# Patient Record
Sex: FEMALE | Race: WHITE | Employment: OTHER | URBAN - METROPOLITAN AREA
[De-identification: names, ages, dates, MRNs, and addresses within clinical notes are randomized per-mention and may not be internally consistent; named-entity substitution may affect disease eponyms.]

---

## 2018-12-21 ENCOUNTER — HOSPITAL ENCOUNTER (OUTPATIENT)
Dept: MAMMOGRAPHY | Age: 73
Discharge: HOME | End: 2018-12-21
Attending: FAMILY MEDICINE
Payer: MEDICARE

## 2018-12-21 ENCOUNTER — HOSPITAL ENCOUNTER (OUTPATIENT)
Dept: CT IMAGING | Age: 73
Discharge: HOME | End: 2018-12-21
Attending: FAMILY MEDICINE
Payer: MEDICARE

## 2018-12-21 DIAGNOSIS — Z12.39 SCREENING BREAST EXAMINATION: ICD-10-CM

## 2018-12-21 DIAGNOSIS — F17.200 NICOTINE DEPENDENCE: ICD-10-CM

## 2018-12-21 DIAGNOSIS — Z87.891 PERSONAL HISTORY OF NICOTINE DEPENDENCE: ICD-10-CM

## 2018-12-21 DIAGNOSIS — Z12.2 ENCOUNTER FOR SCREENING FOR MALIGNANT NEOPLASM OF LUNG: ICD-10-CM

## 2018-12-21 PROCEDURE — G0297 LDCT FOR LUNG CA SCREEN: HCPCS

## 2018-12-21 PROCEDURE — 77063 BREAST TOMOSYNTHESIS BI: CPT

## 2019-07-11 ENCOUNTER — HOSPITAL ENCOUNTER (OUTPATIENT)
Dept: MRI IMAGING | Age: 74
Discharge: HOME | End: 2019-07-11
Attending: ORTHOPAEDIC SURGERY
Payer: MEDICARE

## 2019-07-11 DIAGNOSIS — M54.50 LOW BACK PAIN: ICD-10-CM

## 2019-07-11 PROCEDURE — 72148 MRI LUMBAR SPINE W/O DYE: CPT

## 2021-11-15 ENCOUNTER — HOSPITAL ENCOUNTER (INPATIENT)
Age: 76
LOS: 5 days | Discharge: HOME | DRG: 286 | End: 2021-11-20
Attending: EMERGENCY MEDICINE | Admitting: INTERNAL MEDICINE
Payer: MEDICARE

## 2021-11-15 ENCOUNTER — APPOINTMENT (OUTPATIENT)
Dept: GENERAL RADIOLOGY | Age: 76
DRG: 286 | End: 2021-11-15
Attending: EMERGENCY MEDICINE
Payer: MEDICARE

## 2021-11-15 ENCOUNTER — APPOINTMENT (OUTPATIENT)
Dept: NON INVASIVE DIAGNOSTICS | Age: 76
DRG: 286 | End: 2021-11-15
Attending: INTERNAL MEDICINE
Payer: MEDICARE

## 2021-11-15 DIAGNOSIS — I48.91 ATRIAL FIBRILLATION, UNSPECIFIED TYPE (HCC): Primary | ICD-10-CM

## 2021-11-15 DIAGNOSIS — R07.9 CHEST PAIN: ICD-10-CM

## 2021-11-15 DIAGNOSIS — I50.9 ACUTE ON CHRONIC CONGESTIVE HEART FAILURE, UNSPECIFIED HEART FAILURE TYPE (HCC): ICD-10-CM

## 2021-11-15 LAB
ALBUMIN SERPL-MCNC: 3.7 G/DL (ref 3.5–5)
ALBUMIN/GLOB SERPL: 0.9 {RATIO} (ref 1.1–2.2)
ALP SERPL-CCNC: 77 U/L (ref 45–117)
ALT SERPL-CCNC: 18 U/L (ref 12–78)
ANION GAP SERPL CALC-SCNC: 9 MMOL/L (ref 5–15)
AST SERPL-CCNC: 10 U/L (ref 15–37)
ATRIAL RATE: 68 BPM
BASOPHILS # BLD: 0.1 K/UL (ref 0–0.1)
BASOPHILS NFR BLD: 1 % (ref 0–1)
BILIRUB SERPL-MCNC: 0.8 MG/DL (ref 0.2–1)
BNP SERPL-MCNC: 2178 PG/ML
BUN SERPL-MCNC: 9 MG/DL (ref 6–20)
BUN/CREAT SERPL: 14 (ref 12–20)
CALCIUM SERPL-MCNC: 9.6 MG/DL (ref 8.5–10.1)
CALCULATED R AXIS, ECG10: 54 DEGREES
CALCULATED T AXIS, ECG11: 9 DEGREES
CHLORIDE SERPL-SCNC: 108 MMOL/L (ref 97–108)
CK SERPL-CCNC: 28 U/L (ref 26–192)
CO2 SERPL-SCNC: 24 MMOL/L (ref 21–32)
CREAT SERPL-MCNC: 0.65 MG/DL (ref 0.55–1.02)
DIAGNOSIS, 93000: NORMAL
DIFFERENTIAL METHOD BLD: ABNORMAL
EOSINOPHIL # BLD: 0.2 K/UL (ref 0–0.4)
EOSINOPHIL NFR BLD: 2 % (ref 0–7)
ERYTHROCYTE [DISTWIDTH] IN BLOOD BY AUTOMATED COUNT: 15.8 % (ref 11.5–14.5)
GLOBULIN SER CALC-MCNC: 4 G/DL (ref 2–4)
GLUCOSE SERPL-MCNC: 109 MG/DL (ref 65–100)
HCT VFR BLD AUTO: 38.8 % (ref 35–47)
HGB BLD-MCNC: 11.8 G/DL (ref 11.5–16)
IMM GRANULOCYTES # BLD AUTO: 0.1 K/UL (ref 0–0.04)
IMM GRANULOCYTES NFR BLD AUTO: 1 % (ref 0–0.5)
LYMPHOCYTES # BLD: 1.3 K/UL (ref 0.8–3.5)
LYMPHOCYTES NFR BLD: 14 % (ref 12–49)
MCH RBC QN AUTO: 25.8 PG (ref 26–34)
MCHC RBC AUTO-ENTMCNC: 30.4 G/DL (ref 30–36.5)
MCV RBC AUTO: 84.7 FL (ref 80–99)
MONOCYTES # BLD: 0.8 K/UL (ref 0–1)
MONOCYTES NFR BLD: 9 % (ref 5–13)
NEUTS SEG # BLD: 6.7 K/UL (ref 1.8–8)
NEUTS SEG NFR BLD: 73 % (ref 32–75)
NRBC # BLD: 0 K/UL (ref 0–0.01)
NRBC BLD-RTO: 0 PER 100 WBC
PLATELET # BLD AUTO: 372 K/UL (ref 150–400)
PMV BLD AUTO: 11.2 FL (ref 8.9–12.9)
POTASSIUM SERPL-SCNC: 3.6 MMOL/L (ref 3.5–5.1)
PROT SERPL-MCNC: 7.7 G/DL (ref 6.4–8.2)
Q-T INTERVAL, ECG07: 322 MS
QRS DURATION, ECG06: 86 MS
QTC CALCULATION (BEZET), ECG08: 455 MS
RBC # BLD AUTO: 4.58 M/UL (ref 3.8–5.2)
SODIUM SERPL-SCNC: 141 MMOL/L (ref 136–145)
TROPONIN-HIGH SENSITIVITY: 8 NG/L (ref 0–51)
TSH SERPL DL<=0.05 MIU/L-ACNC: 1.26 UIU/ML (ref 0.36–3.74)
VENTRICULAR RATE, ECG03: 120 BPM
WBC # BLD AUTO: 9.1 K/UL (ref 3.6–11)

## 2021-11-15 PROCEDURE — 94761 N-INVAS EAR/PLS OXIMETRY MLT: CPT

## 2021-11-15 PROCEDURE — 84484 ASSAY OF TROPONIN QUANT: CPT

## 2021-11-15 PROCEDURE — 74011000250 HC RX REV CODE- 250: Performed by: EMERGENCY MEDICINE

## 2021-11-15 PROCEDURE — 83880 ASSAY OF NATRIURETIC PEPTIDE: CPT

## 2021-11-15 PROCEDURE — 82550 ASSAY OF CK (CPK): CPT

## 2021-11-15 PROCEDURE — 36415 COLL VENOUS BLD VENIPUNCTURE: CPT

## 2021-11-15 PROCEDURE — 74011250637 HC RX REV CODE- 250/637: Performed by: INTERNAL MEDICINE

## 2021-11-15 PROCEDURE — 65660000000 HC RM CCU STEPDOWN

## 2021-11-15 PROCEDURE — 93306 TTE W/DOPPLER COMPLETE: CPT

## 2021-11-15 PROCEDURE — 96374 THER/PROPH/DIAG INJ IV PUSH: CPT

## 2021-11-15 PROCEDURE — 74011250636 HC RX REV CODE- 250/636: Performed by: EMERGENCY MEDICINE

## 2021-11-15 PROCEDURE — 96375 TX/PRO/DX INJ NEW DRUG ADDON: CPT

## 2021-11-15 PROCEDURE — 99284 EMERGENCY DEPT VISIT MOD MDM: CPT

## 2021-11-15 PROCEDURE — 93005 ELECTROCARDIOGRAM TRACING: CPT

## 2021-11-15 PROCEDURE — 85025 COMPLETE CBC W/AUTO DIFF WBC: CPT

## 2021-11-15 PROCEDURE — 71046 X-RAY EXAM CHEST 2 VIEWS: CPT

## 2021-11-15 PROCEDURE — 84443 ASSAY THYROID STIM HORMONE: CPT

## 2021-11-15 PROCEDURE — 80053 COMPREHEN METABOLIC PANEL: CPT

## 2021-11-15 RX ORDER — APIXABAN 5 MG/1
5 TABLET, FILM COATED ORAL 2 TIMES DAILY
COMMUNITY
Start: 2021-10-29 | End: 2022-04-15

## 2021-11-15 RX ORDER — SODIUM CHLORIDE 0.9 % (FLUSH) 0.9 %
5-40 SYRINGE (ML) INJECTION AS NEEDED
Status: DISCONTINUED | OUTPATIENT
Start: 2021-11-15 | End: 2021-11-20 | Stop reason: HOSPADM

## 2021-11-15 RX ORDER — SODIUM CHLORIDE 0.9 % (FLUSH) 0.9 %
5-40 SYRINGE (ML) INJECTION EVERY 8 HOURS
Status: DISCONTINUED | OUTPATIENT
Start: 2021-11-15 | End: 2021-11-20 | Stop reason: HOSPADM

## 2021-11-15 RX ORDER — FUROSEMIDE 10 MG/ML
40 INJECTION INTRAMUSCULAR; INTRAVENOUS DAILY
Status: DISPENSED | OUTPATIENT
Start: 2021-11-16 | End: 2021-11-19

## 2021-11-15 RX ORDER — METOPROLOL TARTRATE 5 MG/5ML
2.5 INJECTION INTRAVENOUS
Status: DISCONTINUED | OUTPATIENT
Start: 2021-11-15 | End: 2021-11-20 | Stop reason: HOSPADM

## 2021-11-15 RX ORDER — OMEPRAZOLE 20 MG/1
20 CAPSULE, DELAYED RELEASE ORAL DAILY
COMMUNITY
Start: 2021-09-29

## 2021-11-15 RX ORDER — CELECOXIB 200 MG/1
200 CAPSULE ORAL DAILY
COMMUNITY
Start: 2021-09-29

## 2021-11-15 RX ORDER — DILTIAZEM HYDROCHLORIDE 300 MG/1
300 CAPSULE, EXTENDED RELEASE ORAL DAILY
COMMUNITY
Start: 2021-11-12 | End: 2021-11-20

## 2021-11-15 RX ORDER — PANTOPRAZOLE SODIUM 40 MG/1
40 TABLET, DELAYED RELEASE ORAL
Status: DISCONTINUED | OUTPATIENT
Start: 2021-11-16 | End: 2021-11-20 | Stop reason: HOSPADM

## 2021-11-15 RX ORDER — DILTIAZEM HYDROCHLORIDE 5 MG/ML
10 INJECTION INTRAVENOUS
Status: COMPLETED | OUTPATIENT
Start: 2021-11-15 | End: 2021-11-15

## 2021-11-15 RX ORDER — TRAMADOL HYDROCHLORIDE 50 MG/1
100 TABLET ORAL 2 TIMES DAILY
Status: DISCONTINUED | OUTPATIENT
Start: 2021-11-15 | End: 2021-11-20 | Stop reason: HOSPADM

## 2021-11-15 RX ORDER — FUROSEMIDE 10 MG/ML
40 INJECTION INTRAMUSCULAR; INTRAVENOUS
Status: COMPLETED | OUTPATIENT
Start: 2021-11-15 | End: 2021-11-15

## 2021-11-15 RX ADMIN — APIXABAN 5 MG: 5 TABLET, FILM COATED ORAL at 17:21

## 2021-11-15 RX ADMIN — TRAMADOL HYDROCHLORIDE 100 MG: 50 TABLET, COATED ORAL at 17:20

## 2021-11-15 RX ADMIN — Medication 10 ML: at 22:12

## 2021-11-15 RX ADMIN — DILTIAZEM HYDROCHLORIDE 10 MG: 5 INJECTION INTRAVENOUS at 11:25

## 2021-11-15 RX ADMIN — FUROSEMIDE 40 MG: 10 INJECTION, SOLUTION INTRAMUSCULAR; INTRAVENOUS at 11:28

## 2021-11-15 NOTE — Clinical Note
Right brachial vein. Accessed successfully. Radial access needle used. Using ultrasound guidance.  Number of attempts =  1.

## 2021-11-15 NOTE — Clinical Note
Diagnostic catheter removed over exchange length wire. This document is complete and the patient is ready for discharge.

## 2021-11-15 NOTE — Clinical Note
Sheath #1: Sheath: inserted. Sheath inserted/placed in the right brachialcephalic  vein.  4/5 slender

## 2021-11-15 NOTE — Clinical Note
Right radial artery. Accessed successfully. Micropuncture needle used. Using ultrasound guidance. Number of attempts =  2.

## 2021-11-15 NOTE — Clinical Note
TRANSFER - OUT REPORT:     Verbal report given to: Chaparro Lantigua. Report consisted of patient's Situation, Background, Assessment and   Recommendations(SBAR). Opportunity for questions and clarification was provided. Patient transported with a Registered Nurse. Patient transported to: [facility].

## 2021-11-15 NOTE — Clinical Note
Aspirin Registry Question:   Aspirin was discussed with physician prior to the procedure. Aspirin was not given prior to the procedure.  Eliquis 11/17/21

## 2021-11-15 NOTE — ACP (ADVANCE CARE PLANNING)
Advance Care Planning Note      NAME: Velasquez Crane   :  1945   MRN:  [id number]     Date/Time:  11/15/2021 4:26 PM    Active Diagnoses:  Hospital Problems  Date Reviewed: 2013          Codes Class Noted POA    CHF (congestive heart failure) ([facility] ICD-10-CM: I50.9  ICD-9-CM: 428.0  11/15/2021 Unknown        Atrial fibrillation with rapid ventricular response [facility]) ICD-10-CM: I48.91  ICD-9-CM: 427.31  11/15/2021 Unknown              These active diagnoses are of sufficient risk that focused discussion on advance care planning is indicated in order to allow the patient to thoughtfully consider personal goals of care, and if situations arise that prevent the ability to personally give input, to ensure appropriate representation of their personal desires for different levels and aggressiveness of care. Discussion:   Code status addressed and wants to be a Full Code. Patient wants central line and vasopressors if needed. Patient would also want a feeding tube, if needed, for nutritional support. Patient  would like to assign her daughter  as the surrogate decision maker. Persons present and participating in discussion: Gina Nobles MD,  [address] [place] [id number] Child [phone]           Time Spent:   Total time spent face-to-face in education and discussion: 16  minutes.          Rula Antonio MD   Hospitalist

## 2021-11-15 NOTE — PROGRESS NOTES
Pharmacy Medication Reconciliation     The patient was interviewed regarding current PTA medication list, use and drug allergies;  patient present in room and obtained permission from patient to discuss drug regimen with visitor(s) present. The patient was questioned regarding use of any other inhalers, topical products, over the counter medications, herbal medications, vitamin products or ophthalmic/nasal/otic medication use. Allergy Update: Penicillin g and Amoxicillin    Recommendations/Findings: The following amendments were made to the patient's active medication list on file at [address]:   1) Additions:   +apixaban  +diltiazem  +omeprazole  2) Deletions:   -arm brace, vitamin D, folic acid, norco, methotrexate po, percocet, forteo, zolpidem  3) Changes:   (Old regimen: tramadol 50mg bid /New regimen: tramadol 100mg bid)  Pertinent Findings: none    Clarified PTA med list with patient interview, rx query. PTA medication list was corrected to the following:     Prior to Admission Medications   Prescriptions Last Dose Informant Taking? CALCIUM PO 11/14/2021 at Unknown time Self Yes   Sig: Take 1,000 mg by mouth daily. Eliquis 5 mg tablet 11/15/2021 at 0900 Self Yes   Sig: Take 5 mg by mouth two (2) times a day. celecoxib (CELEBREX) 200 mg capsule 11/15/2021 at Unknown time Self Yes   Sig: Take 200 mg by mouth daily. dilTIAZem ER (TIAZAC) 300 mg capsule 11/15/2021 at Unknown time Self Yes   Sig: Take 300 mg by mouth daily. multivitamin, tx-iron-ca-min (THERA-M w/ IRON) 9 mg iron-400 mcg tab tablet 11/14/2021 at Unknown time Self Yes   Sig: Take 1 Tablet by mouth daily. omeprazole (PRILOSEC) 20 mg capsule 11/15/2021 at 0730 Self Yes   Sig: Take 20 mg by mouth daily. traMADol (ULTRAM) 50 mg tablet 11/15/2021 at 0800 Self Yes   Sig: Take 100 mg by mouth two (2) times a day.       Facility-Administered Medications: None        Thank you,  LADI Tony

## 2021-11-15 NOTE — H&P
Hospitalist Admission Note    NAME: Adah Blizzard   :  1945   MRN:  [id number]     Date/Time:  11/15/2021 2:52 PM    Patient PCP: Kalvin Leventhal, MD  ______________________________________________________________________  Given the patient's current clinical presentation, I have a high level of concern for decompensation if discharged from the emergency department. Complex decision making was performed, which includes reviewing the patient's available past medical records, laboratory results, and x-ray films. My assessment of this patient's clinical condition and my plan of care is as follows. Assessment / Plan:  A. fib with RVR  Elevated proBNP and shortness of breath concerning for CHF  Hypertension  We will admit to stepdown unit, status post Cardizem 10 mg push with improvement of the heart rate. Patient did not need any Cardizem drip  Status post IV Lasix 40 mg. We will continue with Lasix 40 mg daily  Check 2D echo  We will check 3 sets of troponin, first set was negative  Continue with Eliquis, Cardizem  Code Status: Full code  Surrogate Decision Maker: Her daughter Stefanie Stallings    DVT Prophylaxis: Eliquis  GI Prophylaxis: not indicated    Baseline: Ambulatory      Subjective:   CHIEF COMPLAINT: Shortness of breath and chest pain    HISTORY OF PRESENT ILLNESS:     Deyvi Zurita is a 77 y.o.  female past medical history of A. fib, who presents with complaint of shortness of breath and chest pain. She denies any nausea vomiting. Pain was between her shoulder blade, but by the time she got to the ED the pain had resolved. In the ED, she was found to be in A. fib with RVR, no acute ST changes on the EKG  Blood pressure was within normal limit ,she was saturating well on room air. Her labs revealed normal CBC and BMP, elevated proBNP. Chest x-ray showed cardiomegaly  We were asked to admit for work up and evaluation of the above problems.      Past Medical History:   Diagnosis Date    Anemia 7/12/2010    Arrhythmia     afib r/t low potassium     Arthritis 7/12/2010    Chronic pain     Eczema 7/12/2010    Edema 7/12/2010    GERD (gastroesophageal reflux disease) 7/12/2010    Hiatal hernia 7/12/2010    Smoker 7/12/2010        Past Surgical History:   Procedure Laterality Date    HX GYN      hysterectomy    HX ORTHOPAEDIC      left wrist open reduction internal fixation.  HX OTHER SURGICAL      facelift    HI ABDOMEN SURGERY PROC UNLISTED      Lap band    HI COLONOSCOPY FLX DX W/COLLJ SPEC WHEN PFRMD  2/9/2012            Social History     Tobacco Use    Smoking status: Current Every Day Smoker     Packs/day: 0.25     Years: 40.00     Pack years: 10.00    Smokeless tobacco: Not on file   Substance Use Topics    Alcohol use: Yes     Comment: occassionally        Family History   Problem Relation Age of Onset    Cancer Mother     Cancer Father     Breast Cancer Maternal Aunt         not sure     Allergies   Allergen Reactions    Penicillin G Rash    Amoxicillin Rash        Prior to Admission medications    Medication Sig Start Date End Date Taking? Authorizing Provider   multivitamin, tx-iron-ca-min (THERA-M w/ IRON) 9 mg iron-400 mcg tab tablet Take 1 Tablet by mouth daily. Yes Provider, Historical   Eliquis 5 mg tablet Take 5 mg by mouth two (2) times a day. 10/29/21  Yes Provider, Historical   dilTIAZem ER (TIAZAC) 300 mg capsule Take 300 mg by mouth daily. 11/12/21  Yes Provider, Historical   omeprazole (PRILOSEC) 20 mg capsule Take 20 mg by mouth daily. 9/29/21  Yes Provider, Historical   celecoxib (CELEBREX) 200 mg capsule Take 200 mg by mouth daily. 9/29/21  Yes Provider, Historical   CALCIUM PO Take 1,000 mg by mouth daily. Yes Provider, Historical   traMADol (ULTRAM) 50 mg tablet Take 100 mg by mouth two (2) times a day.    Yes Provider, Historical       REVIEW OF SYSTEMS:     I am not able to complete the review of systems because: The patient is intubated and sedated    The patient has altered mental status due to his acute medical problems    The patient has baseline aphasia from prior stroke(s)    The patient has baseline dementia and is not reliable historian    The patient is in acute medical distress and unable to provide information           Total of 12 systems reviewed as follows:       POSITIVE= underlined text  Negative = text not underlined  General:  fever, chills, sweats, generalized weakness, weight loss/gain,      loss of appetite   Eyes:    blurred vision, eye pain, loss of vision, double vision  ENT:    rhinorrhea, pharyngitis   Respiratory:   cough, sputum production, SOB, OSORIO, wheezing, pleuritic pain   Cardiology:   chest pain, palpitations, orthopnea, PND, edema, syncope   Gastrointestinal:  abdominal pain , N/V, diarrhea, dysphagia, constipation, bleeding   Genitourinary:  frequency, urgency, dysuria, hematuria, incontinence   Muskuloskeletal :  arthralgia, myalgia, back pain  Hematology:  easy bruising, nose or gum bleeding, lymphadenopathy   Dermatological: rash, ulceration, pruritis, color change / jaundice  Endocrine:   hot flashes or polydipsia   Neurological:  headache, dizziness, confusion, focal weakness, paresthesia,     Speech difficulties, memory loss, gait difficulty  Psychological: Feelings of anxiety, depression, agitation    Objective:   VITALS:    Visit Vitals  /83   Pulse 79   Temp 98.2 °F (36.8 °C)   Resp 19   Ht 5' 6\" (1.676 m)   Wt 86 kg (189 lb 9.5 oz)   SpO2 92%   BMI 30.60 kg/m²       PHYSICAL EXAM:    General:    Alert, cooperative, no distress, appears stated age. HEENT: Atraumatic, anicteric sclerae, pink conjunctivae     No oral ulcers, mucosa moist, throat clear, dentition fair  Neck:  Supple, symmetrical,  thyroid: non tender  Lungs:   Clear to auscultation bilaterally. No Wheezing or Rhonchi. No rales. Chest wall:  No tenderness  No Accessory muscle use.   Heart:   Regular rhythm,  No  murmur   No edema  Abdomen:   Soft, non-tender. Not distended. Bowel sounds normal  Extremities: No cyanosis. No clubbing,      Skin turgor normal, Capillary refill normal, Radial dial pulse 2+  Skin:     Not pale. Not Jaundiced  No rashes   Psych:  Good insight. Not depressed. Not anxious or agitated. Neurologic: EOMs intact. No facial asymmetry. No aphasia or slurred speech. Symmetrical strength, Sensation grossly intact. Alert and oriented X 4.     _______________________________________________________________________  Care Plan discussed with:    Comments   Patient x    Family      RN x    Care Manager                    Consultant:      _______________________________________________________________________  Expected  Disposition:   Home with Family    HH/PT/OT/RN    SNF/LTC    JESSNEIA    ________________________________________________________________________  TOTAL TIME: 65Minutes    Critical Care Provided     Minutes non procedure based      Comments    x Reviewed previous records   >50% of visit spent in counseling and coordination of care x Discussion with patient and/or family and questions answered       ________________________________________________________________________  Signed: Nadege Mcdaniel MD    Procedures: see electronic medical records for all procedures/Xrays and details which were not copied into this note but were reviewed prior to creation of Plan.     LAB DATA REVIEWED:    Recent Results (from the past 24 hour(s))   EKG, 12 LEAD, INITIAL    Collection Time: 11/15/21 10:19 AM   Result Value Ref Range    Ventricular Rate 120 BPM    Atrial Rate 68 BPM    QRS Duration 86 ms    Q-T Interval 322 ms    QTC Calculation (Bezet) 455 ms    Calculated R Axis 54 degrees    Calculated T Axis 9 degrees    Diagnosis       Atrial fibrillation with rapid ventricular response  Low voltage QRS  Nonspecific ST and T wave abnormality  When compared with ECG of 25-APR-2013 13:31,  Atrial fibrillation has replaced Ectopic atrial rhythm  Confirmed by Lilian Bowden ([id number]) on 11/15/2021 2:25:29 PM     CBC WITH AUTOMATED DIFF    Collection Time: 11/15/21 10:23 AM   Result Value Ref Range    WBC 9.1 3.6 - 11.0 K/uL    RBC 4.58 3.80 - 5.20 M/uL    HGB 11.8 11.5 - 16.0 g/dL    HCT 38.8 35.0 - 47.0 %    MCV 84.7 80.0 - 99.0 FL    MCH 25.8 (L) 26.0 - 34.0 PG    MCHC 30.4 30.0 - 36.5 g/dL    RDW 15.8 (H) 11.5 - 14.5 %    PLATELET 639 403 - 689 K/uL    MPV 11.2 8.9 - 12.9 FL    NRBC 0.0 0  WBC    ABSOLUTE NRBC 0.00 0.00 - 0.01 K/uL    NEUTROPHILS 73 32 - 75 %    LYMPHOCYTES 14 12 - 49 %    MONOCYTES 9 5 - 13 %    EOSINOPHILS 2 0 - 7 %    BASOPHILS 1 0 - 1 %    IMMATURE GRANULOCYTES 1 (H) 0.0 - 0.5 %    ABS. NEUTROPHILS 6.7 1.8 - 8.0 K/UL    ABS. LYMPHOCYTES 1.3 0.8 - 3.5 K/UL    ABS. MONOCYTES 0.8 0.0 - 1.0 K/UL    ABS. EOSINOPHILS 0.2 0.0 - 0.4 K/UL    ABS. BASOPHILS 0.1 0.0 - 0.1 K/UL    ABS. IMM. GRANS. 0.1 (H) 0.00 - 0.04 K/UL    DF AUTOMATED     METABOLIC PANEL, COMPREHENSIVE    Collection Time: 11/15/21 10:23 AM   Result Value Ref Range    Sodium 141 136 - 145 mmol/L    Potassium 3.6 3.5 - 5.1 mmol/L    Chloride 108 97 - 108 mmol/L    CO2 24 21 - 32 mmol/L    Anion gap 9 5 - 15 mmol/L    Glucose 109 (H) 65 - 100 mg/dL    BUN 9 6 - 20 MG/DL    Creatinine 0.65 0.55 - 1.02 MG/DL    BUN/Creatinine ratio 14 12 - 20      GFR est AA >60 >60 ml/min/1.73m2    GFR est non-AA >60 >60 ml/min/1.73m2    Calcium 9.6 8.5 - 10.1 MG/DL    Bilirubin, total 0.8 0.2 - 1.0 MG/DL    ALT (SGPT) 18 12 - 78 U/L    AST (SGOT) 10 (L) 15 - 37 U/L    Alk.  phosphatase 77 45 - 117 U/L    Protein, total 7.7 6.4 - 8.2 g/dL    Albumin 3.7 3.5 - 5.0 g/dL    Globulin 4.0 2.0 - 4.0 g/dL    A-G Ratio 0.9 (L) 1.1 - 2.2     CK W/ REFLX CKMB    Collection Time: 11/15/21 10:23 AM   Result Value Ref Range    CK 28 26 - 192 U/L   TROPONIN-HIGH SENSITIVITY    Collection Time: 11/15/21 10:23 AM   Result Value Ref Range    Troponin-High Sensitivity 8 0 - 51 ng/L   NT-PRO BNP    Collection Time: 11/15/21 10:23 AM   Result Value Ref Range    NT pro-BNP 2,178 (H) <125 PG/ML

## 2021-11-15 NOTE — ED PROVIDER NOTES
EMERGENCY DEPARTMENT HISTORY AND PHYSICAL EXAM      Date: 11/15/2021  Patient Name: Ron Mejia    History of Presenting Illness     Chief Complaint   Patient presents with    Back Pain     lower back pain acute flare for two weeks    Chest Pain     chest pain onset this week from \"back in afib\" saw cardiolgist yesterday; but chest pain started last night under left shoulder blade       History Provided By: Patient    HPI: Ron Mejia, 77 y.o. female presents to the ED with cc of chest pain and shortness of breath. 79-year-old female with a history of atrial fibrillation on Eliquis, anemia, tobacco use and chronic pain presents emergency department with a chief complaint of chest pain or shortness of breath. Patient reports that she saw her cardiologist as an outpatient. Was found to be in atrial fibrillation. History of one episode of atrial fibrillation in the past which resolved with cardioversion. Patient reports cardiology increase her diltiazem, however she has not picked up the medication yet. She reports last night was \"rough\" with intermittent chest pressure. Patient reports a left sided chest pain radiating to the shoulder that is worse with exertion. The last 10 to 15 minutes and then resolves. She reports is associated with shortness of breath. Shortness of breath is worse with exertion, she is using her inhaler without relief. Patient denies abdominal pain, nausea, vomiting or diarrhea. Denies leg swelling. She is not on Lasix. There are no other complaints, changes, or physical findings at this time. PCP: Brendon Gupta MD    No current facility-administered medications on file prior to encounter. Current Outpatient Medications on File Prior to Encounter   Medication Sig Dispense Refill    multivitamin, tx-iron-ca-min (THERA-M w/ IRON) 9 mg iron-400 mcg tab tablet Take 1 Tablet by mouth daily.  Eliquis 5 mg tablet Take 5 mg by mouth two (2) times a day.       dilTIAZem ER (TIAZAC) 300 mg capsule Take 300 mg by mouth daily.  omeprazole (PRILOSEC) 20 mg capsule Take 20 mg by mouth daily.  celecoxib (CELEBREX) 200 mg capsule Take 200 mg by mouth daily.  CALCIUM PO Take 1,000 mg by mouth daily.  traMADol (ULTRAM) 50 mg tablet Take 100 mg by mouth two (2) times a day.  [DISCONTINUED] HYDROcodone-acetaminophen (NORCO) 5-325 mg per tablet Take 2 Tabs by mouth every four (4) hours as needed for Pain (pain). 40 Tab 0    [DISCONTINUED] oxyCODONE-acetaminophen (PERCOCET) 5-325 mg per tablet Take 2 Tabs by mouth every four (4) hours as needed for Pain (severe pain). 30 Tab 0    [DISCONTINUED] Cholecalciferol, Vitamin D3, 50,000 unit cap Take 1 Tab by mouth every Tuesday.  [DISCONTINUED] zolpidem (AMBIEN) 5 mg tablet Take 5 mg by mouth nightly as needed.  [DISCONTINUED] oxyCODONE-acetaminophen (PERCOCET) 5-325 mg per tablet Take 1 Tab by mouth every six (6) hours as needed for Pain. 10 Tab 0    [DISCONTINUED] Arm Brace (ARM SLING) Misc Apply to left arm   1 Device 0    [DISCONTINUED] METHOTREXATE 6 Tabs by Does Not Apply route every seven (7) days.  [DISCONTINUED] FOLIC ACID PO Take 1 mg by mouth daily.  [DISCONTINUED] TERIPARATIDE (FORTEO SC) by SubCUTAneous route daily.  [DISCONTINUED] CELECOXIB (CELEBREX PO) Take 200 mg by mouth daily.  [DISCONTINUED] MULTIVITAMINS (MULTIVITAMIN PO) Take  by mouth. Past History     Past Medical History:  Past Medical History:   Diagnosis Date    Anemia 7/12/2010    Arrhythmia     afib r/t low potassium     Arthritis 7/12/2010    Chronic pain     Eczema 7/12/2010    Edema 7/12/2010    GERD (gastroesophageal reflux disease) 7/12/2010    Hiatal hernia 7/12/2010    Smoker 7/12/2010       Past Surgical History:  Past Surgical History:   Procedure Laterality Date    HX GYN      hysterectomy    HX ORTHOPAEDIC      left wrist open reduction internal fixation.     HX OTHER SURGICAL      facelift    OH ABDOMEN SURGERY PROC UNLISTED      Lap band    OH COLONOSCOPY FLX DX W/COLLJ SPEC WHEN PFRMD  2/9/2012            Family History:  Family History   Problem Relation Age of Onset    Cancer Mother     Cancer Father     Breast Cancer Maternal Aunt         not sure       Social History:  Social History     Tobacco Use    Smoking status: Current Every Day Smoker     Packs/day: 0.25     Years: 40.00     Pack years: 10.00    Smokeless tobacco: Not on file   Substance Use Topics    Alcohol use: Yes     Comment: occassionally    Drug use: No       Allergies: Allergies   Allergen Reactions    Penicillin G Rash    Amoxicillin Rash         Review of Systems   Review of Systems   Constitutional: Negative for activity change, chills and fever. HENT: Negative for facial swelling and voice change. Eyes: Negative for redness. Respiratory: Positive for shortness of breath. Negative for cough and wheezing. Cardiovascular: Positive for chest pain. Negative for leg swelling. Gastrointestinal: Negative for abdominal pain, diarrhea, nausea and vomiting. Genitourinary: Negative for decreased urine volume. Musculoskeletal: Negative for gait problem. Skin: Negative for pallor and rash. Neurological: Negative for tremors and facial asymmetry. Psychiatric/Behavioral: Negative for agitation. All other systems reviewed and are negative. Physical Exam   Physical Exam  Vitals and nursing note reviewed. Constitutional:       Comments: 31-year-old female, resting in bed, no acute distress   HENT:      Head: Normocephalic and atraumatic. Cardiovascular:      Rate and Rhythm: Tachycardia present. Rhythm irregular. Pulses:           Radial pulses are 2+ on the right side and 2+ on the left side. Heart sounds: No murmur heard. No friction rub. No gallop.     Pulmonary:      Effort: Pulmonary effort is normal.      Breath sounds: Examination of the right-lower field reveals decreased breath sounds. Examination of the left-lower field reveals decreased breath sounds. Decreased breath sounds present. Abdominal:      Palpations: Abdomen is soft. Tenderness: There is no abdominal tenderness. Musculoskeletal:         General: Normal range of motion. Cervical back: Normal range of motion. Right lower leg: Edema present. Left lower leg: Edema present. Comments: 1+ edema bilaterally   Skin:     General: Skin is warm. Capillary Refill: Capillary refill takes less than 2 seconds. Neurological:      General: No focal deficit present. Mental Status: She is alert.    Psychiatric:         Mood and Affect: Mood normal.         Diagnostic Study Results     Labs -     Recent Results (from the past 12 hour(s))   EKG, 12 LEAD, INITIAL    Collection Time: 11/15/21 10:19 AM   Result Value Ref Range    Ventricular Rate 120 BPM    Atrial Rate 68 BPM    QRS Duration 86 ms    Q-T Interval 322 ms    QTC Calculation (Bezet) 455 ms    Calculated R Axis 54 degrees    Calculated T Axis 9 degrees    Diagnosis       Atrial fibrillation with rapid ventricular response  Low voltage QRS  Nonspecific ST and T wave abnormality  When compared with ECG of 25-APR-2013 13:31,  Atrial fibrillation has replaced Ectopic atrial rhythm  Confirmed by Susan Austin ([id number]) on 11/15/2021 2:25:29 PM     CBC WITH AUTOMATED DIFF    Collection Time: 11/15/21 10:23 AM   Result Value Ref Range    WBC 9.1 3.6 - 11.0 K/uL    RBC 4.58 3.80 - 5.20 M/uL    HGB 11.8 11.5 - 16.0 g/dL    HCT 38.8 35.0 - 47.0 %    MCV 84.7 80.0 - 99.0 FL    MCH 25.8 (L) 26.0 - 34.0 PG    MCHC 30.4 30.0 - 36.5 g/dL    RDW 15.8 (H) 11.5 - 14.5 %    PLATELET 186 879 - 452 K/uL    MPV 11.2 8.9 - 12.9 FL    NRBC 0.0 0  WBC    ABSOLUTE NRBC 0.00 0.00 - 0.01 K/uL    NEUTROPHILS 73 32 - 75 %    LYMPHOCYTES 14 12 - 49 %    MONOCYTES 9 5 - 13 %    EOSINOPHILS 2 0 - 7 %    BASOPHILS 1 0 - 1 %    IMMATURE GRANULOCYTES 1 (H) 0.0 - 0.5 %    ABS. NEUTROPHILS 6.7 1.8 - 8.0 K/UL    ABS. LYMPHOCYTES 1.3 0.8 - 3.5 K/UL    ABS. MONOCYTES 0.8 0.0 - 1.0 K/UL    ABS. EOSINOPHILS 0.2 0.0 - 0.4 K/UL    ABS. BASOPHILS 0.1 0.0 - 0.1 K/UL    ABS. IMM. GRANS. 0.1 (H) 0.00 - 0.04 K/UL    DF AUTOMATED     METABOLIC PANEL, COMPREHENSIVE    Collection Time: 11/15/21 10:23 AM   Result Value Ref Range    Sodium 141 136 - 145 mmol/L    Potassium 3.6 3.5 - 5.1 mmol/L    Chloride 108 97 - 108 mmol/L    CO2 24 21 - 32 mmol/L    Anion gap 9 5 - 15 mmol/L    Glucose 109 (H) 65 - 100 mg/dL    BUN 9 6 - 20 MG/DL    Creatinine 0.65 0.55 - 1.02 MG/DL    BUN/Creatinine ratio 14 12 - 20      GFR est AA >60 >60 ml/min/1.73m2    GFR est non-AA >60 >60 ml/min/1.73m2    Calcium 9.6 8.5 - 10.1 MG/DL    Bilirubin, total 0.8 0.2 - 1.0 MG/DL    ALT (SGPT) 18 12 - 78 U/L    AST (SGOT) 10 (L) 15 - 37 U/L    Alk. phosphatase 77 45 - 117 U/L    Protein, total 7.7 6.4 - 8.2 g/dL    Albumin 3.7 3.5 - 5.0 g/dL    Globulin 4.0 2.0 - 4.0 g/dL    A-G Ratio 0.9 (L) 1.1 - 2.2     CK W/ REFLX CKMB    Collection Time: 11/15/21 10:23 AM   Result Value Ref Range    CK 28 26 - 192 U/L   TROPONIN-HIGH SENSITIVITY    Collection Time: 11/15/21 10:23 AM   Result Value Ref Range    Troponin-High Sensitivity 8 0 - 51 ng/L   NT-PRO BNP    Collection Time: 11/15/21 10:23 AM   Result Value Ref Range    NT pro-BNP 2,178 (H) <125 PG/ML   TSH 3RD GENERATION    Collection Time: 11/15/21  3:32 PM   Result Value Ref Range    TSH 1.26 0.36 - 3.74 uIU/mL       Radiologic Studies -   XR CHEST PA LAT   Final Result   1. Enlarged cardiac silhouette, otherwise no acute disease           CT Results  (Last 48 hours)    None        CXR Results  (Last 48 hours)               11/15/21 1048  XR CHEST PA LAT Final result    Impression:  1. Enlarged cardiac silhouette, otherwise no acute disease           Narrative:  INDICATION:  Shortness of Breath        EXAM: Chest 2 views.         Comparison:None       Findings: Cardiac silhouette is enlarged. Pulmonary vasculature is not engorged. There are no focal parenchymal opacities, effusions, or pneumothorax. Lungs are   hyperinflated. Patient is post gastric banding. Medical Decision Making   I am the first provider for this patient. I reviewed the vital signs, available nursing notes, past medical history, past surgical history, family history and social history. Vital Signs-Reviewed the patient's vital signs. Patient Vitals for the past 12 hrs:   Temp Pulse Resp BP SpO2   11/15/21 1603 97.9 °F (36.6 °C) 87 20 125/68 93 %   11/15/21 1226 -- 79 19 -- 92 %   11/15/21 1125 -- 95 -- 124/83 --   11/15/21 1013 98.2 °F (36.8 °C) (!) 126 22 (!) 141/72 96 %     Records Reviewed: Nursing Notes and Old Medical Records    Provider Notes (Medical Decision Making):     36-year-old female with a history of atrial fibrillation presents with chest pain or shortness of breath. Suspect this is secondary to A. fib, initial EKG shows A. fib with RVR. No ischemic changes. Troponin is nonspecifically elevated, however below 99th percentile. Patient is tachypneic, appears mildly fluid overloaded. Oxygen rates between the 90s and 92%. Chest x-ray shows cardiomegaly, suspect some element of pulmonary edema in setting of A. Fib. We will attempt rate control and diuresis. ED Course:   Initial assessment performed. The patients presenting problems have been discussed, and they are in agreement with the care plan formulated and outlined with them. I have encouraged them to ask questions as they arise throughout their visit. ED Course as of 11/15/21 1637   Mon Nov 15, 2021   1113 EKG interpreted by me. Shows atrial fibrillation with a HR of 120. No ST elevations or depressions concerning for ischemia. Normal intervals. [MB]   1252 O2 Sat (%): 92 % [MB]   1252 CBC is unremarkable, CMP is unremarkable. BNP is elevated, troponin nonspecifically elevated to 8.  [MB] 1308 On reassessment, patient remained saturating 90 to 92%, she is diuresing, will admit. HR improved with diltiazem, cardiac monitor still notable for atrial fibrillation [MB]      ED Course User Index  [MB] Faye Scherer MD       Patient discussed with hospitalist for admission given borderline hypoxia, chest pain and fluid overload. Ministerio Reyna MD      Disposition:    Admitted      Diagnosis     Clinical Impression:   1. Atrial fibrillation, unspecified type ([facility] [address]    2. Acute on chronic congestive heart failure, unspecified heart failure type [facility])        Attestations:    Ministerio Reyna MD    Please note that this dictation was completed with [employer], the computer voice recognition software. Quite often unanticipated grammatical, syntax, homophones, and other interpretive errors are inadvertently transcribed by the computer software. Please disregard these errors. Please excuse any errors that have escaped final proofreading. Thank you.

## 2021-11-15 NOTE — ED NOTES
Patient is being transferred to [address], Room # 0342 [id number]. Report given to Sofia Boucher RN on Ana Zapata for routine progression of care. Report consisted of the following information SBAR, Kardex, ED Summary, MAR and Recent Results. Patient transferred to receiving unit by: Emre Greco. Outstanding consults needed: No     Next labs due: Yes    The following personal items will be sent with the patient during transfer to the floor: All valuables:    Cardiac monitoring ordered: Yes    The following CURRENT information was reported to the receiving RN:    Code status: Full Code at time of transfer    Last set of vital signs:  Vital Signs  Level of Consciousness: Alert (0) (11/15/21 1013)  Temp: 98.2 °F (36.8 °C) (11/15/21 1013)  Temp Source: Oral (11/15/21 1013)  Pulse (Heart Rate): 79 (11/15/21 1226)  Cardiac Rhythm: Atrial Fib (11/15/21 1135)  Resp Rate: 19 (11/15/21 1226)  BP: 124/83 (11/15/21 1125)  MAP (Calculated): 97 (11/15/21 1125)  BP 1 Method: Automatic (11/15/21 1013)  MEWS Score: 4 (11/15/21 1013)         Oxygen Therapy  O2 Sat (%): 92 % (11/15/21 1226)  Pulse via Oximetry: 77 beats per minute (11/15/21 1226)  O2 Device: None (Room air) (11/15/21 1134)      Last pain assessment:  Pain 1  Pain Scale 1: Numeric (0 - 10)  Pain Intensity 1: 0  Pain Location 1: Chest      Wounds: No     Urinary catheter: voiding  Is there a marquez order: No     LDAs:       Peripheral IV 11/15/21 Left Antecubital (Active)   Site Assessment Clean, dry, & intact 11/15/21 1039   Phlebitis Assessment 0 11/15/21 1039   Infiltration Assessment 0 11/15/21 1039   Dressing Status Clean, dry, & intact 11/15/21 1039   Dressing Type Transparent 11/15/21 1039   Hub Color/Line Status Pink; Flushed 11/15/21 1039         Opportunity for questions and clarification was provided.     Myke Chin RN

## 2021-11-15 NOTE — PROGRESS NOTES
TRANSFER - IN REPORT:    Verbal report received from LAURITA Gunter(name) on Adam Button  being received from ED(unit) for routine progression of care      Report consisted of patients Situation, Background, Assessment and   Recommendations(SBAR). Information from the following report(s) Cardiac Rhythm afib was reviewed with the receiving nurse. Opportunity for questions and clarification was provided. Assessment completed upon patients arrival to unit and care assumed. Primary Nurse Talon Corbin RN and Sheryl Ricci RN performed a dual skin assessment on this patient No impairment noted  Maynor score is 23    End of Shift Note    Bedside shift change report given to  (oncoming nurse) by Talon Corbin RN (offgoing nurse). Report included the following information SBAR, Kardex, Intake/Output, MAR and Cardiac Rhythm afib    Shift worked:  7a-7p     Shift summary and any significant changes:     Cardiology consult called, patient has been rate controlled afib since arrival on unit      Concerns for physician to address:  plan? Zone phone for oncoming shift:          Activity:  Activity Level: Up ad eric  Number times ambulated in hallways past shift: 0  Number of times OOB to chair past shift: 0    Cardiac:   Cardiac Monitoring: Yes      Cardiac Rhythm: Atrial Fib    Access:   Current line(s): PIV     Genitourinary:   Urinary status: voiding    Respiratory:   O2 Device: None (Room air)  Chronic home O2 use?: NO  Incentive spirometer at bedside: NO     GI:  Last Bowel Movement Date:  (not since admission)  Current diet:  ADULT DIET Regular; Low Fat/Low Chol/High Fiber/2 gm Na  Passing flatus: YES  Tolerating current diet: YES       Pain Management:   Patient states pain is manageable on current regimen: YES    Skin:  Maynor Score: 23  Interventions: limit briefs    Patient Safety:  Fall Score:  Total Score: 1  Interventions: bed/chair alarm       Length of Stay:  Expected LOS: - - -  Actual LOS: 0      Mary Do RN

## 2021-11-16 LAB
ANION GAP SERPL CALC-SCNC: 7 MMOL/L (ref 5–15)
AV R PG: 69.92 MMHG
BUN SERPL-MCNC: 9 MG/DL (ref 6–20)
BUN/CREAT SERPL: 16 (ref 12–20)
CALCIUM SERPL-MCNC: 9.2 MG/DL (ref 8.5–10.1)
CHLORIDE SERPL-SCNC: 107 MMOL/L (ref 97–108)
CO2 SERPL-SCNC: 26 MMOL/L (ref 21–32)
CREAT SERPL-MCNC: 0.57 MG/DL (ref 0.55–1.02)
ECHO AO ASC DIAM: 3.73 CM
ECHO AR MAX VEL PISA: 416.24 CM/S
ECHO AV AREA PEAK VELOCITY: 1.84 CM2
ECHO AV AREA PEAK VELOCITY: 1.99 CM2
ECHO AV AREA VTI: 2.12 CM2
ECHO AV AREA/BSA VTI: 1.1 CM2/M2
ECHO AV MEAN GRADIENT: 9.47 MMHG
ECHO AV PEAK GRADIENT: 12.41 MMHG
ECHO AV PEAK GRADIENT: 14.56 MMHG
ECHO AV PEAK VELOCITY: 176.12 CM/S
ECHO AV PEAK VELOCITY: 190.62 CM/S
ECHO AV REGURGITANT PHT: 371.89 MS
ECHO AV VTI: 33.1 CM
ECHO LA AREA 4C: 33.48 CM2
ECHO LA MAJOR AXIS: 5.08 CM
ECHO LA MINOR AXIS: 2.61 CM
ECHO LA VOL 4C: 125.86 ML (ref 22–52)
ECHO LA VOLUME INDEX A4C: 64.54 ML/M2 (ref 16–28)
ECHO LV EDV A4C: 71.56 ML
ECHO LV EDV INDEX A4C: 36.7 ML/M2
ECHO LV EJECTION FRACTION A4C: 58 PERCENT
ECHO LV ESV A4C: 29.91 ML
ECHO LV ESV INDEX A4C: 15.3 ML/M2
ECHO LV INTERNAL DIMENSION DIASTOLIC: 4.32 CM (ref 3.9–5.3)
ECHO LV INTERNAL DIMENSION SYSTOLIC: 3.24 CM
ECHO LV IVSD: 1.15 CM (ref 0.6–0.9)
ECHO LV MASS 2D: 170.6 G (ref 67–162)
ECHO LV MASS INDEX 2D: 87.5 G/M2 (ref 43–95)
ECHO LV POSTERIOR WALL DIASTOLIC: 1.11 CM (ref 0.6–0.9)
ECHO LVOT DIAM: 2.23 CM
ECHO LVOT PEAK GRADIENT: 3.24 MMHG
ECHO LVOT PEAK VELOCITY: 90.04 CM/S
ECHO LVOT SV: 70 ML
ECHO LVOT VTI: 17.97 CM
ECHO MV A VELOCITY: 33.84 CM/S
ECHO MV AREA PHT: 8.5 CM2
ECHO MV AREA VTI: 1.98 CM2
ECHO MV E DECELERATION TIME (DT): 135.32 MS
ECHO MV E VELOCITY: 141.72 CM/S
ECHO MV E/A RATIO: 4.19
ECHO MV MAX VELOCITY: 214.61 CM/S
ECHO MV MEAN GRADIENT: 4.56 MMHG
ECHO MV PEAK GRADIENT: 18.42 MMHG
ECHO MV PRESSURE HALF TIME (PHT): 25.88 MS
ECHO MV REGURGITANT RADIUS PISA: 0.76 CM
ECHO MV VTI: 35.3 CM
ECHO PV MAX VELOCITY: 129.21 CM/S
ECHO PV PEAK INSTANTANEOUS GRADIENT SYSTOLIC: 6.68 MMHG
ECHO TV REGURGITANT MAX VELOCITY: 372.26 CM/S
ECHO TV REGURGITANT MAX VELOCITY: 425.8 CM/S
ECHO TV REGURGITANT PEAK GRADIENT: 72.72 MMHG
ERYTHROCYTE [DISTWIDTH] IN BLOOD BY AUTOMATED COUNT: 15.7 % (ref 11.5–14.5)
GLUCOSE SERPL-MCNC: 88 MG/DL (ref 65–100)
HCT VFR BLD AUTO: 38 % (ref 35–47)
HGB BLD-MCNC: 11.1 G/DL (ref 11.5–16)
LVOT MG: 1.74 MMHG
MCH RBC QN AUTO: 25.3 PG (ref 26–34)
MCHC RBC AUTO-ENTMCNC: 29.2 G/DL (ref 30–36.5)
MCV RBC AUTO: 86.6 FL (ref 80–99)
NRBC # BLD: 0 K/UL (ref 0–0.01)
NRBC BLD-RTO: 0 PER 100 WBC
PLATELET # BLD AUTO: 358 K/UL (ref 150–400)
PMV BLD AUTO: 11.2 FL (ref 8.9–12.9)
POTASSIUM SERPL-SCNC: 3.5 MMOL/L (ref 3.5–5.1)
RBC # BLD AUTO: 4.39 M/UL (ref 3.8–5.2)
SODIUM SERPL-SCNC: 140 MMOL/L (ref 136–145)
TROPONIN-HIGH SENSITIVITY: 9 NG/L (ref 0–51)
WBC # BLD AUTO: 8 K/UL (ref 3.6–11)

## 2021-11-16 PROCEDURE — 84484 ASSAY OF TROPONIN QUANT: CPT

## 2021-11-16 PROCEDURE — 74011250637 HC RX REV CODE- 250/637: Performed by: INTERNAL MEDICINE

## 2021-11-16 PROCEDURE — 74011250636 HC RX REV CODE- 250/636: Performed by: INTERNAL MEDICINE

## 2021-11-16 PROCEDURE — 85027 COMPLETE CBC AUTOMATED: CPT

## 2021-11-16 PROCEDURE — 74011000250 HC RX REV CODE- 250: Performed by: INTERNAL MEDICINE

## 2021-11-16 PROCEDURE — 36415 COLL VENOUS BLD VENIPUNCTURE: CPT

## 2021-11-16 PROCEDURE — 65660000000 HC RM CCU STEPDOWN

## 2021-11-16 PROCEDURE — 80048 BASIC METABOLIC PNL TOTAL CA: CPT

## 2021-11-16 RX ADMIN — TRAMADOL HYDROCHLORIDE 100 MG: 50 TABLET, COATED ORAL at 17:05

## 2021-11-16 RX ADMIN — METOPROLOL TARTRATE 2.5 MG: 5 INJECTION INTRAVENOUS at 11:10

## 2021-11-16 RX ADMIN — APIXABAN 5 MG: 5 TABLET, FILM COATED ORAL at 17:05

## 2021-11-16 RX ADMIN — TRAMADOL HYDROCHLORIDE 100 MG: 50 TABLET, COATED ORAL at 08:30

## 2021-11-16 RX ADMIN — DILTIAZEM HYDROCHLORIDE 300 MG: 180 CAPSULE, COATED, EXTENDED RELEASE ORAL at 09:09

## 2021-11-16 RX ADMIN — FUROSEMIDE 40 MG: 10 INJECTION, SOLUTION INTRAMUSCULAR; INTRAVENOUS at 08:30

## 2021-11-16 RX ADMIN — Medication 10 ML: at 17:06

## 2021-11-16 RX ADMIN — Medication 10 ML: at 22:00

## 2021-11-16 RX ADMIN — Medication 10 ML: at 06:40

## 2021-11-16 RX ADMIN — PANTOPRAZOLE SODIUM 40 MG: 40 TABLET, DELAYED RELEASE ORAL at 08:30

## 2021-11-16 RX ADMIN — APIXABAN 5 MG: 5 TABLET, FILM COATED ORAL at 08:30

## 2021-11-16 NOTE — PROGRESS NOTES
Hospitalist Progress Note    NAME: Gisselle Haines   :  1945   MRN:  [id number]       Assessment / Plan:  Paroxysmal atrial fibrillation with rapid ventricular rate  -Continue Cardizem 300 mg daily. Continue Eliquis.  -Potassium is 3.5. Will check magnesium level. Will replace potassium  -Cardiology consulted and waiting on evaluation  -On as needed metoprolol for uncontrolled rate    Acute diastolic congestive heart failure exacerbation  Moderate to severe pulmonary hypertension  -Echo shows a contraction of 50 to 55% with moderate to severe pulmonary hypertension  -Start Lasix 40 mg IV daily.  -Strict I's and O's, daily weights and low-salt diet    Hypertension  -Continue Cardizem     She is a practicing dentist    Code Status: Full code  Surrogate Decision Maker: Her daughter Eden Frazier     DVT Prophylaxis: Eliquis  GI Prophylaxis: not indicated     Baseline: Ambulatory        Body mass index is 30.51 kg/m². Subjective:     Chief Complaint / Reason for Physician Visit  She reports that shortness of breath is better. Mild cough. No phlegm. No chest pain      Review of Systems:  Symptom Y/N Comments  Symptom Y/N Comments   Fever/Chills    Chest Pain     Poor Appetite    Edema     Cough    Abdominal Pain     Sputum    Joint Pain     SOB/OSORIO    Pruritis/Rash     Nausea/vomit    Tolerating PT/OT     Diarrhea    Tolerating Diet     Constipation    Other       Could NOT obtain due to:      Objective:     VITALS:   Last 24hrs VS reviewed since prior progress note.  Most recent are:  Patient Vitals for the past 24 hrs:   Temp Pulse Resp BP SpO2   21 1136 98.1 °F (36.7 °C) (!) 101 20 128/74 92 %   21 1056 -- (!) 128 -- -- 94 %   21 0731 98 °F (36.7 °C) (!) 101 25 (!) 149/82 95 %   21 0342 97.9 °F (36.6 °C) 100 22 (!) 146/80 92 %   21 0027 97.8 °F (36.6 °C) 88 21 130/82 95 %   11/15/21 2004 97.9 °F (36.6 °C) 95 19 125/79 94 %   11/15/21 1737 -- -- -- 125/68 -- 11/15/21 1603 97.9 °F (36.6 °C) 87 20 125/68 93 %     No intake or output data in the 24 hours ending 11/16/21 1328     PHYSICAL EXAM:  General: Alert, cooperative, no acute distress    EENT:  EOMI. Anicteric sclerae. MMM  Resp:  Bilateral air entry present, crackles present, no wheezing  CV:  Regular  rhythm,  No edema  GI:  Soft, Non distended, Non tender.  +Bowel sounds  Neurologic:  Alert and oriented X 3, normal speech,   Psych:   Not anxious nor agitated  Skin:  No rashes.   No jaundice    Reviewed most current lab test results and cultures  YES  Reviewed most current radiology test results   YES  Review and summation of old records today    NO  Reviewed patient's current orders and MAR    YES  PMH/SH reviewed - no change compared to H&P          Current Facility-Administered Medications:     apixaban (ELIQUIS) tablet 5 mg, 5 mg, Oral, BID, Tracey SOLIS MD, 5 mg at 11/16/21 0830    pantoprazole (PROTONIX) tablet 40 mg, 40 mg, Oral, ACB, Iesha Farmer MD, 40 mg at 11/16/21 0830    traMADoL (ULTRAM) tablet 100 mg, 100 mg, Oral, BID, Tracey SOLIS MD, 100 mg at 11/16/21 0830    sodium chloride (NS) flush 5-40 mL, 5-40 mL, IntraVENous, Q8H, Martinez Monroe Santos MD, 10 mL at 11/16/21 0640    sodium chloride (NS) flush 5-40 mL, 5-40 mL, IntraVENous, PRN, Iesha Farmer MD    dilTIAZem ER (CARDIZEM CD) 300 mg, 300 mg, Oral, DAILY, Iesha Farmer MD, 300 mg at 11/16/21 0909    metoprolol (LOPRESSOR) injection 2.5 mg, 2.5 mg, IntraVENous, Q6H PRN, Iesha Farmer MD, 2.5 mg at 11/16/21 1110    furosemide (LASIX) injection 40 mg, 40 mg, IntraVENous, DAILY, Iesha Farmer MD, 40 mg at 11/16/21 0830  ________________________________________________________________________  Care Plan discussed with:    Comments   Patient y    Family      RN y    Care Manager     Consultant                        Multidiciplinary team rounds were held today with , nursing, pharmacist and clinical coordinator. Patient's plan of care was discussed; medications were reviewed and discharge planning was addressed. ________________________________________________________________________  Total NON critical care TIME: 35   Minutes    Total CRITICAL CARE TIME Spent:   Minutes non procedure based      Comments   >50% of visit spent in counseling and coordination of care y    ________________________________________________________________________  Gm Jameson MD     Procedures: see electronic medical records for all procedures/Xrays and details which were not copied into this note but were reviewed prior to creation of Plan. LABS:  I reviewed today's most current labs and imaging studies.   Pertinent labs include:  Recent Labs     11/16/21  0338 11/15/21  1023   WBC 8.0 9.1   HGB 11.1* 11.8   HCT 38.0 38.8    372     Recent Labs     11/16/21  0338 11/15/21  1023    141   K 3.5 3.6    108   CO2 26 24   GLU 88 109*   BUN 9 9   CREA 0.57 0.65   CA 9.2 9.6   ALB  --  3.7   TBILI  --  0.8   ALT  --  18       Signed: Gm Jameson MD

## 2021-11-16 NOTE — PROGRESS NOTES
Problem: Patient Education: Go to Patient Education Activity  Goal: Patient/Family Education  Outcome: Progressing Towards Goal     Problem: Afib Pathway: Day 1  Goal: Off Pathway (Use only if patient is Off Pathway)  Outcome: Progressing Towards Goal  Goal: Activity/Safety  Outcome: Progressing Towards Goal  Goal: Consults, if ordered  Outcome: Progressing Towards Goal  Goal: Diagnostic Test/Procedures  Outcome: Progressing Towards Goal  Goal: Nutrition/Diet  Outcome: Progressing Towards Goal  Goal: Discharge Planning  Outcome: Progressing Towards Goal  Goal: Medications  Outcome: Progressing Towards Goal  Goal: Respiratory  Outcome: Progressing Towards Goal  Goal: Treatments/Interventions/Procedures  Outcome: Progressing Towards Goal  Goal: Psychosocial  Outcome: Progressing Towards Goal  Goal: *Optimal pain control at patient's stated goal  Outcome: Progressing Towards Goal  Goal: *Hemodynamically stable  Outcome: Progressing Towards Goal  Goal: *Stable cardiac rhythm  Outcome: Progressing Towards Goal  Goal: *Lungs clear or at baseline  Outcome: Progressing Towards Goal  Goal: *Labs within defined limits  Outcome: Progressing Towards Goal  Goal: *Describes available resources and support systems  Outcome: Progressing Towards Goal

## 2021-11-16 NOTE — PROGRESS NOTES
Bedside shift change report given to Salo Mehta RN (oncoming nurse) by LAURITA Melton (offgoing nurse). Report included the following information SBAR, Kardex, Intake/Output, MAR and Cardiac Rhythm NSR. End of Shift Note    Bedside shift change report given to LAURITA Alberto (oncoming nurse) by Sandhya Sanchez RN (offgoing nurse). Report included the following information SBAR, Kardex, Intake/Output, MAR and Cardiac Rhythm afib    Shift worked:  7a-7p     Shift summary and any significant changes:     Patient still afib. Dr. Stan Canales saw patient and will make plan     Concerns for physician to address:  none     Zone phone for oncoming shift:          Activity:  Activity Level: Up ad eric  Number times ambulated in hallways past shift: 0  Number of times OOB to chair past shift: 0    Cardiac:   Cardiac Monitoring: Yes      Cardiac Rhythm: Atrial Fib    Access:   Current line(s): PIV     Genitourinary:   Urinary status: voiding    Respiratory:   O2 Device: None (Room air)  Chronic home O2 use?: NO  Incentive spirometer at bedside: NO     GI:  Last Bowel Movement Date:  (not since admission)  Current diet:  ADULT DIET Regular; Low Fat/Low Chol/High Fiber/2 gm Na  Passing flatus: YES  Tolerating current diet: YES       Pain Management:   Patient states pain is manageable on current regimen: YES    Skin:  Maynor Score: 23  Interventions: limit briefs    Patient Safety:  Fall Score:  Total Score: 1  Interventions: gripper socks       Length of Stay:  Expected LOS: 1d 21h  Actual LOS: 809 LAURITA Landa

## 2021-11-16 NOTE — PROGRESS NOTES
11/16/21 0731   Vitals   Temp 98 °F (36.7 °C)   Temp Source Oral   Pulse (Heart Rate) (!) 101   Resp Rate 25   O2 Sat (%) 95 %   Level of Consciousness Alert (0)   BP (!) 149/82   MAP (Monitor) 98   MAP (Calculated) 104   MEWS Score 3   Pain 1   Pain Scale 1 Numeric (0 - 10)   Pain Intensity 1 0   Patient Stated Pain Goal 0   Oxygen Therapy   Pulse via Oximetry 102 beats per minute       HR elevated- in afib. BP elevated.  Will give scheduled meds

## 2021-11-16 NOTE — PROGRESS NOTES
End of Shift Note    Bedside shift change report given to Hipolito Andersen RN (oncoming nurse) by Mello Nixon RN (offgoing nurse).   Report included the following information SBAR    Shift worked:  7p-7a     Shift summary and any significant changes:     no significant changes     Concerns for physician to address:       Zone phone for oncoming shift:             Mello Nixon RN

## 2021-11-16 NOTE — PROGRESS NOTES
Transition of Care Plan:    RUR: 8%    Disposition: Home with family support    Follow up appointments: To be made prior to discharge. DME needed: To be determined    Transportation at Discharge: Patient drove herself here. Keys or means to access home:  Patient has keys        IM Medicare Letter: To be given prior to discharge. Is patient a BCPI-A Bundle: No            If yes, was Bundle Letter given?:N/A     Caregiver Contact: Daughter    Discharge Caregiver contacted prior to discharge? Caregiver to be contacted prior to discharge. Reason for Admission:  Patient came to ed for complaint of sob and chest pain. PMHX significant for atrial fib which she is on Eliquis, anemia, and  tobacco use. RUR Score:    8%                 Plan for utilizing home health:  Patient has used op rehab in the past.       PCP: First and Last name:  Darwin Velazquez MD     Name of Practice: [facility]     Are you a current patient: Yes/No: Yes     Approximate date of last visit: Approximately one year ago. Can you participate in a virtual visit with your PCP: Yes                    Current Advanced Directive/Advance Care Plan: Full Code  Advance Care Planning     General Advance Care Planning (ACP) Conversation      Date of Conversation: 11/16/21Conducted with: Patient with Decision Making [address]:   No healthcare decision makers have been documented. Click here to complete Iagnosis including selection of the Healthcare Decision Maker Relationship (ie \"Primary\")        Content/Action Overview:    Has ACP document(s) NOT on file - requested patient to provide  Reviewed DNR/DNI and patient elects Full Code (Attempt Resuscitation)         Length of Voluntary ACP Conversation in minutes:  <16 minutes (Non-Billable)    Becca Lee RN                 Healthcare Decision Maker:   Click here to Arias Makers including selection of the Healthcare Decision Maker Relationship (ie \"Primary\")                             Spoke with patient and she verified demographics and pcp information. She states she lives in two story home alone. She has children who live close by. Patient states she was independent prior to coming to the hospital. She drove herself here. She denies using home oxygen or cpap. She uses a cane at times. Will continue to monitor. Sally Moore RN BSN CRM        [phone]

## 2021-11-16 NOTE — PROGRESS NOTES
Patient seen and examined. Full consult note to follow. Reviewed the echo with her which showed normal EF 50-55% but severe pulmonary hypertension. May need an antiarrhythmic to hold sinus rhythm.

## 2021-11-17 LAB
ANION GAP SERPL CALC-SCNC: 7 MMOL/L (ref 5–15)
BUN SERPL-MCNC: 17 MG/DL (ref 6–20)
BUN/CREAT SERPL: 22 (ref 12–20)
CALCIUM SERPL-MCNC: 9.3 MG/DL (ref 8.5–10.1)
CHLORIDE SERPL-SCNC: 100 MMOL/L (ref 97–108)
CO2 SERPL-SCNC: 27 MMOL/L (ref 21–32)
CREAT SERPL-MCNC: 0.79 MG/DL (ref 0.55–1.02)
ERYTHROCYTE [DISTWIDTH] IN BLOOD BY AUTOMATED COUNT: 15.5 % (ref 11.5–14.5)
GLUCOSE SERPL-MCNC: 97 MG/DL (ref 65–100)
HCT VFR BLD AUTO: 40.2 % (ref 35–47)
HGB BLD-MCNC: 12.2 G/DL (ref 11.5–16)
MCH RBC QN AUTO: 25.6 PG (ref 26–34)
MCHC RBC AUTO-ENTMCNC: 30.3 G/DL (ref 30–36.5)
MCV RBC AUTO: 84.5 FL (ref 80–99)
NRBC # BLD: 0 K/UL (ref 0–0.01)
NRBC BLD-RTO: 0 PER 100 WBC
PLATELET # BLD AUTO: 355 K/UL (ref 150–400)
PMV BLD AUTO: 11.1 FL (ref 8.9–12.9)
POTASSIUM SERPL-SCNC: 3.7 MMOL/L (ref 3.5–5.1)
RBC # BLD AUTO: 4.76 M/UL (ref 3.8–5.2)
SODIUM SERPL-SCNC: 134 MMOL/L (ref 136–145)
WBC # BLD AUTO: 9.1 K/UL (ref 3.6–11)

## 2021-11-17 PROCEDURE — 74011000250 HC RX REV CODE- 250: Performed by: INTERNAL MEDICINE

## 2021-11-17 PROCEDURE — 74011250637 HC RX REV CODE- 250/637: Performed by: INTERNAL MEDICINE

## 2021-11-17 PROCEDURE — 65660000000 HC RM CCU STEPDOWN

## 2021-11-17 PROCEDURE — 85027 COMPLETE CBC AUTOMATED: CPT

## 2021-11-17 PROCEDURE — 74011250636 HC RX REV CODE- 250/636: Performed by: STUDENT IN AN ORGANIZED HEALTH CARE EDUCATION/TRAINING PROGRAM

## 2021-11-17 PROCEDURE — 80048 BASIC METABOLIC PNL TOTAL CA: CPT

## 2021-11-17 PROCEDURE — 74011250636 HC RX REV CODE- 250/636: Performed by: INTERNAL MEDICINE

## 2021-11-17 PROCEDURE — 36415 COLL VENOUS BLD VENIPUNCTURE: CPT

## 2021-11-17 RX ORDER — ENOXAPARIN SODIUM 100 MG/ML
1 INJECTION SUBCUTANEOUS EVERY 12 HOURS
Status: DISCONTINUED | OUTPATIENT
Start: 2021-11-17 | End: 2021-11-19

## 2021-11-17 RX ORDER — ONDANSETRON 2 MG/ML
4 INJECTION INTRAMUSCULAR; INTRAVENOUS
Status: DISCONTINUED | OUTPATIENT
Start: 2021-11-17 | End: 2021-11-20 | Stop reason: HOSPADM

## 2021-11-17 RX ORDER — ACETAMINOPHEN 325 MG/1
650 TABLET ORAL
Status: DISCONTINUED | OUTPATIENT
Start: 2021-11-17 | End: 2021-11-20 | Stop reason: HOSPADM

## 2021-11-17 RX ADMIN — TRAMADOL HYDROCHLORIDE 100 MG: 50 TABLET, COATED ORAL at 17:26

## 2021-11-17 RX ADMIN — ENOXAPARIN SODIUM 90 MG: 100 INJECTION SUBCUTANEOUS at 20:48

## 2021-11-17 RX ADMIN — METOPROLOL TARTRATE 2.5 MG: 5 INJECTION INTRAVENOUS at 18:24

## 2021-11-17 RX ADMIN — Medication 10 ML: at 05:33

## 2021-11-17 RX ADMIN — PANTOPRAZOLE SODIUM 40 MG: 40 TABLET, DELAYED RELEASE ORAL at 08:42

## 2021-11-17 RX ADMIN — TRAMADOL HYDROCHLORIDE 100 MG: 50 TABLET, COATED ORAL at 08:42

## 2021-11-17 RX ADMIN — DILTIAZEM HYDROCHLORIDE 300 MG: 180 CAPSULE, COATED, EXTENDED RELEASE ORAL at 08:42

## 2021-11-17 RX ADMIN — Medication 10 ML: at 14:00

## 2021-11-17 RX ADMIN — FUROSEMIDE 40 MG: 10 INJECTION, SOLUTION INTRAMUSCULAR; INTRAVENOUS at 08:42

## 2021-11-17 RX ADMIN — APIXABAN 5 MG: 5 TABLET, FILM COATED ORAL at 08:42

## 2021-11-17 RX ADMIN — Medication 10 ML: at 22:00

## 2021-11-17 NOTE — PROGRESS NOTES
Hospitalist Progress Note    NAME: Alba Whitlock   :  1945   MRN:  [id number]       Assessment / Plan:  Paroxysmal atrial fibrillation with rapid ventricular rate  -Continue Cardizem 300 mg daily. Continue Eliquis.  -keep NPO in case she gets FELTON/DCC today  -On as needed metoprolol for uncontrolled rate    Acute diastolic congestive heart failure exacerbation  Moderate to severe pulmonary hypertension  -Echo shows a contraction of 50 to 55% with moderate to severe pulmonary hypertension  -cont' Lasix 40 mg IV daily.   -Strict I's and O's, daily weights and low-salt diet  -cardiology following, ? cath    Hypertension  -Continue Cardizem           Code Status: Full code  Surrogate Decision Maker: Her daughter Edgardo Crane  DVT Prophylaxis: Eliquis  GI Prophylaxis: not indicated   Baseline: Ambulatory        Body mass index is 30.51 kg/m². Subjective:     Chief Complaint / Reason for Physician Visit  Pt is NAD. No complaint of cp or sob. Review of Systems:  Symptom Y/N Comments  Symptom Y/N Comments   Fever/Chills    Chest Pain     Poor Appetite    Edema     Cough    Abdominal Pain     Sputum    Joint Pain     SOB/OSORIO    Pruritis/Rash     Nausea/vomit    Tolerating PT/OT     Diarrhea    Tolerating Diet     Constipation    Other       Could NOT obtain due to:      Objective:     VITALS:   Last 24hrs VS reviewed since prior progress note.  Most recent are:  Patient Vitals for the past 24 hrs:   Temp Pulse Resp BP SpO2   21 0711 98 °F (36.7 °C) 96 18 136/64 93 %   21 0400 -- 81 -- -- --   21 0316 98.2 °F (36.8 °C) 78 20 137/78 91 %   21 2348 98.1 °F (36.7 °C) 92 22 112/72 90 %   21 1959 98.3 °F (36.8 °C) (!) 103 20 112/72 94 %   21 1459 98 °F (36.7 °C) 84 -- (!) 114/55 94 %   21 1136 98.1 °F (36.7 °C) (!) 101 20 128/74 92 %   21 1056 -- (!) 128 -- -- 94 %     No intake or output data in the 24 hours ending 21 0937     PHYSICAL EXAM:  General: Alert, cooperative, no acute distress    EENT:  EOMI. Anicteric sclerae. MMM  Resp:  Bilateral air entry present, crackles present, no wheezing  CV:  irregular  rhythm,  No edema  GI:  Soft, Non distended, Non tender.  +Bowel sounds  Neurologic:  Alert and oriented X 3, normal speech,   Psych:   Not anxious nor agitated  Skin:  No rashes.   No jaundice    Reviewed most current lab test results and cultures  YES  Reviewed most current radiology test results   YES  Review and summation of old records today    NO  Reviewed patient's current orders and MAR    YES  PMH/SH reviewed - no change compared to H&P          Current Facility-Administered Medications:     ondansetron (ZOFRAN) injection 4 mg, 4 mg, IntraVENous, Q6H PRN, Debbie Lee MD    acetaminophen (TYLENOL) tablet 650 mg, 650 mg, Oral, Q6H PRN, Debbie Lee MD    apixaban (ELIQUIS) tablet 5 mg, 5 mg, Oral, BID, Kiah Aguillon MD, 5 mg at 11/17/21 0842    pantoprazole (PROTONIX) tablet 40 mg, 40 mg, Oral, ACB, Kiah Aguillon MD, 40 mg at 11/17/21 0842    traMADoL (ULTRAM) tablet 100 mg, 100 mg, Oral, BID, Kiah Aguillon MD, 100 mg at 11/17/21 0842    sodium chloride (NS) flush 5-40 mL, 5-40 mL, IntraVENous, Q8H, Rosalia SOLIS MD, 10 mL at 11/17/21 0533    sodium chloride (NS) flush 5-40 mL, 5-40 mL, IntraVENous, PRN, Kiah Aguillon MD    dilTIAZem ER (CARDIZEM CD) 300 mg, 300 mg, Oral, DAILY, Kiah Aguillon MD, 300 mg at 11/17/21 0842    metoprolol (LOPRESSOR) injection 2.5 mg, 2.5 mg, IntraVENous, Q6H PRN, Kiah Aguillon MD, 2.5 mg at 11/16/21 1110    furosemide (LASIX) injection 40 mg, 40 mg, IntraVENous, DAILY, Kiah Aguillon MD, 40 mg at 11/17/21 6410  ________________________________________________________________________  Care Plan discussed with:    Comments   Patient y    Family      RN y    Care Manager     Consultant  y cardiology                     Multidiciplinary team rounds were held today with case manager, nursing, pharmacist and clinical coordinator. Patient's plan of care was discussed; medications were reviewed and discharge planning was addressed. ________________________________________________________________________  Total NON critical care TIME: 35   Minutes    Total CRITICAL CARE TIME Spent:   Minutes non procedure based      Comments   >50% of visit spent in counseling and coordination of care y    ________________________________________________________________________  Rocío Moran MD     Procedures: see electronic medical records for all procedures/Xrays and details which were not copied into this note but were reviewed prior to creation of Plan. LABS:  I reviewed today's most current labs and imaging studies.   Pertinent labs include:  Recent Labs     11/17/21  0357 11/16/21  0338 11/15/21  1023   WBC 9.1 8.0 9.1   HGB 12.2 11.1* 11.8   HCT 40.2 38.0 38.8    358 372     Recent Labs     11/17/21  0357 11/16/21  0338 11/15/21  1023   * 140 141   K 3.7 3.5 3.6    107 108   CO2 27 26 24   GLU 97 88 109*   BUN 17 9 9   CREA 0.79 0.57 0.65   CA 9.3 9.2 9.6   ALB  --   --  3.7   TBILI  --   --  0.8   ALT  --   --  18       Signed: Rocío Moran MD

## 2021-11-17 NOTE — CONSULTS
EP/ ARRHYTHMIA CONSULT    Patient ID:  Patient: Lauren Bai  MRN: [id number]  Age: 77 y.o.  : 1945    Date of  Admission: 11/15/2021 10:30 AM   PCP:  Rhonda Menjivar MD   Usual cardiologist:  Magi Fernando MD    Assessment: 1. Atrial fibrillation with rapid ventricular response, may be a persistent episode. 2. Echo here showing normal LV systolic function EF 22-23%, normal RV systolic function but severe pulmonary hypertension. No valvular disease or reported shunt to explain this. 3. Dyspnea, due to #1 and #2.  4. COPD with tobacco smoking. 5. Obesity with gastric banding procedure. 6. Chronic anticoagulation with Eliquis. 7. Full code. Plan:     1. Rate control. Will continue with calcium channel blocker diltiazem though I don't know if this agent is effective also for pulmonary hypertension (usually nifedipine is the CCB used for this). 2. There may have been an evaluation for pulmonary hypertension prior (a new diagnosis?). I'll have Dr. Casandra Martinez see her tomorrow to follow-up on this. 3. Continue anticoagulation. 4. Continue diuretic, may need this as an OP. 5. Oxygen. 6. 2g Na diet restriction. 7. Tobacco smoking abstinence. At some point, she'll need cardioversion if she remains in atrial fibrillation. I'll talk with Dr. Casandra Martinez as to whether an antiarrhythmic drug is appropriate for her to maintain sinus rhythm. [x]       High complexity decision making was performed in this patient. 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 Found to be in atrial fibrillation with RVR. She sought medical care because of this, but also she experienced chest tightness on exertion as well. She has intermittent pedal edema, no orthopnea, PND. No syncope, dizziness, or palpitations. No TIA or stroke symptoms.   No bleeding on anticoagulation. Past Medical History:   Diagnosis Date    Anemia 7/12/2010    Arrhythmia     afib r/t low potassium     Arthritis 7/12/2010    Chronic pain     Eczema 7/12/2010    Edema 7/12/2010    GERD (gastroesophageal reflux disease) 7/12/2010    Hiatal hernia 7/12/2010    Smoker 7/12/2010        Past Surgical History:   Procedure Laterality Date    HX GYN      hysterectomy    HX ORTHOPAEDIC      left wrist open reduction internal fixation.  HX OTHER SURGICAL      facelift    [place] ABDOMEN SURGERY PROC UNLISTED      Lap band    [place] COLONOSCOPY FLX DX W/COLLJ SPEC WHEN PFRMD  2/9/2012            Social History     Tobacco Use    Smoking status: Current Every Day Smoker     Packs/day: 0.25     Years: 40.00     Pack years: 10.00    Smokeless tobacco: Not on file   Substance Use Topics    Alcohol use: Yes     Comment: occassionally        Family History   Problem Relation Age of Onset    Cancer Mother     Cancer Father     Breast Cancer Maternal Aunt         not sure        Allergies   Allergen Reactions    Penicillin G Rash    Amoxicillin Rash          Current Facility-Administered Medications   Medication Dose Route Frequency    apixaban (ELIQUIS) tablet 5 mg  5 mg Oral BID    pantoprazole (PROTONIX) tablet 40 mg  40 mg Oral ACB    traMADoL (ULTRAM) tablet 100 mg  100 mg Oral BID    sodium chloride (NS) flush 5-40 mL  5-40 mL IntraVENous Q8H    sodium chloride (NS) flush 5-40 mL  5-40 mL IntraVENous PRN    dilTIAZem ER (CARDIZEM CD) 300 mg  300 mg Oral DAILY    metoprolol (LOPRESSOR) injection 2.5 mg  2.5 mg IntraVENous Q6H PRN    furosemide (LASIX) injection 40 mg  40 mg IntraVENous DAILY       Review of Symptoms:  See HPI as well.   General: negative for fever, chills, sweats, weakness, weight loss   Eyes: negative for blurred vision, eye pain, loss of vision, diplopia   Ear Nose and Throat: negative for rhinorrhea, pharyngitis, otalgia, tinnitus, speech or swallowing difficulties   Respiratory: negative for cough, sputum production, wheezing, hemoptysis, pleuritic pain   Cardiology: negative for chest pain, palpitations, orthopnea, PND, syncope   Gastrointestinal: negative for abdominal pain, N/V, dysphagia, change in bowel habits, bleeding   Genitourinary: negative for frequency, urgency, dysuria, hematuria, incontinence   Muskuloskeletal : negative for arthralgia, myalgia   Hematology: negative for easy bruising, bleeding, lymphadenopathy   Dermatological: negative for rash, ulceration, mole change, new lesion   Endocrine: negative for hot flashes or polydipsia   Neurological: negative for headache, dizziness, confusion, focal weakness, paresthesia, memory loss, gait disturbance   Psychological: negative for anxiety, depression, agitation       Objective:      Physical Exam:  Temp (24hrs), Av °F (36.7 °C), Min:97.8 °F (36.6 °C), Max:98.3 °F (36.8 °C)    Patient Vitals for the past 8 hrs:   Pulse   21 (!) 103   21 1459 84    Patient Vitals for the past 8 hrs:   Resp   21 20    Patient Vitals for the past 8 hrs:   BP   21 112/72   21 1459 (!) 114/55      No intake or output data in the 24 hours ending 21    Nondiaphoretic, not in acute distress. Supple, no palpable thyromegaly. No scleral icterus, mucous membranes moist, conjuctivae pink, no xanthelasma. Unlabored, clear to auscultation bilaterally, symmetric air movement. Irregular rate and rhythm, no murmur, pericardial rub, knock, or gallop. +JVD, some peripheral edema. No carotid bruit. Palpable radial pulses bilaterally. Abdomen, soft, nontender, nondistended. No abdominal bruit or pulstatile masses. Extremities without cyanosis or clubbing. Muscle tone and bulk normal.  Skin warm and dry. No rashes or ulcers. Neuro grossly nonfocal.  No tremor. Awake and appropriate. CARDIOGRAPHICS and STUDIES, I reviewed:    Telemetry:  Afib.     ECG: Reviewed. Echo:  As above. Labs:  No results for input(s): CPK, CKMB, CKNDX, TROIQ in the last 72 hours. No lab exists for component: CPKMB  No results found for: CHOL, CHOLX, CHLST, CHOLV, HDL, HDLP, LDL, LDLC, DLDLP, TGLX, TRIGL, TRIGP, CHHD, CHHDX  No results for input(s): INR, PTP, APTT, INREXT in the last 72 hours. Recent Labs     11/16/21  0338 11/15/21  1023    141   K 3.5 3.6    108   CO2 26 24   BUN 9 9   CREA 0.57 0.65   GLU 88 109*   CA 9.2 9.6   ALB  --  3.7   WBC 8.0 9.1   HGB 11.1* 11.8   HCT 38.0 38.8    372     Recent Labs     11/15/21  1023   AP 77   TP 7.7   ALB 3.7   GLOB 4.0     No components found for: GLPOC  No results for input(s): PH, PCO2, PO2 in the last 72 hours.         Karson Flor MD  11/16/2021

## 2021-11-17 NOTE — PROGRESS NOTES
Progress Note      11/17/2021 4:50 PM  NAME: Wilfrido Wiggins   MRN:  [id number]   Admit Diagnosis: Atrial fibrillation with rapid ventricular response (HCC) [I48.91]  CHF (congestive heart failure) [facility]) [I50.9]      Primary Cardiologist: Dr Miki Correa   Physician Requesting consult: Dr Gisselle Singleton     Assessment:    1. Atrial fibrillation with rapid ventricular response, may be a persistent episode. 2. Echo here showing normal LV systolic function EF 84-02%, normal RV systolic function but severe pulmonary hypertension. No valvular disease or reported shunt to explain this. 3. Dyspnea, due to #1 and #2. Also reported to have CP  4. COPD with tobacco smoking. 5. Obesity with gastric banding procedure. 6. Chronic anticoagulation with Eliquis. 7. Full code.           Recommendations:    1. Will plan for DCCV tomorrow, will need antiarrythmic drug as well to maintain NSR, may be ablation in future   2. Will schedule her for RHC to eval for PA pressures   3. She has chest pain - will also do LHC and simultaneous pressure measurements   4. ? Restrictive heart function   5. Cont lasix   6. Cont Dilt 300 mg daily   7. Will change apixaban to Lovenox for procedure tomorrow     Discussed with patient about DCCV, R and LHC tomorrow, she agreed to proceed. Thank you for this consult and allowing me to take part in this patients care. Please call with questions. [x]        High complexity decision making was performed    Subjective:     HPI:   Feels doing okay. No CP or sob at present. Had CP yesterday on admission. ROS: No CP, SOB, Abd pain, nausea, vomiting, syncope, palpitations, new focal neurological symptoms     Objective:      Physical Exam:    Last 24hrs VS reviewed since prior progress note.  Most recent are:    Visit Vitals  /69   Pulse 97   Temp 98.3 °F (36.8 °C)   Resp 17   Ht 5' 6\" (1.676 m)   Wt 85.7 kg (189 lb)   SpO2 90%   BMI 30.51 kg/m²     No intake or output data in the 24 hours ending 11/17/21 1650    General: Alert and oriented x3, no acute distress   Neck: Supple   Respiratory: No respiratory distress, clear lung sound   Cardiovascular: Irregular rate rhythm, S1S2, no murmur   Abdomen: soft, non tender, non distended   Neuro: moves all extremities, oriented x3   Skin: warm and dry   Extremity:Trace edema, warm to touch        Data Review    Telemetry: Afib      Lab Data Personally Reviewed:    Recent Labs     11/17/21 0357 11/16/21 0338   WBC 9.1 8.0   HGB 12.2 11.1*   HCT 40.2 38.0    358     No results for input(s): INR, PTP, APTT, INREXT in the last 72 hours. Recent Labs     11/17/21  0357 11/16/21  0338 11/15/21  1023   * 140 141   K 3.7 3.5 3.6    107 108   CO2 27 26 24   BUN 17 9 9   CREA 0.79 0.57 0.65   GLU 97 88 109*   CA 9.3 9.2 9.6     No results for input(s): CPK, CKNDX, TROIQ in the last 72 hours. No lab exists for component: CPKMB  No results found for: CHOL, CHOLX, CHLST, CHOLV, HDL, HDLP, LDL, LDLC, DLDLP, TGLX, TRIGL, TRIGP, CHHD, CHHDX    Recent Labs     11/15/21  1023   AP 77   TP 7.7   ALB 3.7   GLOB 4.0     No results for input(s): PH, PCO2, PO2 in the last 72 hours.     Medications Personally Reviewed:    Current Facility-Administered Medications   Medication Dose Route Frequency    ondansetron (ZOFRAN) injection 4 mg  4 mg IntraVENous Q6H PRN    acetaminophen (TYLENOL) tablet 650 mg  650 mg Oral Q6H PRN    enoxaparin (LOVENOX) injection 90 mg  1 mg/kg SubCUTAneous Q12H    pantoprazole (PROTONIX) tablet 40 mg  40 mg Oral ACB    traMADoL (ULTRAM) tablet 100 mg  100 mg Oral BID    sodium chloride (NS) flush 5-40 mL  5-40 mL IntraVENous Q8H    sodium chloride (NS) flush 5-40 mL  5-40 mL IntraVENous PRN    dilTIAZem ER (CARDIZEM CD) 300 mg  300 mg Oral DAILY    metoprolol (LOPRESSOR) injection 2.5 mg  2.5 mg IntraVENous Q6H PRN    furosemide (LASIX) injection 40 mg  40 mg IntraVENous DAILY              Susu Padgett MD

## 2021-11-17 NOTE — PROGRESS NOTES
Bedside shift change report given to Rhoda Johnson RN (oncoming nurse) by Italia Espinoza RN (offgoing nurse). Report included the following information SBAR, Kardex, Intake/Output, MAR and Cardiac Rhythm afib. End of Shift Note    Bedside shift change report given to Italia Espinoza RN (oncoming nurse) by Katelyn Rodriguez RN (offgoing nurse). Report included the following information SBAR, Kardex, Intake/Output, MAR and Cardiac Rhythm afib    Shift worked:  7a-7p     Shift summary and any significant changes:     gave PRN metoprolol once, Dr. Shira Freeman saw patient, NPO at midnight for procedure tomorrow     Concerns for physician to address:  none     Zone phone for oncoming shift:          Activity:  Activity Level: Up ad eric  Number times ambulated in hallways past shift: 0  Number of times OOB to chair past shift: 3    Cardiac:   Cardiac Monitoring: Yes      Cardiac Rhythm: Atrial Fib    Access:   Current line(s): PIV     Genitourinary:   Urinary status: voiding    Respiratory:   O2 Device: None (Room air)  Chronic home O2 use?: NO  Incentive spirometer at bedside: YES     GI:  Last Bowel Movement Date:  (not since admission)  Current diet:  DIET NPO  DIET ONE TIME MESSAGE  DIET ONE TIME MESSAGE  Passing flatus: YES  Tolerating current diet: YES       Pain Management:   Patient states pain is manageable on current regimen: YES    Skin:  Maynor Score: 23  Interventions: limit briefs    Patient Safety:  Fall Score:  Total Score: 1  Interventions: bed/chair alarm       Length of Stay:  Expected LOS: 3d 19h  Actual LOS: 2      Katelyn Rodriguez RN

## 2021-11-18 LAB
ANION GAP SERPL CALC-SCNC: 9 MMOL/L (ref 5–15)
BUN SERPL-MCNC: 19 MG/DL (ref 6–20)
BUN/CREAT SERPL: 26 (ref 12–20)
CALCIUM SERPL-MCNC: 9.5 MG/DL (ref 8.5–10.1)
CHLORIDE SERPL-SCNC: 101 MMOL/L (ref 97–108)
CO2 SERPL-SCNC: 27 MMOL/L (ref 21–32)
CREAT SERPL-MCNC: 0.72 MG/DL (ref 0.55–1.02)
GLUCOSE SERPL-MCNC: 92 MG/DL (ref 65–100)
MAGNESIUM SERPL-MCNC: 1.8 MG/DL (ref 1.6–2.4)
PHOSPHATE SERPL-MCNC: 3.6 MG/DL (ref 2.6–4.7)
POTASSIUM SERPL-SCNC: 3.6 MMOL/L (ref 3.5–5.1)
SODIUM SERPL-SCNC: 137 MMOL/L (ref 136–145)

## 2021-11-18 PROCEDURE — 77030008543 HC TBNG MON PRSS MRTM -A: Performed by: STUDENT IN AN ORGANIZED HEALTH CARE EDUCATION/TRAINING PROGRAM

## 2021-11-18 PROCEDURE — 74011250636 HC RX REV CODE- 250/636: Performed by: STUDENT IN AN ORGANIZED HEALTH CARE EDUCATION/TRAINING PROGRAM

## 2021-11-18 PROCEDURE — 74011000636 HC RX REV CODE- 636: Performed by: STUDENT IN AN ORGANIZED HEALTH CARE EDUCATION/TRAINING PROGRAM

## 2021-11-18 PROCEDURE — B2111ZZ FLUOROSCOPY OF MULTIPLE CORONARY ARTERIES USING LOW OSMOLAR CONTRAST: ICD-10-PCS | Performed by: STUDENT IN AN ORGANIZED HEALTH CARE EDUCATION/TRAINING PROGRAM

## 2021-11-18 PROCEDURE — 65660000000 HC RM CCU STEPDOWN

## 2021-11-18 PROCEDURE — 77030019698 HC SYR ANGI MDLON MRTM -A: Performed by: STUDENT IN AN ORGANIZED HEALTH CARE EDUCATION/TRAINING PROGRAM

## 2021-11-18 PROCEDURE — C1894 INTRO/SHEATH, NON-LASER: HCPCS | Performed by: STUDENT IN AN ORGANIZED HEALTH CARE EDUCATION/TRAINING PROGRAM

## 2021-11-18 PROCEDURE — 93005 ELECTROCARDIOGRAM TRACING: CPT

## 2021-11-18 PROCEDURE — C1769 GUIDE WIRE: HCPCS | Performed by: STUDENT IN AN ORGANIZED HEALTH CARE EDUCATION/TRAINING PROGRAM

## 2021-11-18 PROCEDURE — C1751 CATH, INF, PER/CENT/MIDLINE: HCPCS | Performed by: STUDENT IN AN ORGANIZED HEALTH CARE EDUCATION/TRAINING PROGRAM

## 2021-11-18 PROCEDURE — 74011000250 HC RX REV CODE- 250: Performed by: STUDENT IN AN ORGANIZED HEALTH CARE EDUCATION/TRAINING PROGRAM

## 2021-11-18 PROCEDURE — 99152 MOD SED SAME PHYS/QHP 5/>YRS: CPT | Performed by: STUDENT IN AN ORGANIZED HEALTH CARE EDUCATION/TRAINING PROGRAM

## 2021-11-18 PROCEDURE — 5A2204Z RESTORATION OF CARDIAC RHYTHM, SINGLE: ICD-10-PCS | Performed by: STUDENT IN AN ORGANIZED HEALTH CARE EDUCATION/TRAINING PROGRAM

## 2021-11-18 PROCEDURE — 83735 ASSAY OF MAGNESIUM: CPT

## 2021-11-18 PROCEDURE — 80048 BASIC METABOLIC PNL TOTAL CA: CPT

## 2021-11-18 PROCEDURE — 74011250636 HC RX REV CODE- 250/636: Performed by: INTERNAL MEDICINE

## 2021-11-18 PROCEDURE — C1887 CATHETER, GUIDING: HCPCS | Performed by: STUDENT IN AN ORGANIZED HEALTH CARE EDUCATION/TRAINING PROGRAM

## 2021-11-18 PROCEDURE — 77030015766: Performed by: STUDENT IN AN ORGANIZED HEALTH CARE EDUCATION/TRAINING PROGRAM

## 2021-11-18 PROCEDURE — 4A023N8 MEASUREMENT OF CARDIAC SAMPLING AND PRESSURE, BILATERAL, PERCUTANEOUS APPROACH: ICD-10-PCS | Performed by: STUDENT IN AN ORGANIZED HEALTH CARE EDUCATION/TRAINING PROGRAM

## 2021-11-18 PROCEDURE — 77030010221 HC SPLNT WR POS TELE -B: Performed by: STUDENT IN AN ORGANIZED HEALTH CARE EDUCATION/TRAINING PROGRAM

## 2021-11-18 PROCEDURE — 77010033678 HC OXYGEN DAILY

## 2021-11-18 PROCEDURE — 99153 MOD SED SAME PHYS/QHP EA: CPT | Performed by: STUDENT IN AN ORGANIZED HEALTH CARE EDUCATION/TRAINING PROGRAM

## 2021-11-18 PROCEDURE — 84100 ASSAY OF PHOSPHORUS: CPT

## 2021-11-18 PROCEDURE — 74011250637 HC RX REV CODE- 250/637: Performed by: INTERNAL MEDICINE

## 2021-11-18 PROCEDURE — 77030042317 HC BND COMPR HEMSTAT -B: Performed by: STUDENT IN AN ORGANIZED HEALTH CARE EDUCATION/TRAINING PROGRAM

## 2021-11-18 PROCEDURE — 92960 CARDIOVERSION ELECTRIC EXT: CPT | Performed by: STUDENT IN AN ORGANIZED HEALTH CARE EDUCATION/TRAINING PROGRAM

## 2021-11-18 PROCEDURE — 74011250637 HC RX REV CODE- 250/637: Performed by: STUDENT IN AN ORGANIZED HEALTH CARE EDUCATION/TRAINING PROGRAM

## 2021-11-18 PROCEDURE — 36415 COLL VENOUS BLD VENIPUNCTURE: CPT

## 2021-11-18 PROCEDURE — 93460 R&L HRT ART/VENTRICLE ANGIO: CPT | Performed by: STUDENT IN AN ORGANIZED HEALTH CARE EDUCATION/TRAINING PROGRAM

## 2021-11-18 RX ORDER — HEPARIN SODIUM 200 [USP'U]/100ML
INJECTION, SOLUTION INTRAVENOUS
Status: COMPLETED | OUTPATIENT
Start: 2021-11-18 | End: 2021-11-18

## 2021-11-18 RX ORDER — LIDOCAINE HYDROCHLORIDE 10 MG/ML
INJECTION, SOLUTION EPIDURAL; INFILTRATION; INTRACAUDAL; PERINEURAL AS NEEDED
Status: DISCONTINUED | OUTPATIENT
Start: 2021-11-18 | End: 2021-11-18 | Stop reason: HOSPADM

## 2021-11-18 RX ORDER — VERAPAMIL HYDROCHLORIDE 2.5 MG/ML
INJECTION, SOLUTION INTRAVENOUS AS NEEDED
Status: DISCONTINUED | OUTPATIENT
Start: 2021-11-18 | End: 2021-11-18 | Stop reason: HOSPADM

## 2021-11-18 RX ORDER — FENTANYL CITRATE 50 UG/ML
INJECTION, SOLUTION INTRAMUSCULAR; INTRAVENOUS AS NEEDED
Status: DISCONTINUED | OUTPATIENT
Start: 2021-11-18 | End: 2021-11-18 | Stop reason: HOSPADM

## 2021-11-18 RX ORDER — HEPARIN SODIUM 1000 [USP'U]/ML
INJECTION, SOLUTION INTRAVENOUS; SUBCUTANEOUS AS NEEDED
Status: DISCONTINUED | OUTPATIENT
Start: 2021-11-18 | End: 2021-11-18 | Stop reason: HOSPADM

## 2021-11-18 RX ORDER — MAGNESIUM SULFATE 1 G/100ML
1 INJECTION INTRAVENOUS ONCE
Status: COMPLETED | OUTPATIENT
Start: 2021-11-18 | End: 2021-11-18

## 2021-11-18 RX ORDER — MIDAZOLAM HYDROCHLORIDE 1 MG/ML
INJECTION, SOLUTION INTRAMUSCULAR; INTRAVENOUS AS NEEDED
Status: DISCONTINUED | OUTPATIENT
Start: 2021-11-18 | End: 2021-11-18 | Stop reason: HOSPADM

## 2021-11-18 RX ORDER — DOFETILIDE 0.25 MG/1
250 CAPSULE ORAL EVERY 12 HOURS
Status: DISCONTINUED | OUTPATIENT
Start: 2021-11-18 | End: 2021-11-20 | Stop reason: HOSPADM

## 2021-11-18 RX ADMIN — POTASSIUM BICARBONATE 40 MEQ: 782 TABLET, EFFERVESCENT ORAL at 17:16

## 2021-11-18 RX ADMIN — Medication 10 ML: at 05:07

## 2021-11-18 RX ADMIN — DOFETILIDE 250 MCG: 0.25 CAPSULE ORAL at 10:19

## 2021-11-18 RX ADMIN — POTASSIUM BICARBONATE 40 MEQ: 782 TABLET, EFFERVESCENT ORAL at 17:59

## 2021-11-18 RX ADMIN — Medication 10 ML: at 22:34

## 2021-11-18 RX ADMIN — MAGNESIUM SULFATE HEPTAHYDRATE 1 G: 1 INJECTION, SOLUTION INTRAVENOUS at 17:20

## 2021-11-18 RX ADMIN — ENOXAPARIN SODIUM 90 MG: 100 INJECTION SUBCUTANEOUS at 08:48

## 2021-11-18 RX ADMIN — DILTIAZEM HYDROCHLORIDE 300 MG: 180 CAPSULE, COATED, EXTENDED RELEASE ORAL at 10:19

## 2021-11-18 RX ADMIN — DOFETILIDE 250 MCG: 0.25 CAPSULE ORAL at 22:34

## 2021-11-18 RX ADMIN — PANTOPRAZOLE SODIUM 40 MG: 40 TABLET, DELAYED RELEASE ORAL at 08:48

## 2021-11-18 RX ADMIN — TRAMADOL HYDROCHLORIDE 100 MG: 50 TABLET, COATED ORAL at 17:16

## 2021-11-18 RX ADMIN — TRAMADOL HYDROCHLORIDE 100 MG: 50 TABLET, COATED ORAL at 08:48

## 2021-11-18 NOTE — PROGRESS NOTES
Progress Note      11/18/2021 4:50 PM  NAME: Lisa Maldonado   MRN:  [id number]   Admit Diagnosis: Atrial fibrillation with rapid ventricular response (HCC) [I48.91]  CHF (congestive heart failure) [facility]) [I50.9]      Primary Cardiologist: Dr Windy Galeas ,    Physician Requesting consult: Dr Laurie Terry     Assessment:    1. Atrial fibrillation with rapid ventricular response, may be a persistent episode. 2. Echo here showing normal LV systolic function EF 02-29%, normal RV systolic function but severe pulmonary hypertension. No valvular disease or reported shunt to explain this. 3. Dyspnea, due to #1 and #2. Also reported to have CP  4. COPD with tobacco smoking. 5. Obesity with gastric banding procedure. 6. Chronic anticoagulation with Eliquis. 7. Full code.           Recommendations:    1. Discussed with Dr Ava Gipson today, would likely need anti arrhythmic, will start tikosyn 250 mcg BID, monitor QTc interval, DCCV later today    2. Pulmonary hypertension -on echo - Will schedule her for RHC to eval for PA pressures   3. She also had chest pain - LHC with RHC and simultaneous pressure measurements   4. ? Restrictive heart function    5. Cont lasix   6. Cont Dilt 300 mg daily    7. Changed apixaban to Lovenox for procedures    Discussed with patient about DCCV, R and LHC tomorrow, she agreed to proceed. Thank you for this consult and allowing me to take part in this patients care. Please call with questions. [x]        High complexity decision making was performed    Subjective:     HPI:   Feels doing okay. Had CP yesterday on admission. ROS: No CP, SOB, Abd pain, nausea, vomiting, syncope, palpitations, new focal neurological symptoms     Objective:      Physical Exam:    Last 24hrs VS reviewed since prior progress note.  Most recent are:    Visit Vitals  /81 (BP 1 Location: Right upper arm)   Pulse (!) 104   Temp 98.1 °F (36.7 °C)   Resp 16   Ht 5' 6\" (1.676 m)   Wt 81.5 kg (179 lb 10.8 oz)   SpO2 95%   BMI 29.00 kg/m²       Intake/Output Summary (Last 24 hours) at 11/18/2021 5865  Last data filed at 11/18/2021 4176  Gross per 24 hour   Intake 0 ml   Output --   Net 0 ml       General: Alert and oriented x3, no acute distress   Neck: Supple   Respiratory: No respiratory distress, clear lung sound   Cardiovascular: Irregular rate rhythm, S1S2, no murmur   Abdomen: soft, non tender, non distended   Neuro: moves all extremities, oriented x3   Skin: warm and dry   Extremity:Trace edema, warm to touch        Data Review    Telemetry: Afib      Lab Data Personally Reviewed:    Recent Labs     11/17/21 0357 11/16/21 0338   WBC 9.1 8.0   HGB 12.2 11.1*   HCT 40.2 38.0    358     No results for input(s): INR, PTP, APTT, INREXT, INREXT in the last 72 hours. Recent Labs     11/17/21  0357 11/16/21  0338 11/15/21  1023   * 140 141   K 3.7 3.5 3.6    107 108   CO2 27 26 24   BUN 17 9 9   CREA 0.79 0.57 0.65   GLU 97 88 109*   CA 9.3 9.2 9.6     No results for input(s): CPK, CKNDX, TROIQ in the last 72 hours. No lab exists for component: CPKMB  No results found for: CHOL, CHOLX, CHLST, CHOLV, HDL, HDLP, LDL, LDLC, DLDLP, TGLX, TRIGL, TRIGP, CHHD, CHHDX    Recent Labs     11/15/21  1023   AP 77   TP 7.7   ALB 3.7   GLOB 4.0     No results for input(s): PH, PCO2, PO2 in the last 72 hours.     Medications Personally Reviewed:    Current Facility-Administered Medications   Medication Dose Route Frequency    dofetilide (TIKOSYN) capsule 250 mcg  250 mcg Oral Q12H    ondansetron (ZOFRAN) injection 4 mg  4 mg IntraVENous Q6H PRN    acetaminophen (TYLENOL) tablet 650 mg  650 mg Oral Q6H PRN    enoxaparin (LOVENOX) ++ partial dose ++ injection 90 mg  1 mg/kg SubCUTAneous Q12H    pantoprazole (PROTONIX) tablet 40 mg  40 mg Oral ACB    traMADoL (ULTRAM) tablet 100 mg  100 mg Oral BID    sodium chloride (NS) flush 5-40 mL  5-40 mL IntraVENous Q8H    sodium chloride (NS) flush 5-40 mL  5-40 mL IntraVENous PRN    dilTIAZem ER (CARDIZEM CD) 300 mg  300 mg Oral DAILY    metoprolol (LOPRESSOR) injection 2.5 mg  2.5 mg IntraVENous Q6H PRN    furosemide (LASIX) injection 40 mg  40 mg IntraVENous DAILY              Shama Dickerson MD

## 2021-11-18 NOTE — PROGRESS NOTES
0000: Patient NPO   0300: Patient desating while sleeping to 80-85%. Placed on 2L NC periodically, but then patient was placed back on room air shortly after. O2 sats 95%. 36: TRANSFER - OUT REPORT:    Verbal report given to Sarah Gray RN (name) on Gisselle Madrid  being transferred to [facility] (unit) for ordered procedure       Report consisted of patients Situation, Background, Assessment and   Recommendations(SBAR). Information from the following report(s) SBAR, Kardex, Intake/Output, MAR and Recent Results was reviewed with the receiving nurse. Lines:   Peripheral IV 11/15/21 Left Antecubital (Active)   Site Assessment Clean, dry, & intact 11/18/21 0340   Phlebitis Assessment 0 11/18/21 0340   Infiltration Assessment 0 11/18/21 0340   Dressing Status Clean, dry, & intact 11/18/21 0340   Dressing Type Transparent 11/18/21 0340   Hub Color/Line Status Pink; Capped 11/18/21 0340   Action Taken Open ports on tubing capped 11/18/21 0340   Alcohol Cap Used Yes 11/18/21 0340        Opportunity for questions and clarification was provided.       Patient transported with:   Registered Nurse

## 2021-11-18 NOTE — PROGRESS NOTES
Pharmacy Monitoring of Dofetilide [facility]) for Atrial Arrhythmias    Indication: A. Fibrillation     Initial dofetilide dose ordered: 250 mcg BID  Baseline QTc interval (on admission prior to dose) for new starts only: 455 ms on 11/15 EKG  Creatinine clearance (using actual body weight)^ (ml/min): 88.2    Labs:  Recent Labs     11/18/21  1022 11/17/21  0357 11/16/21  0338   K 3.6 3.7 3.5   MG 1.8  --   --    CREA 0.72 0.79 0.57       Date and Time Dose (mcg) QTc* (2 hours after each dose)   1/18 1019 250 310                            *Contact the prescriber, if the QTc increases by >15%    Significant drug interactions:  Ondansetron PRN but she is not using it. For contraindication details, please see package insert    Electrolyte replacement:   Potassium supplementation ordered: YES  Magnesium supplementation ordered:YES      Impression/Plan:   Mg is 1.8; K is 3.6  40 mEq of K already given; ordered 1 g IV mag  MAR note to check QTc 2 hrs after each dose entered.      Thank you,  LADI Scott the clinical trial, the actual body weight was used to calculate the CrCl    Dofetilide Protocol    Document located on pharmacy Teams site: Clinical Practice -> Anticoagulation & Cardiology

## 2021-11-18 NOTE — PROGRESS NOTES
Bedside shift change report given to [facility], RN (oncoming nurse) by Nadege Corea RN (offgoing nurse). Report included the following information SBAR, Kardex, Procedure Summary, Intake/Output, MAR, Accordion, Recent Results, Med Rec Status, Cardiac Rhythm A-Fib, Alarm Parameters , Pre Procedure Checklist, Procedure Verification, Quality Measures and Dual Neuro Assessment.

## 2021-11-18 NOTE — PROGRESS NOTES
Hospitalist Progress Note    NAME: Chloe Dose   :  1945   MRN:  [id number]       Assessment / Plan:  Paroxysmal atrial fibrillation with rapid ventricular rate  HTN  -Continue Cardizem 300 mg daily. Continue Eliquis.  -keep NPO for cardiac cath and dcc today  -pt was also started on Tikoysyn, so will need to be loaded x3 days. Monitor qtc  -hold OAc for procedure, currently on therapeutic loveno  -on as needed metoprolol for uncontrolled rate    Acute diastolic congestive heart failure exacerbation  Moderate to severe pulmonary hypertension  -Echo shows a contraction of 50 to 55% with moderate to severe pulmonary hypertension  -LHC with RHC cath to eval PA pressures  -cont' Lasix 40 mg IV daily.   -Strict I's and O's, daily weights and low-salt diet  -cardiology following, ? cath    Hypokalemia  Replete with Kcl      Code Status: Full code  Surrogate Decision Maker: Her daughter Maxx Garza  DVT Prophylaxis: Eliquis  GI Prophylaxis: not indicated   Baseline: Ambulatory        Body mass index is 29 kg/m². Subjective:     Chief Complaint / Reason for Physician Visit  Pt without any cp or sob. Review of Systems:  Symptom Y/N Comments  Symptom Y/N Comments   Fever/Chills    Chest Pain     Poor Appetite    Edema     Cough    Abdominal Pain     Sputum    Joint Pain     SOB/OSORIO    Pruritis/Rash     Nausea/vomit    Tolerating PT/OT     Diarrhea    Tolerating Diet     Constipation    Other       Could NOT obtain due to:      Objective:     VITALS:   Last 24hrs VS reviewed since prior progress note.  Most recent are:  Patient Vitals for the past 24 hrs:   Temp Pulse Resp BP SpO2   21 0820 98.1 °F (36.7 °C) (!) 104 16 135/81 95 %   21 0605 97.7 °F (36.5 °C) 98 18 131/73 93 %   21 0340 98.3 °F (36.8 °C) 84 20 111/75 91 %   21 2307 98.2 °F (36.8 °C) 95 18 124/65 96 %   21 1928 98.4 °F (36.9 °C) 83 17 (!) 142/67 96 %   21 1820 -- (!) 135 -- -- 92 %   21 1815 -- (!) 127 -- -- (!) 89 %   11/17/21 1804 -- 99 -- -- 90 %   11/17/21 1459 98.3 °F (36.8 °C) 97 -- 121/69 90 %   11/17/21 1111 98.4 °F (36.9 °C) 98 17 119/76 94 %       Intake/Output Summary (Last 24 hours) at 11/18/2021 0849  Last data filed at 11/18/2021 0653  Gross per 24 hour   Intake 0 ml   Output --   Net 0 ml        PHYSICAL EXAM:  General: Alert, cooperative, no acute distress    EENT:  EOMI. Anicteric sclerae. MMM  Resp:  Bilateral air entry present, crackles present, no wheezing  CV:  irregular  rhythm,  No edema  GI:  Soft, Non distended, Non tender.  +Bowel sounds  Neurologic:  Alert and oriented X 3, normal speech,   Psych:   Not anxious nor agitated  Skin:  No rashes.   No jaundice    Reviewed most current lab test results and cultures  YES  Reviewed most current radiology test results   YES  Review and summation of old records today    NO  Reviewed patient's current orders and MAR    YES  PMH/SH reviewed - no change compared to H&P          Current Facility-Administered Medications:     dofetilide (TIKOSYN) capsule 250 mcg, 250 mcg, Oral, Q12H, Jose Gardiner MD    potassium bicarb-citric acid (EFFER-K) tablet 40 mEq, 40 mEq, Oral, ONCE, Estuardo Rudolph MD    ondansetron (ZOFRAN) injection 4 mg, 4 mg, IntraVENous, Q6H PRN, Tomás Diaz MD    acetaminophen (TYLENOL) tablet 650 mg, 650 mg, Oral, Q6H PRN, Tomás Diaz MD    enoxaparin (LOVENOX) ++ partial dose ++ injection 90 mg, 1 mg/kg, SubCUTAneous, Q12H, Estuardo Villafuerte MD, 90 mg at 11/17/21 2048    pantoprazole (PROTONIX) tablet 40 mg, 40 mg, Oral, ACB, Kristel Borrego MD, 40 mg at 11/18/21 0848    traMADoL (ULTRAM) tablet 100 mg, 100 mg, Oral, BID, Doris SOLIS MD, 100 mg at 11/18/21 0848    sodium chloride (NS) flush 5-40 mL, 5-40 mL, IntraVENous, Q8H, Monroe Harkins MD, 10 mL at 11/18/21 0507    sodium chloride (NS) flush 5-40 mL, 5-40 mL, IntraVENous, PRN, Kristel Borrego MD    dilTIAZem ER (CARDIZEM CD) 300 mg, 300 mg, Oral, DAILY, Delmar Alan MD, 300 mg at 11/17/21 1164    metoprolol (LOPRESSOR) injection 2.5 mg, 2.5 mg, IntraVENous, Q6H PRN, Delmar Alan MD, 2.5 mg at 11/17/21 1824    furosemide (LASIX) injection 40 mg, 40 mg, IntraVENous, DAILY, Delmar Alan MD, 40 mg at 11/17/21 1443  ________________________________________________________________________  Care Plan discussed with:    Comments   Patient y    Family      RN y    Care Manager     Consultant  y cardiology                     Multidiciplinary team rounds were held today with , nursing, pharmacist and clinical coordinator. Patient's plan of care was discussed; medications were reviewed and discharge planning was addressed. ________________________________________________________________________  Total NON critical care TIME: 35   Minutes    Total CRITICAL CARE TIME Spent:   Minutes non procedure based      Comments   >50% of visit spent in counseling and coordination of care y    ________________________________________________________________________  Manuel Santillan MD     Procedures: see electronic medical records for all procedures/Xrays and details which were not copied into this note but were reviewed prior to creation of Plan. LABS:  I reviewed today's most current labs and imaging studies.   Pertinent labs include:  Recent Labs     11/17/21  0357 11/16/21  0338 11/15/21  1023   WBC 9.1 8.0 9.1   HGB 12.2 11.1* 11.8   HCT 40.2 38.0 38.8    358 372     Recent Labs     11/17/21  0357 11/16/21  0338 11/15/21  1023   * 140 141   K 3.7 3.5 3.6    107 108   CO2 27 26 24   GLU 97 88 109*   BUN 17 9 9   CREA 0.79 0.57 0.65   CA 9.3 9.2 9.6   ALB  --   --  3.7   TBILI  --   --  0.8   ALT  --   --  18       Signed: Manuel Santillan MD

## 2021-11-18 NOTE — PROGRESS NOTES
TRANSFER - IN REPORT:    Verbal report received from [employer], RN (name) on Kori Liang  being received from PCU (unit) for routine progression of care      Report consisted of patients Situation, Background, Assessment and   Recommendations(SBAR). Information from the following report(s) SBAR, Kardex, Intake/Output, MAR, Accordion, Recent Results, Med Rec Status, Cardiac Rhythm A-Fib, Alarm Parameters , Pre Procedure Checklist, Procedure Verification, Quality Measures and Dual Neuro Assessment was reviewed with the receiving nurse. Opportunity for questions and clarification was provided. Assessment completed upon patients arrival to unit and care assumed.

## 2021-11-18 NOTE — PROGRESS NOTES
Brief Procedure Note:         Indication:   Afib  CP   CMP  Pulmonary hypertension     Procedure:   LHC  RHC  DCCV    Complications: None   Blood loss: Minimal   Condition: Stable     Brief procedure Result:   Mildly elevated filling pressure   Mild pulmonary hypertension (40/17/ 27)  Borderline  and CI   No significant CAD  Successful conversion of Afib to NSR    Recommendations:   Cont Tikosyn for Afib   Fu with EP for consideration of ablation   Cont lasix   Monitor renal function and electrolytes       Full note and recommendations to follow. '

## 2021-11-18 NOTE — PROGRESS NOTES
0600 - Pt. Arrived to [facility]. Pt. Denied SOB, CP and dizziness. Pt. Is asymptomatic and stable vital signs.

## 2021-11-19 ENCOUNTER — APPOINTMENT (OUTPATIENT)
Dept: GENERAL RADIOLOGY | Age: 76
DRG: 286 | End: 2021-11-19
Attending: NURSE PRACTITIONER
Payer: MEDICARE

## 2021-11-19 LAB
ANION GAP SERPL CALC-SCNC: 7 MMOL/L (ref 5–15)
ATRIAL RATE: 80 BPM
BUN SERPL-MCNC: 20 MG/DL (ref 6–20)
BUN/CREAT SERPL: 27 (ref 12–20)
CALCIUM SERPL-MCNC: 9.6 MG/DL (ref 8.5–10.1)
CALCULATED P AXIS, ECG09: 81 DEGREES
CALCULATED R AXIS, ECG10: 13 DEGREES
CALCULATED T AXIS, ECG11: 47 DEGREES
CHLORIDE SERPL-SCNC: 100 MMOL/L (ref 97–108)
CO2 SERPL-SCNC: 29 MMOL/L (ref 21–32)
CREAT SERPL-MCNC: 0.73 MG/DL (ref 0.55–1.02)
DIAGNOSIS, 93000: NORMAL
GLUCOSE SERPL-MCNC: 97 MG/DL (ref 65–100)
MAGNESIUM SERPL-MCNC: 2 MG/DL (ref 1.6–2.4)
P-R INTERVAL, ECG05: 190 MS
POTASSIUM SERPL-SCNC: 4.1 MMOL/L (ref 3.5–5.1)
Q-T INTERVAL, ECG07: 354 MS
QRS DURATION, ECG06: 88 MS
QTC CALCULATION (BEZET), ECG08: 408 MS
SODIUM SERPL-SCNC: 136 MMOL/L (ref 136–145)
VENTRICULAR RATE, ECG03: 80 BPM

## 2021-11-19 PROCEDURE — 74011250637 HC RX REV CODE- 250/637: Performed by: INTERNAL MEDICINE

## 2021-11-19 PROCEDURE — 74011250637 HC RX REV CODE- 250/637: Performed by: STUDENT IN AN ORGANIZED HEALTH CARE EDUCATION/TRAINING PROGRAM

## 2021-11-19 PROCEDURE — 83735 ASSAY OF MAGNESIUM: CPT

## 2021-11-19 PROCEDURE — 65660000000 HC RM CCU STEPDOWN

## 2021-11-19 PROCEDURE — 80048 BASIC METABOLIC PNL TOTAL CA: CPT

## 2021-11-19 PROCEDURE — 74011250636 HC RX REV CODE- 250/636: Performed by: NURSE PRACTITIONER

## 2021-11-19 PROCEDURE — 71045 X-RAY EXAM CHEST 1 VIEW: CPT

## 2021-11-19 PROCEDURE — 36415 COLL VENOUS BLD VENIPUNCTURE: CPT

## 2021-11-19 PROCEDURE — 74011250636 HC RX REV CODE- 250/636: Performed by: STUDENT IN AN ORGANIZED HEALTH CARE EDUCATION/TRAINING PROGRAM

## 2021-11-19 RX ORDER — FUROSEMIDE 10 MG/ML
40 INJECTION INTRAMUSCULAR; INTRAVENOUS ONCE
Status: COMPLETED | OUTPATIENT
Start: 2021-11-19 | End: 2021-11-19

## 2021-11-19 RX ORDER — FUROSEMIDE 20 MG/1
20 TABLET ORAL DAILY
Status: DISCONTINUED | OUTPATIENT
Start: 2021-11-20 | End: 2021-11-20 | Stop reason: HOSPADM

## 2021-11-19 RX ORDER — DILTIAZEM HYDROCHLORIDE 300 MG/1
300 CAPSULE, COATED, EXTENDED RELEASE ORAL DAILY
Status: DISCONTINUED | OUTPATIENT
Start: 2021-11-20 | End: 2021-11-20

## 2021-11-19 RX ORDER — LANOLIN ALCOHOL/MO/W.PET/CERES
400 CREAM (GRAM) TOPICAL DAILY
Status: DISCONTINUED | OUTPATIENT
Start: 2021-11-19 | End: 2021-11-20 | Stop reason: HOSPADM

## 2021-11-19 RX ORDER — POTASSIUM CHLORIDE 750 MG/1
10 TABLET, FILM COATED, EXTENDED RELEASE ORAL DAILY
Status: DISCONTINUED | OUTPATIENT
Start: 2021-11-19 | End: 2021-11-20 | Stop reason: HOSPADM

## 2021-11-19 RX ADMIN — TRAMADOL HYDROCHLORIDE 100 MG: 50 TABLET, COATED ORAL at 17:13

## 2021-11-19 RX ADMIN — DOFETILIDE 250 MCG: 0.25 CAPSULE ORAL at 20:56

## 2021-11-19 RX ADMIN — TRAMADOL HYDROCHLORIDE 100 MG: 50 TABLET, COATED ORAL at 08:56

## 2021-11-19 RX ADMIN — DILTIAZEM HYDROCHLORIDE 300 MG: 180 CAPSULE, COATED, EXTENDED RELEASE ORAL at 08:57

## 2021-11-19 RX ADMIN — FUROSEMIDE 40 MG: 10 INJECTION, SOLUTION INTRAMUSCULAR; INTRAVENOUS at 01:29

## 2021-11-19 RX ADMIN — PANTOPRAZOLE SODIUM 40 MG: 40 TABLET, DELAYED RELEASE ORAL at 08:56

## 2021-11-19 RX ADMIN — Medication 400 MG: at 08:56

## 2021-11-19 RX ADMIN — APIXABAN 5 MG: 5 TABLET, FILM COATED ORAL at 20:56

## 2021-11-19 RX ADMIN — Medication 10 ML: at 20:52

## 2021-11-19 RX ADMIN — FUROSEMIDE 40 MG: 10 INJECTION, SOLUTION INTRAMUSCULAR; INTRAVENOUS at 08:58

## 2021-11-19 RX ADMIN — APIXABAN 5 MG: 5 TABLET, FILM COATED ORAL at 08:57

## 2021-11-19 RX ADMIN — POTASSIUM CHLORIDE 10 MEQ: 750 TABLET, FILM COATED, EXTENDED RELEASE ORAL at 08:56

## 2021-11-19 RX ADMIN — DOFETILIDE 250 MCG: 0.25 CAPSULE ORAL at 08:57

## 2021-11-19 NOTE — PROGRESS NOTES
Progress Note      11/19/2021 4:50 PM  NAME: Chloe Saab   MRN:  [id number]   Admit Diagnosis: Atrial fibrillation with rapid ventricular response (HCC) [I48.91]  CHF (congestive heart failure) [facility]) [I50.9]      Primary Cardiologist: Dr Fiorella Phillip ,    Physician Requesting consult: Dr Bernal Shows     Assessment:    1. Atrial fibrillation with rapid ventricular response, may be a persistent episode. 2. Acute on chronic Diastolic HF, weight 15 Lb down with diuresis   3. Pulmonary hypertension, post capillary, improved with diuresis, on Echo RVSP 80 but after diuresis RHC showed PASP 40s. 4. Dyspnea, due to #1 and #2. Also reported to have CP  5. COPD with tobacco smoking. 6. Obesity with gastric banding procedure. 7. Chronic anticoagulation with Eliquis. 8. Full code.           Recommendations:    1. Discussed with Dr Digna Nam today,  Stated on tikosyn 250 mcg BID, monitor QTc interval - acceptable so far   2. S/p DCCV to NSR   3. L and RHC - No significant CAD, mild post capillary pulmonary HTN    4. Cont lasix > change to PO tomorrow   5. Cont Dilt 300 mg daily    6. Change lovenox to apixaban     Likely DC tomorrow after 5 doses of Tikosyn     Thank you for this consult and allowing me to take part in this patients care. Please call with questions. [x]        High complexity decision making was performed    Subjective:     HPI:  Doing well. No CP. Needed IV lasix yesterday, had some crackles. ROS: No CP, SOB, Abd pain, nausea, vomiting, syncope, palpitations, new focal neurological symptoms     Objective:      Physical Exam:    Last 24hrs VS reviewed since prior progress note.  Most recent are:    Visit Vitals  /72 (BP 1 Location: Left arm, BP Patient Position: At rest)   Pulse 81   Temp 97.8 °F (36.6 °C)   Resp 20   Ht 5' 6\" (1.676 m)   Wt 81.5 kg (179 lb 10.8 oz)   SpO2 96%   BMI 29.00 kg/m²     No intake or output data in the 24 hours ending 11/19/21 0755    General: Alert and oriented x3, no acute distress   Neck: Supple   Respiratory: No respiratory distress, clear lung sound   Cardiovascular: Irregular rate rhythm, S1S2, no murmur   Abdomen: soft, non tender, non distended   Neuro: moves all extremities, oriented x3   Skin: warm and dry   Extremity:Trace edema, warm to touch        Data Review    Telemetry: Afib      Lab Data Personally Reviewed:    Recent Labs     11/17/21  0357   WBC 9.1   HGB 12.2   HCT 40.2        No results for input(s): INR, PTP, APTT, INREXT, INREXT in the last 72 hours. Recent Labs     11/19/21  0211 11/18/21  1022 11/17/21  0357    137 134*   K 4.1 3.6 3.7    101 100   CO2 29 27 27   BUN 20 19 17   CREA 0.73 0.72 0.79   GLU 97 92 97   CA 9.6 9.5 9.3   MG 2.0 1.8  --      No results for input(s): CPK, CKNDX, TROIQ in the last 72 hours. No lab exists for component: CPKMB  No results found for: CHOL, CHOLX, CHLST, CHOLV, HDL, HDLP, LDL, LDLC, DLDLP, TGLX, TRIGL, TRIGP, CHHD, CHHDX    No results for input(s): AP, TBIL, TP, ALB, GLOB, GGT, AML, LPSE in the last 72 hours. No lab exists for component: SGOT, GPT, AMYP, HLPSE  No results for input(s): PH, PCO2, PO2 in the last 72 hours.     Medications Personally Reviewed:    Current Facility-Administered Medications   Medication Dose Route Frequency    magnesium oxide (MAG-OX) tablet 400 mg  400 mg Oral DAILY    dofetilide (TIKOSYN) capsule 250 mcg  250 mcg Oral Q12H    dofetilide(Tikosyn)  QTc documentation reminder  1 Each Other Q12H    ondansetron (ZOFRAN) injection 4 mg  4 mg IntraVENous Q6H PRN    acetaminophen (TYLENOL) tablet 650 mg  650 mg Oral Q6H PRN    enoxaparin (LOVENOX) ++ partial dose ++ injection 90 mg  1 mg/kg SubCUTAneous Q12H    pantoprazole (PROTONIX) tablet 40 mg  40 mg Oral ACB    traMADoL (ULTRAM) tablet 100 mg  100 mg Oral BID    sodium chloride (NS) flush 5-40 mL  5-40 mL IntraVENous Q8H    sodium chloride (NS) flush 5-40 mL  5-40 mL IntraVENous PRN    dilTIAZem ER (CARDIZEM CD) 300 mg  300 mg Oral DAILY    metoprolol (LOPRESSOR) injection 2.5 mg  2.5 mg IntraVENous Q6H PRN    furosemide (LASIX) injection 40 mg  40 mg IntraVENous DAILY              Tio Egan MD

## 2021-11-19 NOTE — CARDIO/PULMONARY
Cardiac Rehab Note: chart review/referral     Cardiac cath 11/18/21 without intervention    EF 50-55%  on 11/15/21 per echo    Smoking history assessed. Patient is a current smoker. Smoking Cessation Program link has been added to the AVS.     Patient not seen at this time due to current condition as indicated in chart.

## 2021-11-19 NOTE — PROGRESS NOTES
2230: Monitoring Tikosin administration. According to notes, QT was measured at 310 2hrs post first administration of Tikosin. Looking back at the cardiac monitoring strip pt ranged with -350 and QTc 420-450. Pt had been in Afib after first dose of tikosin. Pt has had a DCCV since then, now in NSR. QTc at 2230 is 408; QT: 354. Spoke with Azul Tesfaye in Pharmacy about administration of Tikosin. Confirmed with Pharmacy, pt is able to take PM dose of tikosin. 0030: 2hrs post tikosin administration: QT: 324 QTc: 365.

## 2021-11-19 NOTE — PROGRESS NOTES
Received message from patient's nurse stating:    Pulse ox is dropping into the 70s while asleep, then recover and be in the upper 90s. RN observed sleep apnea, undiagnosed. Pt said she's never been told she has DEMETRIUS. Placed on 2L. Pt continues to drop, but recover into the 90s. Pt was admitted for CHF and NSTEMI. Pt had LHC today and AM dose of Lasix was held prior to the cath. Pt has wet crackles in the bases. Pt may benefit from getting some fluid off with Lasix, what do you recommend? Discussion / orders:    Patient Vitals for the past 4 hrs:   Temp Pulse Resp BP SpO2   11/18/21 2230 97.7 °F (36.5 °C) 77 20 127/68 92 %   11/18/21 2215 -- 80 -- (!) 119/98 92 %   11/18/21 2200 -- 84 -- (!) 133/47 91 %   11/18/21 2145 -- 79 -- 136/66 91 %   11/18/21 2130 -- 76 -- 136/60 93 %   11/18/21 2115 -- 77 -- 131/66 91 %              · Chest x-ray  · Lasix 40 mg iv x 1  · Respiratory care consult to evaluate for CPAP           Please note that this note was dictated using Dragon computer voice recognition software. Quite often unanticipated grammatical, syntax, homophones, and other interpretive errors are inadvertently transcribed by the computer software. Please disregard these errors. Please excuse any errors that have escaped final proofreading.

## 2021-11-19 NOTE — PROGRESS NOTES
Hospitalist Progress Note    NAME: Gisselle Haines   :  1945   MRN:  [id number]       Assessment / Plan:  Paroxysmal atrial fibrillation with rapid ventricular rate  HTN  -Continue Cardizem 300 mg daily. Continue Eliquis. Status post cardiac cath and successful cardioversion with return to normal sinus rhythm. Cardiac cath showed no significant CAD  -pt was also started on Tikoysyn, so will need to be loaded x3 days. Last loading dose will be tomorrow monitor qtc  Therapeutic Lovenox changed to p.o. Eliquis  -on as needed metoprolol for uncontrolled rate     Acute diastolic congestive heart failure exacerbation  Moderate to severe pulmonary hypertension  -Echo shows a contraction of 50 to 55% with moderate to severe pulmonary hypertension  -IV Lasix switched to p.o. Lasix  Hypokalemia  Replete with Kcl  Resolved      Code Status: Full code  Surrogate Decision Maker: Her daughter Lalo Urrutia  DVT Prophylaxis: Eliquis  GI Prophylaxis: not indicated   Baseline: Ambulatory        Body mass index is 29 kg/m². Subjective:     Chief Complaint / Reason for Physician Visit  Follow-up paroxysmal A. Fib  Patient reported feeling great      Review of Systems:  Symptom Y/N Comments  Symptom Y/N Comments   Fever/Chills n   Chest Pain n    Poor Appetite    Edema     Cough n   Abdominal Pain     Sputum    Joint Pain     SOB/OSORIO n   Pruritis/Rash     Nausea/vomit    Tolerating PT/OT     Diarrhea    Tolerating Diet y    Constipation    Other       Could NOT obtain due to:      Objective:     VITALS:   Last 24hrs VS reviewed since prior progress note.  Most recent are:  Patient Vitals for the past 24 hrs:   Temp Pulse Resp BP SpO2   21 0755 98.1 °F (36.7 °C) 88 19 (!) 122/52 98 %   21 0205 97.8 °F (36.6 °C) 81 20 104/72 96 %   21 2230 97.7 °F (36.5 °C) 77 20 127/68 92 %   21 2215 -- 80 -- (!) 119/98 92 %   21 2200 -- 84 -- (!) 133/47 91 %   21 2145 -- 79 -- 136/66 91 % 11/18/21 2130 -- 76 -- 136/60 93 %   11/18/21 2115 -- 77 -- 131/66 91 %   11/18/21 2100 -- 79 -- (!) 138/55 93 %   11/18/21 2045 -- 89 -- (!) 152/66 95 %   11/18/21 2030 -- 85 -- 135/62 95 %   11/18/21 2015 -- 90 -- (!) 156/74 92 %   11/18/21 2000 -- 81 -- 129/64 94 %   11/18/21 1945 -- 74 -- (!) 127/52 91 %   11/18/21 1930 -- 73 -- (!) 132/57 (!) 88 %   11/18/21 1914 97.4 °F (36.3 °C) 77 16 (!) 128/57 94 %   11/18/21 1809 -- 86 -- (!) 149/57 93 %   11/18/21 1755 -- 76 -- (!) 105/56 90 %   11/18/21 1740 -- 74 -- (!) 111/46 91 %   11/18/21 1717 -- 81 -- (!) 104/52 92 %   11/18/21 1700 97.9 °F (36.6 °C) 81 16 (!) 133/50 91 %   11/18/21 1657 -- -- -- 124/61 --   11/18/21 1516 98.1 °F (36.7 °C) (!) 117 18 139/83 95 %   11/18/21 1115 98.1 °F (36.7 °C) (!) 119 20 130/69 95 %     No intake or output data in the 24 hours ending 11/19/21 0856     PHYSICAL EXAM:  General: Alert, cooperative, no acute distress    EENT:  EOMI. Anicteric sclerae. MMM  Resp:  ctab  no wheezing  CV:  irregular  rhythm,  No edema  GI:  Soft, Non distended, Non tender.  +Bowel sounds  Neurologic:  Alert and oriented X 3, normal speech,   Psych:   Not anxious nor agitated  Skin:  No rashes.   No jaundice    Reviewed most current lab test results and cultures  YES  Reviewed most current radiology test results   YES  Review and summation of old records today    NO  Reviewed patient's current orders and MAR    YES  PMH/SH reviewed - no change compared to H&P          Current Facility-Administered Medications:     magnesium oxide (MAG-OX) tablet 400 mg, 400 mg, Oral, DAILY, Estuardo Sparrow MD    apixaban (ELIQUIS) tablet 5 mg, 5 mg, Oral, Q12H, Alyce Ferrer MD    [START ON 11/20/2021] furosemide (LASIX) tablet 20 mg, 20 mg, Oral, DAILY, Alyce Ferrer MD    potassium chloride SR (KLOR-CON 10) tablet 10 mEq, 10 mEq, Oral, DAILY, Alyce Ferrer MD    dofetilide (TIKOSYN) capsule 250 mcg, 250 mcg, Oral, Q12H, Estuardo Sparrow, MD, 250 mcg at 11/18/21 2234    dofetilide(Tikosyn)  QTc documentation reminder, 1 Each, Other, Q12H, Jadyn Treadwell MD, 1 Each at 11/18/21 2300    ondansetron (ZOFRAN) injection 4 mg, 4 mg, IntraVENous, Q6H PRN, Reynold Silva MD    acetaminophen (TYLENOL) tablet 650 mg, 650 mg, Oral, Q6H PRN, Reynold Silva MD    pantoprazole (PROTONIX) tablet 40 mg, 40 mg, Oral, ACB, Alicia Farfan MD, 40 mg at 11/18/21 0848    traMADoL (ULTRAM) tablet 100 mg, 100 mg, Oral, BID, Godfrey SOLIS MD, 100 mg at 11/18/21 1716    sodium chloride (NS) flush 5-40 mL, 5-40 mL, IntraVENous, Q8H, Godfrey SOLIS MD, 10 mL at 11/18/21 2234    sodium chloride (NS) flush 5-40 mL, 5-40 mL, IntraVENous, PRN, Alicia Farfan MD    dilTIAZem ER (CARDIZEM CD) 300 mg, 300 mg, Oral, DAILY, Alicia Farfan MD, 300 mg at 11/18/21 1019    metoprolol (LOPRESSOR) injection 2.5 mg, 2.5 mg, IntraVENous, Q6H PRN, Alicia Farfan MD, 2.5 mg at 11/17/21 1824    furosemide (LASIX) injection 40 mg, 40 mg, IntraVENous, DAILY, Jadyn Treadwell MD, 40 mg at 11/17/21 0463  ________________________________________________________________________  Care Plan discussed with:    Comments   Patient y    Family      RN y    Care Manager     Consultant                        Multidiciplinary team rounds were held today with , nursing, pharmacist and clinical coordinator. Patient's plan of care was discussed; medications were reviewed and discharge planning was addressed.      ________________________________________________________________________  Total NON critical care TIME: 35   Minutes    Total CRITICAL CARE TIME Spent:   Minutes non procedure based      Comments   >50% of visit spent in counseling and coordination of care y    ________________________________________________________________________  Tobias Wu MD     Procedures: see electronic medical records for all procedures/Xrays and details which were not copied into this note but were reviewed prior to creation of Plan. LABS:  I reviewed today's most current labs and imaging studies.   Pertinent labs include:  Recent Labs     11/17/21  0357   WBC 9.1   HGB 12.2   HCT 40.2        Recent Labs     11/19/21  0211 11/18/21  1022 11/17/21  0357    137 134*   K 4.1 3.6 3.7    101 100   CO2 29 27 27   GLU 97 92 97   BUN 20 19 17   CREA 0.73 0.72 0.79   CA 9.6 9.5 9.3   MG 2.0 1.8  --    PHOS  --  3.6  --        Signed: Tabatha Langford MD

## 2021-11-19 NOTE — PROGRESS NOTES
Pharmacy Monitoring of Dofetilide [facility]) for Atrial Arrhythmias    Indication: A. Fibrillation     Initial dofetilide dose ordered: 250 mcg BID  Baseline QTc interval (on admission prior to dose) for new starts only: 455 ms on 11/15 EKG  Creatinine clearance (using actual body weight)^ (ml/min): 87.0    Labs:  Recent Labs     11/19/21  0211 11/18/21  1022 11/18/21  1022 11/17/21  0357   K 4.1  --  3.6 3.7   MG 2.0   < > 1.8  --    CREA 0.73  --  0.72 0.79    < > = values in this interval not displayed. Date and Time Dose (mcg) QTc* (2 hours after each dose)   1/18 1019 250 310   1/18 2234 250 443   1/19 0857 250 440                  *Contact the prescriber, if the QTc increases by >15%    Significant drug interactions:  Ondansetron PRN but she is not using it. For contraindication details, please see package insert    Electrolyte replacement:   Potassium supplementation ordered: NO  Magnesium supplementation ordered:NO      Impression/Plan:   Mg is 2; K is 4.1  No electrolyte supplementation needed  MAR note to check QTc 2 hrs after each dose entered.      Thank you,  LADI Cherry the clinical trial, the actual body weight was used to calculate the CrCl    Dofetilide Protocol    Document located on pharmacy Teams site: Clinical Practice -> Anticoagulation & Cardiology

## 2021-11-20 VITALS
HEART RATE: 80 BPM | HEIGHT: 66 IN | WEIGHT: 178.79 LBS | BODY MASS INDEX: 28.73 KG/M2 | DIASTOLIC BLOOD PRESSURE: 65 MMHG | OXYGEN SATURATION: 96 % | RESPIRATION RATE: 17 BRPM | TEMPERATURE: 98.2 F | SYSTOLIC BLOOD PRESSURE: 141 MMHG

## 2021-11-20 LAB
ANION GAP SERPL CALC-SCNC: 5 MMOL/L (ref 5–15)
BUN SERPL-MCNC: 22 MG/DL (ref 6–20)
BUN/CREAT SERPL: 27 (ref 12–20)
CALCIUM SERPL-MCNC: 9.1 MG/DL (ref 8.5–10.1)
CHLORIDE SERPL-SCNC: 100 MMOL/L (ref 97–108)
CO2 SERPL-SCNC: 31 MMOL/L (ref 21–32)
CREAT SERPL-MCNC: 0.81 MG/DL (ref 0.55–1.02)
GLUCOSE SERPL-MCNC: 103 MG/DL (ref 65–100)
MAGNESIUM SERPL-MCNC: 1.9 MG/DL (ref 1.6–2.4)
POTASSIUM SERPL-SCNC: 3.8 MMOL/L (ref 3.5–5.1)
SODIUM SERPL-SCNC: 136 MMOL/L (ref 136–145)

## 2021-11-20 PROCEDURE — 80048 BASIC METABOLIC PNL TOTAL CA: CPT

## 2021-11-20 PROCEDURE — 83735 ASSAY OF MAGNESIUM: CPT

## 2021-11-20 PROCEDURE — 74011250637 HC RX REV CODE- 250/637: Performed by: STUDENT IN AN ORGANIZED HEALTH CARE EDUCATION/TRAINING PROGRAM

## 2021-11-20 PROCEDURE — 93005 ELECTROCARDIOGRAM TRACING: CPT

## 2021-11-20 PROCEDURE — 74011250637 HC RX REV CODE- 250/637: Performed by: INTERNAL MEDICINE

## 2021-11-20 PROCEDURE — 36415 COLL VENOUS BLD VENIPUNCTURE: CPT

## 2021-11-20 RX ORDER — DILTIAZEM HYDROCHLORIDE 120 MG/1
240 CAPSULE, COATED, EXTENDED RELEASE ORAL DAILY
Status: DISCONTINUED | OUTPATIENT
Start: 2021-11-21 | End: 2021-11-20 | Stop reason: HOSPADM

## 2021-11-20 RX ORDER — LANOLIN ALCOHOL/MO/W.PET/CERES
400 CREAM (GRAM) TOPICAL DAILY
Qty: 30 TABLET | Refills: 5 | Status: SHIPPED | OUTPATIENT
Start: 2021-11-20

## 2021-11-20 RX ORDER — DOFETILIDE 0.25 MG/1
250 CAPSULE ORAL EVERY 12 HOURS
Qty: 60 CAPSULE | Refills: 1 | Status: SHIPPED | OUTPATIENT
Start: 2021-11-20 | End: 2022-04-29

## 2021-11-20 RX ORDER — DILTIAZEM HYDROCHLORIDE 240 MG/1
240 CAPSULE, COATED, EXTENDED RELEASE ORAL DAILY
Qty: 30 CAPSULE | Refills: 5 | Status: SHIPPED | OUTPATIENT
Start: 2021-11-21 | End: 2022-04-29

## 2021-11-20 RX ORDER — POTASSIUM CHLORIDE 750 MG/1
40 TABLET, FILM COATED, EXTENDED RELEASE ORAL DAILY
Status: DISCONTINUED | OUTPATIENT
Start: 2021-11-20 | End: 2021-11-20 | Stop reason: HOSPADM

## 2021-11-20 RX ORDER — FUROSEMIDE 20 MG/1
20 TABLET ORAL DAILY
Qty: 30 TABLET | Refills: 5 | Status: SHIPPED | OUTPATIENT
Start: 2021-11-20 | End: 2022-04-29

## 2021-11-20 RX ORDER — POTASSIUM CHLORIDE 750 MG/1
10 TABLET, FILM COATED, EXTENDED RELEASE ORAL DAILY
Qty: 30 TABLET | Refills: 5 | Status: SHIPPED | OUTPATIENT
Start: 2021-11-20 | End: 2022-04-29

## 2021-11-20 RX ADMIN — POTASSIUM CHLORIDE 10 MEQ: 750 TABLET, FILM COATED, EXTENDED RELEASE ORAL at 09:18

## 2021-11-20 RX ADMIN — TRAMADOL HYDROCHLORIDE 100 MG: 50 TABLET, COATED ORAL at 09:17

## 2021-11-20 RX ADMIN — DOFETILIDE 250 MCG: 0.25 CAPSULE ORAL at 09:19

## 2021-11-20 RX ADMIN — PANTOPRAZOLE SODIUM 40 MG: 40 TABLET, DELAYED RELEASE ORAL at 04:44

## 2021-11-20 RX ADMIN — APIXABAN 5 MG: 5 TABLET, FILM COATED ORAL at 09:19

## 2021-11-20 RX ADMIN — POTASSIUM CHLORIDE 40 MEQ: 750 TABLET, FILM COATED, EXTENDED RELEASE ORAL at 09:18

## 2021-11-20 RX ADMIN — FUROSEMIDE 20 MG: 20 TABLET ORAL at 09:17

## 2021-11-20 RX ADMIN — Medication 10 ML: at 04:33

## 2021-11-20 RX ADMIN — Medication 400 MG: at 09:18

## 2021-11-20 NOTE — PROGRESS NOTES
1900 - Bedside report from [facility].  SR.  No complaints. Up ad eric, self care. 2330- QTc .390. No complaints.      - Bedside report to RN.  SR.

## 2021-11-20 NOTE — DISCHARGE INSTRUCTIONS
HOSPITALIST DISCHARGE INSTRUCTIONS    NAME: Wilfrido Wiggins   :  1945   MRN:  [id number]     Date/Time:  2021 11:13 AM    ADMIT DATE: 11/15/2021     DISCHARGE DATE: 2021     DISCHARGE DIAGNOSIS:  Paroxysmal atrial fibrillation with rapid ventricular rate- resolved,  started on Tikosyn, cont Eliquis, Cardizem (lowered), possible ablation in future per Dr Edwar Pino  HTN  Acute diastolic congestive heart failure exacerbation POA- cont Lasix 20 daily, K+ 10 daily, Po mag daily  Moderate to severe pulmonary hypertension  Hypokalemia  Full code    Active Problems:    CHF (congestive heart failure) ([facility] [address] [facility] (11/15/2021)      Atrial fibrillation with rapid ventricular response ([facility] (11/15/2021)         MEDICATIONS:  As per medication reconciliation  list  · It is important that you take the medication exactly as they are prescribed. · Keep your medication in the bottles provided by the pharmacist and keep a list of the medication names, dosages, and times to be taken in your wallet. · Do not take other medications without consulting your doctor. Pain Management: per above medications    What to do at Home    Recommended diet:  Cardiac Diet    Recommended activity: Activity as tolerated    If you have questions regarding the hospital related prescriptions or hospital related issues [phone]. If you experience any of the following symptoms then please call your primary care physician or return to the emergency room if you cannot get hold of your doctor:  Fever, chills, nausea, vomiting, diarrhea, change in mentation, falling, bleeding, shortness of breath,     Follow Up:  Dr. Edward Madsen MD  you are to call and set up an appointment to see them in 7-10 days. Dr Edwar Pino in 4 weeks  Dr Miki Correa in 2 weeks    Information obtained by :  I understand that if any problems occur once I am at home I am to contact my physician.     I understand and acknowledge receipt of the instructions indicated above. Physician's or R.N.'s Signature                                                                  Date/Time                                                                                                                                              Patient or Representative Signature                                                          Date/Time    Smoking Cessation Program:   [employer] is now offering a proven, interactive, text-based smoking cessation program for FREE! To register, please text Yo Cortez [phone] or visit [email or web address]  For more information, please call: [phone]

## 2021-11-20 NOTE — DISCHARGE SUMMARY
Hospitalist Discharge Summary     Patient ID:  Jazmine Douglas  [id number]  77 y.o.  1945  11/15/2021    PCP on record: Radha Dejesus MD    Admit date: 11/15/2021  Discharge date and time: 11/20/2021    DISCHARGE DIAGNOSIS:    Paroxysmal atrial fibrillation with rapid ventricular rate- resolved,  started on Tikosyn, cont Eliquis, Cardizem (lowered), possible ablation in future per Dr Malissa Snow  HTN  Acute diastolic congestive heart failure exacerbation POA- cont Lasix 20 daily, K+ 10 daily, Po mag daily  Moderate to severe pulmonary hypertension  Hypokalemia  Full code    CONSULTATIONS:  IP CONSULT TO HOSPITALIST  IP CONSULT TO CARDIOLOGY    Excerpted HPI from H&P of Jose Ervin MD:  [age] y.o.  female past medical history of A. fib, who presents with complaint of shortness of breath and chest pain. She denies any nausea vomiting. Pain was between her shoulder blade, but by the time she got to the ED the pain had resolved. In the ED, she was found to be in A. fib with RVR, no acute ST changes on the EKG  Blood pressure was within normal limit ,she was saturating well on room air. Her labs revealed normal CBC and BMP, elevated proBNP. Chest x-ray showed cardiomegaly  We were asked to admit for work up and evaluation of the above problems. \"    ______________________________________________________________________  DISCHARGE SUMMARY/HOSPITAL COURSE:  for full details see H&P, daily progress notes, labs, consult notes. Paroxysmal atrial fibrillation -  HTN  -Continue Cardizem 300 mg daily. Continue Eliquis. Status post cardiac cath and successful cardioversion with return to normal sinus rhythm. Cardiac cath showed no significant CAD  -pt was also started on Tikoysyn, so will need to be loaded x3 days.   Last loading dose will be tomorrow monitor qtc  Therapeutic Lovenox changed to p.o. Eliquis  -on as needed metoprolol for uncontrolled rate     Acute diastolic congestive heart failure exacerbation  Moderate to severe pulmonary hypertension  -Echo shows a contraction of 50 to 55% with moderate to severe pulmonary hypertension  -IV Lasix switched to p.o. Lasix  Hypokalemia  Replete with Kcl  Resolved        Code Status: Full code        _______________________________________________________________________  Patient seen and examined by me on discharge day. Pertinent Findings:  Gen:    Not in distress  Chest: Clear lungs  CVS:   Regular rhythm. No edema  Abd:  Soft, not distended, not tender  Neuro:  Alert, oriented x 3  _______________________________________________________________________  DISCHARGE MEDICATIONS:   Current Discharge Medication List      START taking these medications    Details   dilTIAZem ER (CARDIZEM CD) 240 mg capsule Take 1 Capsule by mouth daily. Qty: 30 Capsule, Refills: 5  Start date: 11/21/2021      dofetilide (TIKOSYN) 250 mcg capsule Take 1 Capsule by mouth every twelve (12) hours every twelve (12) hours. Qty: 60 Capsule, Refills: 1  Start date: 11/20/2021      furosemide (LASIX) 20 mg tablet Take 1 Tablet by mouth daily. Qty: 30 Tablet, Refills: 5  Start date: 11/20/2021      magnesium oxide (MAG-OX) 400 mg tablet Take 1 Tablet by mouth daily. Qty: 30 Tablet, Refills: 5  Start date: 11/20/2021      potassium chloride SR (KLOR-CON 10) 10 mEq tablet Take 1 Tablet by mouth daily. Qty: 30 Tablet, Refills: 5  Start date: 11/20/2021         CONTINUE these medications which have NOT CHANGED    Details   multivitamin, tx-iron-ca-min (THERA-M w/ IRON) 9 mg iron-400 mcg tab tablet Take 1 Tablet by mouth daily. Eliquis 5 mg tablet Take 5 mg by mouth two (2) times a day. omeprazole (PRILOSEC) 20 mg capsule Take 20 mg by mouth daily. celecoxib (CELEBREX) 200 mg capsule Take 200 mg by mouth daily. CALCIUM PO Take 1,000 mg by mouth daily. traMADol (ULTRAM) 50 mg tablet Take 100 mg by mouth two (2) times a day.          STOP taking these medications       dilTIAZem ER (TIAZAC) 300 mg capsule Comments:   Reason for Stopping:                 Patient Follow Up Instructions: Activity: Activity as tolerated  Diet: Cardiac Diet      Follow-up with Dr Veronica Mahajan (S cardiology) in 4 weeks.       Follow-up Information    None       ________________________________________________________________    Risk of deterioration: Low    Condition at Discharge:  Stable  __________________________________________________________________    Disposition  Home with family, no needs    ____________________________________________________________________    Code Status: Full Code  ___________________________________________________________________      Total time in minutes spent coordinating this discharge (includes going over instructions, follow-up, prescriptions, and preparing report for sign off to her PCP) :  >30 minutes    Signed:  Rodrigue Parr MD

## 2021-11-20 NOTE — PROGRESS NOTES
Medicare pt has received, reviewed, and signed 2nd IM letter informing them of their right to appeal the discharge. Signed copied has been placed on pt bedside chart.     Lloyd Dela Cruz [address], [address] [facility] [employer]

## 2021-11-20 NOTE — PROGRESS NOTES
CM was alerted that the patient is being discharged today, 11/20/21. CM met with the patient and she has no concerns for discharge. She plans to drive herself home. The patient was given and signed her 2nd IM letter. From CM perspective, the patient can be discharged.      Lolyd Dela Cruz [address], [address]

## 2021-11-20 NOTE — PROGRESS NOTES
Progress Note      11/20/2021 4:50 PM  NAME: Lisa Maldonado   MRN:  [id number]   Admit Diagnosis: Atrial fibrillation with rapid ventricular response (HCC) [I48.91]  CHF (congestive heart failure) [facility]) [I50.9]      Primary Cardiologist: Dr Windy Galeas ,    Physician Requesting consult: Dr Laurie Terry     Assessment:    1. Atrial fibrillation with rapid ventricular response, may be a persistent episode. 2. Acute on chronic Diastolic HF, weight 15 Lb down with diuresis   3. Pulmonary hypertension, post capillary, improved with diuresis, on Echo RVSP 80 but after diuresis RHC showed PASP 40s. 4. Dyspnea, due to #1 and #2. Also reported to have CP  5. COPD with tobacco smoking. 6. Obesity with gastric banding procedure. 7. Chronic anticoagulation with Eliquis. 8. Full code.           Recommendations:    1. Discussed with Dr Ava Gipson,  Stated on tikosyn 250 mcg BID, monitor QTc interval - acceptable so far - if EKG remains okay after dose today then discharge - possible ablation in future   2. S/p DCCV to NSR   3. L and RHC - No significant CAD, mild post capillary pulmonary HTN    4. Continue apixaban   5. Continue Diltiazem - dose reduced to 240   6. Changed lasix to 20 mg po Daily   7. Cont KCL 10 meq and MgO 400 daily   8. Check BMP and Mg in 1 week      Likely DC today if EKG acceptable after dose of Tikosyn today    F/u with Dr Windy Galeas in 2 wks   F/u with Dr Ava Gipson in 4 wks for ablation consideration     Thank you for this consult and allowing me to take part in this patients care. Please call with questions. [x]        High complexity decision making was performed    Subjective:     HPI:  Doing well. No CP or SOB     ROS: No CP, SOB, Abd pain, nausea, vomiting, syncope, palpitations, new focal neurological symptoms     Objective:      Physical Exam:    Last 24hrs VS reviewed since prior progress note.  Most recent are:    Visit Vitals  BP (!) 136/53   Pulse 88   Temp 98.2 °F (36.8 °C)   Resp 17   Ht 5' 6\" (1.676 m)   Wt 81.1 kg (178 lb 12.7 oz)   SpO2 96%   BMI 28.86 kg/m²       Intake/Output Summary (Last 24 hours) at 11/20/2021 0917  Last data filed at 11/20/2021 0448  Gross per 24 hour   Intake 500 ml   Output 1050 ml   Net -550 ml       General: Alert and oriented x3, no acute distress   Neck: Supple   Respiratory: No respiratory distress, clear lung sound   Cardiovascular: Irregular rate rhythm, S1S2, no murmur   Abdomen: soft, non tender, non distended   Neuro: moves all extremities, oriented x3   Skin: warm and dry   Extremity:Trace edema, warm to touch        Data Review    Telemetry: Afib      Lab Data Personally Reviewed:    No results for input(s): WBC, HGB, HCT, PLT, HGBEXT, HCTEXT, PLTEXT, HGBEXT, HCTEXT, PLTEXT in the last 72 hours. No results for input(s): INR, PTP, APTT, INREXT, INREXT in the last 72 hours. Recent Labs     11/20/21  0431 11/19/21  0211 11/18/21  1022    136 137   K 3.8 4.1 3.6    100 101   CO2 31 29 27   BUN 22* 20 19   CREA 0.81 0.73 0.72   * 97 92   CA 9.1 9.6 9.5   MG 1.9 2.0 1.8     No results for input(s): CPK, CKNDX, TROIQ in the last 72 hours. No lab exists for component: CPKMB  No results found for: CHOL, CHOLX, CHLST, CHOLV, HDL, HDLP, LDL, LDLC, DLDLP, TGLX, TRIGL, TRIGP, CHHD, CHHDX    No results for input(s): AP, TBIL, TP, ALB, GLOB, GGT, AML, LPSE in the last 72 hours. No lab exists for component: SGOT, GPT, AMYP, HLPSE  No results for input(s): PH, PCO2, PO2 in the last 72 hours.     Medications Personally Reviewed:    Current Facility-Administered Medications   Medication Dose Route Frequency    potassium chloride SR (KLOR-CON 10) tablet 40 mEq  40 mEq Oral DAILY    [START ON 11/21/2021] dilTIAZem ER (CARDIZEM CD) capsule 240 mg  240 mg Oral DAILY    magnesium oxide (MAG-OX) tablet 400 mg  400 mg Oral DAILY    apixaban (ELIQUIS) tablet 5 mg  5 mg Oral Q12H    furosemide (LASIX) tablet 20 mg  20 mg Oral DAILY    potassium chloride SR (KLOR-CON 10) tablet 10 mEq  10 mEq Oral DAILY    dofetilide (TIKOSYN) capsule 250 mcg  250 mcg Oral Q12H    dofetilide(Tikosyn)  QTc documentation reminder  1 Each Other Q12H    ondansetron (ZOFRAN) injection 4 mg  4 mg IntraVENous Q6H PRN    acetaminophen (TYLENOL) tablet 650 mg  650 mg Oral Q6H PRN    pantoprazole (PROTONIX) tablet 40 mg  40 mg Oral ACB    traMADoL (ULTRAM) tablet 100 mg  100 mg Oral BID    sodium chloride (NS) flush 5-40 mL  5-40 mL IntraVENous Q8H    sodium chloride (NS) flush 5-40 mL  5-40 mL IntraVENous PRN    metoprolol (LOPRESSOR) injection 2.5 mg  2.5 mg IntraVENous Q6H PRN              Joe Arita MD

## 2021-11-21 LAB
ATRIAL RATE: 81 BPM
CALCULATED P AXIS, ECG09: 78 DEGREES
CALCULATED R AXIS, ECG10: 7 DEGREES
CALCULATED T AXIS, ECG11: 62 DEGREES
DIAGNOSIS, 93000: NORMAL
P-R INTERVAL, ECG05: 188 MS
Q-T INTERVAL, ECG07: 402 MS
QRS DURATION, ECG06: 92 MS
QTC CALCULATION (BEZET), ECG08: 466 MS
VENTRICULAR RATE, ECG03: 81 BPM

## 2022-02-28 ENCOUNTER — OFFICE VISIT (OUTPATIENT)
Dept: CARDIOTHORACIC SURGERY | Age: 77
End: 2022-02-28
Payer: MEDICARE

## 2022-02-28 VITALS
HEART RATE: 69 BPM | RESPIRATION RATE: 16 BRPM | DIASTOLIC BLOOD PRESSURE: 62 MMHG | SYSTOLIC BLOOD PRESSURE: 122 MMHG | OXYGEN SATURATION: 94 % | TEMPERATURE: 98.3 F | HEIGHT: 66 IN | WEIGHT: 188.8 LBS | BODY MASS INDEX: 30.34 KG/M2

## 2022-02-28 DIAGNOSIS — I71.21 ASCENDING AORTIC ANEURYSM: Primary | ICD-10-CM

## 2022-02-28 PROCEDURE — 99204 OFFICE O/P NEW MOD 45 MIN: CPT | Performed by: THORACIC SURGERY (CARDIOTHORACIC VASCULAR SURGERY)

## 2022-02-28 RX ORDER — CHOLECALCIFEROL (VITAMIN D3) 125 MCG
2 CAPSULE ORAL 2 TIMES DAILY
COMMUNITY

## 2022-02-28 RX ORDER — ACETAMINOPHEN, DIPHENHYDRAMINE HCL, PHENYLEPHRINE HCL 325; 25; 5 MG/1; MG/1; MG/1
1 TABLET ORAL
COMMUNITY

## 2022-02-28 RX ORDER — ALBUTEROL SULFATE 90 UG/1
2 AEROSOL, METERED RESPIRATORY (INHALATION)
COMMUNITY

## 2022-02-28 RX ORDER — DIAPER,BRIEF,INFANT-TODD,DISP
1 EACH MISCELLANEOUS DAILY
COMMUNITY

## 2022-02-28 NOTE — PROGRESS NOTES
I agree with this consultation note. I personally interviewed and examined the patient, and reviewed their diagnostic studies in detail. This patient is referred to us with regards to a thoracic aortic aneurysm. Her ascending aorta measures approximately 54 mm in diameter. This is aneurysmal and abnormal.    Notably, her echo demonstrates significant mitral regurgitation with severe biatrial enlargement. There is a small degree of aortic insufficiency, as well. There is also some degree of tricuspid regurgitation. The patient I discussed these findings in detail. I described to her that I would need to have a closer look at her valves with a transesophageal echocardiogram before rendering a final opinion as to whether or not she should undergo surgery to address her valvular dysfunction. We will plan on obtaining a transesophageal echocardiogram and seeing her again in the office in order to review these results and make further plans. Thank you very much for referring this patient to us. Windy Colmenares MD, PhD, [facility]. Cardiac Surgery   History and Physical    Subjective:      Rosa Hlae is a 77 y.o. female who was referred by Dr. Giovani Leiva for evaluation of an ascending aortic aneurysm, a-fib, and multi-valvular disease. PMH significant for paroxysmal atrial fibrillation (eliquis/tikosyn) s/p cardioversion x2, diastolic HF, and pulmonary hypertension. She presented to hospital last November with c/o chest pain and dyspnea. Found to be in acute congestive heart failure and a-fib with RVR resulted in hospital admission. Had an echocardiogram on 11/15 demonstrating EF 50-55% with severe LV diastolic dysfunction, moderate MR and TR, mild AI, and severe elevated PA pressures 80mmHg. After diuresis, she had a L/RHC on 11/18 showing improvement in PA pressures and normal coronary angio.  Chest CT showed an incidental finding of an ascending aortic aneurysm measuring ~5.4cm without dissection or rupture. She improved following diuresis and was eventually discharged. Since discharge, she reports being back to her baseline health other than occasional OSORIO. She denies chest pain, palpitations, dizziness, syncope, PND, orthopnea, LE edema. 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 She is a former smoker - quit 4 years ago. Cardiac Testing    Cardiac catheterization: No hemodynamically compromising lesions. TTE:  · LV: Estimated LVEF is 50 - 55%. Normal cavity size and wall thickness. Low normal systolic function. Severe (grade 3) left ventricular diastolic dysfunction. · LA: Severely dilated left atrium. · TV: Moderate tricuspid valve regurgitation is present. · PA: Moderate to severe pulmonary hypertension. Pulmonary arterial systolic pressure is 80 mmHg. · AV: Aortic valve mean gradient is 10 mmHg. Mild aortic valve regurgitation is present. · MV: Mitral valve non-specific thickening. Moderate mitral valve regurgitation is present. · RA: Moderately dilated right atrium. Past Medical History:   Diagnosis Date    Anemia 2010    Aortic aneurysm (HCC)     Arrhythmia     afib r/t low potassium     Arthritis 2010    Atrial fibrillation (HCC)     Chronic pain     Eczema 2010    Edema 2010    GERD (gastroesophageal reflux disease) 2010    Hepatitis     Hiatal hernia 2010    Smoker 2010     Past Surgical History:   Procedure Laterality Date    HX GYN      hysterectomy    HX HYSTERECTOMY      HX ORTHOPAEDIC      left wrist open reduction internal fixation.     HX OTHER SURGICAL      facelift    HX OTHER SURGICAL      Cardioversion x2     VT ABDOMEN SURGERY PROC UNLISTED      Lap band    VT COLONOSCOPY FLX DX W/COLLJ SPEC WHEN PFRMD  2012           Social History     Tobacco Use    Smoking status: Former Smoker     Packs/day: 1.00     Years: 50.00     Pack years: 50.00     Quit date:      Years since quittin.1    Smokeless tobacco: Never Used   Substance Use Topics    Alcohol use: Yes     Alcohol/week: 1.0 standard drink     Types: 1 Cans of beer per week     Comment: occassionally      Family History   Problem Relation Age of Onset    Cancer Mother     Cancer Father     Breast Cancer Maternal Aunt         not sure    Arrhythmia Sister      Prior to Admission medications    Medication Sig Start Date End Date Taking? Authorizing Provider   cholecalciferol, vitamin D3, 50 mcg (2,000 unit) tab Take 2 Tablets by mouth daily. Yes Provider, Historical   biotin 10,000 mcg cap Take 1 Tablet by mouth. Yes Provider, Historical   melatonin 10 mg tab Take 1 Tablet by mouth daily as needed. Yes Provider, Historical   albuterol (PROVENTIL HFA, VENTOLIN HFA, PROAIR HFA) 90 mcg/actuation inhaler Take 2 Puffs by inhalation every four (4) hours as needed for Wheezing. Yes Provider, Historical   dilTIAZem ER (CARDIZEM CD) 240 mg capsule Take 1 Capsule by mouth daily. 11/21/21  Yes Brooks Curling, MD   dofetilide [facility]) 250 mcg capsule Take 1 Capsule by mouth every twelve (12) hours every twelve (12) hours. 11/20/21  Yes Brooks Curling, MD   furosemide (LASIX) 20 mg tablet Take 1 Tablet by mouth daily. 11/20/21  Yes Brooks Curling, MD   magnesium oxide (MAG-OX) 400 mg tablet Take 1 Tablet by mouth daily. 11/20/21  Yes Brooks Curling, MD   potassium chloride SR (KLOR-CON 10) 10 mEq tablet Take 1 Tablet by mouth daily. 11/20/21  Yes Brooks Curling, MD   multivitamin, tx-iron-ca-min (THERA-M w/ IRON) 9 mg iron-400 mcg tab tablet Take 1 Tablet by mouth daily. Yes Provider, Historical   Eliquis 5 mg tablet Take 5 mg by mouth two (2) times a day. 10/29/21  Yes Provider, Historical   omeprazole (PRILOSEC) 20 mg capsule Take 20 mg by mouth daily. 9/29/21  Yes Provider, Historical   celecoxib (CELEBREX) 200 mg capsule Take 200 mg by mouth daily.  9/29/21  Yes Provider, Historical   traMADol (ULTRAM) 50 mg tablet Take 100 mg by mouth two (2) times a day. Yes Provider, Historical   CALCIUM PO Take 1,000 mg by mouth daily. Patient not taking: Reported on 2/28/2022    Provider, Historical       Allergies   Allergen Reactions    Penicillin G Rash    Bactrim [Sulfamethoprim] Rash    Levaquin [Levofloxacin] Rash    Amoxicillin Rash       Review of Systems:   Review of Systems   Constitutional: Negative. Eyes: Negative. Respiratory: Positive for shortness of breath. Cardiovascular: Negative for chest pain, palpitations and orthopnea. Gastrointestinal: Negative. Genitourinary: Negative. Musculoskeletal: Negative. Skin: Negative. Neurological: Negative. Endo/Heme/Allergies: Negative. Psychiatric/Behavioral: Negative. Objective:     VS:   Visit Vitals  /62 (BP 1 Location: Right arm, BP Patient Position: Sitting)   Pulse 69   Temp 98.3 °F (36.8 °C) (Oral)   Resp 16   Ht 5' 6\" (1.676 m)   Wt 188 lb 12.8 oz (85.6 kg)   SpO2 94%   BMI 30.47 kg/m²       Physical Exam:  Physical Exam  Constitutional:       Appearance: Normal appearance. Cardiovascular:      Rate and Rhythm: Normal rate and regular rhythm. Pulses: Normal pulses. Heart sounds: Murmur heard. No friction rub. No gallop. Pulmonary:      Effort: Pulmonary effort is normal.      Breath sounds: Rhonchi present. Abdominal:      General: Abdomen is flat. Palpations: Abdomen is soft. Neurological:      General: No focal deficit present. Mental Status: She is alert and oriented to person, place, and time. Psychiatric:         Mood and Affect: Mood normal.         Behavior: Behavior normal.         Labs: No results for input(s): WBC, HGB, HCT, PLT, NA, K, BUN, CREA, GLU, GLUCPOC, INR, HGBEXT, HCTEXT, PLTEXT, INREXT in the last 72 hours.     No lab exists for component: GLPOC    Diagnostics:   PA and lateral:   CXR Results  (Last 48 hours)    None          Assessment:     Patient Active Problem List   Diagnosis Code    Smoker F17.200    Anemia D64.9    Arthritis M19.90    Edema R60.9    Eczema L30.9    GERD (gastroesophageal reflux disease) K21.9    Hiatal hernia K44.9    Screen for colon cancer Z12.11    Right wrist fracture S62.101A    CHF (congestive heart failure) ([facility]) I50.9    Atrial fibrillation with rapid ventricular response ([address] I48.91         Plan:   Treatment Plan:    1. Ascending aortic aneurysm:  Reviewed images with Dr. Marcela Estrada. Based on current surgical guidelines she is at the cusp of requiring surgical intervention. She needs better evaluation of her valves before determining surgical course. Contacted Dr. Urmila Momin office to help set her up with a FELTON. 2. Moderate MR and TR: Needs FELTON. On lasix. 3. Pulmonary HTN: Better after diuresis as seen on RHC. Former smoker. Encouraged continuing cessation. 4. A-fib: On Tikoysn, eliquis  5.  Diastolic HF: On daily lasix      Signed By: Charbel Hand PA-C     February 28, 2022

## 2022-02-28 NOTE — PROGRESS NOTES
Chief Complaint   Patient presents with    Aortic Aneurysm     1. Have you been to the ER, urgent care clinic since your last visit? Hospitalized since your last visit? No    2. Have you seen or consulted any other health care providers outside of the [address] [place] [address] since your last visit? Include any pap smears or colon screening.  No

## 2022-03-19 PROBLEM — I50.9 CHF (CONGESTIVE HEART FAILURE) (HCC): Status: ACTIVE | Noted: 2021-11-15

## 2022-03-20 PROBLEM — I48.91 ATRIAL FIBRILLATION WITH RAPID VENTRICULAR RESPONSE (HCC): Status: ACTIVE | Noted: 2021-11-15

## 2022-03-21 ENCOUNTER — OFFICE VISIT (OUTPATIENT)
Dept: CARDIOTHORACIC SURGERY | Age: 77
End: 2022-03-21
Payer: MEDICARE

## 2022-03-21 VITALS
WEIGHT: 188.2 LBS | DIASTOLIC BLOOD PRESSURE: 76 MMHG | RESPIRATION RATE: 16 BRPM | BODY MASS INDEX: 30.25 KG/M2 | HEIGHT: 66 IN | TEMPERATURE: 98.3 F | OXYGEN SATURATION: 96 % | HEART RATE: 70 BPM | SYSTOLIC BLOOD PRESSURE: 158 MMHG

## 2022-03-21 DIAGNOSIS — I48.91 ATRIAL FIBRILLATION WITH RAPID VENTRICULAR RESPONSE (HCC): ICD-10-CM

## 2022-03-21 DIAGNOSIS — I71.21 ASCENDING AORTIC ANEURYSM: Primary | ICD-10-CM

## 2022-03-21 PROCEDURE — G8536 NO DOC ELDER MAL SCRN: HCPCS | Performed by: NURSE PRACTITIONER

## 2022-03-21 PROCEDURE — G8417 CALC BMI ABV UP PARAM F/U: HCPCS | Performed by: NURSE PRACTITIONER

## 2022-03-21 PROCEDURE — 3017F COLORECTAL CA SCREEN DOC REV: CPT | Performed by: NURSE PRACTITIONER

## 2022-03-21 PROCEDURE — G8400 PT W/DXA NO RESULTS DOC: HCPCS | Performed by: NURSE PRACTITIONER

## 2022-03-21 PROCEDURE — G8427 DOCREV CUR MEDS BY ELIG CLIN: HCPCS | Performed by: NURSE PRACTITIONER

## 2022-03-21 PROCEDURE — 99214 OFFICE O/P EST MOD 30 MIN: CPT | Performed by: NURSE PRACTITIONER

## 2022-03-21 PROCEDURE — 1101F PT FALLS ASSESS-DOCD LE1/YR: CPT | Performed by: NURSE PRACTITIONER

## 2022-03-21 PROCEDURE — 1090F PRES/ABSN URINE INCON ASSESS: CPT | Performed by: NURSE PRACTITIONER

## 2022-03-21 PROCEDURE — G8432 DEP SCR NOT DOC, RNG: HCPCS | Performed by: NURSE PRACTITIONER

## 2022-03-21 NOTE — PROGRESS NOTES
I agree with this office visit note. I personally interviewed and examined the patient, and reviewed their diagnostic studies in detail. Given the complex nature and extensive pathology involved in this case, I asked my colleague Dr. Annamaria El to weigh in with his opinion. We believe that she has multiple concurrent issues and that we require a little more information prior to determining the final plan. She has a large aortic aneurysm that must be removed as it presents a risk of rupture and dissection. Her recent echocardiograms demonstrate some degree of mitral insufficiency as well as aortic and tricuspid valve insufficiency. I had asked for a FELTON to be performed in order to better elucidate the mechanisms and severities of these valvular insufficiencies. Unfortunately, this study proved inadequate to answer these questions. Moreover, since she was last in the office, she has been receiving some diuresis which seems to have improved her symptoms. It is possible that with optimized medical therapy, she may turn out to be symptom-free. It is important that we give her a fair trial on goal-directed medical therapy as well as obtain a comprehensive FELTON that allows us to better visualize her valves before making any decisions about what operation she requires. That being said, we discussed the risks, benefits and alternatives to surgery, and she was amenable to proceed with what ever it was we felt was necessary. Our plan is to give her approximately 2 weeks of diuresis and then repeat her FELTON. We will also present her case at our multidisciplinary valve conference for broader consideration and opinion. Milly Woods MD, PhD, [facility].       Cardiac Surgery   History and Physical    Subjective:    ** Information obtained from previous visit with updated added below:     Tr Durán is a 77 y.o. female who was referred by Dr. Algernon Call for evaluation of an ascending aortic aneurysm, a-fib, and multi-valvular disease. PMH significant for paroxysmal atrial fibrillation (eliquis/tikosyn) s/p cardioversion x2, diastolic HF, and pulmonary hypertension. She presented to hospital last November with c/o chest pain and dyspnea. Found to be in acute congestive heart failure and a-fib with RVR resulted in hospital admission. Had an echocardiogram on 11/15 demonstrating EF 50-55% with severe LV diastolic dysfunction, moderate MR and TR, mild AI, and severe elevated PA pressures 80mmHg. After diuresis, she had a L/RHC on 11/18 showing improvement in PA pressures and normal coronary angio. Chest CT showed an incidental finding of an ascending aortic aneurysm measuring ~5.4cm without dissection or rupture. She improved following diuresis and was eventually discharged. Since discharge, she reports being back to her baseline health other than occasional OSORIO. She denies chest pain, palpitations, dizziness, syncope, PND, orthopnea, LE edema. 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 She is a former smoker - quit 4 years ago. Cardiac Testing  Cardiac catheterization: 11/18/21  Findings/PostOp Diagnosis:   1. No significant CAD   2. Mildly elevated left sided filling pressure   3. Mildly elevated PA pressure   4. Borderline  and CI   5. Successful DCCV with conversion of Atrial fibrillation to NSR      Recommendations:   1. Continue Tikosyn for Atrial fibrillation, Follow up with EP for Afib   2. Cont lasix for now   3. Routine post procedure & access site care   4. Continue aggressive medical management and risk factor modification      Jayne Rios MD      Right Heart Cath Remus-Martinez catheter inserted via right brachial vein. High PA Sat = 70 %. PA pressure = 40/17 mmHg. Mid RA mean = 10 mmHg. RV pressure = 39/7 mmHg. Wedge pressure = 25 mmHg. FREDY CO 4.37  CI 2.29       TTE:  · LV: Estimated LVEF is 50 - 55%. Normal cavity size and wall thickness. Low normal systolic function. Severe (grade 3) left ventricular diastolic dysfunction. · LA: Severely dilated left atrium. · TV: Moderate tricuspid valve regurgitation is present. · PA: Moderate to severe pulmonary hypertension. Pulmonary arterial systolic pressure is 80 mmHg. · AV: Aortic valve mean gradient is 10 mmHg. Mild aortic valve regurgitation is present. · MV: Mitral valve non-specific thickening. Moderate mitral valve regurgitation is present. · RA: Moderately dilated right atrium. FELTON: 3/7/22 [facility] --Reviewed by Dr. Jaguar Costa. Past Medical History:   Diagnosis Date    Anemia 2010    Aortic aneurysm (HCC)     Arrhythmia     afib r/t low potassium     Arthritis 2010    Atrial fibrillation (HCC)     Chronic pain     Eczema 2010    Edema 2010    GERD (gastroesophageal reflux disease) 2010    Hepatitis     Hiatal hernia 2010    Smoker 2010     Past Surgical History:   Procedure Laterality Date    HX GYN      hysterectomy    HX HYSTERECTOMY      HX ORTHOPAEDIC      left wrist open reduction internal fixation.  HX OTHER SURGICAL      facelift    HX OTHER SURGICAL      Cardioversion x2     OH ABDOMEN SURGERY PROC UNLISTED      Lap band    OH COLONOSCOPY FLX DX W/COLLJ SPEC WHEN PFRMD  2012           Social History     Tobacco Use    Smoking status: Former Smoker     Packs/day: 1.00     Years: 50.00     Pack years: 50.00     Quit date:      Years since quittin.2    Smokeless tobacco: Never Used   Substance Use Topics    Alcohol use: Yes     Alcohol/week: 1.0 standard drink     Types: 1 Cans of beer per week     Comment: occassionally      Family History   Problem Relation Age of Onset    Cancer Mother     Cancer Father     Breast Cancer Maternal Aunt         not sure    Arrhythmia Sister      Prior to Admission medications    Medication Sig Start Date End Date Taking?  Authorizing Provider   cholecalciferol, vitamin D3, 50 mcg (2,000 unit) tab Take 2 Tablets by mouth daily. Yes Provider, Historical   biotin 10,000 mcg cap Take 1 Tablet by mouth. Yes Provider, Historical   melatonin 10 mg tab Take 1 Tablet by mouth daily as needed. Yes Provider, Historical   albuterol (PROVENTIL HFA, VENTOLIN HFA, PROAIR HFA) 90 mcg/actuation inhaler Take 2 Puffs by inhalation every four (4) hours as needed for Wheezing. Yes Provider, Historical   dilTIAZem ER (CARDIZEM CD) 240 mg capsule Take 1 Capsule by mouth daily. 11/21/21  Yes Yvette Andino MD   dofetilide [facility]) 250 mcg capsule Take 1 Capsule by mouth every twelve (12) hours every twelve (12) hours. 11/20/21  Yes Yvette Andino MD   furosemide (LASIX) 20 mg tablet Take 1 Tablet by mouth daily. 11/20/21  Yes Yvette Andino MD   magnesium oxide (MAG-OX) 400 mg tablet Take 1 Tablet by mouth daily. 11/20/21  Yes Yvette Andino MD   potassium chloride SR (KLOR-CON 10) 10 mEq tablet Take 1 Tablet by mouth daily. 11/20/21  Yes Yvette Andino MD   multivitamin, tx-iron-ca-min (THERA-M w/ IRON) 9 mg iron-400 mcg tab tablet Take 1 Tablet by mouth daily. Yes Provider, Historical   Eliquis 5 mg tablet Take 5 mg by mouth two (2) times a day. 10/29/21  Yes Provider, Historical   omeprazole (PRILOSEC) 20 mg capsule Take 20 mg by mouth daily. 9/29/21  Yes Provider, Historical   celecoxib (CELEBREX) 200 mg capsule Take 200 mg by mouth daily. 9/29/21  Yes Provider, Historical   traMADol (ULTRAM) 50 mg tablet Take 100 mg by mouth two (2) times a day. Yes Provider, Historical   CALCIUM PO Take 1,000 mg by mouth daily. Patient not taking: Reported on 2/28/2022    Provider, Historical       Allergies   Allergen Reactions    Penicillin G Rash    Bactrim [Sulfamethoprim] Rash    Levaquin [Levofloxacin] Rash    Amoxicillin Rash       Review of Systems:   Review of Systems   Constitutional: Negative. Eyes: Negative. Respiratory: Positive for shortness of breath.     Cardiovascular: Negative for chest pain, palpitations and orthopnea. Gastrointestinal: Negative. Genitourinary: Negative. Musculoskeletal: Negative. Skin: Negative. Neurological: Negative. Endo/Heme/Allergies: Negative. Psychiatric/Behavioral: Negative. Objective:     VS:    Visit Vitals  BP (!) 158/76 (BP 1 Location: Left upper arm, BP Patient Position: Sitting)   Pulse 70   Temp 98.3 °F (36.8 °C) (Oral)   Resp 16   Ht 5' 6\" (1.676 m)   Wt 188 lb 3.2 oz (85.4 kg)   SpO2 96%   BMI 30.38 kg/m²       Physical Exam:  Physical Exam  Constitutional:       Appearance: Normal appearance. Cardiovascular:      Rate and Rhythm: Normal rate and regular rhythm. Pulses: Normal pulses. Heart sounds: Murmur heard. No friction rub. No gallop. Pulmonary:      Effort: Pulmonary effort is normal.      Breath sounds: Rhonchi present. Abdominal:      General: Abdomen is flat. Palpations: Abdomen is soft. Neurological:      General: No focal deficit present. Mental Status: She is alert and oriented to person, place, and time. Psychiatric:         Mood and Affect: Mood normal.         Behavior: Behavior normal.         Labs: No results for input(s): WBC, HGB, HCT, PLT, NA, K, BUN, CREA, GLU, GLUCPOC, INR, HGBEXT, HCTEXT, PLTEXT, INREXT, HGBEXT, HCTEXT, PLTEXT, INREXT in the last 72 hours. No lab exists for component: [place]    Diagnostics:   PA and lateral: 11/15/21  Findings: Cardiac silhouette is enlarged. Pulmonary vasculature is not engorged. There are no focal parenchymal opacities, effusions, or pneumothorax. Lungs are  hyperinflated. Patient is post gastric banding.     IMPRESSION  1.  Enlarged cardiac silhouette, otherwise no acute disease    Assessment:     Patient Active Problem List   Diagnosis Code    Smoker F17.200    Anemia D64.9    Arthritis M19.90    Edema R60.9    Eczema L30.9    GERD (gastroesophageal reflux disease) K21.9    Hiatal hernia K44.9    Screen for colon cancer Z12.11    Right wrist fracture S62.101A    CHF (congestive heart failure) ([facility]) I50.9    Atrial fibrillation with rapid ventricular response ([facility]) I48.91         Plan:   The risk and benefit of surgery were reviewed with patient and family and all questions answered and the patient wishes to proceed. Risk include infection, bleeding, stroke, heart attack, irregular heart rhythm, kidney failure and death. Surgery is scheduled for April 19th, 2022. STS Risk Calculator V2.81 - Discussed by surgeon with patient. Procedure: MV Repair  Risk of Mortality: 2.106%  Renal Failure: 1.972%  Permanent Stroke: 2.786%  Prolonged Ventilation:  5.865%  DSW Infection:  0.051%  Reoperation:  2.730%  Morbidity or Mortality:  8.923%  Short Length of Stay:  28.108%  Long Length of Stay:  4.862%    Procedure: Isolated AVR  Risk of Mortality:  1.483%  Renal Failure: 1.437%  Permanent Stroke:  0.875%  Prolonged Ventilation:  5.882%  DSW Infection: 0.086%  Reoperation:  3.500%  Morbidity or Mortality:  11.793%  Short Length of Stay:28.124%  Long Length of Stay:  5.580%    Treatment Plan:    1. Ascending aortic aneurysm:  Reviewed images with Dr. Seth Fink. Will proceed with surgery. Complete preop workup will be needed at [facility]. 2. Severe MR and mod TR: FELTON completed and reviewed by Dr. Seth Fink. On lasix. Will need MV repair with possible replacement. 3. Pulmonary HTN: Better after diuresis as seen on RHC. Former smoker. Encouraged continuing cessation. 4. A-fib: On Tikoysn, eliquis. Will have LAAL during surgery. 5. Chronic Diastolic HF: On daily lasix  6.  Mild-Mod AI: Will plan for AV replacement      Signed By: Sandhya Winchester NP     March 21, 2022

## 2022-03-22 DIAGNOSIS — I71.21 ASCENDING AORTIC ANEURYSM: Primary | ICD-10-CM

## 2022-03-24 NOTE — PERIOP NOTES
[facility]  Preoperative Instructions        Surgery Date 3/29/2022     Time of Arrival To Be Called  Contact# [phone]    1. On the day of your surgery, please report to the Surgical Services Registration Desk and sign in at your designated time. The Surgery Center is located to the right of the Emergency Room. 2. You must have someone with you to drive you home. You should not drive a car for 24 hours following surgery. Please make arrangements for a friend or family member to stay with you for the first 24 hours after your surgery. 3. Do not have anything to eat or drink (including water, gum, mints, coffee, juice) after midnight  3/28/2022 . ? This may not apply to medications prescribed by your physician. ?(Please note below the special instructions with medications to take the morning of your procedure.)    4. We recommend you do not drink any alcoholic beverages for 24 hours before and after your surgery. 5. Contact your surgeons office for instructions on the following medications: non-steroidal anti-inflammatory drugs (i.e. Advil, Aleve), vitamins, and supplements. (Some surgeons will want you to stop these medications prior to surgery and others may allow you to take them)  **If you are currently taking Plavix, Coumadin, Aspirin and/or other blood-thinning agents, contact your surgeon for instructions. ** Your surgeon will partner with the physician prescribing these medications to determine if it is safe to stop or if you need to continue taking. Please do not stop taking these medications without instructions from your surgeon    6. Wear comfortable clothes. Wear glasses instead of contacts. Do not bring any money or jewelry. Please bring picture ID, insurance card, and any prearranged co-payment or hospital payment. Do not wear make-up, particularly mascara the morning of your surgery.   Do not wear nail polish, particularly if you are having foot /hand surgery. Wear your hair loose or down, no ponytails, buns, vicki pins or clips. All body piercings must be removed. Please shower with antibacterial soap for three consecutive days before and on the morning of surgery, but do not apply any lotions, powders or deodorants after the shower on the day of surgery. Please use a fresh towels after each shower. Please sleep in clean clothes and change bed linens the night before surgery. Please do not shave for 48 hours prior to surgery. Shaving of the face is acceptable. 7. You should understand that if you do not follow these instructions your surgery may be cancelled. If your physical condition changes (I.e. fever, cold or flu) please contact your surgeon as soon as possible. 8. It is important that you be on time. If a situation occurs where you may be late, please call [phone] (OR Holding Area). 9. If you have any questions and or problems, please call [phone] (Pre-admission Testing). 10. Your surgery time may be subject to change. You will receive a phone call the evening prior if your time changes. 11.  If having outpatient surgery, you must have someone to drive you here, stay with you during the duration of your stay, and to drive you home at time of discharge. 12.  Due to current COVID restrictions, only ONE adult may accompany you the day of the procedure. We have limited seating available. If our waiting room is at capacity, your ride may be asked to remain in their vehicle. No children are allowed in the waiting room    Special Instructions: Follow all instructions given to you by your doctor. Bring all rescue inhalers with you the day of your surgery. NOTHING BY MOUTH THE MORNING OF SURGERY - HOLD ALL MEDICATIONS      I understand a pre-operative phone call will be made to verify my surgery time.   In the event that I am not available, I give permission for a message to be left on my answering service and/or with another person?   yes         ___________________      __________   _________    (Signature of Patient)             (Witness)                (Date and Time)

## 2022-03-28 ENCOUNTER — ANESTHESIA EVENT (OUTPATIENT)
Dept: CARDIOTHORACIC SURGERY | Age: 77
End: 2022-03-28
Payer: MEDICARE

## 2022-03-29 ENCOUNTER — HOSPITAL ENCOUNTER (OUTPATIENT)
Dept: NON INVASIVE DIAGNOSTICS | Age: 77
Discharge: HOME | End: 2022-03-29
Attending: THORACIC SURGERY (CARDIOTHORACIC VASCULAR SURGERY)
Payer: MEDICARE

## 2022-03-29 ENCOUNTER — ANESTHESIA (OUTPATIENT)
Dept: CARDIOTHORACIC SURGERY | Age: 77
End: 2022-03-29
Payer: MEDICARE

## 2022-03-29 ENCOUNTER — HOSPITAL ENCOUNTER (OUTPATIENT)
Age: 77
Setting detail: OUTPATIENT SURGERY
Discharge: HOME | End: 2022-03-29
Attending: THORACIC SURGERY (CARDIOTHORACIC VASCULAR SURGERY) | Admitting: THORACIC SURGERY (CARDIOTHORACIC VASCULAR SURGERY)
Payer: MEDICARE

## 2022-03-29 ENCOUNTER — APPOINTMENT (OUTPATIENT)
Dept: NON INVASIVE DIAGNOSTICS | Age: 77
End: 2022-03-29
Attending: THORACIC SURGERY (CARDIOTHORACIC VASCULAR SURGERY)
Payer: MEDICARE

## 2022-03-29 VITALS
WEIGHT: 186 LBS | HEIGHT: 65 IN | BODY MASS INDEX: 30.99 KG/M2 | TEMPERATURE: 97.7 F | OXYGEN SATURATION: 95 % | DIASTOLIC BLOOD PRESSURE: 69 MMHG | RESPIRATION RATE: 16 BRPM | HEART RATE: 67 BPM | SYSTOLIC BLOOD PRESSURE: 145 MMHG

## 2022-03-29 PROBLEM — I71.21 ASCENDING AORTIC ANEURYSM (HCC): Status: ACTIVE | Noted: 2022-03-29

## 2022-03-29 PROBLEM — I71.21 ASCENDING AORTIC ANEURYSM: Status: ACTIVE | Noted: 2022-03-29

## 2022-03-29 LAB
ECHO AO ASC DIAM: 5.3 CM
ECHO AO ASCENDING AORTA INDEX: 2.76 CM/M2
ECHO AR MAX VEL PISA: 4.7 M/S
ECHO AV AREA PEAK VELOCITY: 2.4 CM2
ECHO AV AREA VTI: 2.6 CM2
ECHO AV AREA/BSA PEAK VELOCITY: 1.3 CM2/M2
ECHO AV AREA/BSA VTI: 1.4 CM2/M2
ECHO AV MEAN GRADIENT: 7 MMHG
ECHO AV MEAN VELOCITY: 1.2 M/S
ECHO AV PEAK GRADIENT: 13 MMHG
ECHO AV PEAK VELOCITY: 1.8 M/S
ECHO AV REGURGITANT PHT: 291.7 MILLISECOND
ECHO AV VELOCITY RATIO: 0.89
ECHO AV VTI: 38.4 CM
ECHO LA DIAMETER INDEX: 2.29 CM/M2
ECHO LA DIAMETER: 4.4 CM
ECHO LV E' LATERAL VELOCITY: 5 CM/S
ECHO LV FRACTIONAL SHORTENING: 30 % (ref 28–44)
ECHO LV INTERNAL DIMENSION DIASTOLE INDEX: 2.76 CM/M2
ECHO LV INTERNAL DIMENSION DIASTOLIC: 5.3 CM (ref 3.9–5.3)
ECHO LV INTERNAL DIMENSION SYSTOLIC INDEX: 1.93 CM/M2
ECHO LV INTERNAL DIMENSION SYSTOLIC: 3.7 CM
ECHO LV IVSD: 1 CM (ref 0.6–0.9)
ECHO LV MASS 2D: 256.6 G (ref 67–162)
ECHO LV MASS INDEX 2D: 133.6 G/M2 (ref 43–95)
ECHO LV POSTERIOR WALL DIASTOLIC: 1.4 CM (ref 0.6–0.9)
ECHO LV RELATIVE WALL THICKNESS RATIO: 0.53
ECHO LVOT AREA: 2.8 CM2
ECHO LVOT AV VTI INDEX: 0.94
ECHO LVOT DIAM: 1.9 CM
ECHO LVOT MEAN GRADIENT: 6 MMHG
ECHO LVOT PEAK GRADIENT: 10 MMHG
ECHO LVOT PEAK VELOCITY: 1.6 M/S
ECHO LVOT STROKE VOLUME INDEX: 53.4 ML/M2
ECHO LVOT SV: 102.6 ML
ECHO LVOT VTI: 36.2 CM
ECHO MV REGURGITANT PEAK GRADIENT: 174 MMHG
ECHO MV REGURGITANT PEAK VELOCITY: 6.6 M/S
ECHO PULMONARY ARTERY END DIASTOLIC PRESSURE: 11 MMHG
ECHO PV MAX VELOCITY: 1.2 M/S
ECHO PV PEAK GRADIENT: 5 MMHG
ECHO PV REGURGITANT MAX VELOCITY: 1.7 M/S
ECHO TV REGURGITANT MAX VELOCITY: 3.08 M/S
ECHO TV REGURGITANT PEAK GRADIENT: 38 MMHG

## 2022-03-29 PROCEDURE — 76010000154 HC OR TIME FIRST 0.5 HR: Performed by: THORACIC SURGERY (CARDIOTHORACIC VASCULAR SURGERY)

## 2022-03-29 PROCEDURE — 93312 ECHO TRANSESOPHAGEAL: CPT | Performed by: THORACIC SURGERY (CARDIOTHORACIC VASCULAR SURGERY)

## 2022-03-29 PROCEDURE — 76210000020 HC REC RM PH II FIRST 0.5 HR: Performed by: THORACIC SURGERY (CARDIOTHORACIC VASCULAR SURGERY)

## 2022-03-29 PROCEDURE — 76060000031 HC ANESTHESIA FIRST 0.5 HR: Performed by: THORACIC SURGERY (CARDIOTHORACIC VASCULAR SURGERY)

## 2022-03-29 PROCEDURE — 74011000250 HC RX REV CODE- 250: Performed by: NURSE ANESTHETIST, CERTIFIED REGISTERED

## 2022-03-29 PROCEDURE — 77030026438 HC STYL ET INTUB CARD -A: Performed by: NURSE ANESTHETIST, CERTIFIED REGISTERED

## 2022-03-29 PROCEDURE — 93306 TTE W/DOPPLER COMPLETE: CPT | Performed by: THORACIC SURGERY (CARDIOTHORACIC VASCULAR SURGERY)

## 2022-03-29 PROCEDURE — 77030008684 HC TU ET CUF COVD -B: Performed by: NURSE ANESTHETIST, CERTIFIED REGISTERED

## 2022-03-29 PROCEDURE — 93306 TTE W/DOPPLER COMPLETE: CPT

## 2022-03-29 PROCEDURE — 74011250636 HC RX REV CODE- 250/636: Performed by: ANESTHESIOLOGY

## 2022-03-29 PROCEDURE — 74011250636 HC RX REV CODE- 250/636: Performed by: NURSE ANESTHETIST, CERTIFIED REGISTERED

## 2022-03-29 PROCEDURE — 74011250637 HC RX REV CODE- 250/637: Performed by: THORACIC SURGERY (CARDIOTHORACIC VASCULAR SURGERY)

## 2022-03-29 PROCEDURE — 76210000006 HC OR PH I REC 0.5 TO 1 HR: Performed by: THORACIC SURGERY (CARDIOTHORACIC VASCULAR SURGERY)

## 2022-03-29 PROCEDURE — 77030019908 HC STETH ESOPH SIMS -A: Performed by: NURSE ANESTHETIST, CERTIFIED REGISTERED

## 2022-03-29 RX ORDER — SODIUM CHLORIDE 0.9 % (FLUSH) 0.9 %
5-40 SYRINGE (ML) INJECTION AS NEEDED
Status: DISCONTINUED | OUTPATIENT
Start: 2022-03-29 | End: 2022-03-29 | Stop reason: HOSPADM

## 2022-03-29 RX ORDER — DEXAMETHASONE SODIUM PHOSPHATE 4 MG/ML
INJECTION, SOLUTION INTRA-ARTICULAR; INTRALESIONAL; INTRAMUSCULAR; INTRAVENOUS; SOFT TISSUE AS NEEDED
Status: DISCONTINUED | OUTPATIENT
Start: 2022-03-29 | End: 2022-03-29 | Stop reason: HOSPADM

## 2022-03-29 RX ORDER — MIDAZOLAM HYDROCHLORIDE 1 MG/ML
1 INJECTION, SOLUTION INTRAMUSCULAR; INTRAVENOUS AS NEEDED
Status: DISCONTINUED | OUTPATIENT
Start: 2022-03-29 | End: 2022-03-29 | Stop reason: HOSPADM

## 2022-03-29 RX ORDER — LIDOCAINE HYDROCHLORIDE 20 MG/ML
INJECTION, SOLUTION EPIDURAL; INFILTRATION; INTRACAUDAL; PERINEURAL AS NEEDED
Status: DISCONTINUED | OUTPATIENT
Start: 2022-03-29 | End: 2022-03-29 | Stop reason: HOSPADM

## 2022-03-29 RX ORDER — SODIUM CHLORIDE, SODIUM LACTATE, POTASSIUM CHLORIDE, CALCIUM CHLORIDE 600; 310; 30; 20 MG/100ML; MG/100ML; MG/100ML; MG/100ML
25 INJECTION, SOLUTION INTRAVENOUS CONTINUOUS
Status: DISCONTINUED | OUTPATIENT
Start: 2022-03-29 | End: 2022-03-29 | Stop reason: HOSPADM

## 2022-03-29 RX ORDER — SUCCINYLCHOLINE CHLORIDE 20 MG/ML
INJECTION INTRAMUSCULAR; INTRAVENOUS AS NEEDED
Status: DISCONTINUED | OUTPATIENT
Start: 2022-03-29 | End: 2022-03-29 | Stop reason: HOSPADM

## 2022-03-29 RX ORDER — ROCURONIUM BROMIDE 10 MG/ML
INJECTION, SOLUTION INTRAVENOUS AS NEEDED
Status: DISCONTINUED | OUTPATIENT
Start: 2022-03-29 | End: 2022-03-29 | Stop reason: HOSPADM

## 2022-03-29 RX ORDER — SODIUM CHLORIDE 0.9 % (FLUSH) 0.9 %
5-40 SYRINGE (ML) INJECTION EVERY 8 HOURS
Status: DISCONTINUED | OUTPATIENT
Start: 2022-03-29 | End: 2022-03-29 | Stop reason: HOSPADM

## 2022-03-29 RX ORDER — ONDANSETRON 2 MG/ML
INJECTION INTRAMUSCULAR; INTRAVENOUS AS NEEDED
Status: DISCONTINUED | OUTPATIENT
Start: 2022-03-29 | End: 2022-03-29 | Stop reason: HOSPADM

## 2022-03-29 RX ORDER — HYDROMORPHONE HYDROCHLORIDE 1 MG/ML
0.2 INJECTION, SOLUTION INTRAMUSCULAR; INTRAVENOUS; SUBCUTANEOUS
Status: DISCONTINUED | OUTPATIENT
Start: 2022-03-29 | End: 2022-03-29 | Stop reason: HOSPADM

## 2022-03-29 RX ORDER — LIDOCAINE HYDROCHLORIDE 10 MG/ML
0.1 INJECTION, SOLUTION EPIDURAL; INFILTRATION; INTRACAUDAL; PERINEURAL AS NEEDED
Status: DISCONTINUED | OUTPATIENT
Start: 2022-03-29 | End: 2022-03-29 | Stop reason: HOSPADM

## 2022-03-29 RX ORDER — FENTANYL CITRATE 50 UG/ML
50 INJECTION, SOLUTION INTRAMUSCULAR; INTRAVENOUS AS NEEDED
Status: DISCONTINUED | OUTPATIENT
Start: 2022-03-29 | End: 2022-03-29 | Stop reason: HOSPADM

## 2022-03-29 RX ORDER — FENTANYL CITRATE 50 UG/ML
25 INJECTION, SOLUTION INTRAMUSCULAR; INTRAVENOUS
Status: DISCONTINUED | OUTPATIENT
Start: 2022-03-29 | End: 2022-03-29 | Stop reason: HOSPADM

## 2022-03-29 RX ORDER — ONDANSETRON 2 MG/ML
4 INJECTION INTRAMUSCULAR; INTRAVENOUS AS NEEDED
Status: DISCONTINUED | OUTPATIENT
Start: 2022-03-29 | End: 2022-03-29 | Stop reason: HOSPADM

## 2022-03-29 RX ORDER — PROPOFOL 10 MG/ML
INJECTION, EMULSION INTRAVENOUS AS NEEDED
Status: DISCONTINUED | OUTPATIENT
Start: 2022-03-29 | End: 2022-03-29 | Stop reason: HOSPADM

## 2022-03-29 RX ORDER — SODIUM CHLORIDE, SODIUM LACTATE, POTASSIUM CHLORIDE, CALCIUM CHLORIDE 600; 310; 30; 20 MG/100ML; MG/100ML; MG/100ML; MG/100ML
50 INJECTION, SOLUTION INTRAVENOUS CONTINUOUS
Status: DISCONTINUED | OUTPATIENT
Start: 2022-03-29 | End: 2022-03-29 | Stop reason: HOSPADM

## 2022-03-29 RX ORDER — ACETAMINOPHEN 325 MG/1
650 TABLET ORAL ONCE
Status: DISCONTINUED | OUTPATIENT
Start: 2022-03-29 | End: 2022-03-29 | Stop reason: HOSPADM

## 2022-03-29 RX ADMIN — PROPOFOL 100 MG: 10 INJECTION, EMULSION INTRAVENOUS at 09:37

## 2022-03-29 RX ADMIN — LIDOCAINE HYDROCHLORIDE 60 MG: 20 INJECTION, SOLUTION EPIDURAL; INFILTRATION; INTRACAUDAL; PERINEURAL at 09:37

## 2022-03-29 RX ADMIN — PHENYLEPHRINE HYDROCHLORIDE 40 MCG: 10 INJECTION INTRAVENOUS at 09:50

## 2022-03-29 RX ADMIN — SUCCINYLCHOLINE CHLORIDE 120 MG: 20 INJECTION, SOLUTION INTRAMUSCULAR; INTRAVENOUS at 09:37

## 2022-03-29 RX ADMIN — PROPOFOL 70 MG: 10 INJECTION, EMULSION INTRAVENOUS at 09:38

## 2022-03-29 RX ADMIN — SODIUM CHLORIDE, POTASSIUM CHLORIDE, SODIUM LACTATE AND CALCIUM CHLORIDE 25 ML/HR: 600; 310; 30; 20 INJECTION, SOLUTION INTRAVENOUS at 08:33

## 2022-03-29 RX ADMIN — ONDANSETRON HYDROCHLORIDE 4 MG: 2 INJECTION, SOLUTION INTRAMUSCULAR; INTRAVENOUS at 09:44

## 2022-03-29 RX ADMIN — Medication 3 AMPULE: at 08:33

## 2022-03-29 RX ADMIN — DEXAMETHASONE SODIUM PHOSPHATE 4 MG: 4 INJECTION, SOLUTION INTRAMUSCULAR; INTRAVENOUS at 09:44

## 2022-03-29 RX ADMIN — ROCURONIUM BROMIDE 5 MG: 10 INJECTION INTRAVENOUS at 09:37

## 2022-03-29 NOTE — DISCHARGE INSTRUCTIONS
Patient Education        Transesophageal Echocardiogram: What to Expect at Home  Your Recovery  A transesophageal echocardiogram is a test to help your doctor look at the inside of your heart. A small device called a transducer directs sound waves toward your heart. The sound waves make a picture of the heart's valves and chambers. Before the test, your throat was sprayed with medicine to numb it. Your throat may be sore for a few days. You may have had a sedative to help you relax. You may be unsteady after having sedation. It can take a few hours for the medicine's effects to wear off. Common side effects include nausea, vomiting, and feeling sleepy or tired. This care sheet gives you a general idea about how long it will take for you to recover. But each person recovers at a different pace. Follow the steps below to feel better as quickly as possible. How can you care for yourself at home? Activity    · If a sedative was used, your doctor will tell you when it is safe for you to do your normal activities.     · For your safety, do not drive or operate any machinery that could be dangerous. Wait until the medicine wears off and you can think clearly and react easily. Diet    · Do not eat or drink until the numbness in your throat wears off.     · When the numbness is gone, you can eat your normal diet.     · Throat lozenges and warm saltwater gargles can help relieve throat soreness. Throat lozenges can be used by people age 3 or older. And most people can gargle at age 6 and older.     · Do not drink alcohol for 24 hours. Follow-up care is a key part of your treatment and safety. Be sure to make and go to all appointments, and call your doctor if you are having problems. It's also a good idea to know your test results and keep a list of the medicines you take. When should you call for help? Call 911 anytime you think you may need emergency care.  For example, call if:    · Your stools are maroon or very bloody.     · You vomit blood or what looks like coffee grounds. Call your doctor now or seek immediate medical care if:    · You have pain in your chest, belly, or back.     · You have new or worse trouble swallowing.     · You have trouble breathing. Watch closely for changes in your health, and be sure to contact your doctor if you have any problems. Current as of: January 10, 2022               Content Version: 13.2  © 2006-2022 [employer]. Care instructions adapted under license by [employer] (which disclaims liability or warranty for this information). If you have questions about a medical condition or this instruction, always ask your healthcare professional. [place] [address] any warranty or liability for your use of this information. [facility]    [address], [address] [place] [address]  Office- [phone]  Fax- [phone]  _____________________________________________________________  Dr. Clari Jones NP             Name:Kasia Gillis Mon     Discharge Date: 3/29/2022     Discharge to: Home    Dr. Len Guevara office will contact you with timing of surgery and preop testing time. Signature:___________________________________________________            DISCHARGE SUMMARY from Nurse    PATIENT INSTRUCTIONS:    After general anesthesia or intravenous sedation, for 24 hours or while taking prescription Narcotics:  · Limit your activities  · Do not drive and operate hazardous machinery  · Do not make important personal or business decisions  · Do  not drink alcoholic beverages  · If you have not urinated within 8 hours after discharge, please contact your surgeon on call.     Report the following to your surgeon:  · Excessive pain, swelling, redness or odor of or around the surgical area  · Temperature over 100.5  · Nausea and vomiting lasting longer than 4 hours or if unable to take medications  · Any signs of decreased circulation or nerve impairment to extremity: change in color, persistent  numbness, tingling, coldness or increase pain  · Any questions    What to do at Home:  A common side effect of anesthesia following surgery is nausea and/or vomiting. In order to decrease symptoms, it is wise to avoid foods that are high in fat, greasy foods, milk products, and spicy foods for the first 24 hours. Acceptable foods for the first 24 hours following surgery include but are not limited to:     soup   broth    toast    crackers    applesauce    bananas    mashed potatoes,   soft or scrambled eggs   oatmeal    jello    It is important to eat when taking your pain medication. This will help to prevent nausea. If possible, please try to time your meals with your medications. It is very important to stay hydrated following surgery. Sip fluids frequently while awake. Avoid acidic drinks such as citrus juices and soda for 24 hours. Carbonated beverages may cause bloating and gas. Acceptable fluids include:    - water (flavor packets may add variety)  - coffee or tea (in moderation)  - Gatorade  - Naresh-aid  - apple juice  - cranberry juice    You are encouraged to cough and deep breathe every hour when awake. This will help to prevent respiratory complications following anesthesia. You may want to hug a pillow when coughing and sneezing to add additional support to the surgical area and to decrease discomfort if you had abdominal or chest surgery. If you are discharged home with support stockings, you may remove them after 24 hours. Support stockings are used to help prevent blood clots in the legs following surgery.     Please take time to review all of your Home Care Instructions and Medication Information sheets provided in your discharge packet. If you have any questions, please contact your surgeons office. Thank you. TO PREVENT AN INFECTION      1. [address] YOUR HANDS     To prevent infection, good handwashing is the most important thing you or your caregiver can do.  Wash your hands with soap and water or use the hand  we gave you before you touch any wounds. 2. SHOWER     Use the antibacterial soap we gave you when you take a shower.  Shower with this soap until your wounds are healed.  To reach all areas of your body, you may need someone to help you.  Dont forget to clean your belly button with every shower. 3.  USE CLEAN SHEETS     Use freshly cleaned sheets on your bed after surgery.  To keep the surgery site clean, do not allow pets to sleep with you while your wound is still healing. 4. STOP SMOKING     Stop smoking, or at least cut back on smoking     Smoking slows your healing. 5.  CONTROL YOUR BLOOD SUGAR     High blood sugars slow wound healing.  If you are diabetic, control your blood sugar levels before and after your surgery. The discharge information has been reviewed with the {PATIENT PARENT GUARDIAN:65794}. The {PATIENT PARENT GUARDIAN:47698} verbalized understanding. Discharge medications reviewed with the {Dishcarge meds reviewed ZTET:26512} and appropriate educational materials and side effects teaching were provided.   ___________________________________________________________________________________________________________________________________

## 2022-03-29 NOTE — ANESTHESIA POSTPROCEDURE EVALUATION
Post-Anesthesia Evaluation and Assessment    Patient: Lorena Rizvi MRN: [id number]  SSN: [id number]    YOB: 1945  Age: 77 y.o. Sex: female      I have evaluated the patient and they are stable and ready for discharge from the PACU. Cardiovascular Function/Vital Signs  Visit Vitals  /65   Pulse 69   Temp 36.7 °C (98.1 °F)   Resp 11   Ht 5' 5\" (1.651 m)   Wt 84.4 kg (186 lb)   SpO2 92%   BMI 30.95 kg/m²       Patient is status post General anesthesia for Procedure(s):  TRANSESOPHAGEAL ECHOCARDIOGRAM (FELTON). Nausea/Vomiting: None    Postoperative hydration reviewed and adequate. Pain:  Pain Scale 1: Numeric (0 - 10) (03/29/22 1005)  Pain Intensity 1: 0 (03/29/22 1005)   Managed    Neurological Status:   Neuro (WDL): Exceptions to WDL (03/29/22 1004)  Neuro  Neurologic State: Drowsy; Eyes open to voice (03/29/22 1004)  Orientation Level: Oriented to person;Oriented to place;Oriented to situation (03/29/22 1004)  Cognition: Appropriate for age attention/concentration; Follows commands (03/29/22 1004)  LUE Motor Response: Purposeful (03/29/22 1004)  LLE Motor Response: Purposeful (03/29/22 1004)  RUE Motor Response: Purposeful (03/29/22 1004)  RLE Motor Response: Purposeful (03/29/22 1004)   At baseline    Mental Status, Level of Consciousness: Alert and  oriented to person, place, and time    Pulmonary Status:   O2 Device: None (Room air) (03/29/22 1020)   Adequate oxygenation and airway patent    Complications related to anesthesia: None    Post-anesthesia assessment completed.  No concerns    Signed By: Thong Simpson MD     March 29, 2022

## 2022-03-29 NOTE — ANESTHESIA PREPROCEDURE EVALUATION
Relevant Problems   RESPIRATORY SYSTEM   (+) Smoker      CARDIOVASCULAR   (+) Atrial fibrillation with rapid ventricular response (HCC)   (+) CHF (congestive heart failure) (HCC)      GASTROINTESTINAL   (+) GERD (gastroesophageal reflux disease)   (+) Hiatal hernia      ENDOCRINE   (+) Arthritis      HEMATOLOGY   (+) Anemia       Anesthetic History   No history of anesthetic complications            Review of Systems / Medical History  Patient summary reviewed, nursing notes reviewed and pertinent labs reviewed    Pulmonary          Smoker    Pertinent negatives: No COPD and asthma     Neuro/Psych              Cardiovascular          CHF  Dysrhythmias : atrial fibrillation        Comments: AAA    · LV: Estimated LVEF is 50 - 55%. Normal cavity size and wall thickness. Low normal systolic function. Severe (grade 3) left ventricular diastolic dysfunction. · LA: Severely dilated left atrium. · TV: Moderate tricuspid valve regurgitation is present. · PA: Moderate to severe pulmonary hypertension. Pulmonary arterial systolic pressure is 80 mmHg. · AV: Aortic valve mean gradient is 10 mmHg. Mild aortic valve regurgitation is present. · MV: Mitral valve non-specific thickening. Moderate mitral valve regurgitation is present. · RA: Moderately dilated right atrium.        GI/Hepatic/Renal     GERD      Hiatal hernia and liver disease     Endo/Other        Obesity     Other Findings            Physical Exam    Airway  Mallampati: II  TM Distance: 4 - 6 cm  Neck ROM: normal range of motion   Mouth opening: Normal     Cardiovascular  Regular rate and rhythm,  S1 and S2 normal,  no murmur, click, rub, or gallop  Rhythm: regular  Rate: normal         Dental  No notable dental hx       Pulmonary  Breath sounds clear to auscultation               Abdominal  GI exam deferred       Other Findings            Anesthetic Plan    ASA: 3  Anesthesia type: general    Monitoring Plan: FELTON      Induction: Intravenous  Anesthetic plan and risks discussed with: Patient

## 2022-03-29 NOTE — OP NOTES
Cardiothoracic Surgery Operative Note    Date of Dictation: 03/29/22    Date of Procedure: 03/29/22    Preoperative Diagnosis:    Thoracic Aortic Aneurysm  Mitral Regurgitation  Aortic Regurgitation  Tricuspid Regurgitation  Biatrial Enlargement  Atrial Fibrillation    Postoperative Diagnosis:    Same    Procedure:    1. Transthoracic Echocardiogram  2. Transesophageal Echocardiogram    Surgeon: Jaylon Forde MD, PhD, [facility], [facility]. Anesthesia: General endotracheal anesthesia. Anesthesiologist(s):  Yolanda Covarrubias MD..    Findings:    Good biventricular function. TTE: Mild MR, Mild AI, Mild TR.    FELTON: Mild MR. Mild AI. Mild TR. Operative Details: The TTE was performed in the holding area under my supervision. The patient was then brought to the OR. Standard monitors and lines were placed, anesthesia was given and the patient was intubated. The FELTON was performed under my supervision. Dr. Kinga Laws, Dr. Yoly Lujan and myself observed the FELTON carefully and had an in-depth discussion of our findings. We agreed that the degree of mitral and aortic regurgitation did not warrant intervention on these valves at the time of her operation for the replacement of her ascending aorta. In further support of this plan, she has demonstrated marked improvement in her symptoms after only a few week of lasix at 20 mg orally once daily. Complications:  None. Disposition: PACU. Condition: Stable.

## 2022-03-29 NOTE — ANESTHESIA PROCEDURE NOTES
FELTON  Date/Time: 3/29/2022 10:09 AM      Procedure Details: probe placement, image aquisition & interpretation    Risks and benefits discussed with the patient and plans are to proceed    Procedure Note    Performed by: Raffi Chatterjee MD  Authorized by: Kera Kyle MD       Indications: assessment of ascending aorta and assessment of surgical repair  Modalities: 2D, CF, CWD, PWD  Probe Type: biplane and multiplane  Insertion: atraumatic  Patient Status: intubated and sedated    Echocardiographic and Doppler Measurements   Aorta  Size  Diam(cm)  Dissection PlaqueThick(mm)  Plaque Mobile    Ascending dilated 5.4 No  No    Arch normal  No  No    Descending normal  No  No          Valves  Annulus  Stenosis  Area/Grad  Regurg  Leaflet   Morph  Leaflet   Motion    Aortic normal none  2+ normal normal    Mitral normal none  2+ calcified, thickened restricted    Tricuspid normal none  2+ normal normal          Atria  Size  SEC (smoke)  Thrombus  Tumor  Device    Rt Atrium dilated No No  No    Lt Atrium dilated No No  No     Interatrial Septum Morphology: normal    Interventricular Septum Morphology: normal    Ventricle  Cavity Size  Cavity Dimension Hypertrophy  Thrombus  Gloal FXN  EF    RV normal  No no normal     LV normal   No normal >55%       Regional Function  (1 = normal, 2 = mildly hypokinetic, 3 = severely hypokinetic, 4 = akinetic, 5 = dyskinetic) LAV - [place]   ME LAV = 0  ME LAV = 90  ME LAV = 130   Basal Sept:1 Basal Ant:1 Basal Post:1   Mid Sept:1 Mid Ant:1 Mid Post:1   Apical Sept:1 Apical Ant:1 Basal Ant Sept:1   Basal Lat:1 Basal Inf:1 Mid Ant Sept:1   Mid Lat:1 Mid Inf:1    Apical Lat:1 Apical Inf:1        Pericardium: normal    Post Intervention Follow-up Study         Valve  Function  Regurgitation  Area    Aortic       Mitral       Tricuspid       Prosthetic        Complications: None  Comments: Exam done for diagnostic purposes at the request of Dr. Regis Corrigan    Normal LVEF with no WMA, Normal RV function. Massively dilated RA and LA, no PFO, TARIK no thrombus, normal pulmonary vein flow. Aortic annulus normal, dilated ascending aorta 5.4cm, mild AI, no AS, normal appearing leaflets, root appears intact. Mitral valve with mild MR, posteriorly directed jet, no prolapse or flail seen, mild MS, leaflets appear mildly rheumatic. No pericardial effusion.

## 2022-03-29 NOTE — DISCHARGE SUMMARY
[facility] Discharge Summary     Patient ID:  Renzo Fontanez  [id number]  77 y.o.  1945    Admit date: 3/29/2022    Discharge date: 3/29/2022     Admitting Physician: Joaquim Ramirez MD     PCP:  Dr. Katerin Sood    Admitting Diagnoses:   Ascending aortic aneurysm    Discharge Diagnoses:     Hospital Problems  Date Reviewed: 4/28/2013          Codes Class Noted POA    Ascending aortic aneurysm [facility]) ICD-10-CM: I71.2  ICD-9-CM: 441.2  3/29/2022 Unknown            Procedures for this admission:  Procedure(s):  TRANSESOPHAGEAL ECHOCARDIOGRAM (FELTON)    Discharge Condition:  good    Discharge Medications:   Current Discharge Medication List      CONTINUE these medications which have NOT CHANGED    Details   cholecalciferol, vitamin D3, 50 mcg (2,000 unit) tab Take 2 Tablets by mouth daily. biotin 10,000 mcg cap Take 1 Tablet by mouth.      melatonin 10 mg tab Take 1 Tablet by mouth daily as needed. albuterol (PROVENTIL HFA, VENTOLIN HFA, PROAIR HFA) 90 mcg/actuation inhaler Take 2 Puffs by inhalation every four (4) hours as needed for Wheezing. dilTIAZem ER (CARDIZEM CD) 240 mg capsule Take 1 Capsule by mouth daily. Qty: 30 Capsule, Refills: 5      dofetilide (TIKOSYN) 250 mcg capsule Take 1 Capsule by mouth every twelve (12) hours every twelve (12) hours. Qty: 60 Capsule, Refills: 1      furosemide (LASIX) 20 mg tablet Take 1 Tablet by mouth daily. Qty: 30 Tablet, Refills: 5      magnesium oxide (MAG-OX) 400 mg tablet Take 1 Tablet by mouth daily. Qty: 30 Tablet, Refills: 5      potassium chloride SR (KLOR-CON 10) 10 mEq tablet Take 1 Tablet by mouth daily. Qty: 30 Tablet, Refills: 5      multivitamin, tx-iron-ca-min (THERA-M w/ IRON) 9 mg iron-400 mcg tab tablet Take 1 Tablet by mouth daily. Eliquis 5 mg tablet Take 5 mg by mouth two (2) times a day. omeprazole (PRILOSEC) 20 mg capsule Take 20 mg by mouth daily. celecoxib (CELEBREX) 200 mg capsule Take 200 mg by mouth daily.       CALCIUM PO Take 1,000 mg by mouth daily. traMADol (ULTRAM) 50 mg tablet Take 100 mg by mouth two (2) times a day. HPI:  Maicol Joseph is a 77 y.o. female who was referred by Dr. Sherri Torres for evaluation of an ascending aortic aneurysm, a-fib, and multi-valvular disease. PMH significant for paroxysmal atrial fibrillation (eliquis/tikosyn) s/p cardioversion x2, diastolic HF, and pulmonary hypertension. She presented to hospital last November with c/o chest pain and dyspnea. Found to be in acute congestive heart failure and a-fib with RVR resulted in hospital admission. Had an echocardiogram on 11/15 demonstrating EF 50-55% with severe LV diastolic dysfunction, moderate MR and TR, mild AI, and severe elevated PA pressures 80mmHg. After diuresis, she had a L/RHC on 11/18 showing improvement in PA pressures and normal coronary angio. Chest CT showed an incidental finding of an ascending aortic aneurysm measuring ~5.4cm without dissection or rupture. She improved following diuresis and was eventually discharged. Since discharge, she reports being back to her baseline health other than occasional OSORIO. She denies chest pain, palpitations, dizziness, syncope, PND, orthopnea, LE edema.      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 She is a former smoker - quit 4 years ago.        Hospital Course: Underwent FELTON with Ene Quinones and Laura for preoperative evaluation. Patient tolerated procedure well. She was recovered in PACU and discharged home. Discharge Vital Signs:   Visit Vitals  BP (!) 150/61   Pulse 70   Temp 98.1 °F (36.7 °C)   Resp 13   Ht 5' 5\" (1.651 m)   Wt 186 lb (84.4 kg)   SpO2 95%   BMI 30.95 kg/m²       Labs: No results for input(s): WBC, HGB, HCT, PLT, NA, K, BUN, CREA, GLU, GLUCPOC, INR, HGBEXT, HCTEXT, PLTEXT, INREXT, HGBEXT, HCTEXT, PLTEXT, INREXT in the last 72 hours.     No lab exists for component: [place]    Patient Instructions:  Dr. Digna Cummings gave discharge instructions were reviewed with the patient and family present. Dr. Erinn Pollack answered questions were also answered at this time. Prescriptions and medications were reviewed. Dr. Erinn Pollack office will contact patient with follow up for preop testing and surgery timing.      Signed:  NEENA Mendez  3/29/2022  10:31 AM

## 2022-03-29 NOTE — PERIOP NOTES
Handoff Report from Operating Room to PACU    Report received from PRATIK Crouch RN and Jay Montague CRNA regarding Akhil Tomlinson. Surgeon(s):  Chas Sanchez MD  And Procedure(s) (LRB):  TRANSESOPHAGEAL ECHOCARDIOGRAM (FELTON) (N/A)  confirmed   with allergies discussed. Anesthesia type, drugs, patient history, complications, estimated blood loss, vital signs, intake and output, and lines and temperature were reviewed.

## 2022-04-11 ENCOUNTER — HOSPITAL ENCOUNTER (OUTPATIENT)
Dept: VASCULAR SURGERY | Age: 77
Discharge: HOME | End: 2022-04-11
Attending: NURSE PRACTITIONER
Payer: MEDICARE

## 2022-04-11 ENCOUNTER — HOSPITAL ENCOUNTER (OUTPATIENT)
Dept: PULMONOLOGY | Age: 77
Discharge: HOME | End: 2022-04-11
Attending: NURSE PRACTITIONER
Payer: MEDICARE

## 2022-04-11 DIAGNOSIS — I71.21 ASCENDING AORTIC ANEURYSM: ICD-10-CM

## 2022-04-11 LAB
LEFT CCA DIST DIAS: 14.4 CM/S
LEFT CCA DIST SYS: 58.2 CM/S
LEFT CCA PROX DIAS: 15.4 CM/S
LEFT CCA PROX SYS: 63.1 CM/S
LEFT ECA DIAS: 0 CM/S
LEFT ECA SYS: 36.3 CM/S
LEFT ICA DIST DIAS: 19.9 CM/S
LEFT ICA DIST SYS: 81.9 CM/S
LEFT ICA MID DIAS: 23.5 CM/S
LEFT ICA MID SYS: 76.5 CM/S
LEFT ICA PROX DIAS: 18.1 CM/S
LEFT ICA PROX SYS: 72.8 CM/S
LEFT ICA/CCA SYS: 1.4 NO UNITS
LEFT VERTEBRAL DIAS: 12.6 CM/S
LEFT VERTEBRAL SYS: 49.1 CM/S
RIGHT CCA DIST DIAS: 12.6 CM/S
RIGHT CCA DIST SYS: 58.3 CM/S
RIGHT CCA PROX DIAS: 12.6 CM/S
RIGHT CCA PROX SYS: 71 CM/S
RIGHT ECA DIAS: 0 CM/S
RIGHT ECA SYS: 56.4 CM/S
RIGHT ICA DIST DIAS: 10.8 CM/S
RIGHT ICA DIST SYS: 67.4 CM/S
RIGHT ICA MID DIAS: 14.4 CM/S
RIGHT ICA MID SYS: 76.5 CM/S
RIGHT ICA PROX DIAS: 8.9 CM/S
RIGHT ICA PROX SYS: 56.4 CM/S
RIGHT ICA/CCA SYS: 1.3 NO UNITS
RIGHT VERTEBRAL DIAS: 7.1 CM/S
RIGHT VERTEBRAL SYS: 52.8 CM/S
VAS LEFT SUBCLAVIAN PROX EDV: 0 CM/S
VAS LEFT SUBCLAVIAN PROX PSV: 91 CM/S
VAS RIGHT SUBCLAVIAN PROX EDV: 0 CM/S
VAS RIGHT SUBCLAVIAN PROX PSV: 103.9 CM/S

## 2022-04-11 PROCEDURE — 93880 EXTRACRANIAL BILAT STUDY: CPT

## 2022-04-11 PROCEDURE — 94375 RESPIRATORY FLOW VOLUME LOOP: CPT

## 2022-04-11 PROCEDURE — 94726 PLETHYSMOGRAPHY LUNG VOLUMES: CPT

## 2022-04-11 PROCEDURE — 94729 DIFFUSING CAPACITY: CPT

## 2022-04-15 ENCOUNTER — DOCUMENTATION ONLY (OUTPATIENT)
Dept: CASE MANAGEMENT | Age: 77
End: 2022-04-15

## 2022-04-15 ENCOUNTER — HOSPITAL ENCOUNTER (OUTPATIENT)
Dept: PREADMISSION TESTING | Age: 77
Discharge: HOME | End: 2022-04-15
Attending: THORACIC SURGERY (CARDIOTHORACIC VASCULAR SURGERY)
Payer: MEDICARE

## 2022-04-15 ENCOUNTER — HOSPITAL ENCOUNTER (OUTPATIENT)
Dept: GENERAL RADIOLOGY | Age: 77
Discharge: HOME | End: 2022-04-15
Attending: NURSE PRACTITIONER
Payer: MEDICARE

## 2022-04-15 VITALS
BODY MASS INDEX: 32.25 KG/M2 | WEIGHT: 193.56 LBS | TEMPERATURE: 98.4 F | SYSTOLIC BLOOD PRESSURE: 139 MMHG | DIASTOLIC BLOOD PRESSURE: 56 MMHG | RESPIRATION RATE: 20 BRPM | OXYGEN SATURATION: 96 % | HEIGHT: 65 IN | HEART RATE: 56 BPM

## 2022-04-15 LAB
ALBUMIN SERPL-MCNC: 3.3 G/DL (ref 3.5–5)
ALBUMIN/GLOB SERPL: 0.8 {RATIO} (ref 1.1–2.2)
ALP SERPL-CCNC: 74 U/L (ref 45–117)
ALT SERPL-CCNC: 18 U/L (ref 12–78)
ANION GAP SERPL CALC-SCNC: 2 MMOL/L (ref 5–15)
APPEARANCE UR: CLEAR
APTT PPP: 32.2 SEC (ref 22.1–31)
ARTERIAL PATENCY WRIST A: POSITIVE
AST SERPL-CCNC: 12 U/L (ref 15–37)
BACTERIA URNS QL MICRO: NEGATIVE /HPF
BASE EXCESS BLD CALC-SCNC: 1.3 MMOL/L
BASOPHILS # BLD: 0.1 K/UL (ref 0–0.1)
BASOPHILS NFR BLD: 1 % (ref 0–1)
BDY SITE: ABNORMAL
BILIRUB SERPL-MCNC: 0.3 MG/DL (ref 0.2–1)
BILIRUB UR QL: NEGATIVE
BNP SERPL-MCNC: 398 PG/ML
BUN SERPL-MCNC: 18 MG/DL (ref 6–20)
BUN/CREAT SERPL: 25 (ref 12–20)
CALCIUM SERPL-MCNC: 9.2 MG/DL (ref 8.5–10.1)
CAOX CRY URNS QL MICRO: ABNORMAL
CHLORIDE SERPL-SCNC: 106 MMOL/L (ref 97–108)
CO2 SERPL-SCNC: 31 MMOL/L (ref 21–32)
COLOR UR: ABNORMAL
CREAT SERPL-MCNC: 0.73 MG/DL (ref 0.55–1.02)
DIFFERENTIAL METHOD BLD: ABNORMAL
EOSINOPHIL # BLD: 0.3 K/UL (ref 0–0.4)
EOSINOPHIL NFR BLD: 4 % (ref 0–7)
EPITH CASTS URNS QL MICRO: ABNORMAL /LPF
ERYTHROCYTE [DISTWIDTH] IN BLOOD BY AUTOMATED COUNT: 15.1 % (ref 11.5–14.5)
EST. AVERAGE GLUCOSE BLD GHB EST-MCNC: 120 MG/DL
GAS FLOW.O2 O2 DELIVERY SYS: ABNORMAL L/MIN
GLOBULIN SER CALC-MCNC: 3.9 G/DL (ref 2–4)
GLUCOSE SERPL-MCNC: 91 MG/DL (ref 65–100)
GLUCOSE UR STRIP.AUTO-MCNC: NEGATIVE MG/DL
HBA1C MFR BLD: 5.8 % (ref 4–5.6)
HCO3 BLD-SCNC: 25.9 MMOL/L (ref 22–26)
HCT VFR BLD AUTO: 37.8 % (ref 35–47)
HGB BLD-MCNC: 11.2 G/DL (ref 11.5–16)
HGB UR QL STRIP: NEGATIVE
HISTORY CHECKED?,CKHIST: NORMAL
IMM GRANULOCYTES # BLD AUTO: 0 K/UL (ref 0–0.04)
IMM GRANULOCYTES NFR BLD AUTO: 1 % (ref 0–0.5)
INR PPP: 1.6 (ref 0.9–1.1)
KETONES UR QL STRIP.AUTO: NEGATIVE MG/DL
LEUKOCYTE ESTERASE UR QL STRIP.AUTO: NEGATIVE
LYMPHOCYTES # BLD: 1.3 K/UL (ref 0.8–3.5)
LYMPHOCYTES NFR BLD: 21 % (ref 12–49)
MAGNESIUM SERPL-MCNC: 2.2 MG/DL (ref 1.6–2.4)
MCH RBC QN AUTO: 26 PG (ref 26–34)
MCHC RBC AUTO-ENTMCNC: 29.6 G/DL (ref 30–36.5)
MCV RBC AUTO: 87.7 FL (ref 80–99)
MONOCYTES # BLD: 0.5 K/UL (ref 0–1)
MONOCYTES NFR BLD: 9 % (ref 5–13)
NEUTS SEG # BLD: 3.9 K/UL (ref 1.8–8)
NEUTS SEG NFR BLD: 64 % (ref 32–75)
NITRITE UR QL STRIP.AUTO: NEGATIVE
NRBC # BLD: 0 K/UL (ref 0–0.01)
NRBC BLD-RTO: 0 PER 100 WBC
PCO2 BLD: 40 MMHG (ref 35–45)
PH BLD: 7.42 [PH] (ref 7.35–7.45)
PH UR STRIP: 6 [PH] (ref 5–8)
PLATELET # BLD AUTO: 363 K/UL (ref 150–400)
PMV BLD AUTO: 11 FL (ref 8.9–12.9)
PO2 BLD: 94 MMHG (ref 80–100)
POTASSIUM SERPL-SCNC: 4.2 MMOL/L (ref 3.5–5.1)
PROT SERPL-MCNC: 7.2 G/DL (ref 6.4–8.2)
PROT UR STRIP-MCNC: NEGATIVE MG/DL
PROTHROMBIN TIME: 16.5 SEC (ref 9–11.1)
RBC # BLD AUTO: 4.31 M/UL (ref 3.8–5.2)
RBC #/AREA URNS HPF: ABNORMAL /HPF (ref 0–5)
SAO2 % BLD: 97.4 % (ref 92–97)
SODIUM SERPL-SCNC: 139 MMOL/L (ref 136–145)
SP GR UR REFRACTOMETRY: 1.02 (ref 1–1.03)
SPECIMEN TYPE: ABNORMAL
THERAPEUTIC RANGE,PTTT: ABNORMAL SECS (ref 58–77)
TSH SERPL DL<=0.05 MIU/L-ACNC: 0.94 UIU/ML (ref 0.36–3.74)
UA: UC IF INDICATED,UAUC: ABNORMAL
UROBILINOGEN UR QL STRIP.AUTO: 0.2 EU/DL (ref 0.2–1)
WBC # BLD AUTO: 6 K/UL (ref 3.6–11)
WBC URNS QL MICRO: ABNORMAL /HPF (ref 0–4)

## 2022-04-15 PROCEDURE — 83036 HEMOGLOBIN GLYCOSYLATED A1C: CPT

## 2022-04-15 PROCEDURE — 71046 X-RAY EXAM CHEST 2 VIEWS: CPT

## 2022-04-15 PROCEDURE — 84443 ASSAY THYROID STIM HORMONE: CPT

## 2022-04-15 PROCEDURE — 36415 COLL VENOUS BLD VENIPUNCTURE: CPT

## 2022-04-15 PROCEDURE — 93005 ELECTROCARDIOGRAM TRACING: CPT

## 2022-04-15 PROCEDURE — 83880 ASSAY OF NATRIURETIC PEPTIDE: CPT

## 2022-04-15 PROCEDURE — 86923 COMPATIBILITY TEST ELECTRIC: CPT

## 2022-04-15 PROCEDURE — 86900 BLOOD TYPING SEROLOGIC ABO: CPT

## 2022-04-15 PROCEDURE — 82803 BLOOD GASES ANY COMBINATION: CPT

## 2022-04-15 PROCEDURE — 83735 ASSAY OF MAGNESIUM: CPT

## 2022-04-15 PROCEDURE — 36600 WITHDRAWAL OF ARTERIAL BLOOD: CPT

## 2022-04-15 PROCEDURE — 85025 COMPLETE CBC W/AUTO DIFF WBC: CPT

## 2022-04-15 PROCEDURE — 36430 TRANSFUSION BLD/BLD COMPNT: CPT

## 2022-04-15 PROCEDURE — 85610 PROTHROMBIN TIME: CPT

## 2022-04-15 PROCEDURE — 80053 COMPREHEN METABOLIC PANEL: CPT

## 2022-04-15 PROCEDURE — 81001 URINALYSIS AUTO W/SCOPE: CPT

## 2022-04-15 PROCEDURE — 85730 THROMBOPLASTIN TIME PARTIAL: CPT

## 2022-04-15 PROCEDURE — P9016 RBC LEUKOCYTES REDUCED: HCPCS

## 2022-04-15 RX ORDER — MUPIROCIN 20 MG/G
OINTMENT TOPICAL 2 TIMES DAILY
Qty: 22 G | Refills: 0 | Status: SHIPPED | OUTPATIENT
Start: 2022-04-17 | End: 2022-04-29

## 2022-04-15 RX ORDER — SODIUM CHLORIDE, SODIUM LACTATE, POTASSIUM CHLORIDE, CALCIUM CHLORIDE 600; 310; 30; 20 MG/100ML; MG/100ML; MG/100ML; MG/100ML
25 INJECTION, SOLUTION INTRAVENOUS CONTINUOUS
Status: CANCELLED | OUTPATIENT
Start: 2022-04-19

## 2022-04-15 RX ORDER — CHLORHEXIDINE GLUCONATE 1.2 MG/ML
15 RINSE ORAL EVERY 12 HOURS
Qty: 60 ML | Refills: 0 | Status: SHIPPED | OUTPATIENT
Start: 2022-04-17 | End: 2022-04-29

## 2022-04-15 NOTE — PROGRESS NOTES
Cardiac Surgery Care Coordinator-  Met with Flaquita Granados in PAT. Introduced role of the Cardiac Surgery Care Coordinator. Reviewed plan of care and began pre-op education. Discussed day of surgery expectations for the pt and family. Provided Valve educational folder and reviewed content, including Cardiac Surgery Pathway. Reinforced sternal precautions and keeping your move in the tube. Discussed preparing for surgery and returning home. Flaquita Granados was instructed to bring the folder with her on the day of surgery. Encouraged Flaquita Granados to verbalize and offered emotional support.  Farrukh Dueñas RN

## 2022-04-15 NOTE — PERIOP NOTES
[facility]  Preoperative Instructions        Surgery Date 04/19/22          Time of Arrival   Contact # [phone]    1. On the day of your surgery, please report to the Surgical Services Registration Desk and sign in at your designated time. The Surgery Center is located to the right of the Emergency Room. 2. You must have someone with you to drive you home. You should not drive a car for 24 hours following surgery. Please make arrangements for a friend or family member to stay with you for the first 24 hours after your surgery. 3. Do not have anything to eat or drink (including water, gum, mints, coffee, juice) after midnight ?04/18/22  . ? This may not apply to medications prescribed by your physician. ?(Please note below the special instructions with medications to take the morning of your procedure.)    4. We recommend you do not drink any alcoholic beverages for 24 hours before and after your surgery. 5. Contact your surgeons office for instructions on the following medications: non-steroidal anti-inflammatory drugs (i.e. Advil, Aleve), vitamins, and supplements. (Some surgeons will want you to stop these medications prior to surgery and others may allow you to take them)  **If you are currently taking Plavix, Coumadin, Aspirin and/or other blood-thinning agents, contact your surgeon for instructions. ** Your surgeon will partner with the physician prescribing these medications to determine if it is safe to stop or if you need to continue taking. Please do not stop taking these medications without instructions from your surgeon    6. Wear comfortable clothes. Wear glasses instead of contacts. Do not bring any money or jewelry. Please bring picture ID, insurance card, and any prearranged co-payment or hospital payment. Do not wear make-up, particularly mascara the morning of your surgery. Do not wear nail polish, particularly if you are having foot /hand surgery.   Wear your hair loose or down, no ponytails, buns, vicki pins or clips. All body piercings must be removed. Please shower with antibacterial soap for three consecutive days before and on the morning of surgery, but do not apply any lotions, powders or deodorants after the shower on the day of surgery. Please use a fresh towels after each shower. Please sleep in clean clothes and change bed linens the night before surgery. Please do not shave for 48 hours prior to surgery. Shaving of the face is acceptable. 7. You should understand that if you do not follow these instructions your surgery may be cancelled. If your physical condition changes (I.e. fever, cold or flu) please contact your surgeon as soon as possible. 8. It is important that you be on time. If a situation occurs where you may be late, please call [phone] (OR Holding Area). 9. If you have any questions and or problems, please call [phone] (Pre-admission Testing). 10. Your surgery time may be subject to change. You will receive a phone call the evening prior if your time changes. 11.  If having outpatient surgery, you must have someone to drive you here, stay with you during the duration of your stay, and to drive you home at time of discharge. 12.  Due to current COVID restrictions, only ONE adult may accompany you the day of the procedure. We have limited seating available. If our waiting room is at capacity, your ride may be asked to remain in their vehicle. No children are allowed in the waiting room  Special Instructions:   Start bactroban (mupirocin) and chlorhexidine (peridex) on 17th (Sunday)  Last dose celebrex today (04/15/22)  Last dose lasix 04/16/22  Last dose eliquis 04/16/22  TAKE NO MEDICATIONS DAY OF SURGERY ---please bring peridex(mouthwash) and bactroban (nasal ointment)      I understand a pre-operative phone call will be made to verify my surgery time.   In the event that I am not available, I give permission for a message to be left on my answering service and/or with another person?   yes          ___________________      __________   _________    (Signature of Patient)             (Witness)                (Date and Time)

## 2022-04-15 NOTE — PERIOP NOTES
Incentive Spirometer        Using the incentive spirometer helps expand the small air sacs of your lungs, helps you breathe deeply, and helps improve your lung function. Use your incentive spirometer twice a day (10 breaths each time) prior to surgery. How to Use Your Incentive Spirometer:  1. Hold the incentive spirometer in an upright position. 2. Breathe out as usual.   3. Place the mouthpiece in your mouth and seal your lips tightly around it. 4. Take a deep breath. Breathe in slowly and as deeply as possible. Keep the blue flow rate guide between the arrows. 5. Hold your breath as long as possible. Then exhale slowly and allow the piston to fall to the bottom of the column. 6. Rest for a few seconds and repeat steps one through five at least 10 times. PAT Tidal Volume___1750_______________  x______2________  Date__04/15/22_____________________    Shellie Rosa THE INCENTIVE SPIROMETER WITH YOU TO THE HOSPITAL ON THE DAY OF YOUR SURGERY. Opportunity given to ask and answer questions as well as to observe return demonstration.     Patient signature_____________________________    Witness____________________________

## 2022-04-15 NOTE — H&P
Cardiac Surgery   History and Physical    Subjective:    ** Information obtained from previous visit with updated added below:     Tamiko Fink is a 77 y.o. female who was referred by Dr. Cherry Aguilar for evaluation of an ascending aortic aneurysm, a-fib, and multi-valvular disease. PMH significant for paroxysmal atrial fibrillation (eliquis/tikosyn) s/p cardioversion x2, diastolic HF, and pulmonary hypertension. She presented to hospital last November with c/o chest pain and dyspnea. Found to be in acute congestive heart failure and a-fib with RVR resulted in hospital admission. Had an echocardiogram on 11/15 demonstrating EF 50-55% with severe LV diastolic dysfunction, moderate MR and TR, mild AI, and severe elevated PA pressures 80mmHg. After diuresis, she had a L/RHC on 11/18 showing improvement in PA pressures and normal coronary angio. Chest CT showed an incidental finding of an ascending aortic aneurysm measuring ~5.4cm without dissection or rupture. She improved following diuresis and was eventually discharged. Since discharge, she reports being back to her baseline health other than occasional OSORIO. She denies chest pain, palpitations, dizziness, syncope, PND, orthopnea, LE edema. 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 She is a former smoker - quit 4 years ago. Cardiac Testing  Cardiac catheterization: 11/18/21  Findings/PostOp Diagnosis:   1. No significant CAD   2. Mildly elevated left sided filling pressure   3. Mildly elevated PA pressure   4. Borderline  and CI   5. Successful DCCV with conversion of Atrial fibrillation to NSR      Recommendations:   1. Continue Tikosyn for Atrial fibrillation, Follow up with EP for Afib   2. Cont lasix for now   3. Routine post procedure & access site care   4. Continue aggressive medical management and risk factor modification      Adolfo Kingsley MD      Right Heart Cath [place]-Martinez catheter inserted via right brachial vein. High PA Sat = 70 %.    PA pressure = 40/17 mmHg. Mid RA mean = 10 mmHg. RV pressure = 39/7 mmHg. Wedge pressure = 25 mmHg. FREDY CO 4.37  CI 2.29       TTE:  · LV: Estimated LVEF is 50 - 55%. Normal cavity size and wall thickness. Low normal systolic function. Severe (grade 3) left ventricular diastolic dysfunction. · LA: Severely dilated left atrium. · TV: Moderate tricuspid valve regurgitation is present. · PA: Moderate to severe pulmonary hypertension. Pulmonary arterial systolic pressure is 80 mmHg. · AV: Aortic valve mean gradient is 10 mmHg. Mild aortic valve regurgitation is present. · MV: Mitral valve non-specific thickening. Moderate mitral valve regurgitation is present. · RA: Moderately dilated right atrium. FELTON: 3/7/22 [facility] --Reviewed by Dr. Key Kramer. Past Medical History:   Diagnosis Date    Anemia 2010    Aortic aneurysm (HCC)     Arrhythmia     afib r/t low potassium     Arthritis 2010    Atrial fibrillation (HCC)     Chronic pain     Eczema 2010    Edema 2010    GERD (gastroesophageal reflux disease) 2010    Heart failure (HCC)     Hepatitis     antibody for Hepatitis B - not a carrier    Hiatal hernia 2010    Smoker 2010     Past Surgical History:   Procedure Laterality Date    HX GYN      hysterectomy    HX HEART CATHETERIZATION      HX HYSTERECTOMY      HX ORTHOPAEDIC Left     left wrist open reduction internal fixation.  HX ORTHOPAEDIC Right     Right ORIF    HX OTHER SURGICAL      facelift    HX OTHER SURGICAL      Cardioversion x2     ID ABDOMEN SURGERY PROC UNLISTED      Lap band    ID COLONOSCOPY FLX DX W/COLLJ SPEC WHEN PFRMD  2012           Social History     Tobacco Use    Smoking status: Former Smoker     Packs/day: 1.00     Years: 50.00     Pack years: 50.00     Quit date:      Years since quittin.2    Smokeless tobacco: Never Used   Substance Use Topics    Alcohol use:  Yes     Alcohol/week: 1.0 standard drink Types: 1 Cans of beer per week     Comment: occassionally      Family History   Problem Relation Age of Onset    Cancer Mother         Lung    Cancer Father         Kidney    Breast Cancer Maternal Aunt         not sure    Arrhythmia Sister      Prior to Admission medications    Medication Sig Start Date End Date Taking? Authorizing Provider   apixaban (Eliquis) 5 mg tablet Take 5 mg by mouth two (2) times a day. Provider, Historical   mupirocin (BACTROBAN) 2 % ointment by Both Nostrils route two (2) times a day for 2 days. 4/17/22 4/19/22  Madeline Preston, NP   chlorhexidine (Peridex) 0.12 % solution 15 mL by Swish and Spit route every twelve (12) hours for 2 days. 4/17/22 4/19/22  Madeilne Preston, [place]   cholecalciferol, vitamin D3, 50 mcg (2,000 unit) tab Take 2 Tablets by mouth two (2) times a day. Provider, Historical   biotin 10,000 mcg cap Take 1 Tablet by mouth daily. Provider, Historical   melatonin 10 mg tab Take 1 Tablet by mouth nightly. Provider, Historical   albuterol (PROVENTIL HFA, VENTOLIN HFA, PROAIR HFA) 90 mcg/actuation inhaler Take 2 Puffs by inhalation every four (4) hours as needed for Wheezing. Provider, Historical   dilTIAZem ER (CARDIZEM CD) 240 mg capsule Take 1 Capsule by mouth daily. 11/21/21   Cyndee Mendez MD   [facility]) 250 mcg capsule Take 1 Capsule by mouth every twelve (12) hours every twelve (12) hours. 11/20/21   Cyndee Mendez MD   furosemide (LASIX) 20 mg tablet Take 1 Tablet by mouth daily. 11/20/21   Cyndee Mendez MD   magnesium oxide (MAG-OX) 400 mg tablet Take 1 Tablet by mouth daily. 11/20/21   Cyndee Mendez MD   potassium chloride SR (KLOR-CON 10) 10 mEq tablet Take 1 Tablet by mouth daily. 11/20/21   Cyndee Mendez MD   multivitamin, tx-iron-ca-min (THERA-M w/ IRON) 9 mg iron-400 mcg tab tablet Take 1 Tablet by mouth daily. Provider, Historical   omeprazole (PRILOSEC) 20 mg capsule Take 20 mg by mouth daily. 9/29/21   Provider, Historical   celecoxib (CELEBREX) 200 mg capsule Take 200 mg by mouth daily. 9/29/21   Provider, Historical   Eliquis 5 mg tablet Take 5 mg by mouth two (2) times a day. 10/29/21 4/15/22  Provider, Historical   traMADol (ULTRAM) 50 mg tablet Take 100 mg by mouth two (2) times a day. Provider, Historical   CALCIUM PO Take 1,000 mg by mouth daily. 4/15/22  Provider, Historical       Allergies   Allergen Reactions    Penicillin G Rash    Bactrim [Sulfamethoprim] Rash    Levaquin [Levofloxacin] Rash    Amoxicillin Rash       Review of Systems:   Review of Systems   Constitutional: Negative. Eyes: Negative. Respiratory: Positive for shortness of breath. Cardiovascular: Negative for chest pain, palpitations and orthopnea. Gastrointestinal: Negative. Genitourinary: Negative. Musculoskeletal: Negative. Skin: Negative. Neurological: Negative. Endo/Heme/Allergies: Negative. Psychiatric/Behavioral: Negative. Objective:     VS:    There were no vitals taken for this visit. Physical Exam:  Physical Exam  Constitutional:       Appearance: Normal appearance. Cardiovascular:      Rate and Rhythm: Normal rate and regular rhythm. Pulses: Normal pulses. Heart sounds: Murmur heard. No friction rub. No gallop. Pulmonary:      Effort: Pulmonary effort is normal.      Breath sounds: Rhonchi present. Abdominal:      General: Abdomen is flat. Palpations: Abdomen is soft. Neurological:      General: No focal deficit present. Mental Status: She is alert and oriented to person, place, and time. Psychiatric:         Mood and Affect: Mood normal.         Behavior: Behavior normal.         Labs: No results for input(s): WBC, HGB, HCT, PLT, NA, K, BUN, CREA, GLU, GLUCPOC, INR, HGBEXT, HCTEXT, PLTEXT, INREXT, HGBEXT, HCTEXT, PLTEXT, INREXT in the last 72 hours.     No lab exists for component: [place]    Diagnostics:   PA and lateral: 11/15/21  Findings: Cardiac silhouette is enlarged. Pulmonary vasculature is not engorged. There are no focal parenchymal opacities, effusions, or pneumothorax. Lungs are  hyperinflated. Patient is post gastric banding.     IMPRESSION  1. Enlarged cardiac silhouette, otherwise no acute disease    Assessment:     Patient Active Problem List   Diagnosis Code    Smoker F17.200    Anemia D64.9    Arthritis M19.90    Edema R60.9    Eczema L30.9    GERD (gastroesophageal reflux disease) K21.9    Hiatal hernia K44.9    Screen for colon cancer Z12.11    Right wrist fracture S62.101A    CHF (congestive heart failure) ([facility]) I50.9    Atrial fibrillation with rapid ventricular response ([facility]) I48.91    Ascending aortic aneurysm ([facility]) I71.2         Plan:   The risk and benefit of surgery were reviewed with patient and family and all questions answered and the patient wishes to proceed. Risk include infection, bleeding, stroke, heart attack, irregular heart rhythm, kidney failure and death. The patient was given instructions and prescriptions for bactroban and peridex. The patient was instructed to stop Celebrex NOW and Eliquis - last dose 4/16. Surgery is scheduled for April 19th, 2022. The patient was counseled at length about the risks of lottie Covid-19 during their perioperative period and any recovery window from their procedure. The patient was made aware that lottie Covid-19  may worsen their prognosis for recovering from their procedure and lend to a higher morbidity and/or mortality risk. All material risks, benefits, and reasonable alternatives including postponing the procedure were discussed. The patient does  wish to proceed with the procedure at this time. Treatment Plan:    1. Ascending aortic aneurysm:  Reviewed images with Dr. Nighat Gonzalez. Will proceed with surgery for aneurysmal repair  2. Severe MR and mod TR: FELTON completed and reviewed by Dr. Nighat Gonzalez. On lasix.  MVR not needed per Dr. Yolanda Woods   3. Pulmonary HTN: Better after diuresis as seen on RHC. Former smoker. Encouraged continuing cessation. 4. A-fib: On Tikoysn, eliquis. Will have LAAL during surgery. 5. Chronic Diastolic HF: On daily lasix  6. Mild-Mod AI: AV replacement not need per Dr. Yolanda Woods.        Signed By: Shasta Graham NP     April 15, 2022

## 2022-04-16 LAB
ATRIAL RATE: 51 BPM
CALCULATED P AXIS, ECG09: -28 DEGREES
CALCULATED R AXIS, ECG10: -6 DEGREES
CALCULATED T AXIS, ECG11: 30 DEGREES
DIAGNOSIS, 93000: NORMAL
P-R INTERVAL, ECG05: 200 MS
Q-T INTERVAL, ECG07: 434 MS
QRS DURATION, ECG06: 94 MS
QTC CALCULATION (BEZET), ECG08: 400 MS
VENTRICULAR RATE, ECG03: 51 BPM

## 2022-04-17 LAB
BACTERIA SPEC CULT: NORMAL
BACTERIA SPEC CULT: NORMAL
SERVICE CMNT-IMP: NORMAL

## 2022-04-18 ENCOUNTER — ANESTHESIA EVENT (OUTPATIENT)
Dept: CARDIOTHORACIC SURGERY | Age: 77
DRG: 219 | End: 2022-04-18
Payer: MEDICARE

## 2022-04-18 RX ORDER — POTASSIUM CHLORIDE 29.8 MG/ML
20 INJECTION INTRAVENOUS ONCE
Status: DISPENSED | OUTPATIENT
Start: 2022-04-19 | End: 2022-04-19

## 2022-04-18 RX ORDER — PROTAMINE SULFATE 10 MG/ML
250 INJECTION, SOLUTION INTRAVENOUS
Status: COMPLETED | OUTPATIENT
Start: 2022-04-19 | End: 2022-04-19

## 2022-04-18 RX ORDER — MAGNESIUM SULFATE HEPTAHYDRATE 40 MG/ML
2 INJECTION, SOLUTION INTRAVENOUS ONCE
Status: DISPENSED | OUTPATIENT
Start: 2022-04-19 | End: 2022-04-19

## 2022-04-18 RX ORDER — DEXMEDETOMIDINE HYDROCHLORIDE 4 UG/ML
.1-1.5 INJECTION, SOLUTION INTRAVENOUS
Status: DISCONTINUED | OUTPATIENT
Start: 2022-04-19 | End: 2022-04-21

## 2022-04-18 RX ORDER — DESMOPRESSIN ACETATE 4 UG/ML
2 INJECTION, SOLUTION INTRAVENOUS; SUBCUTANEOUS ONCE
Status: COMPLETED | OUTPATIENT
Start: 2022-04-19 | End: 2022-04-19

## 2022-04-18 RX ORDER — NOREPINEPHRINE BITARTRATE/D5W 8 MG/250ML
2-16 PLASTIC BAG, INJECTION (ML) INTRAVENOUS
Status: DISCONTINUED | OUTPATIENT
Start: 2022-04-19 | End: 2022-04-19

## 2022-04-18 RX ORDER — DOBUTAMINE HYDROCHLORIDE 200 MG/100ML
0-10 INJECTION INTRAVENOUS
Status: DISCONTINUED | OUTPATIENT
Start: 2022-04-19 | End: 2022-04-21

## 2022-04-18 RX ORDER — NITROGLYCERIN 20 MG/100ML
0-20 INJECTION INTRAVENOUS
Status: DISCONTINUED | OUTPATIENT
Start: 2022-04-19 | End: 2022-04-19

## 2022-04-18 RX ORDER — DOPAMINE HYDROCHLORIDE 320 MG/100ML
0-20 INJECTION, SOLUTION INTRAVENOUS
Status: DISCONTINUED | OUTPATIENT
Start: 2022-04-19 | End: 2022-04-19

## 2022-04-19 ENCOUNTER — HOSPITAL ENCOUNTER (INPATIENT)
Age: 77
LOS: 10 days | Discharge: NURSING, REHAB OR LONG-TERM CARE | DRG: 219 | End: 2022-04-29
Attending: THORACIC SURGERY (CARDIOTHORACIC VASCULAR SURGERY) | Admitting: THORACIC SURGERY (CARDIOTHORACIC VASCULAR SURGERY)
Payer: MEDICARE

## 2022-04-19 ENCOUNTER — ANESTHESIA (OUTPATIENT)
Dept: CARDIOTHORACIC SURGERY | Age: 77
DRG: 219 | End: 2022-04-19
Payer: MEDICARE

## 2022-04-19 ENCOUNTER — APPOINTMENT (OUTPATIENT)
Dept: GENERAL RADIOLOGY | Age: 77
DRG: 219 | End: 2022-04-19
Attending: PHYSICIAN ASSISTANT
Payer: MEDICARE

## 2022-04-19 ENCOUNTER — HOSPITAL ENCOUNTER (OUTPATIENT)
Dept: NON INVASIVE DIAGNOSTICS | Age: 77
Discharge: HOME | End: 2022-04-19
Attending: THORACIC SURGERY (CARDIOTHORACIC VASCULAR SURGERY)

## 2022-04-19 DIAGNOSIS — R73.03 PRE-DIABETES: ICD-10-CM

## 2022-04-19 DIAGNOSIS — Z86.79 S/P AORTIC ANEURYSM REPAIR: ICD-10-CM

## 2022-04-19 DIAGNOSIS — Z98.890 S/P AORTIC ANEURYSM REPAIR: ICD-10-CM

## 2022-04-19 DIAGNOSIS — Z95.2 S/P MVR (MITRAL VALVE REPLACEMENT): ICD-10-CM

## 2022-04-19 LAB
ADMINISTERED INITIALS, ADMINIT: NORMAL
ARTERIAL PATENCY WRIST A: ABNORMAL
BASE DEFICIT BLD-SCNC: 0.5 MMOL/L
BDY SITE: ABNORMAL
COMMENT, HOLDF: NORMAL
D50 ADMINISTERED, D50ADM: 0 ML
D50 ORDER, D50ORD: 0 ML
GAS FLOW.O2 O2 DELIVERY SYS: ABNORMAL L/MIN
GAS FLOW.O2 SETTING OXYMISER: 12 BPM
GLSCOM COMMENTS: NORMAL
GLUCOSE BLD STRIP.AUTO-MCNC: 102 MG/DL (ref 65–117)
GLUCOSE, GLC: 102 MG/DL
GLUCOSE, GLC: 105 MG/DL
GLUCOSE, GLC: 117 MG/DL
GLUCOSE, GLC: 120 MG/DL
GLUCOSE, GLC: 122 MG/DL
GLUCOSE, GLC: 136 MG/DL
GLUCOSE, GLC: 143 MG/DL
GLUCOSE, GLC: 143 MG/DL
GLUCOSE, GLC: 146 MG/DL
GLUCOSE, GLC: 153 MG/DL
GLUCOSE, GLC: 158 MG/DL
GLUCOSE, GLC: 73 MG/DL
HCO3 BLD-SCNC: 24.8 MMOL/L (ref 22–26)
HIGH TARGET, HITG: 140 MG/DL
INSULIN ADMINSTERED, INSADM: 0.7 UNITS/HOUR
INSULIN ADMINSTERED, INSADM: 2.3 UNITS/HOUR
INSULIN ADMINSTERED, INSADM: 2.4 UNITS/HOUR
INSULIN ADMINSTERED, INSADM: 2.5 UNITS/HOUR
INSULIN ADMINSTERED, INSADM: 2.9 UNITS/HOUR
INSULIN ADMINSTERED, INSADM: 3.7 UNITS/HOUR
INSULIN ADMINSTERED, INSADM: 3.8 UNITS/HOUR
INSULIN ADMINSTERED, INSADM: 3.9 UNITS/HOUR
INSULIN ADMINSTERED, INSADM: 4.2 UNITS/HOUR
INSULIN ADMINSTERED, INSADM: 4.7 UNITS/HOUR
INSULIN ADMINSTERED, INSADM: 5.2 UNITS/HOUR
INSULIN ADMINSTERED, INSADM: 5.8 UNITS/HOUR
INSULIN ORDER, INSORD: 0.7 UNITS/HOUR
INSULIN ORDER, INSORD: 2.3 UNITS/HOUR
INSULIN ORDER, INSORD: 2.4 UNITS/HOUR
INSULIN ORDER, INSORD: 2.5 UNITS/HOUR
INSULIN ORDER, INSORD: 2.9 UNITS/HOUR
INSULIN ORDER, INSORD: 3.7 UNITS/HOUR
INSULIN ORDER, INSORD: 3.8 UNITS/HOUR
INSULIN ORDER, INSORD: 3.9 UNITS/HOUR
INSULIN ORDER, INSORD: 4.2 UNITS/HOUR
INSULIN ORDER, INSORD: 4.7 UNITS/HOUR
INSULIN ORDER, INSORD: 5.2 UNITS/HOUR
INSULIN ORDER, INSORD: 5.8 UNITS/HOUR
LOW TARGET, LOT: 100 MG/DL
MINUTES UNTIL NEXT BG, NBG: 60 MIN
MULTIPLIER, MUL: 0.03
MULTIPLIER, MUL: 0.04
MULTIPLIER, MUL: 0.05
MULTIPLIER, MUL: 0.06
MULTIPLIER, MUL: 0.07
MULTIPLIER, MUL: 0.07
O2/TOTAL GAS SETTING VFR VENT: 100 %
ORDER INITIALS, ORDINIT: NORMAL
PCO2 BLD: 42.9 MMHG (ref 35–45)
PEEP RESPIRATORY: 5 CMH2O
PH BLD: 7.37 [PH] (ref 7.35–7.45)
PLATELET # BLD AUTO: 213 K/UL (ref 150–400)
PO2 BLD: 332 MMHG (ref 80–100)
SAMPLES BEING HELD,HOLD: NORMAL
SAO2 % BLD: 99.9 % (ref 92–97)
SERVICE CMNT-IMP: NORMAL
SPECIMEN TYPE: ABNORMAL
VENTILATION MODE VENT: ABNORMAL
VT SETTING VENT: 450 ML

## 2022-04-19 PROCEDURE — 80053 COMPREHEN METABOLIC PANEL: CPT

## 2022-04-19 PROCEDURE — 74011250636 HC RX REV CODE- 250/636: Performed by: NURSE ANESTHETIST, CERTIFIED REGISTERED

## 2022-04-19 PROCEDURE — 77030002524 HC INSTR CLMP FGRTY EDWD -B: Performed by: THORACIC SURGERY (CARDIOTHORACIC VASCULAR SURGERY)

## 2022-04-19 PROCEDURE — P9045 ALBUMIN (HUMAN), 5%, 250 ML: HCPCS | Performed by: PHYSICIAN ASSISTANT

## 2022-04-19 PROCEDURE — 74011000258 HC RX REV CODE- 258: Performed by: THORACIC SURGERY (CARDIOTHORACIC VASCULAR SURGERY)

## 2022-04-19 PROCEDURE — 77030013313: Performed by: THORACIC SURGERY (CARDIOTHORACIC VASCULAR SURGERY)

## 2022-04-19 PROCEDURE — 77030010797: Performed by: THORACIC SURGERY (CARDIOTHORACIC VASCULAR SURGERY)

## 2022-04-19 PROCEDURE — 77030010938 HC CLP LIG TELE -A: Performed by: THORACIC SURGERY (CARDIOTHORACIC VASCULAR SURGERY)

## 2022-04-19 PROCEDURE — 74011636637 HC RX REV CODE- 636/637: Performed by: NURSE ANESTHETIST, CERTIFIED REGISTERED

## 2022-04-19 PROCEDURE — P9073 PLATELETS PHERESIS PATH REDU: HCPCS

## 2022-04-19 PROCEDURE — 02RW0JZ REPLACEMENT OF THORACIC AORTA, DESCENDING WITH SYNTHETIC SUBSTITUTE, OPEN APPROACH: ICD-10-PCS | Performed by: THORACIC SURGERY (CARDIOTHORACIC VASCULAR SURGERY)

## 2022-04-19 PROCEDURE — 77030006994: Performed by: THORACIC SURGERY (CARDIOTHORACIC VASCULAR SURGERY)

## 2022-04-19 PROCEDURE — 77030013079 HC BLNKT BAIR HGGR 3M -A: Performed by: STUDENT IN AN ORGANIZED HEALTH CARE EDUCATION/TRAINING PROGRAM

## 2022-04-19 PROCEDURE — 65620000000 HC RM CCU GENERAL

## 2022-04-19 PROCEDURE — 77030010410 HC WRE VENT PACE AEMC -A: Performed by: THORACIC SURGERY (CARDIOTHORACIC VASCULAR SURGERY)

## 2022-04-19 PROCEDURE — 33430 REPLACEMENT OF MITRAL VALVE: CPT | Performed by: THORACIC SURGERY (CARDIOTHORACIC VASCULAR SURGERY)

## 2022-04-19 PROCEDURE — 85025 COMPLETE CBC W/AUTO DIFF WBC: CPT

## 2022-04-19 PROCEDURE — 36415 COLL VENOUS BLD VENIPUNCTURE: CPT

## 2022-04-19 PROCEDURE — C1768 GRAFT, VASCULAR: HCPCS | Performed by: THORACIC SURGERY (CARDIOTHORACIC VASCULAR SURGERY)

## 2022-04-19 PROCEDURE — 77030021678 HC GLIDESCP STAT DISP VERT -B: Performed by: STUDENT IN AN ORGANIZED HEALTH CARE EDUCATION/TRAINING PROGRAM

## 2022-04-19 PROCEDURE — 77030010506 HC ADH BIOGLU CRYO -G: Performed by: THORACIC SURGERY (CARDIOTHORACIC VASCULAR SURGERY)

## 2022-04-19 PROCEDURE — 77030034689 HC VASC DIL KT W/NDL LIVA -C: Performed by: THORACIC SURGERY (CARDIOTHORACIC VASCULAR SURGERY)

## 2022-04-19 PROCEDURE — 2709999900 HC NON-CHARGEABLE SUPPLY: Performed by: THORACIC SURGERY (CARDIOTHORACIC VASCULAR SURGERY)

## 2022-04-19 PROCEDURE — 77030010818: Performed by: THORACIC SURGERY (CARDIOTHORACIC VASCULAR SURGERY)

## 2022-04-19 PROCEDURE — 77030011266 HC ELECTRD BLD INSL COVD -A: Performed by: THORACIC SURGERY (CARDIOTHORACIC VASCULAR SURGERY)

## 2022-04-19 PROCEDURE — 77030002912 HC SUT ETHBND J&J -A: Performed by: THORACIC SURGERY (CARDIOTHORACIC VASCULAR SURGERY)

## 2022-04-19 PROCEDURE — 74011250636 HC RX REV CODE- 250/636: Performed by: THORACIC SURGERY (CARDIOTHORACIC VASCULAR SURGERY)

## 2022-04-19 PROCEDURE — 77030026438 HC STYL ET INTUB CARD -A: Performed by: STUDENT IN AN ORGANIZED HEALTH CARE EDUCATION/TRAINING PROGRAM

## 2022-04-19 PROCEDURE — 33859 AS-AORT GRF F/DS OTH/THN DSJ: CPT | Performed by: THORACIC SURGERY (CARDIOTHORACIC VASCULAR SURGERY)

## 2022-04-19 PROCEDURE — 77030008684 HC TU ET CUF COVD -B: Performed by: STUDENT IN AN ORGANIZED HEALTH CARE EDUCATION/TRAINING PROGRAM

## 2022-04-19 PROCEDURE — 77030020140: Performed by: THORACIC SURGERY (CARDIOTHORACIC VASCULAR SURGERY)

## 2022-04-19 PROCEDURE — P9047 ALBUMIN (HUMAN), 25%, 50ML: HCPCS | Performed by: THORACIC SURGERY (CARDIOTHORACIC VASCULAR SURGERY)

## 2022-04-19 PROCEDURE — 77030018729 HC ELECTRD DEFIB PAD CARD -B: Performed by: THORACIC SURGERY (CARDIOTHORACIC VASCULAR SURGERY)

## 2022-04-19 PROCEDURE — 85730 THROMBOPLASTIN TIME PARTIAL: CPT

## 2022-04-19 PROCEDURE — 77030003010 HC SUT SURG STL J&J -B: Performed by: THORACIC SURGERY (CARDIOTHORACIC VASCULAR SURGERY)

## 2022-04-19 PROCEDURE — 33641 REPAIR HEART SEPTUM DEFECT: CPT | Performed by: THORACIC SURGERY (CARDIOTHORACIC VASCULAR SURGERY)

## 2022-04-19 PROCEDURE — 74011000258 HC RX REV CODE- 258: Performed by: NURSE ANESTHETIST, CERTIFIED REGISTERED

## 2022-04-19 PROCEDURE — 76998 US GUIDE INTRAOP: CPT | Performed by: THORACIC SURGERY (CARDIOTHORACIC VASCULAR SURGERY)

## 2022-04-19 PROCEDURE — 77030020061 HC IV BLD WRMR ADMIN SET 3M -B: Performed by: STUDENT IN AN ORGANIZED HEALTH CARE EDUCATION/TRAINING PROGRAM

## 2022-04-19 PROCEDURE — 30233N1 TRANSFUSION OF NONAUTOLOGOUS RED BLOOD CELLS INTO PERIPHERAL VEIN, PERCUTANEOUS APPROACH: ICD-10-PCS | Performed by: THORACIC SURGERY (CARDIOTHORACIC VASCULAR SURGERY)

## 2022-04-19 PROCEDURE — 77030037878 HC DRSG MEPILEX >48IN BORD MOLN -B: Performed by: THORACIC SURGERY (CARDIOTHORACIC VASCULAR SURGERY)

## 2022-04-19 PROCEDURE — 82375 ASSAY CARBOXYHB QUANT: CPT

## 2022-04-19 PROCEDURE — 77030019905 HC CATH URETH INTMIT MDII -A: Performed by: THORACIC SURGERY (CARDIOTHORACIC VASCULAR SURGERY)

## 2022-04-19 PROCEDURE — 77030002996 HC SUT SLK J&J -A: Performed by: THORACIC SURGERY (CARDIOTHORACIC VASCULAR SURGERY)

## 2022-04-19 PROCEDURE — 74011250636 HC RX REV CODE- 250/636: Performed by: PHYSICIAN ASSISTANT

## 2022-04-19 PROCEDURE — 77030011235 HC DRN PERCRD SUMP MEDT -B: Performed by: THORACIC SURGERY (CARDIOTHORACIC VASCULAR SURGERY)

## 2022-04-19 PROCEDURE — B24BZZ4 ULTRASONOGRAPHY OF HEART WITH AORTA, TRANSESOPHAGEAL: ICD-10-PCS | Performed by: THORACIC SURGERY (CARDIOTHORACIC VASCULAR SURGERY)

## 2022-04-19 PROCEDURE — 5A1221Z PERFORMANCE OF CARDIAC OUTPUT, CONTINUOUS: ICD-10-PCS | Performed by: THORACIC SURGERY (CARDIOTHORACIC VASCULAR SURGERY)

## 2022-04-19 PROCEDURE — P9012 CRYOPRECIPITATE EACH UNIT: HCPCS

## 2022-04-19 PROCEDURE — 76010000191: Performed by: THORACIC SURGERY (CARDIOTHORACIC VASCULAR SURGERY)

## 2022-04-19 PROCEDURE — 02L70CK OCCLUSION OF LEFT ATRIAL APPENDAGE WITH EXTRALUMINAL DEVICE, OPEN APPROACH: ICD-10-PCS | Performed by: THORACIC SURGERY (CARDIOTHORACIC VASCULAR SURGERY)

## 2022-04-19 PROCEDURE — 88304 TISSUE EXAM BY PATHOLOGIST: CPT

## 2022-04-19 PROCEDURE — 77030013798 HC KT TRNSDUC PRSSR EDWD -B: Performed by: THORACIC SURGERY (CARDIOTHORACIC VASCULAR SURGERY)

## 2022-04-19 PROCEDURE — 77030019579 HC CBL PACE DISP REMG -B: Performed by: THORACIC SURGERY (CARDIOTHORACIC VASCULAR SURGERY)

## 2022-04-19 PROCEDURE — 82803 BLOOD GASES ANY COMBINATION: CPT

## 2022-04-19 PROCEDURE — 94002 VENT MGMT INPAT INIT DAY: CPT

## 2022-04-19 PROCEDURE — 77030008771 HC TU NG SALEM SUMP -A: Performed by: STUDENT IN AN ORGANIZED HEALTH CARE EDUCATION/TRAINING PROGRAM

## 2022-04-19 PROCEDURE — 3E080GC INTRODUCTION OF OTHER THERAPEUTIC SUBSTANCE INTO HEART, OPEN APPROACH: ICD-10-PCS | Performed by: THORACIC SURGERY (CARDIOTHORACIC VASCULAR SURGERY)

## 2022-04-19 PROCEDURE — 74011636637 HC RX REV CODE- 636/637: Performed by: THORACIC SURGERY (CARDIOTHORACIC VASCULAR SURGERY)

## 2022-04-19 PROCEDURE — 77030019908 HC STETH ESOPH SIMS -A: Performed by: STUDENT IN AN ORGANIZED HEALTH CARE EDUCATION/TRAINING PROGRAM

## 2022-04-19 PROCEDURE — 77030012390 HC DRN CHST BTL GTNG -B: Performed by: THORACIC SURGERY (CARDIOTHORACIC VASCULAR SURGERY)

## 2022-04-19 PROCEDURE — C1751 CATH, INF, PER/CENT/MIDLINE: HCPCS | Performed by: STUDENT IN AN ORGANIZED HEALTH CARE EDUCATION/TRAINING PROGRAM

## 2022-04-19 PROCEDURE — 77030005513 HC CATH URETH FOL11 MDII -B: Performed by: THORACIC SURGERY (CARDIOTHORACIC VASCULAR SURGERY)

## 2022-04-19 PROCEDURE — 74011000250 HC RX REV CODE- 250: Performed by: THORACIC SURGERY (CARDIOTHORACIC VASCULAR SURGERY)

## 2022-04-19 PROCEDURE — 76060000057: Performed by: THORACIC SURGERY (CARDIOTHORACIC VASCULAR SURGERY)

## 2022-04-19 PROCEDURE — 77030041076 HC DRSG AG OPTICELL MDII -A: Performed by: THORACIC SURGERY (CARDIOTHORACIC VASCULAR SURGERY)

## 2022-04-19 PROCEDURE — 77030015757: Performed by: THORACIC SURGERY (CARDIOTHORACIC VASCULAR SURGERY)

## 2022-04-19 PROCEDURE — 74011000636 HC RX REV CODE- 636

## 2022-04-19 PROCEDURE — 77030012722 HC VLV MTRL MAGNA EDWD -K3: Performed by: THORACIC SURGERY (CARDIOTHORACIC VASCULAR SURGERY)

## 2022-04-19 PROCEDURE — 77030002933 HC SUT MCRYL J&J -A: Performed by: THORACIC SURGERY (CARDIOTHORACIC VASCULAR SURGERY)

## 2022-04-19 PROCEDURE — 82962 GLUCOSE BLOOD TEST: CPT

## 2022-04-19 PROCEDURE — 77030041933 HC CLP ATRI LAA EXCL FLX ATRC -H: Performed by: THORACIC SURGERY (CARDIOTHORACIC VASCULAR SURGERY)

## 2022-04-19 PROCEDURE — 02Q50ZZ REPAIR ATRIAL SEPTUM, OPEN APPROACH: ICD-10-PCS | Performed by: THORACIC SURGERY (CARDIOTHORACIC VASCULAR SURGERY)

## 2022-04-19 PROCEDURE — 74011000250 HC RX REV CODE- 250: Performed by: NURSE ANESTHETIST, CERTIFIED REGISTERED

## 2022-04-19 PROCEDURE — 77030034936 HC DEV MIN COR-KNOT KT LSIS -F: Performed by: THORACIC SURGERY (CARDIOTHORACIC VASCULAR SURGERY)

## 2022-04-19 PROCEDURE — 02RG08Z REPLACEMENT OF MITRAL VALVE WITH ZOOPLASTIC TISSUE, OPEN APPROACH: ICD-10-PCS | Performed by: THORACIC SURGERY (CARDIOTHORACIC VASCULAR SURGERY)

## 2022-04-19 PROCEDURE — 77030008771 HC TU NG SALEM SUMP -A: Performed by: THORACIC SURGERY (CARDIOTHORACIC VASCULAR SURGERY)

## 2022-04-19 PROCEDURE — 77030041244 HC CBL PACE EXT TEMP REMG -B: Performed by: THORACIC SURGERY (CARDIOTHORACIC VASCULAR SURGERY)

## 2022-04-19 PROCEDURE — 77030005401 HC CATH RAD ARRO -A: Performed by: STUDENT IN AN ORGANIZED HEALTH CARE EDUCATION/TRAINING PROGRAM

## 2022-04-19 PROCEDURE — 77030033069 HC RLD QLC SGL COR-KNOT LSIS -B: Performed by: THORACIC SURGERY (CARDIOTHORACIC VASCULAR SURGERY)

## 2022-04-19 PROCEDURE — 77030013798 HC KT TRNSDUC PRSSR EDWD -B: Performed by: STUDENT IN AN ORGANIZED HEALTH CARE EDUCATION/TRAINING PROGRAM

## 2022-04-19 PROCEDURE — P9045 ALBUMIN (HUMAN), 5%, 250 ML: HCPCS | Performed by: NURSE ANESTHETIST, CERTIFIED REGISTERED

## 2022-04-19 PROCEDURE — 88305 TISSUE EXAM BY PATHOLOGIST: CPT

## 2022-04-19 PROCEDURE — 71045 X-RAY EXAM CHEST 1 VIEW: CPT

## 2022-04-19 PROCEDURE — 85610 PROTHROMBIN TIME: CPT

## 2022-04-19 PROCEDURE — 74011250637 HC RX REV CODE- 250/637: Performed by: THORACIC SURGERY (CARDIOTHORACIC VASCULAR SURGERY)

## 2022-04-19 PROCEDURE — 83735 ASSAY OF MAGNESIUM: CPT

## 2022-04-19 PROCEDURE — C1713 ANCHOR/SCREW BN/BN,TIS/BN: HCPCS | Performed by: THORACIC SURGERY (CARDIOTHORACIC VASCULAR SURGERY)

## 2022-04-19 PROCEDURE — 85049 AUTOMATED PLATELET COUNT: CPT

## 2022-04-19 DEVICE — GRAFT VASC WOVN DBL VELR STR 34MMX30CM: Type: IMPLANTABLE DEVICE | Site: HEART | Status: FUNCTIONAL

## 2022-04-19 DEVICE — Z INACTIVE USE 2424443 DEVICE OCCL CLP L40MM SM FOOTPRINT 1 HND APPL SUTURELESS W/: Type: IMPLANTABLE DEVICE | Site: HEART | Status: FUNCTIONAL

## 2022-04-19 RX ORDER — SODIUM CHLORIDE 0.9 % (FLUSH) 0.9 %
5-40 SYRINGE (ML) INJECTION AS NEEDED
Status: DISCONTINUED | OUTPATIENT
Start: 2022-04-19 | End: 2022-04-27

## 2022-04-19 RX ORDER — PROPOFOL 10 MG/ML
INJECTION, EMULSION INTRAVENOUS
Status: DISPENSED
Start: 2022-04-19 | End: 2022-04-20

## 2022-04-19 RX ORDER — INSULIN GLARGINE 100 [IU]/ML
1-50 INJECTION, SOLUTION SUBCUTANEOUS
Status: DISCONTINUED | OUTPATIENT
Start: 2022-04-20 | End: 2022-04-23

## 2022-04-19 RX ORDER — HYDROMORPHONE HYDROCHLORIDE 1 MG/ML
1 INJECTION, SOLUTION INTRAMUSCULAR; INTRAVENOUS; SUBCUTANEOUS
Status: DISCONTINUED | OUTPATIENT
Start: 2022-04-19 | End: 2022-04-22

## 2022-04-19 RX ORDER — SODIUM CHLORIDE 9 MG/ML
250 INJECTION, SOLUTION INTRAVENOUS AS NEEDED
Status: DISCONTINUED | OUTPATIENT
Start: 2022-04-19 | End: 2022-04-22 | Stop reason: ALTCHOICE

## 2022-04-19 RX ORDER — MAGNESIUM SULFATE 1 G/100ML
1 INJECTION INTRAVENOUS AS NEEDED
Status: DISCONTINUED | OUTPATIENT
Start: 2022-04-19 | End: 2022-04-26

## 2022-04-19 RX ORDER — BACITRACIN 500 UNIT/G
1 PACKET (EA) TOPICAL AS NEEDED
Status: DISCONTINUED | OUTPATIENT
Start: 2022-04-19 | End: 2022-04-30 | Stop reason: HOSPADM

## 2022-04-19 RX ORDER — DOBUTAMINE HYDROCHLORIDE 200 MG/100ML
INJECTION INTRAVENOUS
Status: DISCONTINUED | OUTPATIENT
Start: 2022-04-19 | End: 2022-04-19 | Stop reason: HOSPADM

## 2022-04-19 RX ORDER — POTASSIUM CHLORIDE 29.8 MG/ML
20 INJECTION INTRAVENOUS
Status: ACTIVE | OUTPATIENT
Start: 2022-04-19 | End: 2022-04-20

## 2022-04-19 RX ORDER — SODIUM CHLORIDE 0.9 % (FLUSH) 0.9 %
5-40 SYRINGE (ML) INJECTION EVERY 8 HOURS
Status: DISCONTINUED | OUTPATIENT
Start: 2022-04-19 | End: 2022-04-19

## 2022-04-19 RX ORDER — NEOSTIGMINE METHYLSULFATE 1 MG/ML
INJECTION, SOLUTION INTRAVENOUS AS NEEDED
Status: DISCONTINUED | OUTPATIENT
Start: 2022-04-19 | End: 2022-04-19 | Stop reason: HOSPADM

## 2022-04-19 RX ORDER — NOREPINEPHRINE BITARTRATE/D5W 8 MG/250ML
PLASTIC BAG, INJECTION (ML) INTRAVENOUS
Status: DISCONTINUED | OUTPATIENT
Start: 2022-04-19 | End: 2022-04-19

## 2022-04-19 RX ORDER — AMOXICILLIN 250 MG
1 CAPSULE ORAL 2 TIMES DAILY
Status: DISCONTINUED | OUTPATIENT
Start: 2022-04-20 | End: 2022-04-30 | Stop reason: HOSPADM

## 2022-04-19 RX ORDER — CHLORHEXIDINE GLUCONATE 1.2 MG/ML
10 RINSE ORAL EVERY 12 HOURS
Status: DISCONTINUED | OUTPATIENT
Start: 2022-04-20 | End: 2022-04-21

## 2022-04-19 RX ORDER — DEXAMETHASONE SODIUM PHOSPHATE 4 MG/ML
INJECTION, SOLUTION INTRA-ARTICULAR; INTRALESIONAL; INTRAMUSCULAR; INTRAVENOUS; SOFT TISSUE AS NEEDED
Status: DISCONTINUED | OUTPATIENT
Start: 2022-04-19 | End: 2022-04-19 | Stop reason: HOSPADM

## 2022-04-19 RX ORDER — ALBUTEROL SULFATE 0.83 MG/ML
2.5 SOLUTION RESPIRATORY (INHALATION)
Status: DISCONTINUED | OUTPATIENT
Start: 2022-04-19 | End: 2022-04-30 | Stop reason: HOSPADM

## 2022-04-19 RX ORDER — LANOLIN ALCOHOL/MO/W.PET/CERES
3-6 CREAM (GRAM) TOPICAL
Status: DISCONTINUED | OUTPATIENT
Start: 2022-04-19 | End: 2022-04-30 | Stop reason: HOSPADM

## 2022-04-19 RX ORDER — SODIUM CHLORIDE 9 MG/ML
9 INJECTION, SOLUTION INTRAVENOUS CONTINUOUS
Status: DISCONTINUED | OUTPATIENT
Start: 2022-04-20 | End: 2022-04-23

## 2022-04-19 RX ORDER — DEXMEDETOMIDINE HYDROCHLORIDE 100 UG/ML
INJECTION, SOLUTION INTRAVENOUS AS NEEDED
Status: DISCONTINUED | OUTPATIENT
Start: 2022-04-19 | End: 2022-04-19

## 2022-04-19 RX ORDER — SUFENTANIL CITRATE 50 UG/ML
INJECTION EPIDURAL; INTRAVENOUS
Status: DISCONTINUED | OUTPATIENT
Start: 2022-04-19 | End: 2022-04-19 | Stop reason: HOSPADM

## 2022-04-19 RX ORDER — NOREPINEPHRINE BITARTRATE/D5W 8 MG/250ML
PLASTIC BAG, INJECTION (ML) INTRAVENOUS
Status: DISCONTINUED | OUTPATIENT
Start: 2022-04-19 | End: 2022-04-19 | Stop reason: HOSPADM

## 2022-04-19 RX ORDER — NITROGLYCERIN 20 MG/100ML
INJECTION INTRAVENOUS AS NEEDED
Status: DISCONTINUED | OUTPATIENT
Start: 2022-04-19 | End: 2022-04-19 | Stop reason: HOSPADM

## 2022-04-19 RX ORDER — ROCURONIUM BROMIDE 10 MG/ML
INJECTION, SOLUTION INTRAVENOUS AS NEEDED
Status: DISCONTINUED | OUTPATIENT
Start: 2022-04-19 | End: 2022-04-19 | Stop reason: HOSPADM

## 2022-04-19 RX ORDER — POLYETHYLENE GLYCOL 3350 17 G/17G
17 POWDER, FOR SOLUTION ORAL DAILY
Status: DISCONTINUED | OUTPATIENT
Start: 2022-04-20 | End: 2022-04-30 | Stop reason: HOSPADM

## 2022-04-19 RX ORDER — SODIUM CHLORIDE 9 MG/ML
INJECTION, SOLUTION INTRAVENOUS
Status: DISCONTINUED | OUTPATIENT
Start: 2022-04-19 | End: 2022-04-19 | Stop reason: HOSPADM

## 2022-04-19 RX ORDER — MIDAZOLAM HYDROCHLORIDE 1 MG/ML
1 INJECTION, SOLUTION INTRAMUSCULAR; INTRAVENOUS
Status: DISCONTINUED | OUTPATIENT
Start: 2022-04-19 | End: 2022-04-22

## 2022-04-19 RX ORDER — DEXTROSE MONOHYDRATE 100 MG/ML
0-250 INJECTION, SOLUTION INTRAVENOUS AS NEEDED
Status: DISCONTINUED | OUTPATIENT
Start: 2022-04-19 | End: 2022-04-30 | Stop reason: HOSPADM

## 2022-04-19 RX ORDER — MELATONIN
2000 2 TIMES DAILY
Status: DISCONTINUED | OUTPATIENT
Start: 2022-04-20 | End: 2022-04-30 | Stop reason: HOSPADM

## 2022-04-19 RX ORDER — SUCCINYLCHOLINE CHLORIDE 20 MG/ML
INJECTION INTRAMUSCULAR; INTRAVENOUS AS NEEDED
Status: DISCONTINUED | OUTPATIENT
Start: 2022-04-19 | End: 2022-04-19 | Stop reason: HOSPADM

## 2022-04-19 RX ORDER — GUAIFENESIN 100 MG/5ML
81 LIQUID (ML) ORAL DAILY
Status: DISCONTINUED | OUTPATIENT
Start: 2022-04-20 | End: 2022-04-30 | Stop reason: HOSPADM

## 2022-04-19 RX ORDER — MIDAZOLAM HYDROCHLORIDE 1 MG/ML
INJECTION, SOLUTION INTRAMUSCULAR; INTRAVENOUS AS NEEDED
Status: DISCONTINUED | OUTPATIENT
Start: 2022-04-19 | End: 2022-04-19 | Stop reason: HOSPADM

## 2022-04-19 RX ORDER — AMIODARONE HYDROCHLORIDE 200 MG/1
400 TABLET ORAL EVERY 12 HOURS
Status: DISCONTINUED | OUTPATIENT
Start: 2022-04-20 | End: 2022-04-21

## 2022-04-19 RX ORDER — ALBUMIN HUMAN 50 G/1000ML
12.5 SOLUTION INTRAVENOUS
Status: COMPLETED | OUTPATIENT
Start: 2022-04-19 | End: 2022-04-20

## 2022-04-19 RX ORDER — ALBUMIN HUMAN 50 G/1000ML
SOLUTION INTRAVENOUS AS NEEDED
Status: DISCONTINUED | OUTPATIENT
Start: 2022-04-19 | End: 2022-04-19 | Stop reason: HOSPADM

## 2022-04-19 RX ORDER — NALOXONE HYDROCHLORIDE 0.4 MG/ML
0.4 INJECTION, SOLUTION INTRAMUSCULAR; INTRAVENOUS; SUBCUTANEOUS AS NEEDED
Status: DISCONTINUED | OUTPATIENT
Start: 2022-04-19 | End: 2022-04-30 | Stop reason: HOSPADM

## 2022-04-19 RX ORDER — MAGNESIUM SULFATE 100 %
4 CRYSTALS MISCELLANEOUS AS NEEDED
Status: DISCONTINUED | OUTPATIENT
Start: 2022-04-19 | End: 2022-04-30 | Stop reason: HOSPADM

## 2022-04-19 RX ORDER — OXYCODONE HYDROCHLORIDE 5 MG/1
5 TABLET ORAL
Status: DISCONTINUED | OUTPATIENT
Start: 2022-04-19 | End: 2022-04-30 | Stop reason: HOSPADM

## 2022-04-19 RX ORDER — SODIUM CHLORIDE, SODIUM LACTATE, POTASSIUM CHLORIDE, CALCIUM CHLORIDE 600; 310; 30; 20 MG/100ML; MG/100ML; MG/100ML; MG/100ML
INJECTION, SOLUTION INTRAVENOUS
Status: DISCONTINUED | OUTPATIENT
Start: 2022-04-19 | End: 2022-04-19 | Stop reason: HOSPADM

## 2022-04-19 RX ORDER — LIDOCAINE HYDROCHLORIDE 20 MG/ML
INJECTION, SOLUTION EPIDURAL; INFILTRATION; INTRACAUDAL; PERINEURAL AS NEEDED
Status: DISCONTINUED | OUTPATIENT
Start: 2022-04-19 | End: 2022-04-19 | Stop reason: HOSPADM

## 2022-04-19 RX ORDER — INSULIN LISPRO 100 [IU]/ML
INJECTION, SOLUTION INTRAVENOUS; SUBCUTANEOUS
Status: DISCONTINUED | OUTPATIENT
Start: 2022-04-20 | End: 2022-04-22 | Stop reason: ALTCHOICE

## 2022-04-19 RX ORDER — SODIUM CHLORIDE, SODIUM LACTATE, POTASSIUM CHLORIDE, CALCIUM CHLORIDE 600; 310; 30; 20 MG/100ML; MG/100ML; MG/100ML; MG/100ML
25 INJECTION, SOLUTION INTRAVENOUS CONTINUOUS
Status: DISCONTINUED | OUTPATIENT
Start: 2022-04-19 | End: 2022-04-19

## 2022-04-19 RX ORDER — PANTOPRAZOLE SODIUM 40 MG/1
40 TABLET, DELAYED RELEASE ORAL
Status: DISCONTINUED | OUTPATIENT
Start: 2022-04-20 | End: 2022-04-30 | Stop reason: HOSPADM

## 2022-04-19 RX ORDER — CEFAZOLIN SODIUM 1 G/3ML
INJECTION, POWDER, FOR SOLUTION INTRAMUSCULAR; INTRAVENOUS AS NEEDED
Status: DISCONTINUED | OUTPATIENT
Start: 2022-04-19 | End: 2022-04-19 | Stop reason: HOSPADM

## 2022-04-19 RX ORDER — PROPOFOL 10 MG/ML
0-50 VIAL (ML) INTRAVENOUS
Status: DISCONTINUED | OUTPATIENT
Start: 2022-04-20 | End: 2022-04-21

## 2022-04-19 RX ORDER — LANOLIN ALCOHOL/MO/W.PET/CERES
400 CREAM (GRAM) TOPICAL 2 TIMES DAILY
Status: DISCONTINUED | OUTPATIENT
Start: 2022-04-20 | End: 2022-04-30 | Stop reason: HOSPADM

## 2022-04-19 RX ORDER — ONDANSETRON 2 MG/ML
4 INJECTION INTRAMUSCULAR; INTRAVENOUS
Status: DISCONTINUED | OUTPATIENT
Start: 2022-04-19 | End: 2022-04-30 | Stop reason: HOSPADM

## 2022-04-19 RX ORDER — SODIUM CHLORIDE 9 MG/ML
250 INJECTION, SOLUTION INTRAVENOUS AS NEEDED
Status: DISCONTINUED | OUTPATIENT
Start: 2022-04-19 | End: 2022-04-21 | Stop reason: SDUPTHER

## 2022-04-19 RX ORDER — SODIUM CHLORIDE 450 MG/100ML
25 INJECTION, SOLUTION INTRAVENOUS CONTINUOUS
Status: DISCONTINUED | OUTPATIENT
Start: 2022-04-20 | End: 2022-04-23

## 2022-04-19 RX ORDER — ONDANSETRON 2 MG/ML
INJECTION INTRAMUSCULAR; INTRAVENOUS AS NEEDED
Status: DISCONTINUED | OUTPATIENT
Start: 2022-04-19 | End: 2022-04-19 | Stop reason: HOSPADM

## 2022-04-19 RX ORDER — HYDROMORPHONE HYDROCHLORIDE 1 MG/ML
0.5 INJECTION, SOLUTION INTRAMUSCULAR; INTRAVENOUS; SUBCUTANEOUS
Status: DISCONTINUED | OUTPATIENT
Start: 2022-04-19 | End: 2022-04-26

## 2022-04-19 RX ORDER — SUFENTANIL CITRATE 50 UG/ML
INJECTION EPIDURAL; INTRAVENOUS AS NEEDED
Status: DISCONTINUED | OUTPATIENT
Start: 2022-04-19 | End: 2022-04-19 | Stop reason: HOSPADM

## 2022-04-19 RX ORDER — SODIUM CHLORIDE 0.9 % (FLUSH) 0.9 %
5-40 SYRINGE (ML) INJECTION AS NEEDED
Status: DISCONTINUED | OUTPATIENT
Start: 2022-04-19 | End: 2022-04-19

## 2022-04-19 RX ORDER — MUPIROCIN 20 MG/G
OINTMENT TOPICAL 2 TIMES DAILY
Status: COMPLETED | OUTPATIENT
Start: 2022-04-20 | End: 2022-04-24

## 2022-04-19 RX ORDER — HEPARIN SODIUM 1000 [USP'U]/ML
2000 INJECTION, SOLUTION INTRAVENOUS; SUBCUTANEOUS ONCE
Status: COMPLETED | OUTPATIENT
Start: 2022-04-19 | End: 2022-04-19

## 2022-04-19 RX ORDER — HEPARIN SODIUM 1000 [USP'U]/ML
INJECTION, SOLUTION INTRAVENOUS; SUBCUTANEOUS AS NEEDED
Status: DISCONTINUED | OUTPATIENT
Start: 2022-04-19 | End: 2022-04-19 | Stop reason: HOSPADM

## 2022-04-19 RX ORDER — PROPOFOL 10 MG/ML
INJECTION, EMULSION INTRAVENOUS AS NEEDED
Status: DISCONTINUED | OUTPATIENT
Start: 2022-04-19 | End: 2022-04-19 | Stop reason: HOSPADM

## 2022-04-19 RX ORDER — OXYCODONE HYDROCHLORIDE 5 MG/1
10 TABLET ORAL
Status: DISCONTINUED | OUTPATIENT
Start: 2022-04-19 | End: 2022-04-30 | Stop reason: HOSPADM

## 2022-04-19 RX ORDER — SODIUM CHLORIDE 0.9 % (FLUSH) 0.9 %
5-40 SYRINGE (ML) INJECTION EVERY 8 HOURS
Status: DISCONTINUED | OUTPATIENT
Start: 2022-04-20 | End: 2022-04-27

## 2022-04-19 RX ORDER — FACIAL-BODY WIPES
10 EACH TOPICAL DAILY PRN
Status: DISCONTINUED | OUTPATIENT
Start: 2022-04-19 | End: 2022-04-30 | Stop reason: HOSPADM

## 2022-04-19 RX ORDER — INSULIN LISPRO 100 [IU]/ML
INJECTION, SOLUTION INTRAVENOUS; SUBCUTANEOUS
Status: DISCONTINUED | OUTPATIENT
Start: 2022-04-20 | End: 2022-04-23

## 2022-04-19 RX ORDER — SODIUM CHLORIDE 9 MG/ML
INJECTION, SOLUTION INTRAVENOUS
Status: DISCONTINUED | OUTPATIENT
Start: 2022-04-19 | End: 2022-04-19

## 2022-04-19 RX ORDER — DIPHENHYDRAMINE HYDROCHLORIDE 50 MG/ML
25 INJECTION, SOLUTION INTRAMUSCULAR; INTRAVENOUS
Status: DISCONTINUED | OUTPATIENT
Start: 2022-04-19 | End: 2022-04-26

## 2022-04-19 RX ORDER — GLYCOPYRROLATE 0.2 MG/ML
INJECTION INTRAMUSCULAR; INTRAVENOUS AS NEEDED
Status: DISCONTINUED | OUTPATIENT
Start: 2022-04-19 | End: 2022-04-19 | Stop reason: HOSPADM

## 2022-04-19 RX ORDER — DEXMEDETOMIDINE HYDROCHLORIDE 4 UG/ML
INJECTION, SOLUTION INTRAVENOUS
Status: DISCONTINUED | OUTPATIENT
Start: 2022-04-19 | End: 2022-04-19 | Stop reason: HOSPADM

## 2022-04-19 RX ORDER — PROTAMINE SULFATE 10 MG/ML
INJECTION, SOLUTION INTRAVENOUS AS NEEDED
Status: DISCONTINUED | OUTPATIENT
Start: 2022-04-19 | End: 2022-04-19 | Stop reason: HOSPADM

## 2022-04-19 RX ORDER — FENTANYL CITRATE 50 UG/ML
INJECTION, SOLUTION INTRAMUSCULAR; INTRAVENOUS AS NEEDED
Status: DISCONTINUED | OUTPATIENT
Start: 2022-04-19 | End: 2022-04-19 | Stop reason: HOSPADM

## 2022-04-19 RX ORDER — DIPHENHYDRAMINE HCL 25 MG
25 CAPSULE ORAL
Status: DISCONTINUED | OUTPATIENT
Start: 2022-04-19 | End: 2022-04-30 | Stop reason: HOSPADM

## 2022-04-19 RX ORDER — ACETAMINOPHEN 500 MG
1000 TABLET ORAL EVERY 6 HOURS
Status: DISCONTINUED | OUTPATIENT
Start: 2022-04-20 | End: 2022-04-30 | Stop reason: HOSPADM

## 2022-04-19 RX ADMIN — CEFAZOLIN 2 G: 1 INJECTION, POWDER, FOR SOLUTION INTRAMUSCULAR; INTRAVENOUS; PARENTERAL at 19:31

## 2022-04-19 RX ADMIN — GLYCOPYRROLATE 0.4 MG: 0.2 INJECTION, SOLUTION INTRAMUSCULAR; INTRAVENOUS at 23:30

## 2022-04-19 RX ADMIN — SODIUM CHLORIDE: 9 INJECTION, SOLUTION INTRAVENOUS at 10:25

## 2022-04-19 RX ADMIN — PROTAMINE SULFATE 25 MG: 10 INJECTION, SOLUTION INTRAVENOUS at 21:31

## 2022-04-19 RX ADMIN — VASOPRESSIN 0.03 UNITS/MIN: 20 INJECTION INTRAVENOUS at 19:45

## 2022-04-19 RX ADMIN — FENTANYL CITRATE 50 MCG: 50 INJECTION, SOLUTION INTRAMUSCULAR; INTRAVENOUS at 09:44

## 2022-04-19 RX ADMIN — HEPARIN SODIUM 35000 UNITS: 1000 INJECTION, SOLUTION INTRAVENOUS; SUBCUTANEOUS at 10:50

## 2022-04-19 RX ADMIN — Medication 7 MCG/MIN: at 16:14

## 2022-04-19 RX ADMIN — CEFAZOLIN 2 G: 1 INJECTION, POWDER, FOR SOLUTION INTRAMUSCULAR; INTRAVENOUS; PARENTERAL at 16:20

## 2022-04-19 RX ADMIN — FENTANYL CITRATE 50 MCG: 50 INJECTION, SOLUTION INTRAMUSCULAR; INTRAVENOUS at 07:29

## 2022-04-19 RX ADMIN — FENTANYL CITRATE 50 MCG: 50 INJECTION, SOLUTION INTRAMUSCULAR; INTRAVENOUS at 07:10

## 2022-04-19 RX ADMIN — PROTAMINE SULFATE 50 MG: 10 INJECTION, SOLUTION INTRAVENOUS at 21:08

## 2022-04-19 RX ADMIN — SUCCINYLCHOLINE CHLORIDE 180 MG: 20 INJECTION, SOLUTION INTRAMUSCULAR; INTRAVENOUS at 10:08

## 2022-04-19 RX ADMIN — PROTAMINE SULFATE 175 MG: 10 INJECTION, SOLUTION INTRAVENOUS at 16:50

## 2022-04-19 RX ADMIN — MIDAZOLAM 1 MG: 1 INJECTION INTRAMUSCULAR; INTRAVENOUS at 07:10

## 2022-04-19 RX ADMIN — SUFENTANIL CITRATE 0.3 MCG/KG/HR: 50 INJECTION EPIDURAL; INTRAVENOUS at 10:08

## 2022-04-19 RX ADMIN — SODIUM CHLORIDE 0.3 MCG/KG/HR: 900 INJECTION, SOLUTION INTRAVENOUS at 21:32

## 2022-04-19 RX ADMIN — Medication 2.5 UNITS/HR: at 11:52

## 2022-04-19 RX ADMIN — SUFENTANIL CITRATE 30 MCG: 50 INJECTION EPIDURAL; INTRAVENOUS at 10:08

## 2022-04-19 RX ADMIN — ONDANSETRON HYDROCHLORIDE 4 MG: 2 INJECTION, SOLUTION INTRAMUSCULAR; INTRAVENOUS at 23:30

## 2022-04-19 RX ADMIN — MIDAZOLAM 1 MG: 1 INJECTION INTRAMUSCULAR; INTRAVENOUS at 07:05

## 2022-04-19 RX ADMIN — PHENYLEPHRINE HYDROCHLORIDE 60 MCG/MIN: 10 INJECTION INTRAVENOUS at 23:30

## 2022-04-19 RX ADMIN — ROCURONIUM BROMIDE 10 MG: 10 INJECTION INTRAVENOUS at 10:08

## 2022-04-19 RX ADMIN — ROCURONIUM BROMIDE 50 MG: 10 INJECTION INTRAVENOUS at 13:41

## 2022-04-19 RX ADMIN — ROCURONIUM BROMIDE 50 MG: 10 INJECTION INTRAVENOUS at 11:42

## 2022-04-19 RX ADMIN — EPINEPHRINE 3 MCG/MIN: 1 INJECTION INTRAMUSCULAR; INTRAVENOUS; SUBCUTANEOUS at 17:17

## 2022-04-19 RX ADMIN — Medication 2 MCG/KG/MIN: at 20:20

## 2022-04-19 RX ADMIN — ROCURONIUM BROMIDE 40 MG: 10 INJECTION INTRAVENOUS at 10:18

## 2022-04-19 RX ADMIN — NITROGLYCERIN 40 MCG: 200 INJECTION, SOLUTION INTRAVENOUS at 10:41

## 2022-04-19 RX ADMIN — SODIUM CHLORIDE 1 G/HR: 900 INJECTION, SOLUTION INTRAVENOUS at 18:31

## 2022-04-19 RX ADMIN — SUFENTANIL CITRATE 10 MCG: 50 INJECTION EPIDURAL; INTRAVENOUS at 10:40

## 2022-04-19 RX ADMIN — LIDOCAINE HYDROCHLORIDE 60 MG: 20 INJECTION, SOLUTION EPIDURAL; INFILTRATION; INTRACAUDAL; PERINEURAL at 10:08

## 2022-04-19 RX ADMIN — CEFAZOLIN 2 G: 1 INJECTION, POWDER, FOR SOLUTION INTRAMUSCULAR; INTRAVENOUS; PARENTERAL at 22:34

## 2022-04-19 RX ADMIN — CEFAZOLIN 2 G: 1 INJECTION, POWDER, FOR SOLUTION INTRAMUSCULAR; INTRAVENOUS; PARENTERAL at 10:14

## 2022-04-19 RX ADMIN — SODIUM CHLORIDE, SODIUM LACTATE, POTASSIUM CHLORIDE, CALCIUM CHLORIDE: 600; 310; 30; 20 INJECTION, SOLUTION INTRAVENOUS at 07:01

## 2022-04-19 RX ADMIN — FENTANYL CITRATE 50 MCG: 50 INJECTION, SOLUTION INTRAMUSCULAR; INTRAVENOUS at 07:04

## 2022-04-19 RX ADMIN — SUFENTANIL CITRATE 10 MCG: 50 INJECTION EPIDURAL; INTRAVENOUS at 10:48

## 2022-04-19 RX ADMIN — ALBUMIN (HUMAN) 250 ML: 2.5 SOLUTION INTRAVENOUS at 17:16

## 2022-04-19 RX ADMIN — MIDAZOLAM 1 MG: 1 INJECTION INTRAMUSCULAR; INTRAVENOUS at 07:02

## 2022-04-19 RX ADMIN — HEPARIN SODIUM 35000 UNITS: 1000 INJECTION, SOLUTION INTRAVENOUS; SUBCUTANEOUS at 17:37

## 2022-04-19 RX ADMIN — SODIUM CHLORIDE: 9 INJECTION, SOLUTION INTRAVENOUS at 09:58

## 2022-04-19 RX ADMIN — PROTAMINE SULFATE 175 MG: 10 INJECTION, SOLUTION INTRAVENOUS at 20:41

## 2022-04-19 RX ADMIN — MIDAZOLAM 2 MG: 1 INJECTION INTRAMUSCULAR; INTRAVENOUS at 10:08

## 2022-04-19 RX ADMIN — ALBUMIN (HUMAN) 12.5 G: 12.5 INJECTION, SOLUTION INTRAVENOUS at 23:40

## 2022-04-19 RX ADMIN — SODIUM CHLORIDE: 9 INJECTION, SOLUTION INTRAVENOUS at 18:00

## 2022-04-19 RX ADMIN — Medication 3 MG: at 23:30

## 2022-04-19 RX ADMIN — Medication 3 AMPULE: at 06:55

## 2022-04-19 RX ADMIN — PROPOFOL 150 MG: 10 INJECTION, EMULSION INTRAVENOUS at 10:08

## 2022-04-19 RX ADMIN — SODIUM CHLORIDE, POTASSIUM CHLORIDE, SODIUM LACTATE AND CALCIUM CHLORIDE: 600; 310; 30; 20 INJECTION, SOLUTION INTRAVENOUS at 09:58

## 2022-04-19 RX ADMIN — ROCURONIUM BROMIDE 50 MG: 10 INJECTION INTRAVENOUS at 17:31

## 2022-04-19 RX ADMIN — CEFAZOLIN 2 G: 1 INJECTION, POWDER, FOR SOLUTION INTRAMUSCULAR; INTRAVENOUS; PARENTERAL at 13:12

## 2022-04-19 RX ADMIN — DEXAMETHASONE SODIUM PHOSPHATE 8 MG: 4 INJECTION, SOLUTION INTRAMUSCULAR; INTRAVENOUS at 10:24

## 2022-04-19 RX ADMIN — Medication 0.3 MCG/KG/HR: at 15:41

## 2022-04-19 RX ADMIN — SODIUM CHLORIDE 60 MCG/MIN: 900 INJECTION, SOLUTION INTRAVENOUS at 10:13

## 2022-04-19 RX ADMIN — DESMOPRESSIN ACETATE 24 MCG: 4 INJECTION INTRAVENOUS at 20:50

## 2022-04-19 NOTE — ANESTHESIA PROCEDURE NOTES
Arterial Line Placement    Start time: 4/19/2022 7:35 AM  End time: 4/19/2022 7:37 AM  Performed by: John Mckinnon DO  Authorized by: John Mckinnon DO     Pre-Procedure  Indications:  Arterial pressure monitoring and blood sampling  Preanesthetic Checklist: patient identified, risks and benefits discussed, anesthesia consent, site marked, patient being monitored, timeout performed and patient being monitored      Procedure:   Prep:  ChloraPrep  Seldinger Technique?: Yes    Orientation:  Right  Location:  Radial artery  Catheter size:  20 G  Number of attempts:  1    Assessment:   Post-procedure:  Line secured and sterile dressing applied  Patient Tolerance:  Patient tolerated the procedure well with no immediate complications  Comment:   Multiple arterial line attempts. Right: 2x attempt via SRNA, 1x attempt via Reji Erickson CRNA. Blood return in chamber, but unable to advance wire. Left: 2x attempt via Reji Erickson CRNA. Blood return in chamber, unable to advance wire. Right: 1x attempt via ultrasound guidance by Dr Maribeth Hernandez. The soft tissue and radial artery had dense calcification throughout likely attributing to difficulty.

## 2022-04-19 NOTE — ANESTHESIA PROCEDURE NOTES
FELTON  Date/Time: 4/19/2022 10:45 AM      Procedure Details: probe placement, image aquisition & interpretation    Risks and benefits discussed with the patient and plans are to proceed    Procedure Note    Performed by: Radhames Bartholomew DO  Authorized by: Radhames Bartholomew DO       Indications: assessment of ascending aorta and assessment of surgical repair  Modalities: 2D, CF, CWD, PWD  Probe Type: biplane, multiplane and epiaortic  Insertion: atraumatic  Patient Status: intubated and sedated    Echocardiographic and Doppler Measurements   Aorta  Size  Diam(cm)  Dissection PlaqueThick(mm)  Plaque Mobile    Ascending dilated 4.7 No  No    Arch normal  No  No    Descending normal  No  No          Valves  Annulus  Stenosis  Area/Grad  Regurg  Leaflet   Morph  Leaflet   Motion    Aortic normal none  2+ normal normal    Mitral dilated none 2/1 Max/Mean 4+ normal restricted, Post    Tricuspid normal none  1+ normal normal          Atria  Size  SEC (smoke)  Thrombus  Tumor  Device    Rt Atrium normal No No  No    Lt Atrium dilated No No  No     Interatrial Septum Morphology: patent foramen ovale    Interventricular Septum Morphology: normal    Ventricle  Cavity Size  Cavity Dimension Hypertrophy  Thrombus  Gloal FXN  EF    RV normal  No no normal     LV normal   No normal >55%       Regional Function  (1 = normal, 2 = mildly hypokinetic, 3 = severely hypokinetic, 4 = akinetic, 5 = dyskinetic) LAV - [place]   ME LAV = 0  ME LAV = 90  ME LAV = 130   Basal Sept:1 Basal Ant:1 Basal Post:1   Mid Sept:1 Mid Ant:1 Mid Post:1   Apical Sept:1 Apical Ant:1 Basal Ant Sept:1   Basal Lat:1 Basal Inf:1 Mid Ant Sept:1   Mid Lat:1 Mid Inf:1    Apical Lat:1 Apical Inf:1        Pericardium: normal    Post Intervention Follow-up Study  Ventricular Global Function: unchanged  Ventricular Regional Function: unchanged     Valve  Function  Regurgitation  Area    Aortic no change      Mitral no change      Tricuspid no change      Prosthetic Complications: None  Comments: Pre:    Left Ventricle: Normal systolic function. Normal chamber dimension. No wall motion abnormalities. Right Ventricle: Normal systolic function. Normal chamber dimensions. Left atrium: The left atrium is markedly dilated. The left atrial appendage is without thrombus, though the velocity was low (35.2 cm/s). Right atrium: The right atrium appears normal.     Mitral Valve: There is severe mitral regurgitation secondary to posterior leaflet restriction. The leaflet morphology otherwise appears normal. The regurgitant jet is directed toward the P1 segment and exhibits coanda effect. The mitral valve annulus in short axis was estimated to be 3.47cm and in long axis was estimated to be 3.76cm. There is no stenosis. Tricuspid Valve: There is mild 1+ regurgitation. There is normal leaflet morphology. Aortic Valve: The aortic valve is trileaflet and without any morphologic abnormalities. There is mild 1-2+ central regurgitation, likely secondary to ascending aortic aneurysm. The jet appears to involve 20-30% of the LVOT on m mode color doppler. There valve opens well and appears to be non stenotic. No gradient was obtained due to inability to obtain gastric imaging. Pulmonic Valve: There is mild 1+ insufficiency. IAS: There is a patent foramen ovale identified by color flow doppler. Aorta: The ascending aorta is dilated (4.7cm). The aorta is intact without evidence of dissection. Venous and arterial wires were confirmed in appropriate location for venous and arterial cannulation by surgical team.     No gastric views were obtained secondary to patient status post gastric band placement.     -------  Post Pump #1:   Low normal biventricular function requiring inotropic support  Aortic Valve: No aortic insufficiency. The valve structure and function appears normal.   Mitral Valve (Bioprosthetic):  There is severe holosystolic mitral regurgitation that appears as a central jet on 2D exam and more posteriorly directed on 3D exam. There is no paravalvular leak. The valve gradient is 10/4 (Max/Mean). Aorta: Intact post decannulation. Wire for aortic cannulation confirmed in the descending aorta prior to second cannulation. Post Pump #2:    All findings communicated with surgeon. Post pump run  2 FELTON  Lv fn preserved. Mitral valve  replaced with a new bioprosthetic valve. New valve is well seated. Leaflets opening and closing normally. Mean gradient 1 mm across the mitral inflow. No new valvular changes. Rv fn preserved. All views were limited to mid esophageal views only .

## 2022-04-19 NOTE — INTERVAL H&P NOTE
Date of Surgery Update:  Berna Torres was seen and examined. History and physical has been reviewed. The patient has been examined.  There have been no significant clinical changes since the completion of the originally dated History and Physical.    Signed By: NEENA Toro     April 19, 2022 7:37 AM

## 2022-04-19 NOTE — PROGRESS NOTES
Cardiac Surgery Care Coordinator-  Met with Darius Jackson family. Introduced role of the Cardiac Surgery Care Coordinator. Reviewed plan of care and began pre-op education. Discussed day of surgery expectations for the pt and family. Reviewed material in cardiac educational folder, including the Cardiac Surgery Pathway. Reinforced sternal precautions and keeping your move in the tube. Covered how to prepare the home for Guillermina Wilkes to return home. Discussed incision care, showering and signs and symptoms of infection. Discussed discharge instructions, normal problems after discharge, and what the discharge medication list will look like. Discussion also included participation in Cardiac Rehab and follow-up appointments. Encouraged family to verbalize and offered emotional support.  Rolando Aly RN

## 2022-04-19 NOTE — PERIOP NOTES
0712:  Patient ambulatory to OR Holding. Consents reviewed with her & signature obtained for anesthesia consent. 9614:  Patient up to BR to void, cleanse with CHG wipes & change in to surgical gown. 8875:  Dr. French La Belle in to see the patient & discuss anesthesia plan of care. 2748:  Time out completed for line placement. 0140:  Central & ally placements complete. Mepilex sacral pad applied as a preventative measure.     6321:  Surgeon at bedside     0920:  Cardiac Nurse liason Mita Carmona) updated re: delay to pas on to family

## 2022-04-19 NOTE — PROGRESS NOTES
Cardiac Surgery Care Coordinator- Met with the family [facility] [place], introduced role of the Cardiac Surgery Coordinator. Reviewed plan of care and day of surgery expectations. Provided family with an update from OR. Encouraged family to verbalize and emotional support given. Will continue to update throughout the day. Delaney Acosta RN    7201 - Met with family [facility] [place], provided family with update of on by-pass. Family without questions or concerns at this time. Will continue to follow for educational and emotional needs. Delaney Acosta RN    4256 - Met with family [facility] [place], provided family with update of warming. Family without questions or concerns at this time. Will continue to follow. Delaney Acosta RN    5841 - Met with [facility], provided family with update of off by-pass. Family without questions or concerns at this time. Will continue to follow.  Delaney Acosta RN

## 2022-04-19 NOTE — ANESTHESIA PROCEDURE NOTES
Central Line Placement    Start time: 4/19/2022 7:10 AM  End time: 4/19/2022 7:25 AM  Performed by: Jamaica Vines DO  Authorized by: Jamaica Vines DO     Indications: vascular access, central pressure monitoring and need for vasopressors  Preanesthetic Checklist: patient identified, risks and benefits discussed, anesthesia consent, site marked, patient being monitored and timeout performed      Pre-procedure: All elements of maximal sterile barrier technique followed?  Yes    2% Chlorhexidine for cutaneous antisepsis, Hand hygiene performed prior to catheter insertion and Ultrasound guidance    Sterile Ultrasound Technique followed?: Yes            Procedure:   Prep:  Chlorhexidine  Location: internal jugular  Orientation:  Right  Patient position:  Trendelenburg  Catheter type:  Double lumen  Catheter size:  9 Fr  Catheter length:  12 cm  Number of attempts:  1  Successful placement: Yes      Assessment:   Post-procedure:  Catheter secured and sterile dressing applied  Assessment:  Blood return through all ports, free fluid flow and guidewire removal verified  Insertion:  Uncomplicated  Patient tolerance:  Patient tolerated the procedure well with no immediate complications  8 Fr CCO PAC

## 2022-04-19 NOTE — ANESTHESIA PREPROCEDURE EVALUATION
Relevant Problems   RESPIRATORY SYSTEM   (+) Smoker      CARDIOVASCULAR   (+) Atrial fibrillation with rapid ventricular response (HCC)   (+) CHF (congestive heart failure) (HCC)      GASTROINTESTINAL   (+) GERD (gastroesophageal reflux disease)   (+) Hiatal hernia      ENDOCRINE   (+) Arthritis      HEMATOLOGY   (+) Anemia       Anesthetic History   No history of anesthetic complications            Review of Systems / Medical History  Patient summary reviewed, nursing notes reviewed and pertinent labs reviewed    Pulmonary          Smoker    Pertinent negatives: No COPD and asthma     Neuro/Psych              Cardiovascular          CHF  Dysrhythmias : atrial fibrillation        Comments: AAA  · LV: Estimated LVEF is 50 - 55%. Normal cavity size and wall thickness. Low normal systolic function. Severe (grade 3) left ventricular diastolic dysfunction. · LA: Severely dilated left atrium. · TV: Moderate tricuspid valve regurgitation is present. · PA: Moderate to severe pulmonary hypertension. Pulmonary arterial systolic pressure is 80 mmHg. · AV: Aortic valve mean gradient is 10 mmHg. Mild aortic valve regurgitation is present. · MV: Mitral valve non-specific thickening. Moderate mitral valve regurgitation is present. · RA: Moderately dilated right atrium.        GI/Hepatic/Renal     GERD      Hiatal hernia and liver disease     Endo/Other        Obesity     Other Findings   Comments: Hx gastric band placement          Physical Exam    Airway  Mallampati: II  TM Distance: 4 - 6 cm  Neck ROM: normal range of motion   Mouth opening: Normal     Cardiovascular  Regular rate and rhythm,  S1 and S2 normal,  no murmur, click, rub, or gallop  Rhythm: regular  Rate: normal         Dental  No notable dental hx       Pulmonary  Breath sounds clear to auscultation               Abdominal  GI exam deferred       Other Findings            Anesthetic Plan    ASA: 3  Anesthesia type: general    Monitoring Plan: [place], CVP, Continuous noninvasive hemodynamic monitoring, BIS, Arterial line and FELTON    Post procedure ventilation   Induction: Intravenous  Anesthetic plan and risks discussed with: Patient

## 2022-04-20 ENCOUNTER — APPOINTMENT (OUTPATIENT)
Dept: GENERAL RADIOLOGY | Age: 77
DRG: 219 | End: 2022-04-20
Attending: PHYSICIAN ASSISTANT
Payer: MEDICARE

## 2022-04-20 LAB
ADMINISTERED INITIALS, ADMINIT: NORMAL
ALBUMIN SERPL-MCNC: 2.6 G/DL (ref 3.5–5)
ALBUMIN SERPL-MCNC: 3.1 G/DL (ref 3.5–5)
ALBUMIN/GLOB SERPL: 1.9 {RATIO} (ref 1.1–2.2)
ALBUMIN/GLOB SERPL: 2.6 {RATIO} (ref 1.1–2.2)
ALP SERPL-CCNC: 19 U/L (ref 45–117)
ALP SERPL-CCNC: 21 U/L (ref 45–117)
ALT SERPL-CCNC: 19 U/L (ref 12–78)
ALT SERPL-CCNC: 20 U/L (ref 12–78)
ANION GAP SERPL CALC-SCNC: 5 MMOL/L (ref 5–15)
ANION GAP SERPL CALC-SCNC: 6 MMOL/L (ref 5–15)
APTT PPP: 57 SEC (ref 22.1–31)
APTT PPP: 72.6 SEC (ref 22.1–31)
ARTERIAL PATENCY WRIST A: ABNORMAL
AST SERPL-CCNC: 88 U/L (ref 15–37)
AST SERPL-CCNC: 89 U/L (ref 15–37)
ATRIAL RATE: 72 BPM
ATRIAL RATE: 85 BPM
BASE DEFICIT BLD-SCNC: 1.6 MMOL/L
BASE DEFICIT BLD-SCNC: 1.9 MMOL/L
BASE DEFICIT BLD-SCNC: 3.1 MMOL/L
BASE DEFICIT BLDA-SCNC: 3.6 MMOL/L
BASOPHILS # BLD: 0 K/UL (ref 0–0.1)
BASOPHILS NFR BLD: 0 % (ref 0–1)
BDY SITE: ABNORMAL
BDY SITE: ABNORMAL
BILIRUB SERPL-MCNC: 1.3 MG/DL (ref 0.2–1)
BILIRUB SERPL-MCNC: 1.7 MG/DL (ref 0.2–1)
BLD PROD TYP BPU: NORMAL
BPU ID: NORMAL
BUN SERPL-MCNC: 14 MG/DL (ref 6–20)
BUN SERPL-MCNC: 15 MG/DL (ref 6–20)
BUN/CREAT SERPL: 20 (ref 12–20)
BUN/CREAT SERPL: 20 (ref 12–20)
CA-I BLD-MCNC: 1.12 MMOL/L (ref 1.12–1.32)
CA-I BLD-MCNC: 1.19 MMOL/L (ref 1.12–1.32)
CA-I BLD-MCNC: 1.25 MMOL/L (ref 1.12–1.32)
CALCIUM SERPL-MCNC: 8.2 MG/DL (ref 8.5–10.1)
CALCIUM SERPL-MCNC: 8.6 MG/DL (ref 8.5–10.1)
CALCULATED P AXIS, ECG09: -71 DEGREES
CALCULATED P AXIS, ECG09: 17 DEGREES
CALCULATED R AXIS, ECG10: -23 DEGREES
CALCULATED R AXIS, ECG10: -27 DEGREES
CALCULATED T AXIS, ECG11: 18 DEGREES
CALCULATED T AXIS, ECG11: 29 DEGREES
CHLORIDE BLD-SCNC: 115 MMOL/L (ref 100–108)
CHLORIDE BLD-SCNC: 117 MMOL/L (ref 100–108)
CHLORIDE BLD-SCNC: 118 MMOL/L (ref 100–108)
CHLORIDE SERPL-SCNC: 119 MMOL/L (ref 97–108)
CHLORIDE SERPL-SCNC: 120 MMOL/L (ref 97–108)
CO2 BLD-SCNC: 23 MMOL/L (ref 19–24)
CO2 BLD-SCNC: 23 MMOL/L (ref 19–24)
CO2 BLD-SCNC: 24 MMOL/L (ref 19–24)
CO2 SERPL-SCNC: 23 MMOL/L (ref 21–32)
CO2 SERPL-SCNC: 24 MMOL/L (ref 21–32)
COHGB MFR BLD: 1 % (ref 1–2)
CREAT SERPL-MCNC: 0.69 MG/DL (ref 0.55–1.02)
CREAT SERPL-MCNC: 0.76 MG/DL (ref 0.55–1.02)
CREAT UR-MCNC: 0.7 MG/DL (ref 0.6–1.3)
CREAT UR-MCNC: 0.8 MG/DL (ref 0.6–1.3)
CREAT UR-MCNC: 0.9 MG/DL (ref 0.6–1.3)
D50 ADMINISTERED, D50ADM: 0 ML
D50 ORDER, D50ORD: 0 ML
DIAGNOSIS, 93000: NORMAL
DIAGNOSIS, 93000: NORMAL
DIFFERENTIAL METHOD BLD: ABNORMAL
EOSINOPHIL # BLD: 0 K/UL (ref 0–0.4)
EOSINOPHIL NFR BLD: 0 % (ref 0–7)
ERYTHROCYTE [DISTWIDTH] IN BLOOD BY AUTOMATED COUNT: 14.5 % (ref 11.5–14.5)
ERYTHROCYTE [DISTWIDTH] IN BLOOD BY AUTOMATED COUNT: 14.6 % (ref 11.5–14.5)
FIBRINOGEN PPP-MCNC: 80 MG/DL (ref 200–475)
GLOBULIN SER CALC-MCNC: 1.2 G/DL (ref 2–4)
GLOBULIN SER CALC-MCNC: 1.4 G/DL (ref 2–4)
GLSCOM COMMENTS: NORMAL
GLUCOSE BLD STRIP.AUTO-MCNC: 101 MG/DL (ref 74–106)
GLUCOSE BLD STRIP.AUTO-MCNC: 102 MG/DL (ref 65–117)
GLUCOSE BLD STRIP.AUTO-MCNC: 105 MG/DL (ref 65–117)
GLUCOSE BLD STRIP.AUTO-MCNC: 105 MG/DL (ref 65–117)
GLUCOSE BLD STRIP.AUTO-MCNC: 106 MG/DL (ref 65–117)
GLUCOSE BLD STRIP.AUTO-MCNC: 106 MG/DL (ref 65–117)
GLUCOSE BLD STRIP.AUTO-MCNC: 107 MG/DL (ref 65–117)
GLUCOSE BLD STRIP.AUTO-MCNC: 108 MG/DL (ref 65–117)
GLUCOSE BLD STRIP.AUTO-MCNC: 111 MG/DL (ref 65–117)
GLUCOSE BLD STRIP.AUTO-MCNC: 111 MG/DL (ref 65–117)
GLUCOSE BLD STRIP.AUTO-MCNC: 114 MG/DL (ref 65–117)
GLUCOSE BLD STRIP.AUTO-MCNC: 118 MG/DL (ref 65–117)
GLUCOSE BLD STRIP.AUTO-MCNC: 123 MG/DL (ref 65–117)
GLUCOSE BLD STRIP.AUTO-MCNC: 124 MG/DL (ref 65–117)
GLUCOSE BLD STRIP.AUTO-MCNC: 93 MG/DL (ref 65–117)
GLUCOSE BLD STRIP.AUTO-MCNC: 93 MG/DL (ref 65–117)
GLUCOSE BLD STRIP.AUTO-MCNC: 96 MG/DL (ref 74–106)
GLUCOSE BLD STRIP.AUTO-MCNC: 96 MG/DL (ref 74–106)
GLUCOSE SERPL-MCNC: 101 MG/DL (ref 65–100)
GLUCOSE SERPL-MCNC: 113 MG/DL (ref 65–100)
GLUCOSE, GLC: 101 MG/DL
GLUCOSE, GLC: 102 MG/DL
GLUCOSE, GLC: 105 MG/DL
GLUCOSE, GLC: 105 MG/DL
GLUCOSE, GLC: 106 MG/DL
GLUCOSE, GLC: 106 MG/DL
GLUCOSE, GLC: 107 MG/DL
GLUCOSE, GLC: 108 MG/DL
GLUCOSE, GLC: 111 MG/DL
GLUCOSE, GLC: 111 MG/DL
GLUCOSE, GLC: 114 MG/DL
GLUCOSE, GLC: 118 MG/DL
GLUCOSE, GLC: 123 MG/DL
GLUCOSE, GLC: 124 MG/DL
GLUCOSE, GLC: 93 MG/DL
GLUCOSE, GLC: 93 MG/DL
GLUCOSE, GLC: 96 MG/DL
GLUCOSE, GLC: 96 MG/DL
HCO3 BLDA-SCNC: 19 MMOL/L (ref 22–26)
HCO3 BLDA-SCNC: 22 MMOL/L
HCO3 BLDA-SCNC: 22 MMOL/L
HCO3 BLDA-SCNC: 23 MMOL/L
HCT VFR BLD AUTO: 27.3 % (ref 35–47)
HCT VFR BLD AUTO: 30.7 % (ref 35–47)
HGB BLD OXIMETRY-MCNC: 9.9 G/DL (ref 14–17)
HGB BLD-MCNC: 10.2 G/DL (ref 11.5–16)
HGB BLD-MCNC: 8.9 G/DL (ref 11.5–16)
HIGH TARGET, HITG: 140 MG/DL
IMM GRANULOCYTES # BLD AUTO: 0.2 K/UL (ref 0–0.04)
IMM GRANULOCYTES NFR BLD AUTO: 1 % (ref 0–0.5)
INR PPP: 1.4 (ref 0.9–1.1)
INR PPP: 1.4 (ref 0.9–1.1)
INSULIN ADMINSTERED, INSADM: 0.5 UNITS/HOUR
INSULIN ADMINSTERED, INSADM: 0.7 UNITS/HOUR
INSULIN ADMINSTERED, INSADM: 0.8 UNITS/HOUR
INSULIN ADMINSTERED, INSADM: 0.9 UNITS/HOUR
INSULIN ADMINSTERED, INSADM: 1 UNITS/HOUR
INSULIN ADMINSTERED, INSADM: 1.2 UNITS/HOUR
INSULIN ADMINSTERED, INSADM: 1.4 UNITS/HOUR
INSULIN ADMINSTERED, INSADM: 1.5 UNITS/HOUR
INSULIN ADMINSTERED, INSADM: 1.5 UNITS/HOUR
INSULIN ADMINSTERED, INSADM: 1.6 UNITS/HOUR
INSULIN ADMINSTERED, INSADM: 1.6 UNITS/HOUR
INSULIN ADMINSTERED, INSADM: 1.8 UNITS/HOUR
INSULIN ADMINSTERED, INSADM: 1.8 UNITS/HOUR
INSULIN ORDER, INSORD: 0.5 UNITS/HOUR
INSULIN ORDER, INSORD: 0.7 UNITS/HOUR
INSULIN ORDER, INSORD: 0.8 UNITS/HOUR
INSULIN ORDER, INSORD: 0.9 UNITS/HOUR
INSULIN ORDER, INSORD: 1 UNITS/HOUR
INSULIN ORDER, INSORD: 1.2 UNITS/HOUR
INSULIN ORDER, INSORD: 1.4 UNITS/HOUR
INSULIN ORDER, INSORD: 1.5 UNITS/HOUR
INSULIN ORDER, INSORD: 1.5 UNITS/HOUR
INSULIN ORDER, INSORD: 1.6 UNITS/HOUR
INSULIN ORDER, INSORD: 1.6 UNITS/HOUR
INSULIN ORDER, INSORD: 1.8 UNITS/HOUR
INSULIN ORDER, INSORD: 1.8 UNITS/HOUR
LACTATE BLD-SCNC: 0.6 MMOL/L (ref 0.4–2)
LACTATE BLD-SCNC: 0.78 MMOL/L (ref 0.4–2)
LACTATE BLD-SCNC: 0.85 MMOL/L (ref 0.4–2)
LOW TARGET, LOT: 100 MG/DL
LYMPHOCYTES # BLD: 1.1 K/UL (ref 0.8–3.5)
LYMPHOCYTES NFR BLD: 5 % (ref 12–49)
MAGNESIUM SERPL-MCNC: 2.8 MG/DL (ref 1.6–2.4)
MAGNESIUM SERPL-MCNC: 3.1 MG/DL (ref 1.6–2.4)
MCH RBC QN AUTO: 27.6 PG (ref 26–34)
MCH RBC QN AUTO: 28 PG (ref 26–34)
MCHC RBC AUTO-ENTMCNC: 32.6 G/DL (ref 30–36.5)
MCHC RBC AUTO-ENTMCNC: 33.2 G/DL (ref 30–36.5)
MCV RBC AUTO: 84.3 FL (ref 80–99)
MCV RBC AUTO: 84.8 FL (ref 80–99)
METHGB MFR BLD: 0.2 % (ref 0–1.4)
MINUTES UNTIL NEXT BG, NBG: 120 MIN
MINUTES UNTIL NEXT BG, NBG: 60 MIN
MONOCYTES # BLD: 1.8 K/UL (ref 0–1)
MONOCYTES NFR BLD: 8 % (ref 5–13)
MULTIPLIER, MUL: 0.01
MULTIPLIER, MUL: 0.03
MULTIPLIER, MUL: 0.04
NEUTS SEG # BLD: 19 K/UL (ref 1.8–8)
NEUTS SEG NFR BLD: 86 % (ref 32–75)
NRBC # BLD: 0 K/UL (ref 0–0.01)
NRBC # BLD: 0 K/UL (ref 0–0.01)
NRBC BLD-RTO: 0 PER 100 WBC
NRBC BLD-RTO: 0 PER 100 WBC
ORDER INITIALS, ORDINIT: NORMAL
OXYHGB MFR BLD: 75.8 % (ref 94–97)
P-R INTERVAL, ECG05: 170 MS
P-R INTERVAL, ECG05: 198 MS
PCO2 BLD: 33.3 MMHG (ref 35–45)
PCO2 BLD: 36.4 MMHG (ref 35–45)
PCO2 BLD: 37.5 MMHG (ref 35–45)
PCO2 BLDA: 29 MMHG (ref 35–45)
PH BLD: 7.38 [PH] (ref 7.35–7.45)
PH BLD: 7.4 [PH] (ref 7.35–7.45)
PH BLD: 7.43 [PH] (ref 7.35–7.45)
PH BLDA: 7.44 [PH] (ref 7.35–7.45)
PLATELET # BLD AUTO: 73 K/UL (ref 150–400)
PLATELET # BLD AUTO: 99 K/UL (ref 150–400)
PMV BLD AUTO: 11.2 FL (ref 8.9–12.9)
PMV BLD AUTO: 11.6 FL (ref 8.9–12.9)
PO2 BLD: 55 MMHG (ref 80–100)
PO2 BLD: 67 MMHG (ref 80–100)
PO2 BLD: 71 MMHG (ref 80–100)
PO2 BLDA: 67 MMHG (ref 80–100)
POTASSIUM BLD-SCNC: 4.7 MMOL/L (ref 3.5–5.5)
POTASSIUM BLD-SCNC: 4.7 MMOL/L (ref 3.5–5.5)
POTASSIUM BLD-SCNC: 4.8 MMOL/L (ref 3.5–5.5)
POTASSIUM SERPL-SCNC: 4.8 MMOL/L (ref 3.5–5.1)
POTASSIUM SERPL-SCNC: 5 MMOL/L (ref 3.5–5.1)
PROT SERPL-MCNC: 4 G/DL (ref 6.4–8.2)
PROT SERPL-MCNC: 4.3 G/DL (ref 6.4–8.2)
PROTHROMBIN TIME: 14 SEC (ref 9–11.1)
PROTHROMBIN TIME: 14.6 SEC (ref 9–11.1)
Q-T INTERVAL, ECG07: 398 MS
Q-T INTERVAL, ECG07: 404 MS
QRS DURATION, ECG06: 80 MS
QRS DURATION, ECG06: 80 MS
QTC CALCULATION (BEZET), ECG08: 442 MS
QTC CALCULATION (BEZET), ECG08: 473 MS
RBC # BLD AUTO: 3.22 M/UL (ref 3.8–5.2)
RBC # BLD AUTO: 3.64 M/UL (ref 3.8–5.2)
RBC MORPH BLD: ABNORMAL
SAO2 % BLD: 77 % (ref 95–99)
SAO2 % BLD: 94 % (ref 92–97)
SAO2% DEVICE SAO2% SENSOR NAME: ABNORMAL
SERVICE CMNT-IMP: ABNORMAL
SERVICE CMNT-IMP: NORMAL
SODIUM BLD-SCNC: 145 MMOL/L (ref 136–145)
SODIUM BLD-SCNC: 146 MMOL/L (ref 136–145)
SODIUM BLD-SCNC: 147 MMOL/L (ref 136–145)
SODIUM SERPL-SCNC: 148 MMOL/L (ref 136–145)
SODIUM SERPL-SCNC: 149 MMOL/L (ref 136–145)
SPECIMEN SITE: ABNORMAL
STATUS OF UNIT,%ST: NORMAL
THERAPEUTIC RANGE,PTTT: ABNORMAL SECS (ref 58–77)
THERAPEUTIC RANGE,PTTT: ABNORMAL SECS (ref 58–77)
UNIT DIVISION, %UDIV: 0
VENTRICULAR RATE, ECG03: 72 BPM
VENTRICULAR RATE, ECG03: 85 BPM
WBC # BLD AUTO: 14.3 K/UL (ref 3.6–11)
WBC # BLD AUTO: 22.1 K/UL (ref 3.6–11)

## 2022-04-20 PROCEDURE — P9045 ALBUMIN (HUMAN), 5%, 250 ML: HCPCS | Performed by: THORACIC SURGERY (CARDIOTHORACIC VASCULAR SURGERY)

## 2022-04-20 PROCEDURE — 74011250636 HC RX REV CODE- 250/636: Performed by: PHYSICIAN ASSISTANT

## 2022-04-20 PROCEDURE — 74011250637 HC RX REV CODE- 250/637: Performed by: PHYSICIAN ASSISTANT

## 2022-04-20 PROCEDURE — 74011250636 HC RX REV CODE- 250/636: Performed by: THORACIC SURGERY (CARDIOTHORACIC VASCULAR SURGERY)

## 2022-04-20 PROCEDURE — 74011000250 HC RX REV CODE- 250: Performed by: PHYSICIAN ASSISTANT

## 2022-04-20 PROCEDURE — 82947 ASSAY GLUCOSE BLOOD QUANT: CPT

## 2022-04-20 PROCEDURE — 82962 GLUCOSE BLOOD TEST: CPT

## 2022-04-20 PROCEDURE — C9113 INJ PANTOPRAZOLE SODIUM, VIA: HCPCS | Performed by: PHYSICIAN ASSISTANT

## 2022-04-20 PROCEDURE — 65620000000 HC RM CCU GENERAL

## 2022-04-20 PROCEDURE — 74011250636 HC RX REV CODE- 250/636

## 2022-04-20 PROCEDURE — 74011250637 HC RX REV CODE- 250/637: Performed by: THORACIC SURGERY (CARDIOTHORACIC VASCULAR SURGERY)

## 2022-04-20 PROCEDURE — 83735 ASSAY OF MAGNESIUM: CPT

## 2022-04-20 PROCEDURE — 74011000258 HC RX REV CODE- 258: Performed by: PHYSICIAN ASSISTANT

## 2022-04-20 PROCEDURE — 77030014006 HC SPNG HEMSTAT J&J -A

## 2022-04-20 PROCEDURE — 36415 COLL VENOUS BLD VENIPUNCTURE: CPT

## 2022-04-20 PROCEDURE — 71045 X-RAY EXAM CHEST 1 VIEW: CPT

## 2022-04-20 PROCEDURE — P9045 ALBUMIN (HUMAN), 5%, 250 ML: HCPCS | Performed by: PHYSICIAN ASSISTANT

## 2022-04-20 PROCEDURE — 85384 FIBRINOGEN ACTIVITY: CPT

## 2022-04-20 PROCEDURE — 94003 VENT MGMT INPAT SUBQ DAY: CPT

## 2022-04-20 PROCEDURE — 93005 ELECTROCARDIOGRAM TRACING: CPT

## 2022-04-20 PROCEDURE — 85730 THROMBOPLASTIN TIME PARTIAL: CPT

## 2022-04-20 PROCEDURE — 85027 COMPLETE CBC AUTOMATED: CPT

## 2022-04-20 PROCEDURE — 82803 BLOOD GASES ANY COMBINATION: CPT

## 2022-04-20 PROCEDURE — 85610 PROTHROMBIN TIME: CPT

## 2022-04-20 PROCEDURE — 80053 COMPREHEN METABOLIC PANEL: CPT

## 2022-04-20 PROCEDURE — 99233 SBSQ HOSP IP/OBS HIGH 50: CPT | Performed by: CLINICAL NURSE SPECIALIST

## 2022-04-20 PROCEDURE — P9045 ALBUMIN (HUMAN), 5%, 250 ML: HCPCS

## 2022-04-20 PROCEDURE — 74011000258 HC RX REV CODE- 258: Performed by: THORACIC SURGERY (CARDIOTHORACIC VASCULAR SURGERY)

## 2022-04-20 RX ORDER — ALBUMIN HUMAN 50 G/1000ML
25 SOLUTION INTRAVENOUS ONCE
Status: COMPLETED | OUTPATIENT
Start: 2022-04-20 | End: 2022-04-20

## 2022-04-20 RX ORDER — CHLORHEXIDINE GLUCONATE 0.12 MG/ML
15 RINSE ORAL EVERY 12 HOURS
Status: DISCONTINUED | OUTPATIENT
Start: 2022-04-20 | End: 2022-04-20

## 2022-04-20 RX ORDER — ALBUMIN HUMAN 50 G/1000ML
SOLUTION INTRAVENOUS
Status: COMPLETED
Start: 2022-04-20 | End: 2022-04-20

## 2022-04-20 RX ORDER — ALBUMIN HUMAN 50 G/1000ML
12.5 SOLUTION INTRAVENOUS
Status: COMPLETED | OUTPATIENT
Start: 2022-04-20 | End: 2022-04-20

## 2022-04-20 RX ORDER — BALSAM PERU/CASTOR OIL
OINTMENT (GRAM) TOPICAL 2 TIMES DAILY
Status: DISCONTINUED | OUTPATIENT
Start: 2022-04-20 | End: 2022-04-30 | Stop reason: HOSPADM

## 2022-04-20 RX ADMIN — MUPIROCIN: 20 OINTMENT TOPICAL at 17:38

## 2022-04-20 RX ADMIN — SODIUM CHLORIDE 10 ML/HR: 4.5 INJECTION, SOLUTION INTRAVENOUS at 20:21

## 2022-04-20 RX ADMIN — MUPIROCIN: 20 OINTMENT TOPICAL at 11:19

## 2022-04-20 RX ADMIN — HYDROMORPHONE HYDROCHLORIDE 1 MG: 1 INJECTION, SOLUTION INTRAMUSCULAR; INTRAVENOUS; SUBCUTANEOUS at 12:40

## 2022-04-20 RX ADMIN — PHENYLEPHRINE HYDROCHLORIDE 80 MCG/MIN: 10 INJECTION INTRAVENOUS at 02:00

## 2022-04-20 RX ADMIN — SODIUM CHLORIDE 0.3 MCG/KG/HR: 900 INJECTION, SOLUTION INTRAVENOUS at 06:00

## 2022-04-20 RX ADMIN — SENNOSIDES AND DOCUSATE SODIUM 1 TABLET: 50; 8.6 TABLET ORAL at 17:37

## 2022-04-20 RX ADMIN — PHENYLEPHRINE HYDROCHLORIDE 70 MCG/MIN: 10 INJECTION INTRAVENOUS at 04:03

## 2022-04-20 RX ADMIN — SODIUM CHLORIDE 40 MG: 9 INJECTION, SOLUTION INTRAMUSCULAR; INTRAVENOUS; SUBCUTANEOUS at 00:50

## 2022-04-20 RX ADMIN — ACETAMINOPHEN 1000 MG: 500 TABLET ORAL at 00:50

## 2022-04-20 RX ADMIN — OXYCODONE 10 MG: 5 TABLET ORAL at 12:12

## 2022-04-20 RX ADMIN — PROPOFOL 50 MCG/KG/MIN: 10 INJECTION, EMULSION INTRAVENOUS at 00:00

## 2022-04-20 RX ADMIN — HYDROMORPHONE HYDROCHLORIDE 1 MG: 1 INJECTION, SOLUTION INTRAMUSCULAR; INTRAVENOUS; SUBCUTANEOUS at 16:04

## 2022-04-20 RX ADMIN — SODIUM CHLORIDE, PRESERVATIVE FREE 10 ML: 5 INJECTION INTRAVENOUS at 05:48

## 2022-04-20 RX ADMIN — ALBUMIN (HUMAN) 25 G: 12.5 INJECTION, SOLUTION INTRAVENOUS at 12:02

## 2022-04-20 RX ADMIN — SODIUM CHLORIDE, PRESERVATIVE FREE 10 ML: 5 INJECTION INTRAVENOUS at 22:30

## 2022-04-20 RX ADMIN — VASOPRESSIN 0.04 UNITS/MIN: 20 INJECTION PARENTERAL at 05:56

## 2022-04-20 RX ADMIN — CHOLECALCIFEROL TAB 25 MCG (1000 UNIT) 2000 UNITS: 25 TAB at 17:37

## 2022-04-20 RX ADMIN — SODIUM CHLORIDE 9 ML/HR: 9 INJECTION, SOLUTION INTRAVENOUS at 00:30

## 2022-04-20 RX ADMIN — SODIUM CHLORIDE, PRESERVATIVE FREE 10 ML: 5 INJECTION INTRAVENOUS at 13:23

## 2022-04-20 RX ADMIN — ALBUMIN (HUMAN) 12.5 G: 12.5 INJECTION, SOLUTION INTRAVENOUS at 04:41

## 2022-04-20 RX ADMIN — ACETAMINOPHEN 1000 MG: 500 TABLET ORAL at 17:37

## 2022-04-20 RX ADMIN — CEFAZOLIN SODIUM 2 G: 1 INJECTION, POWDER, FOR SOLUTION INTRAMUSCULAR; INTRAVENOUS at 05:49

## 2022-04-20 RX ADMIN — SODIUM CHLORIDE, PRESERVATIVE FREE 10 ML: 5 INJECTION INTRAVENOUS at 00:05

## 2022-04-20 RX ADMIN — HYDROMORPHONE HYDROCHLORIDE 1 MG: 1 INJECTION, SOLUTION INTRAMUSCULAR; INTRAVENOUS; SUBCUTANEOUS at 20:20

## 2022-04-20 RX ADMIN — ALBUMIN HUMAN 25 G: 50 SOLUTION INTRAVENOUS at 12:02

## 2022-04-20 RX ADMIN — CASTOR OIL AND BALSAM, PERU: 788; 87 OINTMENT TOPICAL at 02:24

## 2022-04-20 RX ADMIN — Medication 400 MG: at 17:37

## 2022-04-20 RX ADMIN — ALBUMIN (HUMAN) 12.5 G: 12.5 INJECTION, SOLUTION INTRAVENOUS at 10:14

## 2022-04-20 RX ADMIN — CASTOR OIL AND BALSAM, PERU: 788; 87 OINTMENT TOPICAL at 20:22

## 2022-04-20 RX ADMIN — PROPOFOL 35 MCG/KG/MIN: 10 INJECTION, EMULSION INTRAVENOUS at 04:41

## 2022-04-20 RX ADMIN — OXYCODONE 10 MG: 5 TABLET ORAL at 22:19

## 2022-04-20 RX ADMIN — CEFAZOLIN SODIUM 2 G: 1 INJECTION, POWDER, FOR SOLUTION INTRAMUSCULAR; INTRAVENOUS at 12:17

## 2022-04-20 RX ADMIN — HYDROMORPHONE HYDROCHLORIDE 1 MG: 1 INJECTION, SOLUTION INTRAMUSCULAR; INTRAVENOUS; SUBCUTANEOUS at 01:42

## 2022-04-20 RX ADMIN — ALBUMIN (HUMAN) 12.5 G: 12.5 INJECTION, SOLUTION INTRAVENOUS at 08:38

## 2022-04-20 RX ADMIN — PROPOFOL 25 MCG/KG/MIN: 10 INJECTION, EMULSION INTRAVENOUS at 00:32

## 2022-04-20 RX ADMIN — HYDROMORPHONE HYDROCHLORIDE 1 MG: 1 INJECTION, SOLUTION INTRAMUSCULAR; INTRAVENOUS; SUBCUTANEOUS at 22:33

## 2022-04-20 RX ADMIN — ACETAMINOPHEN 1000 MG: 500 TABLET ORAL at 05:48

## 2022-04-20 RX ADMIN — HYDROMORPHONE HYDROCHLORIDE 1 MG: 1 INJECTION, SOLUTION INTRAMUSCULAR; INTRAVENOUS; SUBCUTANEOUS at 10:49

## 2022-04-20 RX ADMIN — ACETAMINOPHEN 1000 MG: 500 TABLET ORAL at 12:13

## 2022-04-20 RX ADMIN — CASTOR OIL AND BALSAM, PERU: 788; 87 OINTMENT TOPICAL at 10:47

## 2022-04-20 RX ADMIN — OXYCODONE 10 MG: 5 TABLET ORAL at 17:37

## 2022-04-20 RX ADMIN — CHLORHEXIDINE GLUCONATE 10 ML: 1.2 RINSE ORAL at 00:49

## 2022-04-20 RX ADMIN — CEFAZOLIN SODIUM 2 G: 1 INJECTION, POWDER, FOR SOLUTION INTRAMUSCULAR; INTRAVENOUS at 00:49

## 2022-04-20 RX ADMIN — CHLORHEXIDINE GLUCONATE 10 ML: 1.2 RINSE ORAL at 10:47

## 2022-04-20 RX ADMIN — CHLORHEXIDINE GLUCONATE 10 ML: 1.2 RINSE ORAL at 20:23

## 2022-04-20 RX ADMIN — CEFAZOLIN SODIUM 2 G: 1 INJECTION, POWDER, FOR SOLUTION INTRAMUSCULAR; INTRAVENOUS at 17:37

## 2022-04-20 RX ADMIN — ALBUMIN (HUMAN) 12.5 G: 12.5 INJECTION, SOLUTION INTRAVENOUS at 00:22

## 2022-04-20 NOTE — PROGRESS NOTES
Problem: Falls - Risk of  Goal: *Absence of Falls  Description: Document Adrián Castro Fall Risk and appropriate interventions in the flowsheet. Outcome: Progressing Towards Goal  Note: Fall Risk Interventions:       Mentation Interventions: Adequate sleep, hydration, pain control,Bed/chair exit alarm,Door open when patient unattended,Increase mobility,More frequent rounding,Room close to nurse's station,Toileting rounds,Update white board    Medication Interventions: Bed/chair exit alarm,Patient to call before getting OOB,Teach patient to arise slowly,Evaluate medications/consider consulting pharmacy    Elimination Interventions: Bed/chair exit alarm,Call light in reach,Patient to call for help with toileting needs,Stay With Me (per policy),Toilet paper/wipes in reach,Toileting schedule/hourly rounds              Problem: Patient Education: Go to Patient Education Activity  Goal: Patient/Family Education  Outcome: Progressing Towards Goal     Problem: Non-Violent Restraints  Goal: Removal from restraints as soon as assessed to be safe  Outcome: Resolved/Met  Goal: No harm/injury to patient while restraints in use  Outcome: Resolved/Met  Goal: Patient's dignity will be maintained  Outcome: Resolved/Met  Goal: Patient Interventions  Outcome: Resolved/Met     Problem: Pressure Injury - Risk of  Goal: *Prevention of pressure injury  Description: Document Maynro Scale and appropriate interventions in the flowsheet. Outcome: Progressing Towards Goal  Note: Pressure Injury Interventions:  Sensory Interventions: Assess changes in LOC,Assess need for specialty bed,Discuss PT/OT consult with provider,Check visual cues for pain,Float heels,Keep linens dry and wrinkle-free,Maintain/enhance activity level,Minimize linen layers,Pressure redistribution bed/mattress (bed type),Turn and reposition approx.  every two hours (pillows and wedges if needed),Monitor skin under medical devices    Moisture Interventions: Absorbent underpads,Apply protective barrier, creams and emollients,Assess need for specialty bed,Check for incontinence Q2 hours and as needed,Internal/External urinary devices,Maintain skin hydration (lotion/cream),Moisture barrier,Minimize layers    Activity Interventions: Assess need for specialty bed,Pressure redistribution bed/mattress(bed type),PT/OT evaluation    Mobility Interventions: Assess need for specialty bed,Float heels,Pressure redistribution bed/mattress (bed type),HOB 30 degrees or less,PT/OT evaluation,Turn and reposition approx.  every two hours(pillow and wedges)    Nutrition Interventions: Document food/fluid/supplement intake,Discuss nutritional consult with provider    Friction and Shear Interventions: Feet elevated on foot rest,Apply protective barrier, creams and emollients,HOB 30 degrees or less,Lift sheet,Lift team/patient mobility team,Minimize layers                Problem: Patient Education: Go to Patient Education Activity  Goal: Patient/Family Education  Outcome: Progressing Towards Goal

## 2022-04-20 NOTE — DIABETES MGMT
[address]    CLINICAL NURSE SPECIALIST CONSULT     Initial Presentation   Marie Taylor is a 77 y.o. female admitted 4/19/22 for planned CV surgery. Pre-hospitalization workup with the following findings & plans:  1. Ascending aortic aneurysm:  Reviewed images with Dr. James Dorsey. Will proceed with surgery for aneurysmal repair  2. Severe MR and mod TR: FELTON completed and reviewed by Dr. James Dorsey. On lasix. MVR not needed per Dr. James Dorsey   3. Pulmonary HTN: Better after diuresis as seen on RHC. Former smoker. Encouraged continuing cessation. 4. A-fib: On Tikoysn, eliquis. Will have LAAL during surgery. 5. Chronic Diastolic HF: On daily lasix  6. Mild-Mod AI: AV replacement not need per Dr. James Dorsey. HX:   Past Medical History:   Diagnosis Date    Anemia 7/12/2010    Aortic aneurysm (HCC)     Arrhythmia     afib r/t low potassium     Arthritis 7/12/2010    Atrial fibrillation (HCC)     cardioversion times 2    Chronic pain     Eczema 7/12/2010    Edema 7/12/2010    GERD (gastroesophageal reflux disease) 7/12/2010    Heart failure (HCC)     Hepatitis     antibody for Hepatitis B - not a carrier    Hiatal hernia 7/12/2010    Smoker 7/12/2010      INITIAL DX:   Mitral valve insufficiency, unspecified etiology [I34.0]  Ascending aortic aneurysm ([address] [I71.2]     Current Treatment     TX: 4/19/22 1. Replacement of ascendind aorta with 34 mm Dacron graft (from STJ to distal ascending aorta). 2. Resuspension of aortic valve. 3. Mitral valve replacement with #27 CE Teofilo Ease bioprosthesis. 4. Left atrial appendage occlusion with V-shaped Atricure #40 clip. 5. PFO closure.     Consulted by Provider for advanced diabetes nursing assessment and care for:   [x] Transitioning off [place]   [x] Inpatient management strategy  [] Home management assessment  [] Survival skill education    Hospital Course   Clinical progress has been complicated by long surgical time (14hours with blood loss) and need for ICU level of care. Diabetes History   Patient did not have a personal or family history of diabetes PTA. Admission BG 91 and A1c of 5.8% confirms non-diabetes state. Is pre-diabetic. Subjective   Intubated     Objective   Physical exam  General Obese female who is ill-appearing. Family member at bedside  Neuro  Alert, calm and responding to pain per nursing  Vital Signs   Visit Vitals  BP (!) 107/59   Pulse 80   Temp 99.5 °F (37.5 °C)   Resp 20   Ht 5' 5\" (1.651 m)   Wt 95.6 kg (210 lb 12.2 oz)   SpO2 94%   BMI 35.07 kg/m²     Laboratory  Recent Labs     04/20/22  0424 04/19/22  2346   * 101*   AGAP 5 6   WBC 14.3* 22.1*   CREA 0.69 0.76   GFRNA >60 >60   AST 89* 88*   ALT 19 20     Factors impacting BG management  Factor Dose Comments   Nutrition:  NPO     Drugs:  Vasopressor load  Steroids  Blood transfusion(s)   Vasopressin infusion  Dexamethasone 8mg (4/19/22)  Multiple during surgery     Affects insulin delivery  Impairs insulin action  A1cs inaccurate   Pain Precedex infusion  Dilaudid PRN    Infection Prophylactic Ancef Q6 hrs Low grade fever. WBC coming down   Other:   Kidney function  Liver function   Normal  AST 89      Blood glucose pattern      Significant diabetes-related events over the past 24-72 hours  Admission BG 91. A1c 5.8%. On Glucostabilizer. BGs easily controlled    Assessment and Plan   Nursing Diagnosis Risk for unstable blood glucose pattern   Nursing Intervention Domain 5250 Decision-making Support   Nursing Interventions Examined current inpatient diabetes/blood glucose control   Explored factors facilitating and impeding inpatient management     Evaluation   This obese  female had a difficult surgery yesterday (4/20/22). Intubated; sedation being weaned so plan for potential extubation can be considered. Also weaning off pressers. In setting of no diabetes history, BGs easily controlled with Glucostabilizer. Recommendations     [x] Continue Glucostabilizer X48 hrs per CV protocol    Billing Code(s)   [x] 66054 IP subsequent hospital care - 35 minutes     Before making these care recommendations, I personally reviewed the hospitalization record, including notes, laboratory & diagnostic data and current medications, and examined the patient at the bedside (circumstances permitting) before making care recommendations. More than fifty (50) percent of the time was spent in patient counseling and/or care coordination.   Total minutes: Curt Ortez, CNS  Diabetes Clinical Nurse Specialist  [facility]  Access via [address] [place] [address]

## 2022-04-20 NOTE — PROGRESS NOTES
[facility] ICU Progress Note    Admit Date: 2022  POD:  1 Day Post-Op    Procedure:  Procedure(s):  FELTON BY DR BRUCE - PFO CLOSURE -  MITRAL VALVE REPLACEMENT WITH 27 MM CHARLES MAGNA MITRAL EASE - RESUSPENSION OF AORTIC VALVE -  ASCENDING AORTIC ANEURYSM REPAIR AND LEFT ATRIAL APPENDAGE LIGATION        Subjective:   Pt seen with Dr. Cody Stacy and Laura. Intubated, sedated. AV-paced @80. On precedex, dobutamine 4, steve 30, and vaso 0.04. CI >2. Coagulopathic yesterday requiring platelet, cryo, Kcentra with adqequate hemostasis. CTOP 155ml overnight. UOP 1270ml    Addendum: Vaso weaned. Dobutamine 3, steve 20. Extubated without complication to 6L NC. Neuro intact. Objective:   Vitals:  Blood pressure (!) 107/59, pulse 80, temperature 99.5 °F (37.5 °C), resp. rate 20, height 5' 5\" (1.651 m), weight 210 lb 12.2 oz (95.6 kg), SpO2 94 %.   Temp (24hrs), Av.6 °F (36.4 °C), Min:96 °F (35.6 °C), Max:99.5 °F (37.5 °C)    Hemodynamics:   CO: CO (l/min): 5 l/min   CI: CI (l/min/m2): 2.6 l/min/m2   CVP: CVP (mmHg): 6 mmHg (22 0800)   SVR: SVR (dyne*sec)/cm5: 1228 (dyne*sec)/cm5 (22 3618)   PAP Systolic: PAP Systolic: 30 (42/32/08 0784)   PAP Diastolic: PAP Diastolic: 15 (54/51/73 1464)   PVR:     SV02: SVO2 (%): 78 % (22 0800)   SCV02:      EKG/Rhythm: AV paced    Extubation Date / Time:  @ 1030    Ventilator:  Ventilator Volumes  Vt Set (ml): 450 ml (22 0742)  Vt Exhaled (Machine Breath) (ml): 461 ml (22 0742)  Vt Spont (ml): 602 ml (22 0955)  Ve Observed (l/min): 6.65 l/min (22 0742)    Oxygen Therapy:  Oxygen Therapy  O2 Sat (%): 94 % (22 1033)  Pulse via Oximetry: 80 beats per minute (22 1000)  O2 Device: Nasal cannula (22 1033)  Skin Protection for O2 Device: No (22 0800)  O2 Flow Rate (L/min): 4 l/min (22 1033)  FIO2 (%): 40 % (22 0955)    CXR:   CXR Results  (Last 48 hours)               22 0508  XR CHEST PORT Final result Impression:  1. NG tube coiled in the esophagus, with tip pointed cephalad. 2. Left pleural effusion. Shallow volumes. Narrative:  PORTABLE CHEST RADIOGRAPH/S: 4/20/2022 5:08 AM       INDICATION: Postop heart. COMPARISON: 4/19/2022, 4/15/2022, 11/19/2021. TECHNIQUE: Portable frontal supine radiograph/s of the chest.       FINDINGS:    Passive atelectasis is associated with a layering left pleural effusion. The   lungs are hypoinflated, but otherwise clear. The central airways are patent. An ET tube, pulmonary arterial catheter via a right IJ sheath, mediastinal   drains, and epicardial pacing wires are in appropriate position. Post valve   replacement and left atrial appendage clipping. An NG tube is coiled in the midesophagus, with its tip pointing cephalad. 04/20/22 0001  XR CHEST PORT Final result    Impression:  Life support lines and tubes as described with left basilar atelectasis but no   pulmonary edema or pneumothorax. Nasogastric tube difficult to visualize if   present given pleural and mediastinal drains as well as numerous external   monitoring wires. Endotracheal tube in satisfactory position. Narrative:  INDICATION: postop heart Postop, upon arrival       EXAMINATION:  AP CHEST, PORTABLE       COMPARISON: April 15, 2022       FINDINGS: Single AP portable view of the chest demonstrates interval median   sternotomy. Endotracheal tube is 2.8 cm above the carolyn. Right IJ [place]   catheter tip over the left pulmonary artery. There are pleural and mediastinal   drains. Nasogastric tube difficult to confirm position. Heart size is normal.   Large final hernia. Left basilar atelectasis. No pneumothorax.                  Admission Weight: Last Weight   Weight: 191 lb 12.8 oz (87 kg) Weight: 210 lb 12.2 oz (95.6 kg)     Intake / Output / Drain:  Current Shift: 04/20 0701 - 04/20 1900  In: 626.9 [I.V.:626.9]  Out: 320 [Urine:240; Drains:80]  Last 24 hrs.: Intake/Output Summary (Last 24 hours) at 2022 1043  Last data filed at 2022 1030  Gross per 24 hour   Intake 8691.97 ml   Output 1745 ml   Net 6946.97 ml       EXAM:  General:  Intubated, sedated                   Lungs:   Clear to auscultation bilaterally. Incision:  Prineo intact. Heart:  Regular rate and rhythm, S1, S2 normal, no murmur, click, rub or gallop. Abdomen:   Soft, non-tender. Bowel sounds hypoactive. No masses,  No organomegaly. Extremities:  1+ b/l edema. PPP. Ecchymosis around left femoral a-line. Neurologic:  Gross motor and sensory apparatus intact. Labs:   Recent Labs     22  1029 22  0446 22  0424   WBC  --   --  14.3*   HGB  --   --  8.9*   HCT  --   --  27.3*   PLT  --   --  73*   NA  --   --  148*   K  --   --  4.8   BUN  --   --  14   CREA  --   --  0.69   GLU  --   --  113*   GLUCPOC 96   < >  --    INR  --   --  1.4*    < > = values in this interval not displayed. Assessment:     Active Problems:    Ascending aortic aneurysm ([address] (3/29/2022)      S/P MVR (mitral valve replacement) (2022)      S/P aortic aneurysm repair (2022)         Plan/Recommendations/Medical Decision Makin. S/p ascending aortic aneurysm: Strict BP control. On dobutamine and steve. Wean for systolics above 715. Keep CI > 2.  2. S/p MVR (tissue valve): Will need AC x 3 months for valve. Discussed with Dr. Len rose to resume PTA Eliquis this admission when CT output improves and H/H stable. 3. Pulmonary HTN: Improved following MVR. Monitor. 4. A-fib: s/p LAAL. On diltiazem, Tikosyn, and eliquis PTA. Restart Eliquis in next few days. Hold BB for now d/t hypotension. 5. Anemia secondary to acute blood loss: Monitor H/H and CT output. Received a unit PRBC . 6. Acute respiratory insufficiency: Now extubated to 6L NC. Small left pleural effusion and atelectasis. IS use when able. OOB activity with PT/OT when appropriate.   7. Chronic Diastolic HF: Monitor I/O. Lasix as needed. 8. Leukocytosis: 14.3 today. Tmax 99.1. Continue to monitor. 9. Pain management: Scheduled tylenol. PRN oxycodone and dilaudid for breakthrough pain. Avoid toradol d/t risk of STEVE.     Dispo: PT/OT. Remain in ICU.     Signed By: Kay Valdovinos PA-C

## 2022-04-20 NOTE — PROGRESS NOTES
2330  Admitted from [facility] 23 6 per protocol. Dr Sanchez present and plan of care discussed for overnight. Pt with lt groin bruising with slight firmness noted. Lt upper lip with deep bruising at ETT site noted. 2340  CVP 4. SBP 80. MAP 59. Albumin 500 cc IV given  0100  CVP 8. SBP 95-99    0230  After bathing, pt repositioned to Rt side. SBP fall to 60 and CVP 5. Danny increased to 80 mcg and returned to supine position with immediate improvement of BP.      0430  BP remains labile. Suctioned and BP drop to 60sys. CVP remains 6-7. Albumin 250 cc IV given    0500 Pt startled awake ,  Biting ETT and attempting to reach with hands toward mouth. Unable to follow commands. Propofol 35 mcg.    0540  Dr Tony Navarrete call for update on patient. Discuss Medication therapy, hemodynamics and I/O. Order received to start vasopressin set @ . 04mcg and also to attempt AAI pacing if tolerated    0555 Vaso stated and within 2 minutes danny off. Pacer changed to AAI and CI remains 2.2    06:30  Turned to the right. SBP fall to 80. Resume Danny @ 40 mcg. CVP 7.  CI 1.9-2.1     0630      END OF SHIFT SUMMARY    Drips: Vaso . 04 mcg. Danny 40 mcg.  5 mcg    Telemetry: A Paced 80    Hemodynamics:  CI 1.9-2.2. PAP 34/15. CVP 7. SVR 1124. SVO2 80 (post calibration)    UOP: 465 /12hr    CT: 125/12hr    I/O 24hr:   +6730    Pertinent labs:  WBC 14.3  HGB 8.9  PLT 73  Creat 0.69    Pain Control :  Sedation  Precedex . 3 mcg. Propofol 35 mcg    Pertinent physical assessment:  Pt BP remains labile with turning and stimulation. Received 3 bottles 5% Albumin and 1000 ml LR  And still appears to require more fluid (will discuss with MD upon arrival to floor) . Pt with no movement lower extremities to any stimulus. Pt moved arms toward ETT when startled awake. Pupils 2+ and equal.  Lt groin with moderate amount of bruising but only with slight firmness (no change from arrival) Pulses doppled.      07:30  During bedside report, pt moved feet to command

## 2022-04-20 NOTE — PROGRESS NOTES
Occupational Therapy    Chart reviewed. Pt remains intubated, hypotensive and not appropriate for OT eval at this time. Will hold until medically stable.      PIA Dalal, OTR/L

## 2022-04-20 NOTE — WOUND CARE
Wound care Nurse Consult: consult placed for left upper lip purple discoloration after extended surgery in OR for an ascending aortic aneurysm on 4/19/22    Patient is a 78 y/o CF with PMHX:   Past Medical History:   Diagnosis Date    Anemia 7/12/2010    Aortic aneurysm (HCC)     Arrhythmia     afib r/t low potassium     Arthritis 7/12/2010    Atrial fibrillation (HCC)     cardioversion times 2    Chronic pain     Eczema 7/12/2010    Edema 7/12/2010    GERD (gastroesophageal reflux disease) 7/12/2010    Heart failure ([address]     Hepatitis     antibody for Hepatitis B - not a carrier    Hiatal hernia 7/12/2010    Smoker 7/12/2010     Per Rikki CHAUDHARY and CCU staff nurse Candace Franco, patient had a FELTON during the close to 14 hour surgery and the FELTON line was on the left side of her mouth. It is the probable cause of this lip injury. Currently patient is A&O, but still intubated and WC nurse unable to get a clear visual of wound to left upper lip. This injury is either bruising or a mucosal pressure injury but unable to confirm without a complete exam. Right now there is pale purple discoloration to the outside area of the lip. Patient is anxious and restrained because of trying to self extubate and I do not want to agitate her further. Will wait until extubated - possibly today - and be able to exam the injury better. Plan: WC nurse to re-visit left upper lip injury when patient extubated and not so agitated.     Catalina Calabrsee, LAURITA, Rosio Guevara, RN, [employer]

## 2022-04-20 NOTE — PROGRESS NOTES
Transition of Care Plan:    RUR:13%    Disposition:Home with home health vs rehab    Follow up appointments: To be done prior to discharge. DME needed: To be determined. Transportation at Discharge: [facility] or means to access home:Daughter has keys.  Medicare Letter: To be given prior to discharge. Is patient a BCPI-A Bundle: No            If yes, was Bundle Letter given?: N/A     Is patient a [place] and connected with the [address]? No               If yes, was [place] transfer form completed and VA notified? N/A    Caregiver Contact:Daughter--Jose ShepardNjZtdaqg-[phone]    Discharge Caregiver contacted prior to discharge? Caregiver to be contacted prior to discharge. Care Conference needed?: No      Reason for Admission: Patient has ascending aortic aneurysm, atrial fib and had janet, mvr and left appendage ligation on 4/19/22. PMHX significant for  Atrial fib, diastolic hf and pulmonary htn. RUR Score:   13%                  Plan for utilizing home health:   Family is open to services if needed. PCP: First and Last name:  Ifeoma Gross MD     Name of Practice: [facility]     Are you a current patient: Yes/No: Yes     Approximate date of last visit: September 4, 2019     Can you participate in a virtual visit with your PCP: Yes                    [address] (ACP) Conversation      Date of Conversation: 4/20/22  Conducted with: Patient with Decision Making Capacity and Healthcare Decision Maker: Next of Kin by law (only applies in absence of a Healthcare Power of  or [address]:   No healthcare decision makers have been documented.    Click here to complete [address] including selection of the Healthcare Decision Maker Relationship (ie \"Primary\")        Content/Action Overview:   DECLINED ACP conversation - will revisit periodically   Reviewed DNR/DNI and patient elects Full Code (Attempt Resuscitation)         Length of Voluntary ACP Conversation in minutes:  <16 minutes (Non-Billable)    Bynum Aschoff, RN                         Healthcare Decision Maker:   Click here to complete [employer] including selection of the Healthcare Decision Maker Relationship (ie \"Primary\")                           Patient is recovering in the ccu. She is on the ventilator and plans to extubate today. Spoke with daughter who verified demographics and pcp information. Patient lives in a two story home alone. She was independent prior to coming to the hospital with adl's and iadl's. She does not use home oxygen or cpap machine. She has a cane she uses intermittently. She works part time. Daughter is planning for patient to stay with her when medically stable. Sally Moore RN BSN CRM        [phone]

## 2022-04-20 NOTE — ANESTHESIA POSTPROCEDURE EVALUATION
Procedure(s):  FELTON BY DR TEO RITTER CLOSURE -  MITRAL VALVE REPLACEMENT WITH 27 MM CHARLES MAGNA MITRAL EASE - RESUSPENSION OF AORTIC VALVE -  ASCENDING AORTIC ANEURYSM REPAIR AND LEFT ATRIAL APPENDAGE LIGATION. general    Anesthesia Post Evaluation        Patient location during evaluation: PACU  Note status: Adequate. Level of consciousness: responsive to verbal stimuli and sleepy but conscious  Pain management: satisfactory to patient  Airway patency: patent  Anesthetic complications: no  Cardiovascular status: acceptable  Respiratory status: acceptable  Hydration status: acceptable  Comments: +Post-Anesthesia Evaluation and Assessment    Patient: Carlos Alexander MRN: [id number]  SSN: [id number]   YOB: 1945  Age: 77 y.o. Sex: female          Cardiovascular Function/Vital Signs    BP (!) 105/48   Pulse 80   Temp 37.3 °C (99.1 °F)   Resp 24   Ht 5' 5\" (1.651 m)   Wt 95.6 kg (210 lb 12.2 oz)   SpO2 92%   BMI 35.07 kg/m²     Patient is status post Procedure(s):  FELTON BY DR TEO Zhao PFLOI CLOSURE -  MITRAL VALVE REPLACEMENT WITH 27 MM CHARLES MAGNA MITRAL EASE - RESUSPENSION OF AORTIC VALVE -  ASCENDING AORTIC ANEURYSM REPAIR AND LEFT ATRIAL APPENDAGE LIGATION. Nausea/Vomiting: Controlled. Postoperative hydration reviewed and adequate. Pain:  Pain Scale 1: Numeric (0 - 10) (04/20/22 1240)  Pain Intensity 1: 10 (04/20/22 1240)   Managed. Neurological Status: At baseline. Mental Status and Level of Consciousness: Arousable. Pulmonary Status:   O2 Device: Nasal cannula (04/20/22 1200)   Adequate oxygenation and airway patent. Complications related to anesthesia: None    Post-anesthesia assessment completed. No concerns. I have evaluated the patient and the patient is stable and ready to be discharged from PACU .     Signed By: Keren Marsh MD    4/20/2022        INITIAL Post-op Vital signs:   Vitals Value Taken Time   /48 04/20/22 1300   Temp 37.3 °C (99.1 °F) 04/20/22 1200   Pulse 80 04/20/22 1305   Resp 32 04/20/22 1305   SpO2 92 % 04/20/22 1305   Vitals shown include unvalidated device data.

## 2022-04-20 NOTE — H&P
[facility]    ICU TEAM Progress Note    Name: Sahara Murray   : 1945   MRN: [id number]   Date: 2022      Subjective:   Progress Note: 2022      76 y/o F with pmh of PMH significant for paroxysmal atrial fibrillation (eliquis/tikosyn) s/p cardioversion x2, diastolic HF, pulmonary hypertension, ascending aortic aneurism and multi-valvular disease. Admitted on  for ascending aortic aneurism repair and mitral valve replacement. Post-bypass course significant for coagulopathy necessitating multiple blood products and Kcentra. Admitted to ICU post op intubated.     POD:1 Day Post-Op    S/P: Procedure(s):  FELTON BY DR BRUCE - PFO CLOSURE -  MITRAL VALVE REPLACEMENT WITH 27 MM CHARLES MAGNA MITRAL EASE - RESUSPENSION OF AORTIC VALVE -  ASCENDING AORTIC ANEURYSM REPAIR AND LEFT ATRIAL APPENDAGE LIGATION    Active Problem List:     Problem List  Date Reviewed: 2013          Codes Class    S/P MVR (mitral valve replacement) ICD-10-CM: Z95.2  ICD-9-CM: V43.3         S/P aortic aneurysm repair ICD-10-CM: Z98.890, Z86.79  ICD-9-CM: V45.89         Ascending aortic aneurysm (HCC) ICD-10-CM: I71.2  ICD-9-CM: 441.2         CHF (congestive heart failure) ([facility] ICD-10-CM: I50.9  ICD-9-CM: 428.0         Atrial fibrillation with rapid ventricular response ([facility] ICD-10-CM: I48.91  ICD-9-CM: 427.31         Right wrist fracture ICD-10-CM: S62.101A  ICD-9-CM: 814.00         Screen for colon cancer ICD-10-CM: Z12.11  ICD-9-CM: V76.51     Overview Signed 2012 10:51 AM by Colten Quinones MD     Due for repeat colonoscopy 2022             Smoker ICD-10-CM: F17.200  ICD-9-CM: 305.1         Anemia ICD-10-CM: D64.9  ICD-9-CM: 285.9         Arthritis ICD-10-CM: M19.90  ICD-9-CM: 716.90         Edema ICD-10-CM: R60.9  ICD-9-CM: 782.3         Eczema ICD-10-CM: L30.9  ICD-9-CM: 692.9         GERD (gastroesophageal reflux disease) ICD-10-CM: K21.9  ICD-9-CM: 530.81         Hiatal hernia ICD-10-CM: K44.9  ICD-9-CM: 553.3               Past Medical History:      has a past medical history of Anemia (7/12/2010), Aortic aneurysm ([facility] [address] Arrhythmia, Arthritis (7/12/2010), Atrial fibrillation ([facility] Chronic pain, Eczema (7/12/2010), Edema (7/12/2010), GERD (gastroesophageal reflux disease) (7/12/2010), Heart failure ([facility] Hepatitis, Hiatal hernia (7/12/2010), and Smoker (7/12/2010). She has no past medical history of Unspecified adverse effect of anesthesia. Past Surgical History:      has a past surgical history that includes pr abdomen surgery proc unlisted; hx other surgical; pr colonoscopy flx dx w/collj spec when pfrmd (2/9/2012); hx other surgical; hx heart catheterization; hx hysterectomy (1998); hx wrist fracture tx (Left); and hx wrist fracture tx (Right). Home Medications:     Prior to Admission medications    Medication Sig Start Date End Date Taking? Authorizing Provider   mupirocin (BACTROBAN) 2 % ointment by Both Nostrils route two (2) times a day for 2 days. 4/17/22 4/19/22 Yes Yen Preston NP   chlorhexidine (Peridex) 0.12 % solution 15 mL by Swish and Spit route every twelve (12) hours for 2 days. 4/17/22 4/19/22 Yes Yen Preston NP   cholecalciferol, vitamin D3, 50 mcg (2,000 unit) tab Take 2 Tablets by mouth two (2) times a day. Yes Provider, Historical   biotin 10,000 mcg cap Take 1 Tablet by mouth daily. Yes Provider, Historical   melatonin 10 mg tab Take 1 Tablet by mouth nightly. Yes Provider, Historical   albuterol (PROVENTIL HFA, VENTOLIN HFA, PROAIR HFA) 90 mcg/actuation inhaler Take 2 Puffs by inhalation every four (4) hours as needed for Wheezing. Yes Provider, Historical   dilTIAZem ER (CARDIZEM CD) 240 mg capsule Take 1 Capsule by mouth daily. 11/21/21  Yes Dale Jiménez MD   [facility]) 250 mcg capsule Take 1 Capsule by mouth every twelve (12) hours every twelve (12) hours.  11/20/21  Yes Dale Jiménez MD   magnesium oxide (MAG-OX) 400 mg tablet Take 1 Tablet by mouth daily. 21  Yes Pennie Aguirre MD   potassium chloride SR (KLOR-CON 10) 10 mEq tablet Take 1 Tablet by mouth daily. 21  Yes Pennie Aguirre MD   multivitamin, tx-iron-ca-min (THERA-M w/ IRON) 9 mg iron-400 mcg tab tablet Take 1 Tablet by mouth daily. Yes Provider, Historical   omeprazole (PRILOSEC) 20 mg capsule Take 20 mg by mouth daily. 21  Yes Provider, Historical   traMADol (ULTRAM) 50 mg tablet Take 100 mg by mouth two (2) times a day. Yes Provider, Historical   apixaban (Eliquis) 5 mg tablet Take 5 mg by mouth two (2) times a day. Provider, Historical   furosemide (LASIX) 20 mg tablet Take 1 Tablet by mouth daily. 21   Pennie Aguirre MD   celecoxib (CELEBREX) 200 mg capsule Take 200 mg by mouth daily. 21   Provider, Historical       Allergies/Social/Family History: Allergies   Allergen Reactions    Penicillin G Rash    Bactrim [Sulfamethoprim] Rash    Levaquin [Levofloxacin] Rash    Amoxicillin Rash      Social History     Tobacco Use    Smoking status: Former Smoker     Packs/day: 1.00     Years: 50.00     Pack years: 50.00     Quit date:      Years since quittin.3    Smokeless tobacco: Never Used   Substance Use Topics    Alcohol use: Yes     Alcohol/week: 1.0 standard drink     Types: 1 Cans of beer per week     Comment: twice a month      Family History   Problem Relation Age of Onset    Cancer Mother         Lung    Cancer Father         Kidney    Breast Cancer Maternal Aunt         not sure    Arrhythmia Sister        Review of Systems:     Review of systems not obtained due to patient factors.     Objective:   Vital Signs:  Visit Vitals  BP (!) 86/53   Pulse 86   Temp 97.6 °F (36.4 °C)   Resp 8   Ht 5' 5\" (1.651 m)   Wt 87 kg (191 lb 12.8 oz)   SpO2 99%   BMI 31.92 kg/m²    O2 Flow Rate (L/min): 2 l/min O2 Device: Endotracheal tube,Ventilator Temp (24hrs), Av.3 °F (36.8 °C), Min:97.6 °F (36.4 °C), Max:99.1 °F (37.3 °C)           Intake/Output:     Intake/Output Summary (Last 24 hours) at 4/20/2022 0017  Last data filed at 4/19/2022 2345  Gross per 24 hour   Intake 5275 ml   Output 830 ml   Net 4445 ml       Physical Exam:    General:  sedated  Eye:  conjunctivae/corneas clear. PERRL,   Neurologic:  sedated  Lymphatic:  Cervical, supraclavicular, and axillary nodes normal.   Neck:  normal and no erythema or exudates noted. Lungs:  clear to auscultation bilaterally  Heart:  regular rate and rhythm, S1, S2 normal, no murmur, click, rub or gallop  Abdomen:  soft, non-tender. Bowel sounds normal. No masses,  no organomegaly  Cardiovascular:  Regular rate and rhythm, S1S2 present, without murmur or extra heart sounds, pedal pulses normal and no edema  Skin:  Normal.    LABS AND  DATA: Personally reviewed  Recent Labs     04/19/22 2346 04/19/22  1603   WBC 22.1*  --    HGB 10.2*  --    HCT 30.7*  --    PLT 99* 213     No results for input(s): NA, K, CL, CO2, BUN, CREA, GLU, CA, MG, PHOS in the last 72 hours. No results for input(s): AP, TBIL, TP, ALB, GLOB, AML, LPSE in the last 72 hours. No lab exists for component: SGOT, GPT, AMYP  Recent Labs     04/19/22 2346   INR 1.4*   PTP 14.6*   APTT 57.0*      Recent Labs     04/19/22 2349   PHI 7.37   PCO2I 42.9   PO2I 332*   FIO2I 100     No results for input(s): CPK, CKMB, TROIQ, BNPP in the last 72 hours.     Hemodynamics:   PAP:   CO: CO (l/min): 4.2 l/min (04/19/22 2345)   Wedge:   CI: CI (l/min/m2): 2.2 l/min/m2 (04/19/22 2345)   CVP:    SVR:       PVR:       Ventilator Settings:  Mode Rate Tidal Volume Pressure FiO2 PEEP   Assist control   450 ml    50 % 5 cm H20     Peak airway pressure: 19 cm H2O    Minute ventilation: 5.75 l/min        MEDS: Reviewed    Chest X-Ray: personally reviewed and report checked    FELTON intrarop LVEF>55%    Assessment and Plan:       ABCDEF Bundle/Checklist  Pain Medications: Dilaudid  Target RASS: -3 - Moderate Sedation - Movement or eye opening to voice (no eye contact)  Sedation Medications: Propofol and Precedex  CAM-ICU:  Positive  Discussed Plan of Care (goals of care): Yes  Addressed Code Status: Full Code    Ascending aortic aneurism and MR s/p aortic aneurysm repair and MVR  -intubated for procedure  Vent Goals:   Chlorhexidine   Head of bed > 30 degrees  Optimize PEEP/Ventilation/Oxygenation  Aim for lung protective ventilation  Maintain Vt 4-8 cc/kg IBW  Plan to Extubate: 4/20/22  -as per cardiac surgery will keep intubated overnight  -SBT/SAT in am  -pressors and intatropes as per cardiac surgery  -Goal -120      ANTIBIOTICS  Antibiotics:  -Ancef post op    T/L/D  Tubes: ETT  Lines: Peripheral IV, Arterial Line and Central Line  Drains: Najera Catheter    DISPOSITION  Stay in ICU    CRITICAL CARE CONSULTANT NOTE  I had a face to face encounter with the patient, reviewed and interpreted patient data including clinical events, labs, images, vital signs, I/O's, and examined patient. I have discussed the case and the plan and management of the patient's care with the consulting services, the bedside nurses and the respiratory therapist.      NOTE OF PERSONAL INVOLVEMENT IN CARE   This patient has a high probability of imminent, clinically significant deterioration, which requires the highest level of preparedness to intervene urgently. I participated in the decision-making and personally managed or directed the management of the following life and organ supporting interventions that required my frequent assessment to treat or prevent imminent deterioration. I personally spent 50 minutes of critical care time. This is time spent at this critically ill patient's bedside actively involved in patient care as well as the coordination of care and discussions with the patient's family. This does not include any procedural time which has been billed separately.     Fredricka Scheuermann, NP  [facility]  4/20/2022

## 2022-04-20 NOTE — CARDIO/PULMONARY
Cardiopulmonary Rehab:    Chart reviewed. Pt is a 77 y.o.  F admitted with Mitral valve insufficiency, unspecified etiology [I34.0]  Ascending aortic aneurysm ([address] [I71.2]. LVEF 55-60%. Former smoker, quit in 2017    S/P MVR,  And aneurysm repair. Pt post Op day 1. Intubated and sedated. Will continue to follow.

## 2022-04-20 NOTE — BRIEF OP NOTE
BRIEF OP NOTE  Pre-Op Diagnosis: Ascending Aortic Aneurysm, AFib    Post-Op Diagnosis: Ascending Aortic Aneurysm, AFib      Procedure: Procedure(s):  FELTON BY DR BRUCE - PFO CLOSURE -  MITRAL VALVE REPLACEMENT WITH 27 MM GILES MAGNA MITRAL EASE - RESUSPENSION OF AORTIC VALVE -  ASCENDING AORTIC ANEURYSM REPAIR AND LEFT ATRIAL APPENDAGE LIGATION    Surgeon: Dr. Seena Hatchet, Dr. Nette Rodriguez    Assistant(s): Mp Fitzgerald PA-C    Anesthesia: General     Infusions: Precedex, insulin, steve,      Specimens:   ID Type Source Tests Collected by Time Destination   1 : Aorta Preservative Aorta  Chas Sanchez MD 4/19/2022 3932 Pathology   2 : Mitral Valve Leaflets Preservative Mitral Valve  Chas Sanchez MD 4/19/2022 1347 Pathology       Drains and pacing wires: 2 atrial wires, 2 ventricular wires, 3 leann drains    Complications: Went back on cardiopulmonary bypass to address mitral valve following initial replacement. Appeared on FELTON to have posterior leaflet restrictions and severe MR. Prior tissue valve removed and replaced with another Giles tissue valve. Post-bypass course significant for coagulopathy necessitating multiple blood products and Kcentra. Adequate hemostasis achieved prior to chest closure. Findings: Ascending Aortic Aneurysm, AFib        Implants:   Implant Name Type Inv.  Item Serial No.  Lot No. LRB No. Used Action   DEVICE OCCL CLP L40MM SM FOOTPRINT 1 HND APPL SUTURELESS W/ - [id number]  DEVICE OCCL CLP L40MM SM FOOTPRINT 1 HND APPL SUTURELESS W/ [id number] [id number]WD [id number] N/A 1 Implanted   GRAFT VASC WOVN DBL VELR STR [id number] - [id number]  GRAFT VASC WOVN DBL VELR STR [id number] Group 1 [employer]_[id number] N/A 1 Implanted   Bard PTFE Felt Other  [id number] BARD [id number] N/A 1 Implanted   VALVE MITRL PERIMT MAGNA 27MM -- PERIMOUNT - [id number]  VALVE MITRL PERIMT MAGNA 27MM -- PERIMOUNT [id number] [employer] [id number] N/A 1 Implanted       Mp Fitzgerald PA-C

## 2022-04-20 NOTE — PROGRESS NOTES
[id number]  AM assessment complete and cardiac surgery rounds complete. Plan to give pt additional volume and wean pressors as tolerated keeping SBP .   0838  250cc albumin given   0851  Pt responding some to voice. Propofol decreased to 20mcg  0931  Danny weaned off  0935  Propofol stopped  0945  dobut weaned to 4mcg for CI 2.6  0955  Pt opens eyes to voice and follows commands. Pt placed on SBT  1014  250cc albumin given  1029  Pt extubated to 4LPM NC. Restraints removed. Pt has weak vocal quality. [address] weaned to 0.02mcg  1052 dobut weaned to 3. CI remains 2.6  1111  Vaso stopped  1130  BP trending down, SBP 90's, MAP 62. D/w Kay CHAUDHARY to ask vasopressor preference. Ok to restart danny. Danny restarted at 20mcg. 1145  Dr. Kristen Rea at bedside. Discussed pt progress and hemodynamics. Pt still appears dry. Order received to give additional albumin and wean off pressors. 1202  500cc albumin given    1226  Danny weaned off. Precedex stopped. 1258  dobut weaned to 2mcg for CI 3.1  1330  Ok per Kay CHAUDHARY to D/C arterial lines as pt is now off danny gtt. Radial art line removed. Chest tube dressings and R fem cannulation site dressings changed. Femoral art line D/C'd and pressure held for 15min. Quick clot applied with transparent dressing. Pedal pulse palpable. 1348  dobut weaned to 1mcg for CI 3.3  1423  Dobutamine stopped. CI remains 3.0  1500  Preparing to get pt up to chair. Dr. Prem Pineda and Dr. Kristen Rea at bedside. Order received to D/C jhon prior to transferring pt to chair. 1520  During transfer to chair, pacer lost capture when pt sitting at edge of bed. Pt laid back down while settings adjusted. Unable to continue to AAI pace. Pt appears to be in junctional bradycardia under pacer. Settings changed to DDD 80/16/16 and pt capturing 100%. Pt transferred to chair with 2 person minimal assist.  1730  Pt assisted back to bed and tolerated well.   Pt remains off all pressors and BP stable

## 2022-04-20 NOTE — PERIOP NOTES
TRANSFER - OUT REPORT:    Verbal report given to LATOSHA Castanon RN on Geni Alonzo  being transferred to CCU for routine progression of care       Report consisted of patients Situation, Background, Assessment and   Recommendations(SBAR). Information from the following report(s) SBAR, OR Summary and Procedure Summary was reviewed with the receiving nurse. Lines:   Double Lumen 04/19/22 Right (Active)       Manette Brandon 04/19/22 Neck (Active)       Quad Lumen 04/19/22 Neck (Active)       Peripheral IV 04/19/22 Left;Posterior Forearm (Active)   Site Assessment Clean, dry, & intact 04/19/22 0644   Phlebitis Assessment 0 04/19/22 0644   Infiltration Assessment 0 04/19/22 0644   Dressing Status Clean, dry, & intact 04/19/22 0644   Dressing Type Tape;Transparent 04/19/22 0644   Hub Color/Line Status Pink; Infusing 04/19/22 0644       Arterial Line 04/19/22 Radial artery (Active)       Arterial Line 2 04/19/22 Left Femoral artery (Active)        Opportunity for questions and clarification was provided.       Patient transported with:   Monitor  O2 @ 10 liters via ET Tube and AMBU Bag Ventilations accompanied by CRNA, Perfusionist, DELROY and RN Circulator

## 2022-04-20 NOTE — PROGRESS NOTES
Chart reviewed. Pt remains intubated, hypotensive and not appropriate for PT eval at this time. Will hold until medically stable.

## 2022-04-20 NOTE — OP NOTES
Cardiothoracic Surgery Operative Note    Date of Dictation: 04/19/22    Date of Procedure: 04/19/22    Referring Physician: Dr. Fox Garcia. Preoperative Diagnosis:    Thoracic Aortic Aneurysm   Atrial Fibrillation  Mitral Regurgitation  Aortic Insufficiency  Heart Failure    Postoperative Diagnosis:    Same    Procedure:    1. Replacement of ascendind aorta with 34 mm Dacron graft (from STJ to distal ascending aorta). 2. Resuspension of aortic valve. 3. Mitral valve replacement with #27 CE Teofilo Ease bioprosthesis. 4. Left atrial appendage occlusion with V-shaped Atricure #40 clip. 5. PFO closure. Surgeon: Viktoria Durant MD, PhD, [facility], [facility]. Assistant Surgeon(s):  Sandy Paris MD.    Assistant(s): NEENA Sanz. and Tahir Hutson. The assistance of the PA was required due to the critical nature of the surgery. Anesthesia: General endotracheal anesthesia and FELTON. Anesthesiologist(s):  Sudeep Bangura MD. and Finley Angelucci, MD..    Findings:    Despite her recent transesophageal echo showing significantly reduced mitral regurgitation with diuretic therapy, her assessment in the operating room today preoperatively demonstrated severe mitral regurgitation which we felt had to be addressed. Therefore, we decided to proceed with mitral valve repair or replacement prior to commencing the operation. The ascending aorta was significantly and symmetrically dilated. The aortic valve cusps appeared morphologically normal.  The distal ascending aorta was of appropriate size and quality such that the distal anastomosis could be performed with the clamp in place. The right femoral artery and vein were of good quality and suitable for cannulation. Her cardiac tissues were generally quite friable. The ascending aorta was found to have no atheromatous disease by epiaortic ultrasound examination. Post-bypass LV systolic function was good. Post-bypass RV systolic function was good.     Given the significant time spent on cardiopulmonary bypass, there was some perioperative coagulopathy which was medically treated. Estimated Blood Loss:  Documented in the anesthesia record    STS Data: The risks, benefits and alternatives to surgery were discussed with the patient and they requested to proceed with surgery. Argelia-operative antibiotics were given within the STS-recommended windows. Operative Details: The patient was placed supine on the operating table. Standard monitors and lines were placed, anesthesia was given and the patient was intubated. The patient was prepped and draped in a sterile manner. A pre-incision time-out was performed. A median sternotomy was performed using a scalpel on the skin and electrocautery down to the sternum. The sternum was divided in the midline using a vertical oscillating saw. Hemostasis was achieved. The pericardium was opened and a cradle was created. Meanwhile, the right femoral artery and vein were exposed and prepared for cannulation. IV heparin was given to maintain an ACT over 400. The femoral artery and vein were cannulated. A catheter was placed in the coronary sinus and a vent was placed in the left ventricle. The distal extent of the thoracic aortic aneurysm in the ascending aorta was dissected free of surrounding tissue and room was made for the application of a cross-clamp. Cardiopulmonary bypass was initiated. The aorta was cross-clamped. Although retrograde cardioplegia was given, the coronary sinus catheter may not have been properly positioned and this method of arresting the heart was affected. Therefore, the aortic aneurysm was opened and adequate doses of cardioplegia were given directly down the left and right coronary ostia at appropriate intervals throughout the duration of the operation. Good arrest was achieved. We then turned our attention to the left atrium. The interatrial groove was developed.   The left atrium was incised. The PFO was closed. The mitral valve was exposed. It appeared that the posterior leaflet was retracted along its length with a very short chordae. This was likely the mechanism of regurgitation. It was felt that attempts at repair would likely be unsuccessful and we proceeded directly to mitral valve replacement. The majority of the anterior leaflet body was removed. The posterior leaflet was preserved. Everting 2-0 Ethibond pledgeted sutures were placed circumferentially on the annulus. These were then passed through the sewing of a #27 CE Magna Ease bioprosthesis. The bioprosthesis was lowered into position. The sutures were secured with the cor knot device. Upon completion of valve implantation, the valve was inspected and appeared to have normal morphology and function. The left atrium was closed in 2 layers of running 3-0 Prolene. The left atrial appendage was sized and clipped. We then turned our attention to the thoracic aortic aneurysm. The remaining aneurysmal tissue was excised. The proximal and distal ends were sized and a 34 mm dacron graft was selected. The proximal anastomosis was constructed in a running mattress fashion and in such a way, the aortic valve was resuspended allowing for proper coaptation and elimination of the aortic insufficiency. The graft length was then trimmed down to an appropriate degree. The distal anastomosis was then performed with a clamp on in a running fashion. A root vent was installed in the graft. The graft was de-aired and the cross-clamp was removed. The patient was uneventfully weaned off of cardiopulmonary bypass. Echocardiographic examination of the heart at this point revealed severe mitral regurgitation. This was an unexpected result. It was clear that the regurgitation was central in nature. There was no paravalvular regurgitation. The valve looked to be well-seated and the leaflets look to be moving appropriately. Regardless, there was severe valvular regurgitation which was very concerning. I called my colleague Dr. Gifty Stokes to discuss the situation. We also involved our colleague Dr. Houston Gann in the discussion. After significant deliberation and carefully weighing her options, we all agreed that it would be best to reintervene on this mitral valve to achieve a satisfactory result prior to leaving the operating room. At this point Dr. Gifty Stokes joined me the operating room. We proceeded to reheparinize and recannulate the patient using the femoral artery, the IVC and the SVC. The coronary sinus catheter was replaced and an antegrade cardioplegia catheter was placed in the aortic graft. Cardiopulmonary bypass was initiated. The cross-clamp was reapplied and the heart was arrested with a combination of antegrade and retrograde cardioplegia. We proceeded to reopen the left atrium and expose mitral valve. The valve appeared to be seated properly. There appeared to be a failure of coaptation of one of the 3 leaflets of the bioprosthesis. The mechanism of this failure was unclear. There were no obvious technical issues with the placement of the valve that we could identify. All of the core knot's staples and pledgets were removed. The bioprosthesis was removed. New stitches were placed and a new bioprosthesis of the same type and size was installed without difficulty. Once again, the bioprosthesis appeared to be seated properly and functioning normally. The left atrium was closed. The heart was de-aired and the cross-clamp was removed. The patient was weaned from cardiopulmonary bypass without difficulty and decannulated. All cannulation sites were appropriately repaired and were hemostatic. Extended cardiopulmonary bypass time, the patient was coagulopathic and received numerous products to aid in hemostasis.   Cardiac performance at the end of the operation was quite good despite the long operation    The final echocardiogram obtained in the operating room demonstrated a properly functioning and well-seated valve with no mitral regurgitation. Atrial and ventricular wires were placed. Chest drains were placed in the mediastinal and posterior pericardial spaces. Hemostasis was reconfirmed at all surgical sites. The sternum was reapproximated with stainless steel wires. The presternal fascia, subcutaneous and dermal layers were reapproximated with running sutures. The wounds were covered with sterile dressings. The PA was critical for florinda cannulation, placement of the valve, assistance with construction of each anastomosis, decannulation, and closure of the wound. Specimens:  Anterior Mitral Valve Leaflet. .    Pacing Wires: Atrial x 2, Ventricular X 2. Chest Tubes: Posterior pericardial and mediastinal.    CPB Time:  453 min. Aortic Clamp Time:  369 min. Complications:  None. Disposition: Cardiovascular recovery room. Condition: Critical but stable.

## 2022-04-20 NOTE — PROGRESS NOTES
I have seen and examined the patient myself and discussed the plan with NP in the morning. Patient is hemodynamically improving, still on 3mcg of dobutamine and low dose neosyneprhine.   -Was on 50% Fio2 and PEEP of 5cm of water.   -Performed SBT and patient did well on SBT. -Extubated the patient to venti mask.   -Will use BiPAP as needed. After extubation patient looks comfortable, alert and oriented. -Wean pressors for goal MAP >65. I spent another 40min of CC time during the day without overlap and excluding procedures.

## 2022-04-21 ENCOUNTER — APPOINTMENT (OUTPATIENT)
Dept: GENERAL RADIOLOGY | Age: 77
DRG: 219 | End: 2022-04-21
Attending: PHYSICIAN ASSISTANT
Payer: MEDICARE

## 2022-04-21 ENCOUNTER — TRANSCRIBE ORDER (OUTPATIENT)
Dept: CARDIAC REHAB | Age: 77
End: 2022-04-21

## 2022-04-21 DIAGNOSIS — Z98.890 S/P MITRAL VALVE REPAIR: Primary | ICD-10-CM

## 2022-04-21 LAB
ADMINISTERED INITIALS, ADMINIT: NORMAL
ALBUMIN SERPL-MCNC: 3.7 G/DL (ref 3.5–5)
ALBUMIN/GLOB SERPL: 1.9 {RATIO} (ref 1.1–2.2)
ALP SERPL-CCNC: 33 U/L (ref 45–117)
ALT SERPL-CCNC: 19 U/L (ref 12–78)
ANION GAP SERPL CALC-SCNC: 5 MMOL/L (ref 5–15)
ANION GAP SERPL CALC-SCNC: 7 MMOL/L (ref 5–15)
AST SERPL-CCNC: 94 U/L (ref 15–37)
BILIRUB SERPL-MCNC: 0.4 MG/DL (ref 0.2–1)
BUN SERPL-MCNC: 22 MG/DL (ref 6–20)
BUN SERPL-MCNC: 33 MG/DL (ref 6–20)
BUN/CREAT SERPL: 20 (ref 12–20)
BUN/CREAT SERPL: 26 (ref 12–20)
CALCIUM SERPL-MCNC: 8.4 MG/DL (ref 8.5–10.1)
CALCIUM SERPL-MCNC: 8.9 MG/DL (ref 8.5–10.1)
CHLORIDE SERPL-SCNC: 107 MMOL/L (ref 97–108)
CHLORIDE SERPL-SCNC: 112 MMOL/L (ref 97–108)
CO2 SERPL-SCNC: 22 MMOL/L (ref 21–32)
CO2 SERPL-SCNC: 22 MMOL/L (ref 21–32)
CREAT SERPL-MCNC: 1.11 MG/DL (ref 0.55–1.02)
CREAT SERPL-MCNC: 1.27 MG/DL (ref 0.55–1.02)
D50 ADMINISTERED, D50ADM: 0 ML
D50 ORDER, D50ORD: 0 ML
ERYTHROCYTE [DISTWIDTH] IN BLOOD BY AUTOMATED COUNT: 15.8 % (ref 11.5–14.5)
GLOBULIN SER CALC-MCNC: 1.9 G/DL (ref 2–4)
GLSCOM COMMENTS: NORMAL
GLUCOSE BLD STRIP.AUTO-MCNC: 100 MG/DL (ref 65–117)
GLUCOSE BLD STRIP.AUTO-MCNC: 104 MG/DL (ref 65–117)
GLUCOSE BLD STRIP.AUTO-MCNC: 107 MG/DL (ref 65–117)
GLUCOSE BLD STRIP.AUTO-MCNC: 107 MG/DL (ref 65–117)
GLUCOSE BLD STRIP.AUTO-MCNC: 108 MG/DL (ref 65–117)
GLUCOSE BLD STRIP.AUTO-MCNC: 108 MG/DL (ref 65–117)
GLUCOSE BLD STRIP.AUTO-MCNC: 109 MG/DL (ref 65–117)
GLUCOSE BLD STRIP.AUTO-MCNC: 110 MG/DL (ref 65–117)
GLUCOSE BLD STRIP.AUTO-MCNC: 111 MG/DL (ref 65–117)
GLUCOSE BLD STRIP.AUTO-MCNC: 114 MG/DL (ref 65–117)
GLUCOSE BLD STRIP.AUTO-MCNC: 115 MG/DL (ref 65–117)
GLUCOSE BLD STRIP.AUTO-MCNC: 124 MG/DL (ref 65–117)
GLUCOSE BLD STRIP.AUTO-MCNC: 133 MG/DL (ref 65–117)
GLUCOSE BLD STRIP.AUTO-MCNC: 96 MG/DL (ref 65–117)
GLUCOSE SERPL-MCNC: 110 MG/DL (ref 65–100)
GLUCOSE SERPL-MCNC: 99 MG/DL (ref 65–100)
GLUCOSE, GLC: 100 MG/DL
GLUCOSE, GLC: 104 MG/DL
GLUCOSE, GLC: 107 MG/DL
GLUCOSE, GLC: 108 MG/DL
GLUCOSE, GLC: 108 MG/DL
GLUCOSE, GLC: 109 MG/DL
GLUCOSE, GLC: 110 MG/DL
GLUCOSE, GLC: 111 MG/DL
GLUCOSE, GLC: 114 MG/DL
GLUCOSE, GLC: 115 MG/DL
GLUCOSE, GLC: 124 MG/DL
GLUCOSE, GLC: 133 MG/DL
GLUCOSE, GLC: 96 MG/DL
HCT VFR BLD AUTO: 26.1 % (ref 35–47)
HGB BLD-MCNC: 8.3 G/DL (ref 11.5–16)
HIGH TARGET, HITG: 140 MG/DL
INSULIN ADMINSTERED, INSADM: 0.2 UNITS/HOUR
INSULIN ADMINSTERED, INSADM: 0.3 UNITS/HOUR
INSULIN ADMINSTERED, INSADM: 0.3 UNITS/HOUR
INSULIN ADMINSTERED, INSADM: 0.4 UNITS/HOUR
INSULIN ADMINSTERED, INSADM: 0.6 UNITS/HOUR
INSULIN ADMINSTERED, INSADM: 0.7 UNITS/HOUR
INSULIN ADMINSTERED, INSADM: 0.8 UNITS/HOUR
INSULIN ORDER, INSORD: 0.2 UNITS/HOUR
INSULIN ORDER, INSORD: 0.3 UNITS/HOUR
INSULIN ORDER, INSORD: 0.3 UNITS/HOUR
INSULIN ORDER, INSORD: 0.4 UNITS/HOUR
INSULIN ORDER, INSORD: 0.6 UNITS/HOUR
INSULIN ORDER, INSORD: 0.7 UNITS/HOUR
INSULIN ORDER, INSORD: 0.8 UNITS/HOUR
LOW TARGET, LOT: 100 MG/DL
MAGNESIUM SERPL-MCNC: 2.5 MG/DL (ref 1.6–2.4)
MCH RBC QN AUTO: 28.5 PG (ref 26–34)
MCHC RBC AUTO-ENTMCNC: 31.8 G/DL (ref 30–36.5)
MCV RBC AUTO: 89.7 FL (ref 80–99)
MINUTES UNTIL NEXT BG, NBG: 120 MIN
MINUTES UNTIL NEXT BG, NBG: 60 MIN
MULTIPLIER, MUL: 0.01
NRBC # BLD: 0.04 K/UL (ref 0–0.01)
NRBC BLD-RTO: 0.2 PER 100 WBC
ORDER INITIALS, ORDINIT: NORMAL
PLATELET # BLD AUTO: 111 K/UL (ref 150–400)
POTASSIUM SERPL-SCNC: 4.5 MMOL/L (ref 3.5–5.1)
POTASSIUM SERPL-SCNC: 4.7 MMOL/L (ref 3.5–5.1)
PROT SERPL-MCNC: 5.6 G/DL (ref 6.4–8.2)
RBC # BLD AUTO: 2.91 M/UL (ref 3.8–5.2)
SERVICE CMNT-IMP: ABNORMAL
SERVICE CMNT-IMP: ABNORMAL
SERVICE CMNT-IMP: NORMAL
SODIUM SERPL-SCNC: 136 MMOL/L (ref 136–145)
SODIUM SERPL-SCNC: 139 MMOL/L (ref 136–145)
WBC # BLD AUTO: 24.9 K/UL (ref 3.6–11)

## 2022-04-21 PROCEDURE — 71045 X-RAY EXAM CHEST 1 VIEW: CPT

## 2022-04-21 PROCEDURE — 83735 ASSAY OF MAGNESIUM: CPT

## 2022-04-21 PROCEDURE — P9045 ALBUMIN (HUMAN), 5%, 250 ML: HCPCS | Performed by: THORACIC SURGERY (CARDIOTHORACIC VASCULAR SURGERY)

## 2022-04-21 PROCEDURE — 65270000046 HC RM TELEMETRY

## 2022-04-21 PROCEDURE — 74011250636 HC RX REV CODE- 250/636: Performed by: NURSE PRACTITIONER

## 2022-04-21 PROCEDURE — 74011250636 HC RX REV CODE- 250/636: Performed by: PHYSICIAN ASSISTANT

## 2022-04-21 PROCEDURE — 93005 ELECTROCARDIOGRAM TRACING: CPT

## 2022-04-21 PROCEDURE — 82962 GLUCOSE BLOOD TEST: CPT

## 2022-04-21 PROCEDURE — 74011000250 HC RX REV CODE- 250: Performed by: PHYSICIAN ASSISTANT

## 2022-04-21 PROCEDURE — P9047 ALBUMIN (HUMAN), 25%, 50ML: HCPCS | Performed by: NURSE PRACTITIONER

## 2022-04-21 PROCEDURE — 51798 US URINE CAPACITY MEASURE: CPT

## 2022-04-21 PROCEDURE — 85027 COMPLETE CBC AUTOMATED: CPT

## 2022-04-21 PROCEDURE — 97530 THERAPEUTIC ACTIVITIES: CPT

## 2022-04-21 PROCEDURE — 77010033678 HC OXYGEN DAILY

## 2022-04-21 PROCEDURE — 74011250637 HC RX REV CODE- 250/637: Performed by: THORACIC SURGERY (CARDIOTHORACIC VASCULAR SURGERY)

## 2022-04-21 PROCEDURE — 74011250636 HC RX REV CODE- 250/636: Performed by: THORACIC SURGERY (CARDIOTHORACIC VASCULAR SURGERY)

## 2022-04-21 PROCEDURE — 97535 SELF CARE MNGMENT TRAINING: CPT

## 2022-04-21 PROCEDURE — 97162 PT EVAL MOD COMPLEX 30 MIN: CPT

## 2022-04-21 PROCEDURE — 97166 OT EVAL MOD COMPLEX 45 MIN: CPT

## 2022-04-21 PROCEDURE — 74011250636 HC RX REV CODE- 250/636

## 2022-04-21 PROCEDURE — P9045 ALBUMIN (HUMAN), 5%, 250 ML: HCPCS

## 2022-04-21 PROCEDURE — 97116 GAIT TRAINING THERAPY: CPT

## 2022-04-21 PROCEDURE — 74011250637 HC RX REV CODE- 250/637: Performed by: PHYSICIAN ASSISTANT

## 2022-04-21 PROCEDURE — 74011000250 HC RX REV CODE- 250: Performed by: THORACIC SURGERY (CARDIOTHORACIC VASCULAR SURGERY)

## 2022-04-21 PROCEDURE — 36415 COLL VENOUS BLD VENIPUNCTURE: CPT

## 2022-04-21 PROCEDURE — 80053 COMPREHEN METABOLIC PANEL: CPT

## 2022-04-21 RX ORDER — ALBUMIN HUMAN 50 G/1000ML
SOLUTION INTRAVENOUS
Status: COMPLETED
Start: 2022-04-21 | End: 2022-04-21

## 2022-04-21 RX ORDER — ALBUMIN HUMAN 250 G/1000ML
25 SOLUTION INTRAVENOUS ONCE
Status: DISCONTINUED | OUTPATIENT
Start: 2022-04-21 | End: 2022-04-21

## 2022-04-21 RX ORDER — ALBUMIN HUMAN 50 G/1000ML
25 SOLUTION INTRAVENOUS ONCE
Status: COMPLETED | OUTPATIENT
Start: 2022-04-21 | End: 2022-04-22

## 2022-04-21 RX ORDER — GABAPENTIN 100 MG/1
100 CAPSULE ORAL EVERY 8 HOURS
Status: DISCONTINUED | OUTPATIENT
Start: 2022-04-21 | End: 2022-04-30 | Stop reason: HOSPADM

## 2022-04-21 RX ORDER — FUROSEMIDE 10 MG/ML
40 INJECTION INTRAMUSCULAR; INTRAVENOUS ONCE
Status: COMPLETED | OUTPATIENT
Start: 2022-04-21 | End: 2022-04-21

## 2022-04-21 RX ORDER — CHLORHEXIDINE GLUCONATE 1.2 MG/ML
10 RINSE ORAL EVERY 12 HOURS
Status: DISCONTINUED | OUTPATIENT
Start: 2022-04-21 | End: 2022-04-26 | Stop reason: SDUPTHER

## 2022-04-21 RX ORDER — ALBUMIN HUMAN 250 G/1000ML
12.5 SOLUTION INTRAVENOUS ONCE
Status: COMPLETED | OUTPATIENT
Start: 2022-04-21 | End: 2022-04-21

## 2022-04-21 RX ORDER — ALBUMIN HUMAN 50 G/1000ML
12.5 SOLUTION INTRAVENOUS ONCE
Status: COMPLETED | OUTPATIENT
Start: 2022-04-21 | End: 2022-04-21

## 2022-04-21 RX ADMIN — HYDROMORPHONE HYDROCHLORIDE 1 MG: 1 INJECTION, SOLUTION INTRAMUSCULAR; INTRAVENOUS; SUBCUTANEOUS at 23:48

## 2022-04-21 RX ADMIN — MUPIROCIN: 20 OINTMENT TOPICAL at 09:23

## 2022-04-21 RX ADMIN — ACETAMINOPHEN 1000 MG: 500 TABLET ORAL at 23:48

## 2022-04-21 RX ADMIN — FUROSEMIDE 40 MG: 10 INJECTION, SOLUTION INTRAMUSCULAR; INTRAVENOUS at 09:42

## 2022-04-21 RX ADMIN — ACETAMINOPHEN 1000 MG: 500 TABLET ORAL at 12:16

## 2022-04-21 RX ADMIN — CHLORHEXIDINE GLUCONATE 10 ML: 1.2 RINSE ORAL at 09:19

## 2022-04-21 RX ADMIN — OXYCODONE 10 MG: 5 TABLET ORAL at 01:21

## 2022-04-21 RX ADMIN — SODIUM CHLORIDE, PRESERVATIVE FREE 10 ML: 5 INJECTION INTRAVENOUS at 05:15

## 2022-04-21 RX ADMIN — Medication 400 MG: at 09:19

## 2022-04-21 RX ADMIN — HYDROMORPHONE HYDROCHLORIDE 1 MG: 1 INJECTION, SOLUTION INTRAMUSCULAR; INTRAVENOUS; SUBCUTANEOUS at 12:28

## 2022-04-21 RX ADMIN — CEFAZOLIN SODIUM 2 G: 1 INJECTION, POWDER, FOR SOLUTION INTRAMUSCULAR; INTRAVENOUS at 05:15

## 2022-04-21 RX ADMIN — HYDROMORPHONE HYDROCHLORIDE 1 MG: 1 INJECTION, SOLUTION INTRAMUSCULAR; INTRAVENOUS; SUBCUTANEOUS at 00:34

## 2022-04-21 RX ADMIN — CEFAZOLIN SODIUM 2 G: 1 INJECTION, POWDER, FOR SOLUTION INTRAMUSCULAR; INTRAVENOUS at 00:10

## 2022-04-21 RX ADMIN — SODIUM CHLORIDE, PRESERVATIVE FREE 10 ML: 5 INJECTION INTRAVENOUS at 14:31

## 2022-04-21 RX ADMIN — SODIUM CHLORIDE, PRESERVATIVE FREE 10 ML: 5 INJECTION INTRAVENOUS at 21:17

## 2022-04-21 RX ADMIN — HYDROMORPHONE HYDROCHLORIDE 1 MG: 1 INJECTION, SOLUTION INTRAMUSCULAR; INTRAVENOUS; SUBCUTANEOUS at 03:29

## 2022-04-21 RX ADMIN — CHOLECALCIFEROL TAB 25 MCG (1000 UNIT) 2000 UNITS: 25 TAB at 09:19

## 2022-04-21 RX ADMIN — MUPIROCIN: 20 OINTMENT TOPICAL at 17:57

## 2022-04-21 RX ADMIN — SENNOSIDES AND DOCUSATE SODIUM 1 TABLET: 50; 8.6 TABLET ORAL at 17:56

## 2022-04-21 RX ADMIN — SENNOSIDES AND DOCUSATE SODIUM 1 TABLET: 50; 8.6 TABLET ORAL at 09:19

## 2022-04-21 RX ADMIN — CHOLECALCIFEROL TAB 25 MCG (1000 UNIT) 2000 UNITS: 25 TAB at 17:56

## 2022-04-21 RX ADMIN — ACETAMINOPHEN 1000 MG: 500 TABLET ORAL at 00:10

## 2022-04-21 RX ADMIN — OXYCODONE 10 MG: 5 TABLET ORAL at 05:11

## 2022-04-21 RX ADMIN — CASTOR OIL AND BALSAM, PERU: 788; 87 OINTMENT TOPICAL at 21:13

## 2022-04-21 RX ADMIN — GABAPENTIN 100 MG: 100 CAPSULE ORAL at 09:16

## 2022-04-21 RX ADMIN — CASTOR OIL AND BALSAM, PERU: 788; 87 OINTMENT TOPICAL at 09:23

## 2022-04-21 RX ADMIN — CHLORHEXIDINE GLUCONATE 10 ML: 1.2 RINSE ORAL at 21:03

## 2022-04-21 RX ADMIN — PANTOPRAZOLE SODIUM 40 MG: 40 TABLET, DELAYED RELEASE ORAL at 09:13

## 2022-04-21 RX ADMIN — ACETAMINOPHEN 1000 MG: 500 TABLET ORAL at 05:11

## 2022-04-21 RX ADMIN — POLYETHYLENE GLYCOL 3350 17 G: 17 POWDER, FOR SOLUTION ORAL at 09:18

## 2022-04-21 RX ADMIN — ALBUMIN (HUMAN) 12.5 G: 0.25 INJECTION, SOLUTION INTRAVENOUS at 09:14

## 2022-04-21 RX ADMIN — GABAPENTIN 100 MG: 100 CAPSULE ORAL at 14:30

## 2022-04-21 RX ADMIN — ALBUMIN HUMAN 25 G: 50 SOLUTION INTRAVENOUS at 21:05

## 2022-04-21 RX ADMIN — HYDROMORPHONE HYDROCHLORIDE 1 MG: 1 INJECTION, SOLUTION INTRAMUSCULAR; INTRAVENOUS; SUBCUTANEOUS at 06:20

## 2022-04-21 RX ADMIN — GABAPENTIN 100 MG: 100 CAPSULE ORAL at 21:03

## 2022-04-21 RX ADMIN — ALBUMIN (HUMAN) 25 G: 12.5 INJECTION, SOLUTION INTRAVENOUS at 21:05

## 2022-04-21 RX ADMIN — ASPIRIN 81 MG: 81 TABLET, CHEWABLE ORAL at 09:24

## 2022-04-21 RX ADMIN — Medication 400 MG: at 17:56

## 2022-04-21 RX ADMIN — ACETAMINOPHEN 1000 MG: 500 TABLET ORAL at 17:56

## 2022-04-21 RX ADMIN — ALBUMIN (HUMAN) 12.5 G: 12.5 INJECTION, SOLUTION INTRAVENOUS at 15:39

## 2022-04-21 RX ADMIN — HYDROMORPHONE HYDROCHLORIDE 1 MG: 1 INJECTION, SOLUTION INTRAMUSCULAR; INTRAVENOUS; SUBCUTANEOUS at 18:39

## 2022-04-21 RX ADMIN — Medication 1 TABLET: at 09:19

## 2022-04-21 NOTE — PROGRESS NOTES
Problem: Self Care Deficits Care Plan (Adult)  Goal: *Acute Goals and Plan of Care (Insert Text)  Description: FUNCTIONAL STATUS PRIOR TO ADMISSION: Patient was independent and active without use of DME. Reports occasional use of SPC as needed. 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     HOME SUPPORT: The patient lived alone with 3 children as local support. Pt reports children plan to stay with her at [place]. Occupational Therapy Goals  Initiated 4/21/2022  1. Patient will perform ADLs standing 5 mins without fatigue or LOB with supervision/set-up within 7 day(s). 2.  Patient will perform lower body ADLs with supervision/set-up within 7 day(s). 3.  Patient will perform gathering ADL items high and low 2/2 with supervision/set-up within 7 day(s). 4.  Patient will perform toilet transfers with supervision/set-up within 7 day(s). 5.  Patient will perform all aspects of toileting with supervision/set-up within 7 day(s). 6.  Patient will participate in cardiac/sternal upper extremity therapeutic exercise/activities to increase independence with ADLs with supervision/set-up for 5 minutes within 7 day(s). Outcome: Progressing Towards Goal       OCCUPATIONAL THERAPY EVALUATION  Patient: Berna Torres (9 y.o. female)  Date: 4/21/2022  Primary Diagnosis: Mitral valve insufficiency, unspecified etiology [I34.0]  Ascending aortic aneurysm (HCC) [I71.2]  Procedure(s) (LRB):  FELTON BY DR BRUCE - PFO CLOSURE -  MITRAL VALVE REPLACEMENT WITH 27 MM CHARLES MAGNA MITRAL EASE - RESUSPENSION OF AORTIC VALVE -  ASCENDING AORTIC ANEURYSM REPAIR AND LEFT ATRIAL APPENDAGE LIGATION (N/A) 2 Days Post-Op   Precautions:  Sternal,Fall    ASSESSMENT  Based on the objective data described below, the patient is POD #2 s/p FELTON + MVR presents with limited endurance, decreased balance, impaired balance, and limited activity tolerance.  Patient is functioning below their independent - modified independent baseline for self-care and functional mobility, now completing self-care with minimal - maximal assist and functional mobility with rollator with min A and close recliner chair follow. Patient received on 4L via NC, seated on BSC reporting difficulty with urinating following marquez removal. Following pt assisted to stand at rollator level with min A and verbal cues for hand placement. Pt endorsing increased fatigue required pivot to recliner chair, vitals stable. Following provided seated rest break, pt self limiting but agreeable to complete bedroom mobility. Pt only able to take a few steps prior to returning to sit in recliner chair, again VSS. Pt remained seated in recliner chair, call bell within reach all needs met. Patient limited by increased fatigue, poor endurance and decreased strength, recommend IPR at DC to maximize safety and independence with Adls. Patient is not verbalizing and is not demonstrating understanding of mindful-based movements (\"move in the tube\") principles of keeping UEs proximal to ribcage to prevent lateral pull on the sternum during load-bearing activities with verbal cues required for compliance. Current Level of Function Impacting Discharge (ADLs/self-care):  Feeding: Setup    Oral Facial Hygiene/Grooming: Setup    Bathing: Maximum assistance    Upper Body Dressing: Maximum assistance    Lower Body Dressing: Maximum assistance    Toileting: Maximum assistance      Functional Outcome Measure: The patient scored 35/100 on the Barthel outcome measure which is indicative of being very dependent with ADL tasks. Other factors to consider for discharge: lives alone, MVR     Patient will benefit from skilled therapy intervention to address the above noted impairments.        PLAN :  Recommendations and Planned Interventions: self care training, functional mobility training, therapeutic exercise, balance training, therapeutic activities, endurance activities, patient education, home safety training, and family training/education    Frequency/Duration: Patient will be followed by occupational therapy 5 times a week to address goals. Recommendation for discharge: (in order for the patient to meet his/her long term goals)  Therapy 3 hours per day 5-7 days per week    This discharge recommendation:  Has been made in collaboration with the attending provider and/or case management    IF patient discharges home will need the following DME: shower chair and walker: rolling       SUBJECTIVE:   Patient stated I dont think I can get up.     OBJECTIVE DATA SUMMARY:   HISTORY:   Past Medical History:   Diagnosis Date    Anemia 7/12/2010    Aortic aneurysm (HCC)     Arrhythmia     afib r/t low potassium     Arthritis 7/12/2010    Atrial fibrillation (HCC)     cardioversion times 2    Chronic pain     Eczema 7/12/2010    Edema 7/12/2010    GERD (gastroesophageal reflux disease) 7/12/2010    Heart failure (HCC)     Hepatitis     antibody for Hepatitis B - not a carrier    Hiatal hernia 7/12/2010    Smoker 7/12/2010     Past Surgical History:   Procedure Laterality Date    HX HEART CATHETERIZATION      HX HYSTERECTOMY  1998    HX OTHER SURGICAL      facelift    HX OTHER SURGICAL      Cardioversion x2     HX WRIST FRACTURE TX Left     left wrist open reduction internal fixation.     HX WRIST FRACTURE TX Right     LA ABDOMEN SURGERY PROC UNLISTED      Lap band    LA COLONOSCOPY FLX DX W/COLLJ SPEC WHEN PFRMD  2/9/2012            Expanded or extensive additional review of patient history:     Home Situation  Home Environment: Private residence  # Steps to Enter: 4  Rails to Enter: Yes  Hand Rails : Bilateral  One/Two Story Residence: Two story  # of Interior Steps: 12  Interior Rails: Left  Living Alone: Yes  Support Systems: Child(daryl)  Patient Expects to be Discharged to[de-identified] Home  Current DME Used/Available at Home: Cane, straight  Tub or Shower Type: Tub/Shower combination    Hand dominance: Right    EXAMINATION OF PERFORMANCE DEFICITS:  Cognitive/Behavioral Status:  Neurologic State: Alert  Orientation Level: Oriented X4  Cognition: Appropriate safety awareness; Appropriate for age attention/concentration; Follows commands             Skin: intact; lines and leads    Edema: edema in BLE    Hearing: Auditory  Auditory Impairment: None  Hearing Aids/Status: Does not own    Vision/Perceptual:                           Acuity: Within Defined Limits    Corrective Lenses: Glasses    Range of Motion:    AROM: Generally decreased, functional  PROM: Generally decreased, functional                      Strength:    Strength: Generally decreased, functional                Coordination:  Coordination: Within functional limits  Fine Motor Skills-Upper: Left Intact; Right Intact    Gross Motor Skills-Upper: Left Intact; Right Intact    Tone & Sensation:    Tone: Normal  Sensation: Intact                      Balance:  Sitting: Intact  Standing: Impaired  Standing - Static: Fair;Constant support  Standing - Dynamic : Fair;Constant support    Functional Mobility and Transfers for ADLs:  Bed Mobility:       Transfers:  Sit to Stand: Minimum assistance;Assist x2  Stand to Sit: Minimum assistance;Assist x2  Toilet Transfer : Minimum assistance;Assist x2    ADL Assessment:  Feeding: Setup    Oral Facial Hygiene/Grooming: Setup    Bathing: Maximum assistance    Upper Body Dressing: Maximum assistance    Lower Body Dressing: Maximum assistance    Toileting: Maximum assistance                ADL Intervention and task modifications:     Upper Body Dressing Assistance  Shirt simulation with hospital gown: Maximum assistance         Patient instructed and educated on mindful movement principles based on Move in The Tube concept to include maintaining bilateral elbows close to rib cage when performing any load-bearing activity such as getting in/out of bed, pushing up from a chair, opening a door, or lifting a box.   Patient was given a handout with diagrams of each correct/incorrect method of performing each of the above tasks. Patient instructed on the ability to utilize upper extremities outside the tube when doing any non-load bearing activity such as washing hair/body, brushing teeth, retrieving clothing items, or scratching your back. Patient encouraged to also perform upper extremity exercises \"outside of the tube\" to prevent scar tissue formation around sternal incision site. Patient instructed in detail about activities to heed with caution, allowing pain to be the guide. These activities include but are not limited to: mowing the lawn, riding a bike, walking a dog, lifting a child, workshop hobbies, golfing, sexual activity, vacuuming, fishing, scrubbing the floors, and moving furniture. Patient was given the [address] in the [place] handout to describe each of these activities in detail. Patient instructed no asymmetrical reaching over head to ensure B UEs when shoulders >90* i.e. reaching in cabinets and dressing. Instruction on upper body dressing techniques of over head, then arms through to decrease pain and unilateral shoulder flexion >90*. Instruction on the benefits of utilizing B UEs during functional tasks i.e. opening the fridge, stepping into the tub. Functional Measure:    Barthel Index:  Bathin  Bladder: 5  Bowels: 5  Groomin  Dressin  Feeding: 10  Mobility: 0  Stairs: 0  Toilet Use: 5  Transfer (Bed to Chair and Back): 10  Total: 35/100      The Barthel ADL Index: Guidelines  1. The index should be used as a record of what a patient does, not as a record of what a patient could do. 2. The main aim is to establish degree of independence from any help, physical or verbal, however minor and for whatever reason. 3. The need for supervision renders the patient not independent. 4. A patient's performance should be established using the best available evidence.  Asking the patient, friends/relatives and nurses are the usual sources, but direct observation and common sense are also important. However direct testing is not needed. 5. Usually the patient's performance over the preceding 24-48 hours is important, but occasionally longer periods will be relevant. 6. Middle categories imply that the patient supplies over 50 per cent of the effort. 7. Use of aids to be independent is allowed. Score Interpretation (from [address] [facility] 83)    Independent   60-79 Minimally independent   40-59 Partially dependent   20-39 Very dependent   <20 Totally dependent     -Berna Mccormick., BarthelSHARON. (1965). Functional evaluation: the Barthel Index. [address] ([address] [place] [address] (1997). The Barthel activities of daily living index: self-reporting versus actual performance in the old (> or = 75 years). Journal of [address] [place] [address] 45(7), [facility], SAMY, Saw Mendozar., Nazanin Goldsmith. (1999). Measuring the change in disability after inpatient rehabilitation; comparison of the responsiveness of the Barthel Index and Functional [place] Measure. Journal of Neurology, Neurosurgery, and Psychiatry, 66(4), 881-385. Mitul Nassar, N.J.A, HANNAH Cook, & Katja Moyer, M.A. (2004) Assessment of post-stroke quality of life in cost-effectiveness studies: The usefulness of the Barthel Index and the EuroQoL-5D. Quality of Life Research, 15, 567-46        Occupational Therapy Evaluation Charge Determination   History Examination Decision-Making   MEDIUM Complexity : Expanded review of history including physical, cognitive and psychosocial  history  HIGH Complexity : 5 or more performance deficits relating to physical, cognitive , or psychosocial skils that result in activity limitations and / or participation restrictions MEDIUM Complexity : Patient may present with comorbidities that affect occupational performnce.  Miniml to moderate modification of tasks or assistance (eg, physical or verbal ) with assesment(s) is necessary to enable patient to complete evaluation       Based on the above components, the patient evaluation is determined to be of the following complexity level: MEDIUM  Pain Rating:  Pt did reporting typical sternal discomfort with activity. Activity Tolerance:   Poor, requires rest breaks, observed SOB with activity, and O2 stable with activity     After treatment patient left in no apparent distress:    Sitting in chair and Call bell within reach    COMMUNICATION/EDUCATION:   The patients plan of care was discussed with: Physical therapist, Occupational therapist, and Registered nurse. Patient/family have participated as able in goal setting and plan of care. This patients plan of care is appropriate for delegation to JOSE.     Thank you for this referral.  Marcie Beverly, OT  Time Calculation: 27 mins

## 2022-04-21 NOTE — PROGRESS NOTES
Problem: Falls - Risk of  Goal: *Absence of Falls  Description: Document Jesse Leal Fall Risk and appropriate interventions in the flowsheet. Outcome: Progressing Towards Goal  Note: Fall Risk Interventions:  Mobility Interventions: Bed/chair exit alarm,Communicate number of staff needed for ambulation/transfer,OT consult for ADLs,Patient to call before getting OOB,PT Consult for mobility concerns,PT Consult for assist device competence,Strengthening exercises (ROM-active/passive),Utilize walker, cane, or other assistive device,Utilize gait belt for transfers/ambulation    Mentation Interventions: Adequate sleep, hydration, pain control,Bed/chair exit alarm,Door open when patient unattended,Increase mobility,Room close to nurse's station,Toileting rounds,Update white board,More frequent rounding    Medication Interventions: Bed/chair exit alarm,Patient to call before getting OOB,Teach patient to arise slowly,Assess postural VS orthostatic hypotension    Elimination Interventions: Bed/chair exit alarm,Call light in reach,Patient to call for help with toileting needs,Stay With Me (per policy),Toilet paper/wipes in reach,Toileting schedule/hourly rounds              Problem: Patient Education: Go to Patient Education Activity  Goal: Patient/Family Education  Outcome: Progressing Towards Goal     Problem: Pressure Injury - Risk of  Goal: *Prevention of pressure injury  Description: Document Maynor Scale and appropriate interventions in the flowsheet. Outcome: Progressing Towards Goal  Note: Pressure Injury Interventions:  Sensory Interventions: Assess changes in LOC,Assess need for specialty bed,Check visual cues for pain,Discuss PT/OT consult with provider,Float heels,Keep linens dry and wrinkle-free,Maintain/enhance activity level,Minimize linen layers,Monitor skin under medical devices,Pressure redistribution bed/mattress (bed type),Turn and reposition approx.  every two hours (pillows and wedges if needed)    Moisture Interventions: Absorbent underpads,Apply protective barrier, creams and emollients,Assess need for specialty bed,Check for incontinence Q2 hours and as needed,Internal/External urinary devices,Maintain skin hydration (lotion/cream),Minimize layers,Moisture barrier,Offer toileting Q_hr    Activity Interventions: Assess need for specialty bed,Increase time out of bed,Pressure redistribution bed/mattress(bed type),PT/OT evaluation    Mobility Interventions: Assess need for specialty bed,Float heels,HOB 30 degrees or less,Pressure redistribution bed/mattress (bed type),PT/OT evaluation,Turn and reposition approx.  every two hours(pillow and wedges)    Nutrition Interventions: Document food/fluid/supplement intake,Offer support with meals,snacks and hydration,Discuss nutritional consult with provider    Friction and Shear Interventions: Apply protective barrier, creams and emollients,Feet elevated on foot rest,HOB 30 degrees or less,Lift sheet,Lift team/patient mobility team,Minimize layers,Transferring/repositioning devices                Problem: Patient Education: Go to Patient Education Activity  Goal: Patient/Family Education  Outcome: Progressing Towards Goal     Problem: Infection - Risk of, Urinary Catheter-Associated Urinary Tract Infection  Goal: *Absence of infection signs and symptoms  Outcome: Resolved/Met     Problem: Patient Education: Go to Patient Education Activity  Goal: Patient/Family Education  Outcome: Resolved/Met     Problem: Cardiac Valve Surgery: Post-Op Day 2  Goal: Off Pathway (Use only if patient is Off Pathway)  Outcome: Progressing Towards Goal  Goal: Activity/Safety  Outcome: Progressing Towards Goal  Goal: Consults, if ordered  Outcome: Progressing Towards Goal  Goal: Diagnostic Test/Procedures  Outcome: Progressing Towards Goal  Goal: Nutrition/Diet  Outcome: Progressing Towards Goal  Goal: Discharge Planning  Outcome: Progressing Towards Goal  Goal: Medications  Outcome: Progressing Towards Goal  Goal: Respiratory  Outcome: Progressing Towards Goal  Goal: Treatments/Interventions/Procedures  Outcome: Progressing Towards Goal  Goal: Psychosocial  Outcome: Progressing Towards Goal  Goal: *Hemodynamically stable without vasoactive medications  Outcome: Progressing Towards Goal  Goal: *Stable cardiac rhythm  Outcome: Progressing Towards Goal  Goal: *Lungs clear or at baseline  Outcome: Progressing Towards Goal  Goal: *Mechanical ventilation discontinued  Outcome: Progressing Towards Goal  Goal: *Optimal pain control at patient's stated goal  Outcome: Progressing Towards Goal  Goal: *Demonstrates progressive activity  Outcome: Progressing Towards Goal  Goal: *Tolerating diet  Outcome: Progressing Towards Goal  Goal: *Incisions without signs and symptoms of wound complication  Outcome: Progressing Towards Goal

## 2022-04-21 NOTE — PROGRESS NOTES
\[facility]\"" ICU Progress Note    Admit Date: 2022  POD:  2 Day Post-Op    Procedure:  Procedure(s):  FELTON BY DR BRUCE - PFO CLOSURE -  MITRAL VALVE REPLACEMENT WITH 27 MM CHARLES MAGNA MITRAL EASE - RESUSPENSION OF AORTIC VALVE -  ASCENDING AORTIC ANEURYSM REPAIR AND LEFT ATRIAL APPENDAGE LIGATION        Subjective:   Pt seen with Dr. César Villegas. Pt sitting up in bed (needs to get up). Feeling well, difficult to take deep breathes. Worried about her lap ban - Dr. César Villegas discussed. On 6L NC. Tmax 99.1. In accelerated junctional, temp pacer at backup. Objective:   Vitals:  Blood pressure (!) 111/59, pulse 87, temperature 98.5 °F (36.9 °C), resp. rate 18, height 5' 5\" (1.651 m), weight 211 lb 3.2 oz (95.8 kg), SpO2 93 %. Temp (24hrs), Av.5 °F (36.9 °C), Min:97.6 °F (36.4 °C), Max:99.5 °F (37.5 °C)    EKG/Rhythm: AV paced - underlying accel junctional      Oxygen Therapy:  Oxygen Therapy  O2 Sat (%): 93 % (22 0700)  Pulse via Oximetry: 80 beats per minute (22 0700)  O2 Device: Nasal cannula (22 2000)  Skin Assessment: Clean, dry, & intact (22 1600)  Skin Protection for O2 Device: No (22 0800)  O2 Flow Rate (L/min): 6 l/min (22 0300)  FIO2 (%): 40 % (22 0955)    CXR:   CXR Results  (Last 48 hours)               22 0420  XR CHEST PORT Final result    Impression:  Stable cardiomegaly and left sided effusion. No significant interval   change. Narrative:  EXAM: XR CHEST PORT       INDICATION: postop heart       COMPARISON: Chest radiograph 2022       FINDINGS: A portable AP radiograph of the chest was obtained at 0350  hours. Exam is slightly limited by patient rotation. Patient is status post sternotomy   and mitral valve replacement. The ET tube, NG tube, and [place]-Martinez catheter have   been removed. The cardiomediastinal configuration is unchanged. The lung volumes   are again low.  There is ongoing left-sided small effusion and suspected atelectasis. The bones and soft tissues are grossly within normal limits. 04/20/22 0508  XR CHEST PORT Final result    Impression:  1. NG tube coiled in the esophagus, with tip pointed cephalad. 2. Left pleural effusion. Shallow volumes. Narrative:  PORTABLE CHEST RADIOGRAPH/S: 4/20/2022 5:08 AM       INDICATION: Postop heart. COMPARISON: 4/19/2022, 4/15/2022, 11/19/2021. TECHNIQUE: Portable frontal supine radiograph/s of the chest.       FINDINGS:    Passive atelectasis is associated with a layering left pleural effusion. The   lungs are hypoinflated, but otherwise clear. The central airways are patent. An ET tube, pulmonary arterial catheter via a right IJ sheath, mediastinal   drains, and epicardial pacing wires are in appropriate position. Post valve   replacement and left atrial appendage clipping. An NG tube is coiled in the midesophagus, with its tip pointing cephalad. 04/20/22 0001  XR CHEST PORT Final result    Impression:  Life support lines and tubes as described with left basilar atelectasis but no   pulmonary edema or pneumothorax. Nasogastric tube difficult to visualize if   present given pleural and mediastinal drains as well as numerous external   monitoring wires. Endotracheal tube in satisfactory position. Narrative:  INDICATION: postop heart Postop, upon arrival       EXAMINATION:  AP CHEST, PORTABLE       COMPARISON: April 15, 2022       FINDINGS: Single AP portable view of the chest demonstrates interval median   sternotomy. Endotracheal tube is 2.8 cm above the carolyn. Right IJ Hilbert-Martinez   catheter tip over the left pulmonary artery. There are pleural and mediastinal   drains. Nasogastric tube difficult to confirm position. Heart size is normal.   Large final hernia. Left basilar atelectasis. No pneumothorax.                  Admission Weight: Last Weight   Weight: 191 lb 12.8 oz (87 kg) Weight: 211 lb 3.2 oz (95.8 kg)     Intake / Output / Drain:  Current Shift: No intake/output data recorded. Last 24 hrs.:     Intake/Output Summary (Last 24 hours) at 2022 0804  Last data filed at 2022 0620  Gross per 24 hour   Intake 2712.21 ml   Output 1390 ml   Net 1322.21 ml       EXAM:  General:  NAD, pleasant mood      Lungs:   Clear to auscultation bilaterally. Incision:  Prineo intact. Heart:  Regular rate and rhythm, S1, S2 normal, no murmur, click, rub or gallop. Abdomen:   Soft, non-tender. Bowel sounds hypoactive. No masses,  No organomegaly. Extremities:  1+ b/l edema. PPP. Ecchymosis around left femoral a-line. Neurologic:  Gross motor and sensory apparatus intact. Labs:   Recent Labs     22  0618 22  0330 22  0220 22  0446 22  0424   WBC  --  24.9*  --    < > 14.3*   HGB  --  8.3*  --    < > 8.9*   HCT  --  26.1*  --    < > 27.3*   PLT  --  111*  --    < > 73*   NA  --  139  --    < > 148*   K  --  4.5  --    < > 4.8   BUN  --  22*  --    < > 14   CREA  --  1.11*  --    < > 0.69   GLU  --  99  --    < > 113*   GLUCPOC 108  --    < >   < >  --    INR  --   --   --   --  1.4*    < > = values in this interval not displayed. Assessment:     Active Problems:    Ascending aortic aneurysm ([address] (3/29/2022)      S/P MVR (mitral valve replacement) (2022)      S/P aortic aneurysm repair (2022)         Plan/Recommendations/Medical Decision Makin. S/p ascending aortic aneurysm: Strict BP control. 2. S/p MVR (tissue valve): Will need AC x 3 months for valve. Discussed with Dr. Key rose to resume PTA Eliquis this admission when CT output improves and H/H stable. Keep CT today. 3. Pulmonary HTN: Improved following MVR  4. A-fib: s/p LAAL. On diltiazem, Tikosyn, and eliquis PTA. Restart Eliquis in next few days. Cont to hold rate meds. accel junct - get morning EKG  5. Anemia secondary to acute blood loss: Monitor H/H and CT output. Received a unit PRBC .   6. Atelectasis, pulmonary edema, pleural effusions:  6L NC. IS hourly! OOB activity with PT/OT when appropriate. 7. Chronic Diastolic HF: Monitor I/O. Lasix/albumin this morning. 8. Leukocytosis: 14.3 today. Tmax 99.1. Continue to monitor. 9. Pain management: Scheduled tylenol. PRN oxycodone and dilaudid for breakthrough pain. Avoid toradol d/t risk of STEVE. Add gabapentin today. 10. Leukocytosis: Increase mobility. Pulm toileting. Daily incisional care. Tmax 99.1       Dispo: PT/OT. Transfer to stepdown. Needs to be OOB TID, ambulating TID. Remove Najrea. Case management following to aid in d/c planning[facility]-3 days.      Signed By: Jack Sanchez NP

## 2022-04-21 NOTE — PROGRESS NOTES
Problem: Mobility Impaired (Adult and Pediatric)  Goal: *Acute Goals and Plan of Care (Insert Text)  Description: FUNCTIONAL STATUS PRIOR TO ADMISSION: Patient was independent and active without use of DME.     HOME SUPPORT PRIOR TO ADMISSION: The patient lived alone with children to provide assistance. Physical Therapy Goals  Initiated 4/21/2022  1. Patient will move from supine to sit and sit to supine  in bed with moderate assistance  within 5 days. 2.  Patient will perform sit to/from stand with minimal assistance/contact guard assist within 5 days. 3.  Patient will ambulate 60 feet with least restrictive assistive device and minimal assistance/contact guard assist within 5 days. 4.  Patient will perform cardiac exercises per protocol with modified independence within 5 days. 5.  Patient will verbally recall and functionally demonstrate mindful-based movements (\"move in the tube\") principles without cues within 5 days. Outcome: Not Met     PHYSICAL THERAPY EVALUATION  Patient: Paulette Montero (84 y.o. female)  Date: 4/21/2022  Primary Diagnosis: Mitral valve insufficiency, unspecified etiology [I34.0]  Ascending aortic aneurysm (HCC) [I71.2]  Procedure(s) (LRB):  FELTON BY DR BRUCE - PFO CLOSURE -  MITRAL VALVE REPLACEMENT WITH 27 MM CHARLES MAGNA MITRAL EASE - RESUSPENSION OF AORTIC VALVE -  ASCENDING AORTIC ANEURYSM REPAIR AND LEFT ATRIAL APPENDAGE LIGATION (N/A) 2 Days Post-Op   Precautions:   Sternal,Fall      ASSESSMENT  Based on the objective data described below, the patient presents with generalized weakness, decreased activity tolerance, self limiting behavior, impaired balance, altered gait and overall decreased functional mobility. Pt was received on the [facility] with 4L and cleared by nursing to mobilize. She needed increased assistance for standing and for maintaining sternal precautions. She was able to take a few steps to get to the chair.  Rest and then ambulated a very short distance with significant encouragement. He was left sitting up in the chair. Patient is not demonstrating understanding of mindful-based movements (\"move in the tube\") principles of keeping UEs proximal to ribcage to prevent lateral pull on the sternum during load-bearing activities with verbal cues required for compliance. Current Level of Function Impacting Discharge (mobility/balance): min/mod    Functional Outcome Measure: The patient scored 35/100 on the barthel outcome measure which is indicative of partially dependent in basic care. Other factors to consider for discharge:      Patient will benefit from skilled therapy intervention to address the above noted impairments. PLAN :  Recommendations and Planned Interventions: bed mobility training, transfer training, gait training, therapeutic exercises, patient and family training/education, and therapeutic activities      Frequency/Duration: Patient will be followed by physical therapy:  daily to address goals. Recommendation for discharge: (in order for the patient to meet his/her long term goals)  Therapy 3 hours per day 5-7 days per week    This discharge recommendation:  Has not yet been discussed the attending provider and/or case management    IF patient discharges home will need the following DME: to be determined (TBD)         SUBJECTIVE:   Patient stated I can't walk.     OBJECTIVE DATA SUMMARY:   Patient mobilized on continuous portable monitor/telemetry.   HISTORY:    Past Medical History:   Diagnosis Date    Anemia 7/12/2010    Aortic aneurysm (HCC)     Arrhythmia     afib r/t low potassium     Arthritis 7/12/2010    Atrial fibrillation (HCC)     cardioversion times 2    Chronic pain     Eczema 7/12/2010    Edema 7/12/2010    GERD (gastroesophageal reflux disease) 7/12/2010    Heart failure ([address]     Hepatitis     antibody for Hepatitis B - not a carrier    Hiatal hernia 7/12/2010    Smoker 7/12/2010     Past Surgical History: Procedure Laterality Date    HX HEART CATHETERIZATION      HX HYSTERECTOMY  1998    HX OTHER SURGICAL      facelift    HX OTHER SURGICAL      Cardioversion x2     HX WRIST FRACTURE TX Left     left wrist open reduction internal fixation. HX WRIST FRACTURE TX Right     ID ABDOMEN SURGERY PROC UNLISTED      Lap band    ID COLONOSCOPY FLX DX W/COLLJ SPEC WHEN PFRMD  2/9/2012            Personal factors and/or comorbidities impacting plan of care:     Home Situation  Home Environment: Private residence  # Steps to Enter: 4  Rails to Enter: Yes  Hand Rails : Bilateral  One/Two Story Residence: Two story  # of Interior Steps: 12  Interior Rails: Left  Living Alone: Yes  Support Systems: Child(daryl)  Patient Expects to be Discharged to[de-identified] Home  Current DME Used/Available at Home: Cane, straight  Tub or Shower Type: Tub/Shower combination    EXAMINATION/PRESENTATION/DECISION MAKING:     Critical Behavior:  Neurologic State: Alert  Orientation Level: Oriented X4  Cognition: Appropriate safety awareness,Appropriate for age attention/concentration,Follows commands     Hearing:   Auditory  Auditory Impairment: None  Hearing Aids/Status: Does not own  Skin:  incision intact  Edema: WDL  Range Of Motion:  AROM: Generally decreased, functional           PROM: Generally decreased, functional           Strength:    Strength: Generally decreased, functional                    Tone & Sensation:   Tone: Normal              Sensation: Intact               Coordination:  Coordination: Within functional limits  Vision:      Functional Mobility:  Bed Mobility:      Session began and ended in sitting        Transfers:  Sit to Stand: Minimum assistance;Assist x2  Stand to Sit: Minimum assistance;Assist x2                       Balance:   Sitting: Intact  Standing: Impaired  Standing - Static: Fair;Constant support  Standing - Dynamic : Fair;Constant support  Ambulation/Gait Training:  Distance (ft): 5 Feet (ft)  Assistive Device: Alexandru Faulkner rollator  Ambulation - Level of Assistance: Minimal assistance; Moderate assistance        Gait Abnormalities: Decreased step clearance;Shuffling gait        Base of Support: Widened     Speed/Lissy: Pace decreased (<100 feet/min); Shuffled  Step Length: Left shortened;Right shortened                      Cardiac diagnosis intervention:  Patient instructed and educated on mindful movement principles based on Move in The Tube concept to include maintaining bilateral elbows close to rib cage when performing any load-bearing activity such as getting in/out of bed, pushing up from a chair, opening a door, or lifting a box. Patient was given a handout with diagrams of each correct/incorrect method of performing each of the above tasks. Therapeutic Exercises:   Patient instructed on the benefits and demonstrated cardiac exercises while sitting with Contact guard assistance. Instructed and indicated understanding on how to progress reps, sets against gravity, pacing through progressive muscle strengthening standing based on surgeon clearance for more weight in prep for functional activity. Instruction on the use of household items in place of weights as needed.     CARDIAC   EXERCISE    Sets    Reps    Active  Active Assist    Passive  Self ROM    Comments    Shoulder flexion  1  5   []                            []                             []                             []                                Shoulder abduction  1  5  []                             []                             []                             []                                Scapular elevation  1  5  [x]                             []                              []                             []                                Scapular retraction  1  5  []                             []                             []                             []                                Trunk rotation  1  5  []                             [] []                             []                                Trunk sidebending  1  5  []                             []                              []                             []                                          Functional Measure:  Barthel Index:    Bathin  Bladder: 5  Bowels: 5  Groomin  Dressin  Feeding: 10  Mobility: 0  Stairs: 0  Toilet Use: 5  Transfer (Bed to Chair and Back): 10  Total: 35/100       The Barthel ADL Index: Guidelines  1. The index should be used as a record of what a patient does, not as a record of what a patient could do. 2. The main aim is to establish degree of independence from any help, physical or verbal, however minor and for whatever reason. 3. The need for supervision renders the patient not independent. 4. A patient's performance should be established using the best available evidence. Asking the patient, friends/relatives and nurses are the usual sources, but direct observation and common sense are also important. However direct testing is not needed. 5. Usually the patient's performance over the preceding 24-48 hours is important, but occasionally longer periods will be relevant. 6. Middle categories imply that the patient supplies over 50 per cent of the effort. 7. Use of aids to be independent is allowed. Score Interpretation (from [address] [address])    Independent   60-79 Minimally independent   40-59 Partially dependent   20-39 Very dependent   <20 Totally dependent     -Berna Mccormick., Barthel, D.W. (1965). Functional evaluation: the Barthel Index. [facility] ([address] (1997). The Barthel activities of daily living index: self-reporting versus actual performance in the old (> or = 75 years). Journal of [address] [place] [address] 45(7), [facility], ..., Danish Rosas., Beatriz Guzman. (1999).  Measuring the change in disability after inpatient rehabilitation; comparison of the responsiveness of the Barthel Index and Functional [place] Measure. Journal of Neurology, Neurosurgery, and Psychiatry, 66(4), 519-423. TIM Mendez, HANNAH Cook, & Harriet España M.A. (2004) Assessment of post-stroke quality of life in cost-effectiveness studies: The usefulness of the Barthel Index and the EuroQoL-5D. Quality of Life Research, 15, 927-84            Physical Therapy Evaluation Charge Determination   History Examination Presentation Decision-Making   HIGH Complexity :3+ comorbidities / personal factors will impact the outcome/ POC  MEDIUM Complexity : 3 Standardized tests and measures addressing body structure, function, activity limitation and / or participation in recreation  MEDIUM Complexity : Evolving with changing characteristics  Other outcome measures barthel  MEDIUM      Based on the above components, the patient evaluation is determined to be of the following complexity level: MEDIUM    Pain Rating:  Typical surgical pain    Activity Tolerance:   Fair    After treatment patient left in no apparent distress:   Sitting in chair and Call bell within reach    COMMUNICATION/EDUCATION:   The patients plan of care was discussed with: Occupational therapist and Registered nurse. Fall prevention education was provided and the patient/caregiver indicated understanding. and Patient/family agree to work toward stated goals and plan of care.     Thank you for this referral.  Jet Brand, PT, DPT   Time Calculation: 27 mins

## 2022-04-21 NOTE — PROGRESS NOTES
0918-0339: Assessment complete. Cardiac surgeon rounds complete. Dr. Edward Decker made adjustments to pacemaker and requested that patient not be paced as long as intrinsic rhythm supports adequate BP. EKG complete, patient in undetermined rhythm at this time (possibly junctional or slow a. Fib), BP stable. Orders received to d/c marquez and give albumin prior to Lasix  0815: Marquez removed. 1694: Albumin 25% given. 0470: 40mg IV Lasix given. 1000: Patient up to recliner. VSS. No complaints at this time. 1020: O2 weaned to 4L NC.  1040: Patient up with PT/OT but unable to ambulate at this time due to fatigue and shortness of breath. 1402: Patient has not voided. Bladder scan showed 50-114mL. Dr. Verna Jones at bedside and informed of patient with no urine output. Orders received for albumin 5% and returned to pacing patient DDD 80/16/16 to increase cardiac output. 1559: Patient voided 100mL in bedside commode and assisted back to bed. 1323-4621: Patient to transfer to PCU. Ambulated patient to bathroom to void. Patient voided significant amount but unable to measure. During transfer, pacer stopped functioning properly. Patient weak and short of breath on toilet. Pacemaker settings adjusted to 80/20/25. Patient transferred to recliner. Pacemaker still not functioning properly. Attempted multiple settings without success. Dr. Verna Jones notified. EKG complete. Patient in junctional rhythm with episodes of sinus darlyn and a fib. BMP drawn per telephone order. BP stable at this time. Patient to remain in ICU overnight. Will leave pacer off at this time and have PA assess patient when available.

## 2022-04-21 NOTE — DIABETES MGMT
This is a 78-year-old woman admitted for management of an ascending aortic aneurysm status post aneurysmal repair on April 20, 2022. The patient does not have a history of diabetes mellitus. Her admission glucose was 101, A1c 5.8%, and her blood sugars have been easily controlled on glucose stabilizer per protocol. This morning the patient is somewhat uncomfortable but lying in bed and alert and oriented. Blood pressure 116/50  Pulse 58  Afebrile  93% on 5 L    Recent laboratory data  Glucose 99 (range ) on glucostabilizer  Creatinine 1.1  Calcium 8.4  Albumin 3.7    Impression  1. Valvular disease and aortic aneurysm status postrepair  2. Hyperglycemia currently managed with an insulin infusion per protocol    Recommendation  1. It is unlikely that she will require antihyperglycemic therapy when coming off glucostabilizer  2. We will follow closely with you    Total time 25 minutes, more than 50% of which was in direct patient care and/or care coordination.

## 2022-04-21 NOTE — INTERDISCIPLINARY ROUNDS
Interdisciplinary team rounds were held 4/21/2022 with the following team members:Care Management, Nursing, Nutrition, Pharmacy, Physical Therapy, Physician, Respiratory Therapy. Plan of care discussed. See clinical pathway and/or care plan for interventions and desired outcomes. Goals of the Day: Increase activity and post op education.

## 2022-04-21 NOTE — WOUND CARE
Left upper lip wound appears to be caused by trauma from the FELTON line during surgery. Bruising appears on outside of lip and inner lip deep purple intact skin. No treatment for lip at this time, but need to continue to monitor.     Neelam Kilpatrick RN, [employer]

## 2022-04-21 NOTE — CARDIO/PULMONARY
Cardiac Rehab Note: chart review/referral     Consult has been acknowledged         Smoking history assessed. Patient is a former smoker. Smoking Cessation Program link has not been added to the AVS.     EF 60%  on 04/19/2022per echo. Valve educational folder with \"Cardiac Surgery Post-Op Instructions\" book, heart healthy eating, warning signs, heart facts, heart aware, resources, and  Valve Surgery booklet to Clif Garcia on 04/19/2022. Educated using teach back method. Reviewed the use of bear for sternal support, daily weight and temperature monitoring, showering restrictions, signs and symptoms of infection at surgery sites, daily walking and arm exercises, and use of incentive spirometer. [facility] was able to demonstrate proper use of incentive spirometer. Reviewed risk factors for CAD to include the following: family history, elevated BMI, hyperlipidemia, hypertension, diabetes, stress, and smoking. Discussed Heart Healthy/Low Sodium (less than 2000 mg.) diet. Emphasized the value of cardiac rehab. Discussed Cardiac Rehab Program format, benefits, and encouraged enrollment to assist with risk modification and management. Patient would like to go to [address] [place] [address] cardiac rehab. Will have them follow up. [facility] verbalized understanding with questions answered. [facility] will continue to follow for educational needs.

## 2022-04-21 NOTE — PROGRESS NOTES
Cardiac Surgery Care Coordinator-  Met with Sivakumar Kumar. Reviewed plan of care and discussed goals for the day. Sivakumar José has a good understanding of her plan for the day. Reinforced sternal precautions and encouraged continued use of the incentive spirometer. Sivakumar Kumar can pull 500ml. Encouraged Sivakumar Kumar to verbalize. Will continue to follow for educational and emotional needs.  Amy Mchugh RN

## 2022-04-21 NOTE — PROGRESS NOTES
0600  Telemetry AV Paced 8( underlying rhythm accelerated junctional).  cc/ 12 hr and  cc/ 12 hr.  24 hr I/O +1.3L. WBC 24.9 and HGB 8.3. Posterior lung fields diminished. Pt short of breath when turning and after long verbal conversations. 2+ edema all extremities. O2 Sats 92-94% on 6L BNC.    Poor pain control requiring multiple doses of both IV and PO pain medications with only short term relief

## 2022-04-21 NOTE — PROGRESS NOTES
Critical Care Progress Note  Ngozi Almeida MD          Date of Service:  2022  NAME:  Mamie Cardoza  :  1945  MRN:  [id number]      Subjective/Hospital course:      : Patient was extubated yesterday. She reports feeling better today, currently on 6 L of oxygen. Pressors are weaned off. Problem list:     Problem list:  Hospital Problems  Date Reviewed: 2013          Codes Class Noted POA    S/P MVR (mitral valve replacement) ICD-10-CM: Z95.2  ICD-9-CM: V43.3  2022 Unknown        S/P aortic aneurysm repair ICD-10-CM: Z98.890, Z86.79  ICD-9-CM: V45.89  2022 Unknown        Ascending aortic aneurysm (HCC) ICD-10-CM: I71.2  ICD-9-CM: 441.2  3/29/2022 Unknown              Assessment/Plan:     Post cardiac surgery care: s/p aortic aneurysm repair and MVR  -Extubated on .  - Hemodynamic management per CT surgery postoperative guidelines. - Insulin for glycemic control. Shock. Vasoplegic vs cardiogenic.  - Weaned pressors. - hemodynamic management in collaboration with CT surgery    Ventilator Management. - Post-extubation pulmonary toilette with adequate postop pain control.   - Early mobilization with PT/OT as able. -Diuresis as tolerated for pulmonary edema. Anemia secondary to acute blood loss  - Follow CT output for now. - Follow CBC   - Supportive transfusions if needed. Thrombocytopenia  - follow CBC for now. Prophylaxis  - DVT prophylaxis. Start [facility] when ok with CTS. - VAP bundle with chlorhexidine    Code status: full code. I personally spent --- minutes of critical care time. This is time spent at this critically ill patient's bedside actively involved in patient care as well as the coordination of care and discussions with the patient's family. This does not include any procedural time which has been billed separately. Review of Systems:   A comprehensive review of systems was negative.        Vital Signs:     Patient Vitals for the past 4 hrs:   BP Temp Pulse Resp SpO2   04/21/22 0800 (!) 109/58 98 °F (36.7 °C) 61 22 93 %   04/21/22 0700 (!) 111/59  87 18 93 %        Intake/Output Summary (Last 24 hours) at 4/21/2022 0916  Last data filed at 4/21/2022 0800  Gross per 24 hour   Intake 2375.43 ml   Output 1415 ml   Net 960.43 ml        Physical Examination:    Physical Exam  Constitutional:       Appearance: Normal appearance. HENT:      Head: Normocephalic and atraumatic. Mouth/Throat:      Mouth: Mucous membranes are moist.   Eyes:      Extraocular Movements: Extraocular movements intact. Conjunctiva/sclera: Conjunctivae normal.   Cardiovascular:      Rate and Rhythm: Normal rate and regular rhythm. Pulmonary:      Effort: Pulmonary effort is normal. No respiratory distress. Breath sounds: Normal breath sounds. Abdominal:      General: Abdomen is flat. There is no distension. Palpations: Abdomen is soft. Tenderness: There is no abdominal tenderness. There is no guarding. Musculoskeletal:      Cervical back: Normal range of motion and neck supple. Neurological:      Mental Status: She is alert and oriented to person, place, and time. Labs:   Labs and imaging reviewed.       Medications:     Current Facility-Administered Medications   Medication Dose Route Frequency    chlorhexidine (PERIDEX) 0.12 % mouthwash 10 mL  10 mL Oral Q12H    gabapentin (NEURONTIN) capsule 100 mg  100 mg Oral Q8H    furosemide (LASIX) injection 40 mg  40 mg IntraVENous ONCE    balsam peru-castor oiL (VENELEX) ointment   Topical BID    cholecalciferol (VITAMIN D3) (1000 Units /25 mcg) tablet 2,000 Units  2,000 Units Oral BID    multivitamin, tx-iron-ca-min (THERA-M w/ IRON) tablet 1 Tablet  1 Tablet Oral DAILY    alteplase (CATHFLO) 1 mg in sterile water (preservative free) 1 mL injection  1 mg InterCATHeter PRN    bacitracin 500 unit/gram packet 1 Packet  1 Packet Topical PRN    sodium chloride (NS) flush 5-40 mL  5-40 mL IntraVENous Q8H    sodium chloride (NS) flush 5-40 mL  5-40 mL IntraVENous PRN    0.45% sodium chloride infusion  10 mL/hr IntraVENous CONTINUOUS    0.9% sodium chloride infusion  9 mL/hr IntraVENous CONTINUOUS    acetaminophen (TYLENOL) tablet 1,000 mg  1,000 mg Oral Q6H    oxyCODONE IR (ROXICODONE) tablet 5 mg  5 mg Oral Q4H PRN    oxyCODONE IR (ROXICODONE) tablet 10 mg  10 mg Oral Q4H PRN    naloxone (NARCAN) injection 0.4 mg  0.4 mg IntraVENous PRN    mupirocin (BACTROBAN) 2 % ointment   Both Nostrils BID    ondansetron (ZOFRAN) injection 4 mg  4 mg IntraVENous Q4H PRN    albuterol (PROVENTIL VENTOLIN) nebulizer solution 2.5 mg  2.5 mg Nebulization Q4H PRN    aspirin chewable tablet 81 mg  81 mg Oral DAILY    midazolam (VERSED) injection 1 mg  1 mg IntraVENous Q1H PRN    magnesium oxide (MAG-OX) tablet 400 mg  400 mg Oral BID    calcium chloride 1 g in 0.9% sodium chloride 250 mL IVPB  1 g IntraVENous PRN    bisacodyL (DULCOLAX) suppository 10 mg  10 mg Rectal DAILY PRN    senna-docusate (PERICOLACE) 8.6-50 mg per tablet 1 Tablet  1 Tablet Oral BID    polyethylene glycol (MIRALAX) packet 17 g  17 g Oral DAILY    ELECTROLYTE REPLACEMENT NOTE: Nurse to review Serum Potassium and Magnesuim levels and Initiate Electrolyte Replacement Protocol as needed  1 Each Other PRN    magnesium sulfate 1 g/100 ml IVPB (premix or compounded)  1 g IntraVENous PRN    insulin regular (NOVOLIN R, HUMULIN R) 100 Units in 0.9% sodium chloride 100 mL infusion  0-50 Units/hr IntraVENous TITRATE    glucose chewable tablet 16 g  4 Tablet Oral PRN    glucagon (GLUCAGEN) injection 1 mg  1 mg IntraMUSCular PRN    insulin lispro (HUMALOG) injection   SubCUTAneous TIDAC    insulin lispro (HUMALOG) injection   SubCUTAneous AC&HS    insulin glargine (LANTUS) injection 1-50 Units  1-50 Units SubCUTAneous ONCE OVER 24 HOURS    dextrose 10% infusion 0-250 mL  0-250 mL IntraVENous PRN    diphenhydrAMINE (BENADRYL) capsule 25 mg  25 mg Oral Q6H PRN    diphenhydrAMINE (BENADRYL) injection 25 mg  25 mg IntraVENous Q6H PRN    HYDROmorphone (DILAUDID) injection 0.5 mg  0.5 mg IntraVENous Q2H PRN    HYDROmorphone (DILAUDID) injection 1 mg  1 mg IntraVENous Q2H PRN    melatonin tablet 3-6 mg  3-6 mg Oral QHS PRN    pantoprazole (PROTONIX) tablet 40 mg  40 mg Oral ACB    0.9% sodium chloride infusion 250 mL  250 mL IntraVENous PRN     ______________________________________________________________________  EXPECTED LENGTH OF STAY: 5d 21h  ACTUAL LENGTH OF STAY:          Timbo Osullivan, MD   Pulmonary/CCM  [phone]  [phone]  4/21/2022

## 2022-04-22 ENCOUNTER — APPOINTMENT (OUTPATIENT)
Dept: GENERAL RADIOLOGY | Age: 77
DRG: 219 | End: 2022-04-22
Attending: PHYSICIAN ASSISTANT
Payer: MEDICARE

## 2022-04-22 LAB
ADMINISTERED INITIALS, ADMINIT: NORMAL
ALBUMIN SERPL-MCNC: 3.9 G/DL (ref 3.5–5)
ALBUMIN/GLOB SERPL: 1.9 {RATIO} (ref 1.1–2.2)
ALP SERPL-CCNC: 39 U/L (ref 45–117)
ALT SERPL-CCNC: 14 U/L (ref 12–78)
ANION GAP SERPL CALC-SCNC: 7 MMOL/L (ref 5–15)
AST SERPL-CCNC: 49 U/L (ref 15–37)
ATRIAL RATE: 277 BPM
ATRIAL RATE: 72 BPM
BILIRUB SERPL-MCNC: 0.8 MG/DL (ref 0.2–1)
BUN SERPL-MCNC: 35 MG/DL (ref 6–20)
BUN/CREAT SERPL: 32 (ref 12–20)
CALCIUM SERPL-MCNC: 9 MG/DL (ref 8.5–10.1)
CALCULATED R AXIS, ECG10: 5 DEGREES
CALCULATED R AXIS, ECG10: 8 DEGREES
CALCULATED T AXIS, ECG11: -10 DEGREES
CALCULATED T AXIS, ECG11: -4 DEGREES
CHLORIDE SERPL-SCNC: 107 MMOL/L (ref 97–108)
CO2 SERPL-SCNC: 22 MMOL/L (ref 21–32)
CREAT SERPL-MCNC: 1.11 MG/DL (ref 0.55–1.02)
D50 ADMINISTERED, D50ADM: 0 ML
D50 ORDER, D50ORD: 0 ML
DIAGNOSIS, 93000: NORMAL
DIAGNOSIS, 93000: NORMAL
ERYTHROCYTE [DISTWIDTH] IN BLOOD BY AUTOMATED COUNT: 15.6 % (ref 11.5–14.5)
GLOBULIN SER CALC-MCNC: 2.1 G/DL (ref 2–4)
GLSCOM COMMENTS: NORMAL
GLUCOSE BLD STRIP.AUTO-MCNC: 100 MG/DL (ref 65–117)
GLUCOSE BLD STRIP.AUTO-MCNC: 115 MG/DL (ref 65–117)
GLUCOSE BLD STRIP.AUTO-MCNC: 116 MG/DL (ref 65–117)
GLUCOSE BLD STRIP.AUTO-MCNC: 120 MG/DL (ref 65–117)
GLUCOSE BLD STRIP.AUTO-MCNC: 121 MG/DL (ref 65–117)
GLUCOSE BLD STRIP.AUTO-MCNC: 122 MG/DL (ref 65–117)
GLUCOSE BLD STRIP.AUTO-MCNC: 125 MG/DL (ref 65–117)
GLUCOSE BLD STRIP.AUTO-MCNC: 142 MG/DL (ref 65–117)
GLUCOSE BLD STRIP.AUTO-MCNC: 91 MG/DL (ref 65–117)
GLUCOSE SERPL-MCNC: 115 MG/DL (ref 65–100)
GLUCOSE, GLC: 100 MG/DL
GLUCOSE, GLC: 100 MG/DL
GLUCOSE, GLC: 110 MG/DL
GLUCOSE, GLC: 115 MG/DL
GLUCOSE, GLC: 116 MG/DL
GLUCOSE, GLC: 121 MG/DL
GLUCOSE, GLC: 122 MG/DL
GLUCOSE, GLC: 91 MG/DL
HCT VFR BLD AUTO: 24.9 % (ref 35–47)
HGB BLD-MCNC: 7.9 G/DL (ref 11.5–16)
HIGH TARGET, HITG: 140 MG/DL
INSULIN ADMINSTERED, INSADM: 0 UNITS/HOUR
INSULIN ADMINSTERED, INSADM: 0.3 UNITS/HOUR
INSULIN ORDER, INSORD: 0 UNITS/HOUR
INSULIN ORDER, INSORD: 0.3 UNITS/HOUR
LOW TARGET, LOT: 100 MG/DL
MAGNESIUM SERPL-MCNC: 2.7 MG/DL (ref 1.6–2.4)
MCH RBC QN AUTO: 28.5 PG (ref 26–34)
MCHC RBC AUTO-ENTMCNC: 31.7 G/DL (ref 30–36.5)
MCV RBC AUTO: 89.9 FL (ref 80–99)
MINUTES UNTIL NEXT BG, NBG: 120 MIN
MINUTES UNTIL NEXT BG, NBG: 120 MIN
MINUTES UNTIL NEXT BG, NBG: 60 MIN
MULTIPLIER, MUL: 0
MULTIPLIER, MUL: 0.01
NRBC # BLD: 0.1 K/UL (ref 0–0.01)
NRBC BLD-RTO: 0.6 PER 100 WBC
ORDER INITIALS, ORDINIT: NORMAL
PLATELET # BLD AUTO: 120 K/UL (ref 150–400)
PMV BLD AUTO: 13 FL (ref 8.9–12.9)
POTASSIUM SERPL-SCNC: 4.3 MMOL/L (ref 3.5–5.1)
PROT SERPL-MCNC: 6 G/DL (ref 6.4–8.2)
Q-T INTERVAL, ECG07: 406 MS
Q-T INTERVAL, ECG07: 410 MS
QRS DURATION, ECG06: 78 MS
QRS DURATION, ECG06: 80 MS
QTC CALCULATION (BEZET), ECG08: 410 MS
QTC CALCULATION (BEZET), ECG08: 465 MS
RBC # BLD AUTO: 2.77 M/UL (ref 3.8–5.2)
SERVICE CMNT-IMP: ABNORMAL
SERVICE CMNT-IMP: NORMAL
SODIUM SERPL-SCNC: 136 MMOL/L (ref 136–145)
VENTRICULAR RATE, ECG03: 60 BPM
VENTRICULAR RATE, ECG03: 79 BPM
WBC # BLD AUTO: 18 K/UL (ref 3.6–11)

## 2022-04-22 PROCEDURE — 74011250636 HC RX REV CODE- 250/636: Performed by: PHYSICIAN ASSISTANT

## 2022-04-22 PROCEDURE — 51798 US URINE CAPACITY MEASURE: CPT

## 2022-04-22 PROCEDURE — 97110 THERAPEUTIC EXERCISES: CPT

## 2022-04-22 PROCEDURE — P9047 ALBUMIN (HUMAN), 25%, 50ML: HCPCS | Performed by: THORACIC SURGERY (CARDIOTHORACIC VASCULAR SURGERY)

## 2022-04-22 PROCEDURE — 74011636637 HC RX REV CODE- 636/637: Performed by: PHYSICIAN ASSISTANT

## 2022-04-22 PROCEDURE — 77010033678 HC OXYGEN DAILY

## 2022-04-22 PROCEDURE — 85027 COMPLETE CBC AUTOMATED: CPT

## 2022-04-22 PROCEDURE — 36415 COLL VENOUS BLD VENIPUNCTURE: CPT

## 2022-04-22 PROCEDURE — 74011250637 HC RX REV CODE- 250/637: Performed by: THORACIC SURGERY (CARDIOTHORACIC VASCULAR SURGERY)

## 2022-04-22 PROCEDURE — 74011000250 HC RX REV CODE- 250: Performed by: THORACIC SURGERY (CARDIOTHORACIC VASCULAR SURGERY)

## 2022-04-22 PROCEDURE — 82962 GLUCOSE BLOOD TEST: CPT

## 2022-04-22 PROCEDURE — 99232 SBSQ HOSP IP/OBS MODERATE 35: CPT | Performed by: CLINICAL NURSE SPECIALIST

## 2022-04-22 PROCEDURE — 83735 ASSAY OF MAGNESIUM: CPT

## 2022-04-22 PROCEDURE — 74011250637 HC RX REV CODE- 250/637: Performed by: PHYSICIAN ASSISTANT

## 2022-04-22 PROCEDURE — 97530 THERAPEUTIC ACTIVITIES: CPT

## 2022-04-22 PROCEDURE — 74011000250 HC RX REV CODE- 250: Performed by: PHYSICIAN ASSISTANT

## 2022-04-22 PROCEDURE — 71045 X-RAY EXAM CHEST 1 VIEW: CPT

## 2022-04-22 PROCEDURE — 74011250636 HC RX REV CODE- 250/636: Performed by: THORACIC SURGERY (CARDIOTHORACIC VASCULAR SURGERY)

## 2022-04-22 PROCEDURE — 65270000046 HC RM TELEMETRY

## 2022-04-22 PROCEDURE — 80053 COMPREHEN METABOLIC PANEL: CPT

## 2022-04-22 PROCEDURE — 77030038269 HC DRN EXT URIN PURWCK BARD -A

## 2022-04-22 RX ORDER — ALBUMIN HUMAN 250 G/1000ML
12.5 SOLUTION INTRAVENOUS 2 TIMES DAILY
Status: COMPLETED | OUTPATIENT
Start: 2022-04-22 | End: 2022-04-22

## 2022-04-22 RX ADMIN — HYDROMORPHONE HYDROCHLORIDE 1 MG: 1 INJECTION, SOLUTION INTRAMUSCULAR; INTRAVENOUS; SUBCUTANEOUS at 05:28

## 2022-04-22 RX ADMIN — Medication 400 MG: at 09:04

## 2022-04-22 RX ADMIN — ASPIRIN 81 MG: 81 TABLET, CHEWABLE ORAL at 09:27

## 2022-04-22 RX ADMIN — CASTOR OIL AND BALSAM, PERU: 788; 87 OINTMENT TOPICAL at 21:00

## 2022-04-22 RX ADMIN — GABAPENTIN 100 MG: 100 CAPSULE ORAL at 05:28

## 2022-04-22 RX ADMIN — CHOLECALCIFEROL TAB 25 MCG (1000 UNIT) 2000 UNITS: 25 TAB at 17:46

## 2022-04-22 RX ADMIN — ACETAMINOPHEN 1000 MG: 500 TABLET ORAL at 18:44

## 2022-04-22 RX ADMIN — CHLORHEXIDINE GLUCONATE 10 ML: 1.2 RINSE ORAL at 09:07

## 2022-04-22 RX ADMIN — SENNOSIDES AND DOCUSATE SODIUM 1 TABLET: 50; 8.6 TABLET ORAL at 09:04

## 2022-04-22 RX ADMIN — Medication 400 MG: at 17:46

## 2022-04-22 RX ADMIN — MUPIROCIN: 20 OINTMENT TOPICAL at 09:24

## 2022-04-22 RX ADMIN — GABAPENTIN 100 MG: 100 CAPSULE ORAL at 14:56

## 2022-04-22 RX ADMIN — ALBUMIN (HUMAN) 12.5 G: 0.25 INJECTION, SOLUTION INTRAVENOUS at 09:03

## 2022-04-22 RX ADMIN — CHOLECALCIFEROL TAB 25 MCG (1000 UNIT) 2000 UNITS: 25 TAB at 10:11

## 2022-04-22 RX ADMIN — Medication 2 UNITS: at 23:40

## 2022-04-22 RX ADMIN — CASTOR OIL AND BALSAM, PERU: 788; 87 OINTMENT TOPICAL at 09:25

## 2022-04-22 RX ADMIN — Medication 1 TABLET: at 09:04

## 2022-04-22 RX ADMIN — SODIUM CHLORIDE, PRESERVATIVE FREE 10 ML: 5 INJECTION INTRAVENOUS at 05:29

## 2022-04-22 RX ADMIN — SODIUM CHLORIDE, PRESERVATIVE FREE 10 ML: 5 INJECTION INTRAVENOUS at 22:00

## 2022-04-22 RX ADMIN — GABAPENTIN 100 MG: 100 CAPSULE ORAL at 22:25

## 2022-04-22 RX ADMIN — SENNOSIDES AND DOCUSATE SODIUM 1 TABLET: 50; 8.6 TABLET ORAL at 17:46

## 2022-04-22 RX ADMIN — HYDROMORPHONE HYDROCHLORIDE 0.5 MG: 1 INJECTION, SOLUTION INTRAMUSCULAR; INTRAVENOUS; SUBCUTANEOUS at 09:01

## 2022-04-22 RX ADMIN — OXYCODONE 10 MG: 5 TABLET ORAL at 00:02

## 2022-04-22 RX ADMIN — ACETAMINOPHEN 1000 MG: 500 TABLET ORAL at 12:56

## 2022-04-22 RX ADMIN — CHLORHEXIDINE GLUCONATE 10 ML: 1.2 RINSE ORAL at 22:35

## 2022-04-22 RX ADMIN — SODIUM CHLORIDE, PRESERVATIVE FREE 10 ML: 5 INJECTION INTRAVENOUS at 14:56

## 2022-04-22 RX ADMIN — PANTOPRAZOLE SODIUM 40 MG: 40 TABLET, DELAYED RELEASE ORAL at 09:04

## 2022-04-22 RX ADMIN — MUPIROCIN: 20 OINTMENT TOPICAL at 17:26

## 2022-04-22 RX ADMIN — ACETAMINOPHEN 1000 MG: 500 TABLET ORAL at 23:55

## 2022-04-22 RX ADMIN — ALBUMIN (HUMAN) 12.5 G: 0.25 INJECTION, SOLUTION INTRAVENOUS at 12:11

## 2022-04-22 RX ADMIN — OXYCODONE 5 MG: 5 TABLET ORAL at 12:10

## 2022-04-22 RX ADMIN — POLYETHYLENE GLYCOL 3350 17 G: 17 POWDER, FOR SOLUTION ORAL at 09:07

## 2022-04-22 RX ADMIN — ACETAMINOPHEN 1000 MG: 500 TABLET ORAL at 05:28

## 2022-04-22 NOTE — PROGRESS NOTES
Cardiac Surgery Care Coordinator- Met with Flaquita Granados, reviewed plan of care. Reinforced sternal precautions and encouraged continued use of the incentive spirometer. Reviewed goals for the day and emphasized the importance of increased activity to meet discharge goals. Will continue to follow for educational and emotional needs.  Farrukh Dueñas RN

## 2022-04-22 NOTE — PROGRESS NOTES
Problem: Mobility Impaired (Adult and Pediatric)  Goal: *Acute Goals and Plan of Care (Insert Text)  Description: FUNCTIONAL STATUS PRIOR TO ADMISSION: Patient was independent and active without use of DME.     HOME SUPPORT PRIOR TO ADMISSION: The patient lived alone with children to provide assistance. Physical Therapy Goals  Initiated 4/21/2022  1. Patient will move from supine to sit and sit to supine  in bed with moderate assistance  within 5 days. 2.  Patient will perform sit to/from stand with minimal assistance/contact guard assist within 5 days. 3.  Patient will ambulate 60 feet with least restrictive assistive device and minimal assistance/contact guard assist within 5 days. 4.  Patient will perform cardiac exercises per protocol with modified independence within 5 days. 5.  Patient will verbally recall and functionally demonstrate mindful-based movements (\"move in the tube\") principles without cues within 5 days. Outcome: Not Progressing Towards Goal     PHYSICAL THERAPY TREATMENT  Patient: Gabriele Cartwright (58 y.o. female)  Date: 4/22/2022  Diagnosis: Mitral valve insufficiency, unspecified etiology [I34.0]  Ascending aortic aneurysm (HCC) [I71.2] <principal problem not specified>  Procedure(s) (LRB):  FELTON BY DR BRUCE - PFO CLOSURE -  MITRAL VALVE REPLACEMENT WITH 27 MM CHARLES MAGNA MITRAL EASE - RESUSPENSION OF AORTIC VALVE -  ASCENDING AORTIC ANEURYSM REPAIR AND LEFT ATRIAL APPENDAGE LIGATION (N/A) 3 Days Post-Op  Precautions: Sternal,Fall  Chart, physical therapy assessment, plan of care and goals were reviewed. ASSESSMENT  Patient continues with skilled PT services and is not progressing towards goals. Pt was received in the chair on 4L and cleared by nursing to mobilize. She performed cardiac exercises but needed extensive rest breaks. She needed multiple attempts to stand with increased assistance of 2 people.  Attempted to ambulate,but pt reported significant SOB and she needed mod A to take a few steps to get to the bed. Vitals stable and was returned to supine with significant assistance. She needs rehab at discharge, but unsure if she is able to tolerate the intensity of 3 hours. Patient is not demonstrating understanding of mindful-based movements (\"move in the tube\") principles of keeping UEs proximal to ribcage to prevent lateral pull on the sternum during load-bearing activities with verbal cues required for compliance. Current Level of Function Impacting Discharge (mobility/balance): mod A x 2    Other factors to consider for discharge:          PLAN :  Patient continues to benefit from skilled intervention to address the above impairments. Continue treatment per established plan of care. to address goals. Recommendation for discharge: (in order for the patient to meet his/her long term goals)  Therapy 3 hours per day 5-7 days per week    This discharge recommendation:  Has not yet been discussed the attending provider and/or case management    IF patient discharges home will need the following DME: to be determined (TBD)       SUBJECTIVE:   Patient stated I can't breathe.     OBJECTIVE DATA SUMMARY:   Patient mobilized on continuous portable monitor/telemetry. Critical Behavior:  Neurologic State: Alert  Orientation Level: Oriented X4  Cognition: Appropriate decision making,Appropriate for age attention/concentration,Appropriate safety awareness,Follows commands       Functional Mobility Training:  Bed Mobility:        Sit to Supine: Maximum assistance  Scooting: Moderate assistance          Transfers:  Sit to Stand:  Moderate assistance;Assist x2  Stand to Sit: Moderate assistance;Assist x2                               Balance:  Sitting: Impaired  Sitting - Static: Good (unsupported)  Sitting - Dynamic: Fair (occasional)  Standing: Impaired  Standing - Static: Fair;Constant support  Standing - Dynamic : Fair;Constant support    Ambulation/Gait Training: Took a few steps with damaris and mod A               Cardiac diagnosis intervention:  Patient instructed and educated on mindful movement principles based on Move in The Tube concept to include maintaining bilateral elbows close to rib cage when performing any load-bearing activity such as getting in/out of bed, pushing up from a chair, opening a door, or lifting a box. Patient was given a handout with diagrams of each correct/incorrect method of performing each of the above tasks. Therapeutic Exercises:   Patient instructed on the benefits and demonstrated cardiac exercises while sitting with Stand-by assistance. Instructed and indicated understanding on how to progress reps, sets against gravity, pacing through progressive muscle strengthening standing based on surgeon clearance for more weight in prep for functional activity. Instruction on the use of household items in place of weights as needed.      CARDIAC  EXERCISE   Sets   Reps   Active Active Assist   Passive Self ROM   Comments   Shoulder flexion 1 5 [x]                                            []                                            []                                            []                                               Shoulder abduction 1      5 [x]                                            []                                            []                                            []                                               Scapular elevation 1 5 [x]                                            []                                            []                                            []                                               Scapular retraction 1 5 [x]                                            []                                            []                                            []                                               Trunk rotation 1 5 [x]                                            [] []                                            [x]                                               Trunk sidebending 1 5 [x]                                            []                                            []                                            []                                                  []                                            []                                            []                                            []                                                     Activity Tolerance:   Poor and desaturates with exertion and requires oxygen    After treatment patient left in no apparent distress:   Supine in bed and Call bell within reach    COMMUNICATION/COLLABORATION:   The patients plan of care was discussed with: Occupational therapist and Registered nurse.      Amanda Elder, PT, DPT   Time Calculation: 24 mins

## 2022-04-22 NOTE — CONSULTS
[facility]    Name:  Kassy Kumar  MR#:  [id number]  :  1945  ACCOUNT #:  [de-identified]  DATE OF SERVICE:  2022    REQUESTING PHYSICIAN:  Wilner Jarrell MD    REASON FOR CONSULTATION:  Evaluate bradycardia. CHIEF COMPLAINT:  The patient voices no chief complaint. HISTORY OF PRESENT ILLNESS:  The patient is a 28-year-old female with a history of atrial fibrillation, hypertension, mitral regurgitation and an ascending aortic aneurysm. She is status post an ascending aortic aneurysm repair as well as mitral valve replacement and left atrial appendage occlusion earlier this week. She has no history of coronary artery disease. She has been pacemaker dependent since her surgery earlier this week on Tuesday and we were asked to see the patient because of possible need for permanent pacemaker. The patient has a normal EF by prior echocardiogram.  She had catheterization in 2021 that showed no significant disease with severe mitral regurgitation. Her aortic root was 5.4 cm. She underwent a repair with a 34 mm Dacron graft as well as a #27 Carey Giles bioprosthetic mitral valve. She has a history of intermittent atrial fibrillation in the past and has been on antiarrhythmic therapy including Tikosyn. Currently she is awake and voices no specific complaints. PAST MEDICAL HISTORY:  As noted above. Also some COPD. SURGERIES:  Status post recent surgery as noted above, status post gastric banding, status post hysterectomy, status post right hip open reduction and internal fixation status post left wrist surgery. CURRENT MEDICATIONS:  1.  IV normal saline. 2.  IV Lantus insulin. 3.  Neurontin. 4.  Humalog sliding scale. 5.  Magnesium oxide. 6.  Protonix. 7.  Argelia-Colace. 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 She is a former smoker. She drinks alcohol socially.     FAMILY HISTORY:  The patient's mother had lung cancer. REVIEW OF SYSTEMS:  As noted above, otherwise noncontributory. PHYSICAL EXAMINATION:  GENERAL:  Exam reveals an elderly white female in no acute distress and a currently postop cardiac surgery. VITAL SIGNS: Blood pressure 107/52, pulse 70, respirations 18, temperature 97. HEENT:  Pupils are equal and reactive. Oropharynx moist oral mucosa. NECK:  No masses or thyromegaly. No obvious cervical or supraclavicular adenopathy. No carotid bruits. CHEST:  Clear anteriorly. SKIN:  No obvious rashes. CARDIAC:  Regular rate and rhythm. 2/6 systolic murmur. No gallop. There was a pericardial rub present. ABDOMEN:  Obese, soft, nontender, no masses or organomegaly. Bowel sounds positive. EXTREMITIES:  No cyanosis or clubbing. 1+ pedal edema bilaterally. Distal pulses not well palpated. NEUROLOGIC:  No obvious gross motor deficits. LABORATORY DATA:  Hemoglobin 7.9, white count 18, BUN 35, creatinine 1.1. EKG done on 04/21 showed atrial fibrillation, normal QRS duration. No obvious ST-T changes. IMPRESSION:  1. Status post recent ascending aortic aneurysm repair and mitral valve replacement as described above. 2.  Postoperative bradycardia and junctional rhythm. 3.  History of paroxysmal atrial fibrillation. 4.  Hypertension. 5.  History of coronary artery disease. 6.  Multiple other medical problems described above. RECOMMENDATIONS:  At this point, I agree with observing the patient over the weekend. If she remains pacemaker dependent we can move towards permanent pacemaker placement early next week. In the meantime, continue current therapy. Thank you for this consult.       MD PEACE [email or web address]  D:  04/22/2022 12:59  T:  04/22/2022 14:02  JOB #:  [id number]  CC:  MD Pao Cruz MD

## 2022-04-22 NOTE — PROGRESS NOTES
2030  Back to bed x 3 assist.  Place pt on right side and pacer with better capture DDD 70. While lying flat to reposition sats drop to 85% Ascultation of posterior lobe fields with crackles bilaterally. Dr Claudia Mandel talk to day nurse and ordered for Albumin 50 cc/hr overnight. 0000  Bladder scan 58 cc noted. No urge to void. No bladder distension    0245  BS 90. Insulin drip off per stabilizer      0532  Up to [facility] to void. 250 cc dark monica urine (very strong concentrated smell). Return to bed and labs drawn. 06:00  Telemetry DDD 70  (underlying AJR 50-60).  cc/ 12 hr .   cc/ 12 hr.  I/O +1.6 L/24 hr and 9.6L/Admission  Creatinine down at  1.1 overnight. Pain control much improved from previous night. Only medicated twice for break thru pain.     2496  Up to chair with 2 assist .

## 2022-04-22 NOTE — PROGRESS NOTES
Critical Care Progress Note  Allison Parish MD          Date of Service:  2022  NAME:  Tamiko Fink  :  1945  MRN:  [id number]      Subjective/Hospital course:      : Patient was extubated yesterday. She reports feeling better today, currently on 6 L of oxygen. Pressors are weaned off.  : Patient reports feeling better, on 4 lit oxygen. No acute events overnight. Problem list:     Problem list:  Hospital Problems  Date Reviewed: 2013          Codes Class Noted POA    S/P MVR (mitral valve replacement) ICD-10-CM: Z95.2  ICD-9-CM: V43.3  2022 Unknown        S/P aortic aneurysm repair ICD-10-CM: Z98.890, Z86.79  ICD-9-CM: V45.89  2022 Unknown        Ascending aortic aneurysm (HCC) ICD-10-CM: I71.2  ICD-9-CM: 441.2  3/29/2022 Unknown              Assessment/Plan:     Post cardiac surgery care: s/p aortic aneurysm repair and MVR  -Extubated on .  -Hemodynamic management per CT surgery postoperative guidelines.   -Insulin for glycemic control. Shock. Vasoplegic vs cardiogenic.  - Weaned pressors. - hemodynamic management in collaboration with CT surgery    Ventilator Management. - Post-extubation pulmonary toilette with adequate postop pain control.   - Early mobilization with PT/OT as able. -Diuresis as tolerated for pulmonary edema. Anemia secondary to acute blood loss  - Follow CT output for now. - Follow CBC   - Supportive transfusions if needed. Thrombocytopenia  - follow CBC for now. Prophylaxis  - DVT prophylaxis. [facility] when ok with CTS. - VAP bundle with chlorhexidine    Code status: full code. I personally spent 40 minutes of critical care time. This is time spent at this critically ill patient's bedside actively involved in patient care as well as the coordination of care and discussions with the patient's family. This does not include any procedural time which has been billed separately.       Review of Systems:   A comprehensive review of systems was negative. Vital Signs:     Patient Vitals for the past 4 hrs:   BP Temp Pulse Resp SpO2   04/22/22 0800 133/62 97 °F (36.1 °C) 69 21 94 %   04/22/22 0700 124/69  70 17 95 %   04/22/22 0600 (!) 107/52  70 14 94 %        Intake/Output Summary (Last 24 hours) at 4/22/2022 0952  Last data filed at 4/22/2022 0800  Gross per 24 hour   Intake 2645.85 ml   Output 740 ml   Net 1905.85 ml        Physical Examination:    Physical Exam  Constitutional:       Appearance: Normal appearance. HENT:      Head: Normocephalic and atraumatic. Mouth/Throat:      Mouth: Mucous membranes are moist.   Eyes:      Extraocular Movements: Extraocular movements intact. Conjunctiva/sclera: Conjunctivae normal.   Cardiovascular:      Rate and Rhythm: Normal rate and regular rhythm. Pulmonary:      Effort: Pulmonary effort is normal. No respiratory distress. Breath sounds: Normal breath sounds. Abdominal:      General: Abdomen is flat. There is no distension. Palpations: Abdomen is soft. Tenderness: There is no abdominal tenderness. There is no guarding. Musculoskeletal:      Cervical back: Normal range of motion and neck supple. Neurological:      Mental Status: She is alert and oriented to person, place, and time. Labs:   Labs and imaging reviewed.       Medications:     Current Facility-Administered Medications   Medication Dose Route Frequency    albumin human 25% (BUMINATE) solution 12.5 g  12.5 g IntraVENous BID    chlorhexidine (PERIDEX) 0.12 % mouthwash 10 mL  10 mL Oral Q12H    gabapentin (NEURONTIN) capsule 100 mg  100 mg Oral Q8H    balsam peru-castor oiL (VENELEX) ointment   Topical BID    cholecalciferol (VITAMIN D3) (1000 Units /25 mcg) tablet 2,000 Units  2,000 Units Oral BID    multivitamin, tx-iron-ca-min (THERA-M w/ IRON) tablet 1 Tablet  1 Tablet Oral DAILY    alteplase (CATHFLO) 1 mg in sterile water (preservative free) 1 mL injection 1 mg InterCATHeter PRN    bacitracin 500 unit/gram packet 1 Packet  1 Packet Topical PRN    sodium chloride (NS) flush 5-40 mL  5-40 mL IntraVENous Q8H    sodium chloride (NS) flush 5-40 mL  5-40 mL IntraVENous PRN    0.45% sodium chloride infusion  25 mL/hr IntraVENous CONTINUOUS    0.9% sodium chloride infusion  9 mL/hr IntraVENous CONTINUOUS    acetaminophen (TYLENOL) tablet 1,000 mg  1,000 mg Oral Q6H    oxyCODONE IR (ROXICODONE) tablet 5 mg  5 mg Oral Q4H PRN    oxyCODONE IR (ROXICODONE) tablet 10 mg  10 mg Oral Q4H PRN    naloxone (NARCAN) injection 0.4 mg  0.4 mg IntraVENous PRN    mupirocin (BACTROBAN) 2 % ointment   Both Nostrils BID    ondansetron (ZOFRAN) injection 4 mg  4 mg IntraVENous Q4H PRN    albuterol (PROVENTIL VENTOLIN) nebulizer solution 2.5 mg  2.5 mg Nebulization Q4H PRN    aspirin chewable tablet 81 mg  81 mg Oral DAILY    magnesium oxide (MAG-OX) tablet 400 mg  400 mg Oral BID    calcium chloride 1 g in 0.9% sodium chloride 250 mL IVPB  1 g IntraVENous PRN    bisacodyL (DULCOLAX) suppository 10 mg  10 mg Rectal DAILY PRN    senna-docusate (PERICOLACE) 8.6-50 mg per tablet 1 Tablet  1 Tablet Oral BID    polyethylene glycol (MIRALAX) packet 17 g  17 g Oral DAILY    ELECTROLYTE REPLACEMENT NOTE: Nurse to review Serum Potassium and Magnesuim levels and Initiate Electrolyte Replacement Protocol as needed  1 Each Other PRN    magnesium sulfate 1 g/100 ml IVPB (premix or compounded)  1 g IntraVENous PRN    insulin regular (NOVOLIN R, HUMULIN R) 100 Units in 0.9% sodium chloride 100 mL infusion  0-50 Units/hr IntraVENous TITRATE    glucose chewable tablet 16 g  4 Tablet Oral PRN    glucagon (GLUCAGEN) injection 1 mg  1 mg IntraMUSCular PRN    insulin lispro (HUMALOG) injection   SubCUTAneous TIDAC    insulin lispro (HUMALOG) injection   SubCUTAneous AC&HS    insulin glargine (LANTUS) injection 1-50 Units  1-50 Units SubCUTAneous ONCE OVER 24 HOURS    dextrose 10% infusion 0-250 mL  0-250 mL IntraVENous PRN    diphenhydrAMINE (BENADRYL) capsule 25 mg  25 mg Oral Q6H PRN    diphenhydrAMINE (BENADRYL) injection 25 mg  25 mg IntraVENous Q6H PRN    HYDROmorphone (DILAUDID) injection 0.5 mg  0.5 mg IntraVENous Q2H PRN    melatonin tablet 3-6 mg  3-6 mg Oral QHS PRN    pantoprazole (PROTONIX) tablet 40 mg  40 mg Oral ACB     ______________________________________________________________________  EXPECTED LENGTH OF STAY: 5d 21h  ACTUAL LENGTH OF STAY:          [address], MD   Pulmonary/[facility]  [address]  [phone]  4/22/2022

## 2022-04-22 NOTE — DIABETES MGMT
[id number]    CLINICAL NURSE SPECIALIST CONSULT     Initial Presentation   Danielle Perkins is a 77 y.o. female admitted 4/19/22 for planned CV surgery. Pre-hospitalization workup with the following findings & plans:  1. Ascending aortic aneurysm:  Reviewed images with Dr. Margaret Damico. Will proceed with surgery for aneurysmal repair  2. Severe MR and mod TR: FELTON completed and reviewed by Dr. Margaret Damico. On lasix. MVR not needed per Dr. Margaret Damico   3. Pulmonary HTN: Better after diuresis as seen on RHC. Former smoker. Encouraged continuing cessation. 4. A-fib: On Tikoysn, eliquis. Will have LAAL during surgery. 5. Chronic Diastolic HF: On daily lasix  6. Mild-Mod AI: AV replacement not need per Dr. Margaret Damico. HX:   Past Medical History:   Diagnosis Date    Anemia 7/12/2010    Aortic aneurysm (HCC)     Arrhythmia     afib r/t low potassium     Arthritis 7/12/2010    Atrial fibrillation (HCC)     cardioversion times 2    Chronic pain     Eczema 7/12/2010    Edema 7/12/2010    GERD (gastroesophageal reflux disease) 7/12/2010    Heart failure (HCC)     Hepatitis     antibody for Hepatitis B - not a carrier    Hiatal hernia 7/12/2010    Smoker 7/12/2010      INITIAL DX:   Mitral valve insufficiency, unspecified etiology [I34.0]  Ascending aortic aneurysm ([address] [I71.2]     Current Treatment     TX: 4/19/22 1. Replacement of ascendind aorta with 34 mm Dacron graft (from STJ to distal ascending aorta). 2. Resuspension of aortic valve. 3. Mitral valve replacement with #27 CE Teofilo Ease bioprosthesis. 4. Left atrial appendage occlusion with V-shaped Atricure #40 clip. 5. PFO closure.     Consulted by Provider for advanced diabetes nursing assessment and care for:   [x] Transitioning off John Lash   [x] Inpatient management strategy  [] Home management assessment  [] Survival skill education    Hospital Course   Clinical progress has been complicated by long surgical time (14hours with blood loss) and need for ICU level of care. 4/22/22 Alert & oriented. On O2NC. Pacer not capturing => for permanent pacer today. Received albumin & Lasix yesterday; will receive more diuretic today. First meal this morning; not much appetite. Pain+. Voiding+. OOB to chair    Diabetes History   Patient did not have a personal or family history of diabetes PTA. Admission BG 91 and A1c of 5.8% confirms non-diabetes state. Is pre-diabetic. Subjective   \"I'm active at home. \"     Objective   Physical exam  General Obese female who is in no acute distress  Neuro  Alert & oriented  Vital Signs   Visit Vitals  /62 (BP 1 Location: Left upper arm, BP Patient Position: At rest)   Pulse 69   Temp 97 °F (36.1 °C)   Resp 21   Ht 5' 5\" (1.651 m)   Wt 100.1 kg (220 lb 10.9 oz)   SpO2 94%   BMI 36.72 kg/m²     Laboratory  Recent Labs     04/22/22  0539 04/21/22  1921 04/21/22  0330 04/20/22  0424 04/20/22  0424   * 110* 99   < > 113*   AGAP 7 7 5   < > 5   WBC 18.0*  --  24.9*  --  14.3*   CREA 1.11* 1.27* 1.11*   < > 0.69   GFRNA 48* 41* 48*   < > >60   AST 49*  --  94*  --  89*   ALT 14  --  19  --  19    < > = values in this interval not displayed. Factors impacting BG management  Factor Dose Comments   Nutrition:  Regular Adult    60 gms CHO/meal   First meal today   Drugs:  Steroids  Blood transfusion(s)   Dexamethasone 8mg (4/19/22)  Multiple during surgery   Impairs insulin action  A1cs inaccurate   Pain Dilaudid PRN Dose @9am   Infection Prophylactic Ancef Q6 hrs Afebrile. WBC coming down   Other:   Kidney function  Liver function   STEVE  AST down to 49      Blood glucose pattern      Significant diabetes-related events over the past 24-72 hours  Admission BG 91. A1c 5.8%. On Glucostabilizer => BGs easily controlled.  Used 19.2 units of total insulin 4/20/22 and 18.3 units 4/21/22    Assessment and Plan   Nursing Diagnosis Risk for unstable blood glucose pattern   Nursing Intervention Domain 5259 Decision-making Support   Nursing Interventions Examined current inpatient diabetes/blood glucose control   Explored factors facilitating and impeding inpatient management     Evaluation   This obese  female had a difficult surgery 4/20/22. Intubated initially; sedation weaned off, along with pressers. In setting of no diabetes history, BGs have been easily controlled on Glucostabilizer. Recommend transitioning off GS to QID BG monitoring for 1-2 days. Discussed diabetes prevention particularly need for 12 lb weight loss (over the next 4-6 months) and 150 minutes of physical activity/week. Patient has been successful in losing weight in the past, and is interested to pursuing again. Recommendations     [x] Transition off Glucostabilizer; switch to QID BG monitoring     Billing Code(s)   [x] 12648 IP subsequent hospital care - 25 minutes     Before making these care recommendations, I personally reviewed the hospitalization record, including notes, laboratory & diagnostic data and current medications, and examined the patient at the bedside (circumstances permitting) before making care recommendations. More than fifty (50) percent of the time was spent in patient counseling and/or care coordination.   Total minutes: [address], CNS  Diabetes Clinical Nurse Specialist  [facility]  Access via [address] [place] [address]

## 2022-04-22 NOTE — PROCEDURES
Stone drain dressings removed. Sites cleansed with CHG. CT x2 removed without difficulty. Vaseline gauze and 4x4 dressing applied. VSS. RN updated. Pt may get up for lunch now.

## 2022-04-22 NOTE — PROGRESS NOTES
[facility] ICU Progress Note    Admit Date: 2022  POD:  3 Day Post-Op    Procedure:  Procedure(s):  FELTON BY DR BRUCE - PFO CLOSURE -  MITRAL VALVE REPLACEMENT WITH 27 MM CHARLES MAGNA MITRAL EASE - RESUSPENSION OF AORTIC VALVE -  ASCENDING AORTIC ANEURYSM REPAIR AND LEFT ATRIAL APPENDAGE LIGATION        Subjective:   Pt seen with Dr. Kristen Rea and Dr. Prem Pineda. Pt sitting up in chair. Feeling well. On 4L NC. Afebrile. In junctional, slower 40-50s this morning. Objective:   Vitals:  Blood pressure 133/62, pulse 69, temperature (P) 97 °F (36.1 °C), resp. rate 21, height 5' 5\" (1.651 m), weight 220 lb 10.9 oz (100.1 kg), SpO2 94 %. Temp (24hrs), Av.8 °F (36.6 °C), Min:97 °F (36.1 °C), Max:98.7 °F (37.1 °C)    EKG/Rhythm: AV paced - underlying junctional      Oxygen Therapy:  Oxygen Therapy  O2 Sat (%): 94 % (22 0800)  Pulse via Oximetry: 70 beats per minute (22 0800)  O2 Device: Nasal cannula (22 0000)  Skin Assessment: Clean, dry, & intact (22 2000)  Skin Protection for O2 Device: No (22 0800)  O2 Flow Rate (L/min): 4 l/min (22 0000)  FIO2 (%): 40 % (22 0955)    CXR:   CXR Results  (Last 48 hours)               22 0514  XR CHEST PORT Final result    Impression:  No significant change. Narrative:  INDICATION:  postop heart       EXAM: CXR Portable. FINDINGS: Portable chest shows removal of right IJ sheath with apparent stable   chest remains since yesterday. There is no apparent pneumothorax. Lungs show   stable nonspecific bibasilar haziness. Heart size is stable. There is no overt   pulmonary edema. 22 0420  XR CHEST PORT Final result    Impression:  Stable cardiomegaly and left sided effusion. No significant interval   change.        Narrative:  EXAM: XR CHEST PORT       INDICATION: postop heart       COMPARISON: Chest radiograph 2022       FINDINGS: A portable AP radiograph of the chest was obtained at 0350 hours.   Exam is slightly limited by patient rotation. Patient is status post sternotomy   and mitral valve replacement. The ET tube, NG tube, and [place]-Martinez catheter have   been removed. The cardiomediastinal configuration is unchanged. The lung volumes   are again low. There is ongoing left-sided small effusion and suspected   atelectasis. The bones and soft tissues are grossly within normal limits. Admission Weight: Last Weight   Weight: 191 lb 12.8 oz (87 kg) Weight: 220 lb 10.9 oz (100.1 kg)     Intake / Output / Drain:  Current Shift: 04/22 0701 - 04/22 1900  In: -   Out: 50 [Drains:50]  Last 24 hrs.:     Intake/Output Summary (Last 24 hours) at 4/22/2022 0914  Last data filed at 4/22/2022 0758  Gross per 24 hour   Intake 2308.17 ml   Output 740 ml   Net 1568.17 ml       EXAM:  General:  NAD, pleasant mood      Lungs:   Clear to auscultation bilaterally. Incision:  Prineo intact. Heart:  Regular rate and rhythm, S1, S2 normal, no murmur, click, rub or gallop. Abdomen:   Soft, non-tender. Bowel sounds hypoactive. No masses,  No organomegaly. Extremities:  1+ b/l edema - nonpitting. PPP. Ecchymosis around left femoral a-line. Neurologic:  Gross motor and sensory apparatus intact. Labs:   Recent Labs     04/22/22  0900 04/22/22  0540 04/22/22  0539 04/20/22  0446 04/20/22  0424   WBC  --   --  18.0*   < > 14.3*   HGB  --   --  7.9*   < > 8.9*   HCT  --   --  24.9*   < > 27.3*   PLT  --   --  120*   < > 73*   NA  --   --  136   < > 148*   K  --   --  4.3   < > 4.8   BUN  --   --  35*   < > 14   CREA  --   --  1.11*   < > 0.69   GLU  --   --  115*   < > 113*   GLUCPOC 121*   < >  --    < >  --    INR  --   --   --   --  1.4*    < > = values in this interval not displayed.         Assessment:     Active Problems:    Ascending aortic aneurysm ([address] (3/29/2022)      S/P MVR (mitral valve replacement) (4/19/2022)      S/P aortic aneurysm repair (4/19/2022) Plan/Recommendations/Medical Decision Makin. S/p ascending aortic aneurysm: Strict BP control. Will remove CT today. 2. S/p MVR (tissue valve): Will need AC x 3 months for valve. Discussed with Dr. Gilmer Calhoun -  Will resume Eliquis potentially Monday if pt doesn't need PPM  3. Pulmonary HTN: Improved following MVR  4. A-fib: s/p LAAL. On diltiazem, Tikosyn, and eliquis PTA. Restart Eliquis in next few days. Cont to hold rate meds for junctional rhythm. Consult EP today for poss PPM on Monday if pt does not improve  5. Anemia secondary to acute blood loss: Monitor H/H and CT output. Received a unit PRBC . 6. Atelectasis, pulmonary edema, pleural effusions:  Down to 4L NC. IS hourly! OOB activity with PT/OT when appropriate. 7. Chronic Diastolic HF: Monitor I/O. Low UOP. Hold diuretics, appears dry. Give IVFs and more albumin today. 8. Leukocytosis: WBC down to 18 today. Tmax 99. Continue to monitor. 9. Pain management: Scheduled tylenol and gabapentin. PRN oxycodone and dilaudid for breakthrough pain. Avoid toradol d/t risk of STEVE. 10. Leukocytosis: Increase mobility. Pulm toileting. Daily incisional care. Tmax 99  11. Nutrition: encourage PO. Add Ensure High Protein today.      Dispo: PT/OT. Keep in ICU per Dr. Byers Figures for HR concerns. Needs to be OOB TID, ambulating TID. Remove Najera. Case management following to aid in d/c planning, home with [facility] TBD based on rhythm.      Signed By: Nathan Lopez NP

## 2022-04-22 NOTE — PROGRESS NOTES
Problem: Self Care Deficits Care Plan (Adult)  Goal: *Acute Goals and Plan of Care (Insert Text)  Description: FUNCTIONAL STATUS PRIOR TO ADMISSION: Patient was independent and active without use of DME. Reports occasional use of SPC as needed. 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     HOME SUPPORT: The patient lived alone with 3 children as local support. Pt reports children plan to stay with her at [place]. Occupational Therapy Goals  Initiated 4/21/2022  1. Patient will perform ADLs standing 5 mins without fatigue or LOB with supervision/set-up within 7 day(s). 2.  Patient will perform lower body ADLs with supervision/set-up within 7 day(s). 3.  Patient will perform gathering ADL items high and low 2/2 with supervision/set-up within 7 day(s). 4.  Patient will perform toilet transfers with supervision/set-up within 7 day(s). 5.  Patient will perform all aspects of toileting with supervision/set-up within 7 day(s). 6.  Patient will participate in cardiac/sternal upper extremity therapeutic exercise/activities to increase independence with ADLs with supervision/set-up for 5 minutes within 7 day(s). Outcome: Progressing Towards Goal    OCCUPATIONAL THERAPY TREATMENT  Patient: Gabriele Cartwright (17 y.o. female)  Date: 4/22/2022  Diagnosis: Mitral valve insufficiency, unspecified etiology [I34.0]  Ascending aortic aneurysm (HCC) [I71.2] <principal problem not specified>  Procedure(s) (LRB):  FELTON BY DR BRUCE - PFO CLOSURE -  MITRAL VALVE REPLACEMENT WITH 27 MM CHARLES MAGNA MITRAL EASE - RESUSPENSION OF AORTIC VALVE -  ASCENDING AORTIC ANEURYSM REPAIR AND LEFT ATRIAL APPENDAGE LIGATION (N/A) 3 Days Post-Op  Precautions: Sternal,Fall  Chart, occupational therapy assessment, plan of care, and goals were reviewed. ASSESSMENT  Patient continues with skilled OT services and is slowly progressing towards goals.   Pt limited by increased pain and poor endurance on this date, reporting dizziness and SOB when attempting standing and cardiac exercises. Pt able to briefly complete cardiac exercises while seated requiring continued education and cues for proper positioning. Following pt required multiple trials of standing, requiring mod A x2 to stand and maintain balance. Attempted to get standing BP however unable to obtain as pt requesting to lay down and endorsing significant dizziness. Required increased assistance to returned to supine, remained SOB however all vitals stable. Patient lives alone and has limited family support, recommend IPR at DC pending progress for these reasons however hesitant that pt can tolerate 3 hours/5days/week. Patient is not verbalizing and is not demonstrating understanding of mindful-based movements (\"move in the tube\") principles of keeping UEs proximal to ribcage to prevent lateral pull on the sternum during load-bearing activities with verbal, manual, and tactile cues required for compliance. Current Level of Function Impacting Discharge (ADLs): min - max A     Other factors to consider for discharge: below baseline, MVR         PLAN :  Patient continues to benefit from skilled intervention to address the above impairments. Continue treatment per established plan of care to address goals. Recommend with staff: up to chair daily    Recommend next OT session: cardiac exercises, standing UB dressing     Recommendation for discharge: (in order for the patient to meet his/her long term goals)  Therapy 3 hours per day 5-7 days per week    This discharge recommendation:  Has been made in collaboration with the attending provider and/or case management    IF patient discharges home will need the following DME: AE: long handled bathing, AE: long handled dressing, bedside commode, shower chair, and walker: rolling       SUBJECTIVE:   Patient stated I cant I need to sit.     OBJECTIVE DATA SUMMARY:   Cognitive/Behavioral Status:  Neurologic State: Alert  Orientation Level: Oriented X4  Cognition: Appropriate decision making; Appropriate for age attention/concentration; Appropriate safety awareness; Follows commands             Functional Mobility and Transfers for ADLs:  Bed Mobility:  Sit to Supine: Maximum assistance  Scooting: Moderate assistance    Transfers:  Sit to Stand: Moderate assistance;Assist x2          Balance:  Sitting: Impaired  Sitting - Static: Good (unsupported)  Sitting - Dynamic: Fair (occasional)  Standing: Impaired  Standing - Static: Fair;Constant support  Standing - Dynamic : Fair;Constant support    ADL Intervention:        Patient continues to require up to max A for ADLs. Patient instructed and educated on mindful movement principles based on Move in The Tube concept to include maintaining bilateral elbows close to rib cage when performing any load-bearing activity such as getting in/out of bed, pushing up from a chair, opening a door, or lifting a box. Patient was given a handout with diagrams of each correct/incorrect method of performing each of the above tasks. Patient instructed on the ability to utilize upper extremities outside the tube when doing any non-load bearing activity such as washing hair/body, brushing teeth, retrieving clothing items, or scratching your back. Patient encouraged to also perform upper extremity exercises \"outside of the tube\" to prevent scar tissue formation around sternal incision site. Therapeutic Exercises:   Patient instructed on the benefits and demonstrated cardiac exercises while seated in recliner with Minimum assistance. Instructed and indicated understanding on how to progress reps, sets against gravity, pacing through progressive muscle strengthening standing based on surgeon clearance for more weight in prep for basic and instrumental ADLs. Instruction on the use of household items in place of weights as needed.     CARDIAC   EXERCISE    Sets    Reps    Active  Active Assist    Passive  Self ROM    Comments Shoulder flexion  1  5   [x]                            []                             []                             []                                Shoulder abduction  1  5  [x]                             []                             []                             []                                Scapular elevation  1  5  [x]                             []                              []                             []                                Scapular retraction  1  5  [x]                             []                             []                             []                                Trunk rotation  1  5  [x]                             []                             []                             []                                Trunk sidebending  1  5  [x]                             []                              []                             []                                          Pain:  Pt endorsing fatigue and pain at sternal incision did not quantify. Activity Tolerance:   Poor, requires rest breaks, and observed SOB with activity    After treatment patient left in no apparent distress:   Supine in bed, Call bell within reach, Side rails x 3, and RN notified    COMMUNICATION/COLLABORATION:   The patients plan of care was discussed with: Physical therapist, Occupational therapist, and Registered nurse.      Abdulaziz Eldridge OT  Time Calculation: 24 mins

## 2022-04-22 NOTE — PROGRESS NOTES
0700: Bedside shift change report given to Kelley Romero RN/Marguerite SHAY (oncoming nurse) by Madison Mcneal (offgoing nurse). Report included the following information SBAR, ED Summary, Intake/Output, MAR, Recent Results, Cardiac Rhythm AV paced and Alarm Parameters      0800: AM assessment completed see flowsheet for details. Pt is up in chair, alert and oriented. Not complaining of pain at this time. S1S2 heart sounds, pericardial rub heard. Crackles in left lower lobe, no cough. Does incentive spirometry independently. Voided in bedside commode, monica in color. Chest tube draining serosanguinous. +2 edema in all 4 extremities. 0830: cardiac surgery rounds completed. Chest tube to be taken out today. 0900: PRN dilaudid given for incisional pain. 1030: Interdisciplinary team rounds were held 4/22/2022 with the following team members:Care Management, Diabetes Treatment Specialist, Nursing, Pharmacy,Physician, and Physical Therapy. Plan of care discussed. See clinical pathway and/or care plan for interventions and desired outcomes. Goals of the Day: remove chest tubes. EP consulted for pacemaker. 1100: pt working with PT/OT. Will be returned back to bed.     1200: reassessment complete-  No significant changes. 1300: chest tubes removed by NP.     1600: reassessment complete- no significant changes. Pt up in chair for dinner. 1900: End of Shift Note    Bedside shift change report given to Kaite Diaz (oncoming nurse) by Gino Mooney RN (offgoing nurse). Report included the following information SBAR, ED Summary, Intake/Output, MAR, Recent Results, Cardiac Rhythm AV paced and Alarm Parameters     Shift worked:  day     Shift summary and any significant changes:     pt is very SOB on exertion and rest. SOB has been getting progressively worse. Unable to do much with PT. Temp pacer intermittently fails to capture. Pt made NPO as of 1830. Insulin gtt off, switched to AC HS.       Concerns for physician to address:  would likely benefit from chest xr     Zone phone for oncoming shift:          Activity:  Activity Level: Bed Rest  Number times ambulated in hallways past shift: 0  Number of times OOB to chair past shift: 3    Cardiac:   Cardiac Monitoring: Yes      Cardiac Rhythm: AV Paced    Access:   Current line(s): PIV     Genitourinary:   Urinary status: voiding    Respiratory:   O2 Device: Nasal cannula  Chronic home O2 use?: NO  Incentive spirometer at bedside: YES  Actual Volume (ml): 500 ml    GI:  Last Bowel Movement Date: 04/18/22  Current diet:  ADULT DIET Regular; 4 carb choices (60 gm/meal); Low Fat/Low Chol/High Fiber/SAMY  ADULT ORAL NUTRITION SUPPLEMENT Breakfast, Lunch, Dinner; Standard High Calorie/High Protein  DIET NPO Sips of Water with Meds  Passing flatus: YES  Tolerating current diet: YES       Pain Management:   Patient states pain is manageable on current regimen: YES    Skin:  Maynor Score: 16  Interventions: speciality bed, float heels, increase time out of bed and PT/OT consult    Patient Safety:  Fall Score:  Total Score: 3  Interventions: bed/chair alarm, assistive device (walker, cane, etc), gripper socks and pt to call before getting OOB  High Fall Risk: Yes    Length of Stay:  Expected LOS: 5d 21h  Actual LOS: 3      Bonnie Tam RN

## 2022-04-23 LAB
ABO + RH BLD: NORMAL
ALBUMIN SERPL-MCNC: 3.7 G/DL (ref 3.5–5)
ALBUMIN/GLOB SERPL: 1.7 {RATIO} (ref 1.1–2.2)
ALP SERPL-CCNC: 53 U/L (ref 45–117)
ALT SERPL-CCNC: 11 U/L (ref 12–78)
ANION GAP SERPL CALC-SCNC: 4 MMOL/L (ref 5–15)
AST SERPL-CCNC: 33 U/L (ref 15–37)
BILIRUB SERPL-MCNC: 0.8 MG/DL (ref 0.2–1)
BLD PROD TYP BPU: NORMAL
BLOOD GROUP ANTIBODIES SERPL: NORMAL
BPU ID: NORMAL
BUN SERPL-MCNC: 34 MG/DL (ref 6–20)
BUN/CREAT SERPL: 46 (ref 12–20)
CALCIUM SERPL-MCNC: 8.9 MG/DL (ref 8.5–10.1)
CHLORIDE SERPL-SCNC: 106 MMOL/L (ref 97–108)
CO2 SERPL-SCNC: 27 MMOL/L (ref 21–32)
CREAT SERPL-MCNC: 0.74 MG/DL (ref 0.55–1.02)
CROSSMATCH RESULT,%XM: NORMAL
ERYTHROCYTE [DISTWIDTH] IN BLOOD BY AUTOMATED COUNT: 15.9 % (ref 11.5–14.5)
GLOBULIN SER CALC-MCNC: 2.2 G/DL (ref 2–4)
GLUCOSE SERPL-MCNC: 118 MG/DL (ref 65–100)
HCT VFR BLD AUTO: 22.9 % (ref 35–47)
HGB BLD-MCNC: 7.2 G/DL (ref 11.5–16)
MAGNESIUM SERPL-MCNC: 2.4 MG/DL (ref 1.6–2.4)
MCH RBC QN AUTO: 28.7 PG (ref 26–34)
MCHC RBC AUTO-ENTMCNC: 31.4 G/DL (ref 30–36.5)
MCV RBC AUTO: 91.2 FL (ref 80–99)
NRBC # BLD: 0.12 K/UL (ref 0–0.01)
NRBC BLD-RTO: 0.8 PER 100 WBC
PHOSPHATE SERPL-MCNC: 1.6 MG/DL (ref 2.6–4.7)
PLATELET # BLD AUTO: 137 K/UL (ref 150–400)
PMV BLD AUTO: 12.2 FL (ref 8.9–12.9)
POTASSIUM SERPL-SCNC: 4.1 MMOL/L (ref 3.5–5.1)
PROT SERPL-MCNC: 5.9 G/DL (ref 6.4–8.2)
RBC # BLD AUTO: 2.51 M/UL (ref 3.8–5.2)
SODIUM SERPL-SCNC: 137 MMOL/L (ref 136–145)
SPECIMEN EXP DATE BLD: NORMAL
STATUS OF UNIT,%ST: NORMAL
UNIT DIVISION, %UDIV: 0
WBC # BLD AUTO: 15.1 K/UL (ref 3.6–11)

## 2022-04-23 PROCEDURE — 65270000046 HC RM TELEMETRY

## 2022-04-23 PROCEDURE — 74011000250 HC RX REV CODE- 250: Performed by: THORACIC SURGERY (CARDIOTHORACIC VASCULAR SURGERY)

## 2022-04-23 PROCEDURE — 74011250637 HC RX REV CODE- 250/637: Performed by: PHYSICIAN ASSISTANT

## 2022-04-23 PROCEDURE — 83735 ASSAY OF MAGNESIUM: CPT

## 2022-04-23 PROCEDURE — 97530 THERAPEUTIC ACTIVITIES: CPT

## 2022-04-23 PROCEDURE — 74011250637 HC RX REV CODE- 250/637: Performed by: THORACIC SURGERY (CARDIOTHORACIC VASCULAR SURGERY)

## 2022-04-23 PROCEDURE — 36415 COLL VENOUS BLD VENIPUNCTURE: CPT

## 2022-04-23 PROCEDURE — 74011000250 HC RX REV CODE- 250: Performed by: PHYSICIAN ASSISTANT

## 2022-04-23 PROCEDURE — 80053 COMPREHEN METABOLIC PANEL: CPT

## 2022-04-23 PROCEDURE — 97110 THERAPEUTIC EXERCISES: CPT

## 2022-04-23 PROCEDURE — 74011250636 HC RX REV CODE- 250/636: Performed by: THORACIC SURGERY (CARDIOTHORACIC VASCULAR SURGERY)

## 2022-04-23 PROCEDURE — 84100 ASSAY OF PHOSPHORUS: CPT

## 2022-04-23 PROCEDURE — 85027 COMPLETE CBC AUTOMATED: CPT

## 2022-04-23 PROCEDURE — 77010033678 HC OXYGEN DAILY

## 2022-04-23 RX ORDER — FUROSEMIDE 10 MG/ML
20 INJECTION INTRAMUSCULAR; INTRAVENOUS 2 TIMES DAILY
Status: DISCONTINUED | OUTPATIENT
Start: 2022-04-23 | End: 2022-04-27

## 2022-04-23 RX ADMIN — OXYCODONE 10 MG: 5 TABLET ORAL at 16:13

## 2022-04-23 RX ADMIN — PANTOPRAZOLE SODIUM 40 MG: 40 TABLET, DELAYED RELEASE ORAL at 08:19

## 2022-04-23 RX ADMIN — FUROSEMIDE 20 MG: 10 INJECTION, SOLUTION INTRAMUSCULAR; INTRAVENOUS at 17:32

## 2022-04-23 RX ADMIN — GABAPENTIN 100 MG: 100 CAPSULE ORAL at 06:35

## 2022-04-23 RX ADMIN — FUROSEMIDE 20 MG: 10 INJECTION, SOLUTION INTRAMUSCULAR; INTRAVENOUS at 08:27

## 2022-04-23 RX ADMIN — ACETAMINOPHEN 1000 MG: 500 TABLET ORAL at 13:15

## 2022-04-23 RX ADMIN — MUPIROCIN: 20 OINTMENT TOPICAL at 08:22

## 2022-04-23 RX ADMIN — GABAPENTIN 100 MG: 100 CAPSULE ORAL at 13:15

## 2022-04-23 RX ADMIN — CHLORHEXIDINE GLUCONATE 10 ML: 1.2 RINSE ORAL at 22:20

## 2022-04-23 RX ADMIN — ASPIRIN 81 MG: 81 TABLET, CHEWABLE ORAL at 08:25

## 2022-04-23 RX ADMIN — CHLORHEXIDINE GLUCONATE 10 ML: 1.2 RINSE ORAL at 08:18

## 2022-04-23 RX ADMIN — CASTOR OIL AND BALSAM, PERU: 788; 87 OINTMENT TOPICAL at 22:19

## 2022-04-23 RX ADMIN — Medication 400 MG: at 08:19

## 2022-04-23 RX ADMIN — Medication 400 MG: at 17:29

## 2022-04-23 RX ADMIN — CHOLECALCIFEROL TAB 25 MCG (1000 UNIT) 2000 UNITS: 25 TAB at 08:19

## 2022-04-23 RX ADMIN — Medication 1 TABLET: at 08:19

## 2022-04-23 RX ADMIN — MUPIROCIN: 20 OINTMENT TOPICAL at 22:19

## 2022-04-23 RX ADMIN — SODIUM CHLORIDE, PRESERVATIVE FREE 10 ML: 5 INJECTION INTRAVENOUS at 13:16

## 2022-04-23 RX ADMIN — CASTOR OIL AND BALSAM, PERU: 788; 87 OINTMENT TOPICAL at 08:21

## 2022-04-23 RX ADMIN — SENNOSIDES AND DOCUSATE SODIUM 1 TABLET: 50; 8.6 TABLET ORAL at 08:19

## 2022-04-23 RX ADMIN — ACETAMINOPHEN 1000 MG: 500 TABLET ORAL at 06:35

## 2022-04-23 RX ADMIN — SENNOSIDES AND DOCUSATE SODIUM 1 TABLET: 50; 8.6 TABLET ORAL at 17:29

## 2022-04-23 RX ADMIN — CHOLECALCIFEROL TAB 25 MCG (1000 UNIT) 2000 UNITS: 25 TAB at 17:29

## 2022-04-23 RX ADMIN — GABAPENTIN 100 MG: 100 CAPSULE ORAL at 22:20

## 2022-04-23 RX ADMIN — SODIUM CHLORIDE, PRESERVATIVE FREE 10 ML: 5 INJECTION INTRAVENOUS at 05:30

## 2022-04-23 NOTE — PROGRESS NOTES
1900- Bedside report rec'd from Dayana Lutz RN and assumed care of pt.  2000- assessment completed. 2230 nena explained and placed for pt use tonight rather than exert large amounts of energy getting up and out out of bed tonight                                                                                                                                                 0400- pt resting with eyes closed.   9389- pt up to bsc for BM.   Earma Hence dc'd for now  0610- transferred  pt to the recliner      0700- bedside report given to Marcello Posadareynaldo Alvarez

## 2022-04-23 NOTE — PROGRESS NOTES
Critical Care Progress Note  Myrtle Garcia MD          Date of Service:  2022  NAME:  Jad Sandoval  :  1945  MRN:  [id number]      Subjective/Hospital course:      : Patient was extubated yesterday. She reports feeling better today, currently on 6 L of oxygen. Pressors are weaned off.  : Patient reports feeling better, on 4 lit oxygen. No acute events overnight.   : Patient reports feeling a little bit better than yesterday. She is weaned off of vasopressors now. Problem list:     Problem list:  Hospital Problems  Date Reviewed: 2013          Codes Class Noted POA    S/P MVR (mitral valve replacement) ICD-10-CM: Z95.2  ICD-9-CM: V43.3  2022 Unknown        S/P aortic aneurysm repair ICD-10-CM: Z98.890, Z86.79  ICD-9-CM: V45.89  2022 Unknown        Ascending aortic aneurysm (HCC) ICD-10-CM: I71.2  ICD-9-CM: 441.2  3/29/2022 Unknown              Assessment/Plan:     Post cardiac surgery care: s/p aortic aneurysm repair and MVR  -Extubated on .  -Hemodynamic management per CT surgery postoperative guidelines.   -Insulin for glycemic control. Shock. Vasoplegic vs cardiogenic.  - Weaned pressors. - hemodynamic management in collaboration with CT surgery    Ventilator Management. - Post-extubation pulmonary toilette with adequate postop pain control.   - Early mobilization with PT/OT as able. -Diuresis as tolerated for pulmonary edema. Anemia secondary to acute blood loss  - Follow CT output for now. - Follow CBC   - Supportive transfusions if needed. Thrombocytopenia  - follow CBC for now. Prophylaxis  - DVT prophylaxis. Start [facility] when ok with CTS. - VAP bundle with chlorhexidine    Code status: full code. I personally spent --- minutes of critical care time.   This is time spent at this critically ill patient's bedside actively involved in patient care as well as the coordination of care and discussions with the patient's family. This does not include any procedural time which has been billed separately. Review of Systems:   A comprehensive review of systems was negative. Vital Signs:     Patient Vitals for the past 4 hrs:   BP Temp Pulse Resp SpO2   04/23/22 1300 126/86  74 23 94 %   04/23/22 1200 (!) 121/58 97.6 °F (36.4 °C) (!) 59 19 98 %   04/23/22 1100 (!) 101/46  60 20 97 %   04/23/22 1000 (!) 112/51  62 26 98 %        Intake/Output Summary (Last 24 hours) at 4/23/2022 1323  Last data filed at 4/23/2022 1319  Gross per 24 hour   Intake 700 ml   Output 1650 ml   Net -950 ml        Physical Examination:    Physical Exam  Constitutional:       Appearance: Normal appearance. HENT:      Head: Normocephalic and atraumatic. Mouth/Throat:      Mouth: Mucous membranes are moist.   Eyes:      Extraocular Movements: Extraocular movements intact. Conjunctiva/sclera: Conjunctivae normal.   Cardiovascular:      Rate and Rhythm: Normal rate and regular rhythm. Pulmonary:      Effort: Pulmonary effort is normal. No respiratory distress. Breath sounds: Normal breath sounds. Abdominal:      General: Abdomen is flat. There is no distension. Palpations: Abdomen is soft. Tenderness: There is no abdominal tenderness. There is no guarding. Musculoskeletal:      Cervical back: Normal range of motion and neck supple. Neurological:      Mental Status: She is alert and oriented to person, place, and time. Labs:   Labs and imaging reviewed.       Medications:     Current Facility-Administered Medications   Medication Dose Route Frequency    furosemide (LASIX) injection 20 mg  20 mg IntraVENous BID    chlorhexidine (PERIDEX) 0.12 % mouthwash 10 mL  10 mL Oral Q12H    gabapentin (NEURONTIN) capsule 100 mg  100 mg Oral Q8H    balsam peru-castor oiL (VENELEX) ointment   Topical BID    cholecalciferol (VITAMIN D3) (1000 Units /25 mcg) tablet 2,000 Units  2,000 Units Oral BID    multivitamin, tx-iron-ca-min (THERA-M w/ IRON) tablet 1 Tablet  1 Tablet Oral DAILY    alteplase (CATHFLO) 1 mg in sterile water (preservative free) 1 mL injection  1 mg InterCATHeter PRN    bacitracin 500 unit/gram packet 1 Packet  1 Packet Topical PRN    sodium chloride (NS) flush 5-40 mL  5-40 mL IntraVENous Q8H    sodium chloride (NS) flush 5-40 mL  5-40 mL IntraVENous PRN    acetaminophen (TYLENOL) tablet 1,000 mg  1,000 mg Oral Q6H    oxyCODONE IR (ROXICODONE) tablet 5 mg  5 mg Oral Q4H PRN    oxyCODONE IR (ROXICODONE) tablet 10 mg  10 mg Oral Q4H PRN    naloxone (NARCAN) injection 0.4 mg  0.4 mg IntraVENous PRN    mupirocin (BACTROBAN) 2 % ointment   Both Nostrils BID    ondansetron (ZOFRAN) injection 4 mg  4 mg IntraVENous Q4H PRN    albuterol (PROVENTIL VENTOLIN) nebulizer solution 2.5 mg  2.5 mg Nebulization Q4H PRN    aspirin chewable tablet 81 mg  81 mg Oral DAILY    magnesium oxide (MAG-OX) tablet 400 mg  400 mg Oral BID    calcium chloride 1 g in 0.9% sodium chloride 250 mL IVPB  1 g IntraVENous PRN    bisacodyL (DULCOLAX) suppository 10 mg  10 mg Rectal DAILY PRN    senna-docusate (PERICOLACE) 8.6-50 mg per tablet 1 Tablet  1 Tablet Oral BID    polyethylene glycol (MIRALAX) packet 17 g  17 g Oral DAILY    ELECTROLYTE REPLACEMENT NOTE: Nurse to review Serum Potassium and Magnesuim levels and Initiate Electrolyte Replacement Protocol as needed  1 Each Other PRN    magnesium sulfate 1 g/100 ml IVPB (premix or compounded)  1 g IntraVENous PRN    glucose chewable tablet 16 g  4 Tablet Oral PRN    glucagon (GLUCAGEN) injection 1 mg  1 mg IntraMUSCular PRN    dextrose 10% infusion 0-250 mL  0-250 mL IntraVENous PRN    diphenhydrAMINE (BENADRYL) capsule 25 mg  25 mg Oral Q6H PRN    diphenhydrAMINE (BENADRYL) injection 25 mg  25 mg IntraVENous Q6H PRN    HYDROmorphone (DILAUDID) injection 0.5 mg  0.5 mg IntraVENous Q2H PRN    melatonin tablet 3-6 mg  3-6 mg Oral QHS PRN    pantoprazole (PROTONIX) tablet 40 mg  40 mg Oral ACB     ______________________________________________________________________  EXPECTED LENGTH OF STAY: 5d 21h  ACTUAL LENGTH OF STAY:          Brendon Perez 69, MD   Pulmonary/[facility]  [address]  [phone]  4/23/2022

## 2022-04-23 NOTE — PROGRESS NOTES
Spiritual Care Assessment/Progress Note  [facility]      NAME: Sahara Murray      MRN: [id number]  AGE: 77 y.o. SEX: female  Gnosticist Affiliation: Yazidism   Language: English     4/23/2022     Total Time (in minutes): 10     Spiritual Assessment begun in [facility] CRITICAL CARE 1 through conversation with:         []Patient        [] Family    [] Friend(s)        Reason for Consult: Initial/Spiritual assessment, critical care     Spiritual beliefs: (Please include comment if needed)     [] Identifies with a mercedes tradition:         [] Supported by a mercedes community:            [] Claims no spiritual orientation:           [] Seeking spiritual identity:                [] Adheres to an individual form of spirituality:           [x] Not able to assess:                           Identified resources for coping:      [] Prayer                               [] Music                  [] Guided Imagery     [] Family/friends                 [] Pet visits     [] Devotional reading                         [x] Unknown     [] Other:                                              Interventions offered during this visit: (See comments for more details)    Patient Interventions: Initial visit           Plan of Care:     [x] Support spiritual and/or cultural needs    [] Support AMD and/or advance care planning process      [] Support grieving process   [] Coordinate Rites and/or Rituals    [] Coordination with community clergy   [] No spiritual needs identified at this time   [] Detailed Plan of Care below (See Comments)  [] Make referral to Music Therapy  [] Make referral to Pet Therapy     [] Make referral to Addiction services  [] Make referral to [facility]  [] Make referral to Spiritual Care Partner  [] No future visits requested        [x] Contact Spiritual Care for further referrals     Comments:  Reviewed chart prior to visit on CCU for spiritual assessment. No family/friends present.  Patient appeared to be sleeping comfortably.  did not attempt to wake. Unable to assess for spiritual needs or concerns at this time.    available upon referral by nurse or by patient/family request.       MAKI Maya, [facility], Staff   [facility] Paging Service  [phone])

## 2022-04-23 NOTE — PROGRESS NOTES
0700  Bedside and verbal report from Mary Calabrese RN  1513  Dr. Antonette Mackenzie here to see patient. Pacer rate decreased to 50 by Dr. Antonette Mackenzie  0800  Assessment completed. Patient up in the chair, eating breakfast, poor appetite. 0900  Assisted to [facility], then to bed. Bath completed by PCT. Old chest tube site dressing wet/saturated. Dressing removed, site cleansed with CHG, then new dressing applied. Pacer care also completed at the same time as dressing change. 1000  Resting with eyes closed at intervals. 1200  Napping at intervals. Reassessment completed. 1300  PT with patient, assisted out of bed to [facility], then to recliner at bedside. 1355  Back to bed with assistance of PCT. 1500  Napping at intervals. 1600  Reassessment completed. 1613  Medicated with oxycodone 10 mg po for pain, per patient request.  1800  Took diet fair. Daughter at bedside.    1900  Bedside and verbal report given to Creasie Spurling, RN

## 2022-04-23 NOTE — PROGRESS NOTES
Problem: Mobility Impaired (Adult and Pediatric)  Goal: *Acute Goals and Plan of Care (Insert Text)  Description: FUNCTIONAL STATUS PRIOR TO ADMISSION: Patient was independent and active without use of DME.     HOME SUPPORT PRIOR TO ADMISSION: The patient lived alone with children to provide assistance. Physical Therapy Goals  Initiated 4/21/2022  1. Patient will move from supine to sit and sit to supine  in bed with moderate assistance  within 5 days. 2.  Patient will perform sit to/from stand with minimal assistance/contact guard assist within 5 days. 3.  Patient will ambulate 60 feet with least restrictive assistive device and minimal assistance/contact guard assist within 5 days. 4.  Patient will perform cardiac exercises per protocol with modified independence within 5 days. 5.  Patient will verbally recall and functionally demonstrate mindful-based movements (\"move in the tube\") principles without cues within 5 days. Outcome: Progressing Towards Goal   PHYSICAL THERAPY TREATMENT  Patient: Maicol Joseph (22 y.o. female)  Date: 4/23/2022  Diagnosis: Mitral valve insufficiency, unspecified etiology [I34.0]  Ascending aortic aneurysm (HCC) [I71.2] <principal problem not specified>  Procedure(s) (LRB):  FELTON BY DR BRUCE - PFO CLOSURE -  MITRAL VALVE REPLACEMENT WITH 27 MM CHARLES MAGNA MITRAL EASE - RESUSPENSION OF AORTIC VALVE -  ASCENDING AORTIC ANEURYSM REPAIR AND LEFT ATRIAL APPENDAGE LIGATION (N/A) 4 Days Post-Op  Precautions: Sternal,Fall  Chart, physical therapy assessment, plan of care and goals were reviewed. ASSESSMENT  Patient continues with skilled PT services and is progressing towards goals. Pt received in supine and requiring max encouragement to participate/education on importance of OOB mobility and ultimately agreeable to participate with therapy. Pt received on 3L NC and weaned down to 2L NC prior to therapy session.  Pt requiring less assistance with therapy this session overall, transitioning from supine to sitting with Shane and with sit<>stand transfers Shane. Pt requires frequent rest breaks due to complaints of SOB and desats to 87% 2LNC and recovers with cueing for PLB. Pt slightly impulsive with ambulation this session with reports of having Lasix and urgently needing to use BSC. Pt took sidesteps with rollator while therapist was arranging lining. Gait unsteady, very shuffled, with very flexed posture. Ambulated pt from [facility] to chair Shane with rollator and pt deferring further mobility due to complaints of fatigue this date and SOB. Performed UE cardiac exercises seated well. Pt left seated in chair on 2L NC with all vitals stable and all needs met. Nursing aware. Pt slightly self limiting in activity tolerance and would continue to benefit from skilled PT services as pt is below her level of baseline. Continue to recommend IPR. Patient is verbalizing understanding of mindful-based movements (\"move in the tube\") principles of keeping UEs proximal to ribcage to prevent lateral pull on the sternum during load-bearing activities with verbal cues required for compliance. Current Level of Function Impacting Discharge (mobility/balance): CGA-Shane w/bed mobility, Shane w/transfers, Shane w/rollator for gait    Other factors to consider for discharge: below baseline, decreased strength/endurance, MVR, OSORIO         PLAN :  Patient continues to benefit from skilled intervention to address the above impairments. Continue treatment per established plan of care. to address goals. Recommendation for discharge: (in order for the patient to meet his/her long term goals)  Therapy 3 hours per day 5-7 days per week    This discharge recommendation:  Has been made in collaboration with the attending provider and/or case management    IF patient discharges home will need the following DME: to be determined (TBD)       SUBJECTIVE:   Patient stated i have 20 cats.     OBJECTIVE DATA SUMMARY:   Patient mobilized on continuous portable monitor/telemetry. Critical Behavior:  Neurologic State: Alert  Orientation Level: Oriented X4  Cognition: Follows commands     Functional Mobility Training:  Bed Mobility:  Rolling: Minimum assistance  Supine to Sit: Minimum assistance; Moderate assistance     Scooting: Contact guard assistance    Transfers:  Sit to Stand: Minimum assistance  Stand to Sit: Minimum assistance    Balance:  Sitting: Intact; With support  Sitting - Static: Good (unsupported)  Standing: Impaired  Standing - Static: Fair;Constant support  Standing - Dynamic : Fair;Constant support    Ambulation/Gait Training:  Distance (ft): 10 Feet (ft) (5ftx2 bed>BSC>chair)  Assistive Device: Gait belt;Walker, rollator  Ambulation - Level of Assistance: Minimal assistance     Gait Abnormalities: Decreased step clearance;Shuffling gait    Base of Support: Widened     Speed/Lissy: Pace decreased (<100 feet/min); Shuffled  Step Length: Left shortened;Right shortened    Cardiac diagnosis intervention:  Patient instructed and educated on mindful movement principles based on Move in The Tube concept to include maintaining bilateral elbows close to rib cage when performing any load-bearing activity such as getting in/out of bed, pushing up from a chair, opening a door, or lifting a box. Patient was given a handout with diagrams of each correct/incorrect method of performing each of the above tasks. Therapeutic Exercises:   Patient instructed on the benefits and demonstrated cardiac exercises while seated with Supervision. Instructed and indicated understanding on how to progress reps, sets against gravity, pacing through progressive muscle strengthening standing based on surgeon clearance for more weight in prep for functional activity. Instruction on the use of household items in place of weights as needed.      CARDIAC  EXERCISE   Sets   Reps   Active Active Assist   Passive Self ROM   Comments Shoulder flexion 1 5 [x]                                            []                                            []                                            []                                               Shoulder abduction 1      5 [x]                                            []                                            []                                            []                                               Scapular elevation 1 5 [x]                                            []                                            []                                            []                                               Scapular retraction 1 5 [x]                                            []                                            []                                            []                                               Trunk rotation 1 5 [x]                                            []                                            []                                            [x]                                               Trunk sidebending 1 5 [x]                                            []                                            []                                            []                                                  []                                            []                                            []                                            []                                                   Pain Rating:  No pain rating received from pt    Activity Tolerance:   Poor, desaturates with exertion and requires oxygen, requires frequent rest breaks, and observed SOB with activity    After treatment patient left in no apparent distress:   Sitting in chair and Call bell within reach    COMMUNICATION/COLLABORATION:   The patients plan of care was discussed with: Registered nurse.      Katina Astorga PTA   Time Calculation: 27 mins

## 2022-04-23 NOTE — PROGRESS NOTES
Progress Note      4/23/2022 10:44 AM  NAME: Masood Caballero   MRN:  [id number]   Admit Diagnosis: Mitral valve insufficiency, unspecified etiology [I34.0]; Ascending aortic aneurysm ([facility].) [I71.2]     Assessment:       IMPRESSION:  1. Status post recent ascending aortic aneurysm repair and mitral valve replacement as described above. 2.  Postoperative bradycardia and junctional rhythm. 3.  History of paroxysmal atrial fibrillation. 4.  Hypertension. 5.  History of coronary artery disease. 6.  Multiple other medical problems described above. 4/23   Rhythm looks a little better. Sinus at 60 / min with normal AV conduction resting comfortably in the bed. Plan:     continue to monitor . Advance as tolerated. See how rhythm responds to activity . []        High complexity decision making was performed    Subjective:     Masood Caballero denies chest pain, dyspnea. Discussed with RN events overnight. Patient Active Problem List   Diagnosis Code    Smoker F17.200    Anemia D64.9    Arthritis M19.90    Edema R60.9    Eczema L30.9    GERD (gastroesophageal reflux disease) K21.9    Hiatal hernia K44.9    Screen for colon cancer Z12.11    Right wrist fracture S62.101A    CHF (congestive heart failure) ([facility]) I50.9    Atrial fibrillation with rapid ventricular response ([facility]) I48.91    Ascending aortic aneurysm ([facility]) I71.2    S/P MVR (mitral valve replacement) Z95.2    S/P aortic aneurysm repair Z98.890, Z86.79       Review of Systems:    Symptom Y/N Comments  Symptom Y/N Comments   Fever/Chills N   Chest Pain N    Poor Appetite N   Edema N    Cough N   Abdominal Pain N    Sputum N   Joint Pain N    SOB/OSORIO N   Pruritis/Rash N    Nausea/vomit N   Tolerating PT/OT Y    Diarrhea N   Tolerating Diet Y    Constipation N   Other       Could NOT obtain due to:      Objective:      Physical Exam:    Last 24hrs VS reviewed since prior progress note.  Most recent are:    Visit Vitals  BP (!) 112/51   Pulse 62   Temp 98.6 °F (37 °C)   Resp 26   Ht 5' 5\" (1.651 m)   Wt 100.1 kg (220 lb 10.9 oz)   SpO2 98%   BMI 36.72 kg/m²       Intake/Output Summary (Last 24 hours) at 4/23/2022 1044  Last data filed at 4/23/2022 0530  Gross per 24 hour   Intake 632.75 ml   Output 690 ml   Net -57.25 ml        General Appearance: Well developed, well nourished, alert & oriented x 3,    no acute distress. Ears/Nose/Mouth/Throat: Hearing grossly normal.  Neck: Supple. Chest: Lungs clear to auscultation bilaterally. Cardiovascular: Regular rate and rhythm, S1S2 normal, no murmur. Abdomen: Soft, non-tender, bowel sounds are active. Extremities: No edema bilaterally. Skin: Warm and dry. PMH/SH reviewed - no change compared to H&P    Data Review    Telemetry: normal sinus rhythm     Lab Data Personally Reviewed:    Recent Labs     04/23/22  1022 04/22/22  0539   WBC 15.1* 18.0*   HGB 7.2* 7.9*   HCT 22.9* 24.9*   * 120*   LABRCNT(INR:3,PTP:3,APTT:3,)  Recent Labs     04/22/22  0539 04/21/22  1921 04/21/22  0330    136 139   K 4.3 4.7 4.5    107 112*   CO2 22 22 22   BUN 35* 33* 22*   CREA 1.11* 1.27* 1.11*   * 110* 99   CA 9.0 8.9 8.4*   MG 2.7*  --  2.5*   LABRCNT(CPK:3,CpKMB:3,ckndx:3,troiq:3)No results found for: CHOL, CHOLX, CHLST, CHOLV, HDL, HDLP, LDL, LDLC, DLDLP, TGLX, TRIGL, TRIGP, CHHD, CHHDXLABRCNT(sgot:3,gpt:3,ap:3,tbiL:3,TP:3,ALB:3,GLOB:3,ggt:3,aml:3,amyp:3,lpse:3,hlpse:3)No results for input(s): PH, PCO2, PO2 in the last 72 hours. No results found for: CHOL, CHOLX, CHLST, CHOLV, HDL, HDLP, LDL, LDLC, DLDLP, TGLX, TRIGL, TRIGP, CHHD, CHHDXMEDTABLEAlbin Santos MD  No results for input(s): PH, PCO2, PO2 in the last 72 hours.     Medications Personally Reviewed:    Current Facility-Administered Medications   Medication Dose Route Frequency    furosemide (LASIX) injection 20 mg  20 mg IntraVENous BID    chlorhexidine (PERIDEX) 0.12 % mouthwash 10 mL  10 mL Oral Q12H    gabapentin (NEURONTIN) capsule 100 mg  100 mg Oral Q8H    balsam peru-castor oiL (VENELEX) ointment   Topical BID    cholecalciferol (VITAMIN D3) (1000 Units /25 mcg) tablet 2,000 Units  2,000 Units Oral BID    multivitamin, tx-iron-ca-min (THERA-M w/ IRON) tablet 1 Tablet  1 Tablet Oral DAILY    alteplase (CATHFLO) 1 mg in sterile water (preservative free) 1 mL injection  1 mg InterCATHeter PRN    bacitracin 500 unit/gram packet 1 Packet  1 Packet Topical PRN    sodium chloride (NS) flush 5-40 mL  5-40 mL IntraVENous Q8H    sodium chloride (NS) flush 5-40 mL  5-40 mL IntraVENous PRN    acetaminophen (TYLENOL) tablet 1,000 mg  1,000 mg Oral Q6H    oxyCODONE IR (ROXICODONE) tablet 5 mg  5 mg Oral Q4H PRN    oxyCODONE IR (ROXICODONE) tablet 10 mg  10 mg Oral Q4H PRN    naloxone (NARCAN) injection 0.4 mg  0.4 mg IntraVENous PRN    mupirocin (BACTROBAN) 2 % ointment   Both Nostrils BID    ondansetron (ZOFRAN) injection 4 mg  4 mg IntraVENous Q4H PRN    albuterol (PROVENTIL VENTOLIN) nebulizer solution 2.5 mg  2.5 mg Nebulization Q4H PRN    aspirin chewable tablet 81 mg  81 mg Oral DAILY    magnesium oxide (MAG-OX) tablet 400 mg  400 mg Oral BID    calcium chloride 1 g in 0.9% sodium chloride 250 mL IVPB  1 g IntraVENous PRN    bisacodyL (DULCOLAX) suppository 10 mg  10 mg Rectal DAILY PRN    senna-docusate (PERICOLACE) 8.6-50 mg per tablet 1 Tablet  1 Tablet Oral BID    polyethylene glycol (MIRALAX) packet 17 g  17 g Oral DAILY    ELECTROLYTE REPLACEMENT NOTE: Nurse to review Serum Potassium and Magnesuim levels and Initiate Electrolyte Replacement Protocol as needed  1 Each Other PRN    magnesium sulfate 1 g/100 ml IVPB (premix or compounded)  1 g IntraVENous PRN    glucose chewable tablet 16 g  4 Tablet Oral PRN    glucagon (GLUCAGEN) injection 1 mg  1 mg IntraMUSCular PRN    dextrose 10% infusion 0-250 mL  0-250 mL IntraVENous PRN    diphenhydrAMINE (BENADRYL) capsule 25 mg  25 mg Oral Q6H PRN    diphenhydrAMINE (BENADRYL) injection 25 mg  25 mg IntraVENous Q6H PRN    HYDROmorphone (DILAUDID) injection 0.5 mg  0.5 mg IntraVENous Q2H PRN    melatonin tablet 3-6 mg  3-6 mg Oral QHS PRN    pantoprazole (PROTONIX) tablet 40 mg  40 mg Oral AYSHA Hatfield MD

## 2022-04-23 NOTE — PROGRESS NOTES
[facility] FLOOR Progress Note    Admit Date: 2022  POD: 4 Days Post-Op      Procedure:  Procedure(s):  FELTON BY DR BRUCE - PFO CLOSURE -  MITRAL VALVE REPLACEMENT WITH 27 MM CHARLES MAGNA MITRAL EASE - RESUSPENSION OF AORTIC VALVE -  ASCENDING AORTIC ANEURYSM REPAIR AND LEFT ATRIAL APPENDAGE LIGATION    Subjective:     NSR in the 60's under pacemaker - on back-up rate 50; very weak; need PT/OT; some haziness on CXR and on 4 L NC - will start some lasix     Objective:     Blood pressure 104/81, pulse 70, temperature 98.7 °F (37.1 °C), resp. rate 19, height 5' 5\" (1.651 m), weight 220 lb 10.9 oz (100.1 kg), SpO2 97 %. Temp (24hrs), Av °F (36.7 °C), Min:97 °F (36.1 °C), Max:98.8 °F (37.1 °C)        Oxygen:    CXR    Medications reviewed    Admission Weight: Last Weight   Weight: 191 lb 12.8 oz (87 kg) Weight: 220 lb 10.9 oz (100.1 kg)     Intake / Output / Drain:  Current Shift: No intake/output data recorded.   Last 24 hrs.:  1901 -  0700  In: 2012.9 [P.O.:1060; I.V.:952.9]  Out: 1280 [Urine:950; Drains:330]    EXAM:  Visit Vitals  /81   Pulse 70   Temp 98.7 °F (37.1 °C)   Resp 19   Ht 5' 5\" (1.651 m)   Wt 220 lb 10.9 oz (100.1 kg)   SpO2 97%   BMI 36.72 kg/m²                           Labs:  Recent Results (from the past 24 hour(s))   GLUCOSE, POC    Collection Time: 22  7:51 AM   Result Value Ref Range    Glucose (POC) 122 (H) 65 - 117 mg/dL    Performed by Anjelica Alvarez RN    GLUCOSTABILIZER    Collection Time: 22  7:52 AM   Result Value Ref Range    Glucose 122 mg/dL    Insulin order 0.0 units/hour    Insulin adminstered 0.0 units/hour    Multiplier 0.000     Low target 100 mg/dL    High target 140 mg/dL    D50 order 0.0 ml    D50 administered 0.00 ml    Minutes until next BG 60 min    Order initials lt     Administered initials lt     GLSCOM Comments     GLUCOSE, POC    Collection Time: 22  9:00 AM   Result Value Ref Range    Glucose (POC) 121 (H) 65 - 117 mg/dL    Performed by Darion Mary RN    GLUCOSTABILIZER    Collection Time: 04/22/22  9:01 AM   Result Value Ref Range    Glucose 121 mg/dL    Insulin order 0.0 units/hour    Insulin adminstered 0.0 units/hour    Multiplier 0.000     Low target 100 mg/dL    High target 140 mg/dL    D50 order 0.0 ml    D50 administered 0.00 ml    Minutes until next  min    Order initials lt     Administered initials lt     GLSCOM Comments     GLUCOSE, POC    Collection Time: 04/22/22 12:07 PM   Result Value Ref Range    Glucose (POC) 120 (H) 65 - 117 mg/dL    Performed by Darion Mary RN    GLUCOSE, POC    Collection Time: 04/22/22  5:24 PM   Result Value Ref Range    Glucose (POC) 125 (H) 65 - 117 mg/dL    Performed by Darion Mary RN    GLUCOSE, POC    Collection Time: 04/22/22 11:31 PM   Result Value Ref Range    Glucose (POC) 142 (H) 65 - 117 mg/dL    Performed by Sridhar Wise        Signed By: Leonore Sandifer, MD

## 2022-04-24 LAB
ALBUMIN SERPL-MCNC: 3.4 G/DL (ref 3.5–5)
ALBUMIN/GLOB SERPL: 1.4 {RATIO} (ref 1.1–2.2)
ALP SERPL-CCNC: 58 U/L (ref 45–117)
ALT SERPL-CCNC: 14 U/L (ref 12–78)
ANION GAP SERPL CALC-SCNC: 5 MMOL/L (ref 5–15)
AST SERPL-CCNC: 31 U/L (ref 15–37)
BILIRUB SERPL-MCNC: 0.8 MG/DL (ref 0.2–1)
BUN SERPL-MCNC: 27 MG/DL (ref 6–20)
BUN/CREAT SERPL: 45 (ref 12–20)
CALCIUM SERPL-MCNC: 8.9 MG/DL (ref 8.5–10.1)
CHLORIDE SERPL-SCNC: 104 MMOL/L (ref 97–108)
CO2 SERPL-SCNC: 30 MMOL/L (ref 21–32)
CREAT SERPL-MCNC: 0.6 MG/DL (ref 0.55–1.02)
ERYTHROCYTE [DISTWIDTH] IN BLOOD BY AUTOMATED COUNT: 16 % (ref 11.5–14.5)
GLOBULIN SER CALC-MCNC: 2.4 G/DL (ref 2–4)
GLUCOSE SERPL-MCNC: 108 MG/DL (ref 65–100)
HCT VFR BLD AUTO: 22.2 % (ref 35–47)
HGB BLD-MCNC: 6.9 G/DL (ref 11.5–16)
MAGNESIUM SERPL-MCNC: 2.3 MG/DL (ref 1.6–2.4)
MCH RBC QN AUTO: 28.3 PG (ref 26–34)
MCHC RBC AUTO-ENTMCNC: 31.1 G/DL (ref 30–36.5)
MCV RBC AUTO: 91 FL (ref 80–99)
NRBC # BLD: 0.1 K/UL (ref 0–0.01)
NRBC BLD-RTO: 0.8 PER 100 WBC
PHOSPHATE SERPL-MCNC: 1.7 MG/DL (ref 2.6–4.7)
PLATELET # BLD AUTO: 141 K/UL (ref 150–400)
PMV BLD AUTO: 11.8 FL (ref 8.9–12.9)
POTASSIUM SERPL-SCNC: 4.1 MMOL/L (ref 3.5–5.1)
PROT SERPL-MCNC: 5.8 G/DL (ref 6.4–8.2)
RBC # BLD AUTO: 2.44 M/UL (ref 3.8–5.2)
SODIUM SERPL-SCNC: 139 MMOL/L (ref 136–145)
WBC # BLD AUTO: 12.3 K/UL (ref 3.6–11)

## 2022-04-24 PROCEDURE — 97110 THERAPEUTIC EXERCISES: CPT

## 2022-04-24 PROCEDURE — 77010033678 HC OXYGEN DAILY

## 2022-04-24 PROCEDURE — 74011250636 HC RX REV CODE- 250/636: Performed by: NURSE PRACTITIONER

## 2022-04-24 PROCEDURE — 36415 COLL VENOUS BLD VENIPUNCTURE: CPT

## 2022-04-24 PROCEDURE — 80053 COMPREHEN METABOLIC PANEL: CPT

## 2022-04-24 PROCEDURE — 83735 ASSAY OF MAGNESIUM: CPT

## 2022-04-24 PROCEDURE — 74011250637 HC RX REV CODE- 250/637: Performed by: PHYSICIAN ASSISTANT

## 2022-04-24 PROCEDURE — 65270000046 HC RM TELEMETRY

## 2022-04-24 PROCEDURE — 74011250637 HC RX REV CODE- 250/637: Performed by: THORACIC SURGERY (CARDIOTHORACIC VASCULAR SURGERY)

## 2022-04-24 PROCEDURE — 85027 COMPLETE CBC AUTOMATED: CPT

## 2022-04-24 PROCEDURE — 97530 THERAPEUTIC ACTIVITIES: CPT

## 2022-04-24 PROCEDURE — 74011000250 HC RX REV CODE- 250: Performed by: NURSE PRACTITIONER

## 2022-04-24 PROCEDURE — 93005 ELECTROCARDIOGRAM TRACING: CPT

## 2022-04-24 PROCEDURE — 74011250636 HC RX REV CODE- 250/636: Performed by: THORACIC SURGERY (CARDIOTHORACIC VASCULAR SURGERY)

## 2022-04-24 PROCEDURE — 74011000250 HC RX REV CODE- 250: Performed by: PHYSICIAN ASSISTANT

## 2022-04-24 PROCEDURE — 84100 ASSAY OF PHOSPHORUS: CPT

## 2022-04-24 PROCEDURE — 74011000250 HC RX REV CODE- 250: Performed by: THORACIC SURGERY (CARDIOTHORACIC VASCULAR SURGERY)

## 2022-04-24 RX ADMIN — SODIUM CHLORIDE, PRESERVATIVE FREE 10 ML: 5 INJECTION INTRAVENOUS at 21:17

## 2022-04-24 RX ADMIN — GABAPENTIN 100 MG: 100 CAPSULE ORAL at 07:02

## 2022-04-24 RX ADMIN — SODIUM CHLORIDE, PRESERVATIVE FREE 10 ML: 5 INJECTION INTRAVENOUS at 07:06

## 2022-04-24 RX ADMIN — CHLORHEXIDINE GLUCONATE 10 ML: 1.2 RINSE ORAL at 21:20

## 2022-04-24 RX ADMIN — CASTOR OIL AND BALSAM, PERU: 788; 87 OINTMENT TOPICAL at 10:32

## 2022-04-24 RX ADMIN — Medication 1 TABLET: at 09:04

## 2022-04-24 RX ADMIN — ASPIRIN 81 MG: 81 TABLET, CHEWABLE ORAL at 09:04

## 2022-04-24 RX ADMIN — CHLORHEXIDINE GLUCONATE 10 ML: 1.2 RINSE ORAL at 10:29

## 2022-04-24 RX ADMIN — Medication 400 MG: at 09:04

## 2022-04-24 RX ADMIN — PANTOPRAZOLE SODIUM 40 MG: 40 TABLET, DELAYED RELEASE ORAL at 07:04

## 2022-04-24 RX ADMIN — FUROSEMIDE 20 MG: 10 INJECTION, SOLUTION INTRAMUSCULAR; INTRAVENOUS at 09:02

## 2022-04-24 RX ADMIN — OXYCODONE 10 MG: 5 TABLET ORAL at 16:00

## 2022-04-24 RX ADMIN — CASTOR OIL AND BALSAM, PERU: 788; 87 OINTMENT TOPICAL at 21:17

## 2022-04-24 RX ADMIN — ACETAMINOPHEN 1000 MG: 500 TABLET ORAL at 13:01

## 2022-04-24 RX ADMIN — GABAPENTIN 100 MG: 100 CAPSULE ORAL at 21:17

## 2022-04-24 RX ADMIN — Medication 400 MG: at 17:46

## 2022-04-24 RX ADMIN — SODIUM CHLORIDE, PRESERVATIVE FREE 10 ML: 5 INJECTION INTRAVENOUS at 16:37

## 2022-04-24 RX ADMIN — SENNOSIDES AND DOCUSATE SODIUM 1 TABLET: 50; 8.6 TABLET ORAL at 09:04

## 2022-04-24 RX ADMIN — ACETAMINOPHEN 1000 MG: 500 TABLET ORAL at 06:58

## 2022-04-24 RX ADMIN — POLYETHYLENE GLYCOL 3350 17 G: 17 POWDER, FOR SOLUTION ORAL at 09:03

## 2022-04-24 RX ADMIN — POTASSIUM PHOSPHATE, MONOBASIC POTASSIUM PHOSPHATE, DIBASIC: 224; 236 INJECTION, SOLUTION, CONCENTRATE INTRAVENOUS at 08:57

## 2022-04-24 RX ADMIN — CHOLECALCIFEROL TAB 25 MCG (1000 UNIT) 2000 UNITS: 25 TAB at 09:05

## 2022-04-24 RX ADMIN — MUPIROCIN: 20 OINTMENT TOPICAL at 21:17

## 2022-04-24 RX ADMIN — CHOLECALCIFEROL TAB 25 MCG (1000 UNIT) 2000 UNITS: 25 TAB at 17:46

## 2022-04-24 RX ADMIN — ACETAMINOPHEN 1000 MG: 500 TABLET ORAL at 17:46

## 2022-04-24 RX ADMIN — MUPIROCIN: 20 OINTMENT TOPICAL at 10:29

## 2022-04-24 RX ADMIN — GABAPENTIN 100 MG: 100 CAPSULE ORAL at 13:45

## 2022-04-24 RX ADMIN — FUROSEMIDE 20 MG: 10 INJECTION, SOLUTION INTRAMUSCULAR; INTRAVENOUS at 17:50

## 2022-04-24 NOTE — PROGRESS NOTES
Progress Note      4/24/2022 10:44 AM  NAME: Gabriele Cartwright   MRN:  [id number]   Admit Diagnosis: Mitral valve insufficiency, unspecified etiology [I34.0]; Ascending aortic aneurysm ([facility].) [I71.2]     Assessment:       IMPRESSION:  1. Status post recent ascending aortic aneurysm repair and mitral valve replacement as described above. 2.  Postoperative bradycardia and junctional rhythm. 3.  History of paroxysmal atrial fibrillation. 4.  Hypertension. 5.  History of coronary artery disease. 6.  Multiple other medical problems described above. 4/23   Rhythm looks a little better. Sinus at 60 / min with normal AV conduction resting comfortably in the bed.   4/24  I think the rhythm is sinus at 80 / min . Check EKG . Plan:     continue to monitor . Advance as tolerated. See how rhythm responds to activity . []        High complexity decision making was performed    Subjective:     Gabriele Cartwright denies chest pain, dyspnea. Discussed with RN events overnight.      Patient Active Problem List   Diagnosis Code    Smoker F17.200    Anemia D64.9    Arthritis M19.90    Edema R60.9    Eczema L30.9    GERD (gastroesophageal reflux disease) K21.9    Hiatal hernia K44.9    Screen for colon cancer Z12.11    Right wrist fracture S62.101A    CHF (congestive heart failure) ([facility]) I50.9    Atrial fibrillation with rapid ventricular response ([facility]) I48.91    Ascending aortic aneurysm ([facility]) I71.2    S/P MVR (mitral valve replacement) Z95.2    S/P aortic aneurysm repair Z98.890, Z86.79       Review of Systems:    Symptom Y/N Comments  Symptom Y/N Comments   Fever/Chills N   Chest Pain N    Poor Appetite N   Edema N    Cough N   Abdominal Pain N    Sputum N   Joint Pain N    SOB/OSORIO N   Pruritis/Rash N    Nausea/vomit N   Tolerating PT/OT Y    Diarrhea N   Tolerating Diet Y    Constipation N   Other       Could NOT obtain due to:      Objective:      Physical Exam:    Last 24hrs VS reviewed since prior progress note. Most recent are:    Visit Vitals  BP (!) 109/46   Pulse 83   Temp 97.6 °F (36.4 °C)   Resp 21   Ht 5' 5\" (1.651 m)   Wt 100.1 kg (220 lb 10.9 oz)   SpO2 92%   BMI 36.72 kg/m²       Intake/Output Summary (Last 24 hours) at 4/24/2022 1110  Last data filed at 4/24/2022 0800  Gross per 24 hour   Intake 1450 ml   Output 2600 ml   Net -1150 ml        General Appearance: Well developed, well nourished, alert & oriented x 3,    no acute distress. Ears/Nose/Mouth/Throat: Hearing grossly normal.  Neck: Supple. Chest: Lungs clear to auscultation bilaterally. Cardiovascular: Regular rate and rhythm, S1S2 normal, no murmur. Abdomen: Soft, non-tender, bowel sounds are active. Extremities: No edema bilaterally. Skin: Warm and dry. PMH/SH reviewed - no change compared to H&P    Data Review    Telemetry: normal sinus rhythm     Lab Data Personally Reviewed:    Recent Labs     04/24/22  0419 04/23/22  1022   WBC 12.3* 15.1*   HGB 6.9* 7.2*   HCT 22.2* 22.9*   * 137*   LABRCNT(INR:3,PTP:3,APTT:3,)  Recent Labs     04/24/22  0419 04/23/22  1022 04/22/22  0539    137 136   K 4.1 4.1 4.3    106 107   CO2 30 27 22   BUN 27* 34* 35*   CREA 0.60 0.74 1.11*   * 118* 115*   CA 8.9 8.9 9.0   MG 2.3 2.4 2.7*   LABRCNT(CPK:3,CpKMB:3,ckndx:3,troiq:3)No results found for: CHOL, CHOLX, CHLST, CHOLV, HDL, HDLP, LDL, LDLC, DLDLP, TGLX, TRIGL, TRIGP, CHHD, CHHDXLABRCNT(sgot:3,gpt:3,ap:3,tbiL:3,TP:3,ALB:3,GLOB:3,ggt:3,aml:3,amyp:3,lpse:3,hlpse:3)No results for input(s): PH, PCO2, PO2 in the last 72 hours. No results found for: CHOL, CHOLX, CHLST, CHOLV, HDL, HDLP, LDL, LDLC, DLDLP, TGLX, TRIGL, TRIGP, CHHD, CHHDXMEDTABLETimothy Barak Zhu MD  No results for input(s): PH, PCO2, PO2 in the last 72 hours.     Medications Personally Reviewed:    Current Facility-Administered Medications   Medication Dose Route Frequency    potassium phosphate 20 mmol in 0.9% sodium chloride 500 mL infusion   IntraVENous ONCE    furosemide (LASIX) injection 20 mg  20 mg IntraVENous BID    chlorhexidine (PERIDEX) 0.12 % mouthwash 10 mL  10 mL Oral Q12H    gabapentin (NEURONTIN) capsule 100 mg  100 mg Oral Q8H    balsam peru-castor oiL (VENELEX) ointment   Topical BID    cholecalciferol (VITAMIN D3) (1000 Units /25 mcg) tablet 2,000 Units  2,000 Units Oral BID    multivitamin, tx-iron-ca-min (THERA-M w/ IRON) tablet 1 Tablet  1 Tablet Oral DAILY    alteplase (CATHFLO) 1 mg in sterile water (preservative free) 1 mL injection  1 mg InterCATHeter PRN    bacitracin 500 unit/gram packet 1 Packet  1 Packet Topical PRN    sodium chloride (NS) flush 5-40 mL  5-40 mL IntraVENous Q8H    sodium chloride (NS) flush 5-40 mL  5-40 mL IntraVENous PRN    acetaminophen (TYLENOL) tablet 1,000 mg  1,000 mg Oral Q6H    oxyCODONE IR (ROXICODONE) tablet 5 mg  5 mg Oral Q4H PRN    oxyCODONE IR (ROXICODONE) tablet 10 mg  10 mg Oral Q4H PRN    naloxone (NARCAN) injection 0.4 mg  0.4 mg IntraVENous PRN    mupirocin (BACTROBAN) 2 % ointment   Both Nostrils BID    ondansetron (ZOFRAN) injection 4 mg  4 mg IntraVENous Q4H PRN    albuterol (PROVENTIL VENTOLIN) nebulizer solution 2.5 mg  2.5 mg Nebulization Q4H PRN    aspirin chewable tablet 81 mg  81 mg Oral DAILY    magnesium oxide (MAG-OX) tablet 400 mg  400 mg Oral BID    calcium chloride 1 g in 0.9% sodium chloride 250 mL IVPB  1 g IntraVENous PRN    bisacodyL (DULCOLAX) suppository 10 mg  10 mg Rectal DAILY PRN    senna-docusate (PERICOLACE) 8.6-50 mg per tablet 1 Tablet  1 Tablet Oral BID    polyethylene glycol (MIRALAX) packet 17 g  17 g Oral DAILY    ELECTROLYTE REPLACEMENT NOTE: Nurse to review Serum Potassium and Magnesuim levels and Initiate Electrolyte Replacement Protocol as needed  1 Each Other PRN    magnesium sulfate 1 g/100 ml IVPB (premix or compounded)  1 g IntraVENous PRN    glucose chewable tablet 16 g  4 Tablet Oral PRN    glucagon (GLUCAGEN) injection 1 mg  1 mg IntraMUSCular PRN    dextrose 10% infusion 0-250 mL  0-250 mL IntraVENous PRN    diphenhydrAMINE (BENADRYL) capsule 25 mg  25 mg Oral Q6H PRN    diphenhydrAMINE (BENADRYL) injection 25 mg  25 mg IntraVENous Q6H PRN    HYDROmorphone (DILAUDID) injection 0.5 mg  0.5 mg IntraVENous Q2H PRN    melatonin tablet 3-6 mg  3-6 mg Oral QHS PRN    pantoprazole (PROTONIX) tablet 40 mg  40 mg Oral AYSHA Antunez MD

## 2022-04-24 NOTE — PROGRESS NOTES
0700: bedside and verbal report given to LAURITA Ly  0730:  in to round on pt, updated on pt condition. 0800: assessment completed. Pt alert and oriented, assisted up to chair.    1200: reassessment no change  1600: reassessment no change  1900: bedside and verbal report given to Niall Boss

## 2022-04-24 NOTE — PROGRESS NOTES
1945  Bedside and Verbal shift change report given to Liz Verdugo RN (oncoming nurse) by Tawanda Levine RN (offgoing nurse). Report included the following information SBAR, Kardex, Recent Results and Cardiac Rhythm NSR.     0700  No issues during the shift. VSS. Patient's HR stayed in the 70's through the night. No c/o pain. Casandra Lopez worked well for her during the night. Discussed possibly trying to get for home use. 0745  Bedside and Verbal shift change report given to Isidro Mortimer, RN (oncoming nurse) by Liz Verdugo RN (offgoing nurse). Report included the following information SBAR, Kardex, MAR and Cardiac Rhythm NSR.

## 2022-04-24 NOTE — PROGRESS NOTES
[facility] FLOOR Progress Note    Admit Date: 2022  POD: 5 Days Post-Op      Procedure:  Procedure(s):  FELTON BY DR BRUCE - PFO CLOSURE -  MITRAL VALVE REPLACEMENT WITH 27 MM CHARLES MAGNA MITRAL EASE - RESUSPENSION OF AORTIC VALVE -  ASCENDING AORTIC ANEURYSM REPAIR AND LEFT ATRIAL APPENDAGE LIGATION    Subjective:     Looks a little better this am; NSR with LBBB; needs PT/OT    Objective:     Blood pressure 136/74, pulse 80, temperature 98.5 °F (36.9 °C), resp. rate 21, height 5' 5\" (1.651 m), weight 220 lb 10.9 oz (100.1 kg), SpO2 96 %. Temp (24hrs), Av.4 °F (36.9 °C), Min:97.6 °F (36.4 °C), Max:99 °F (37.2 °C)        Oxygen:    CXR    Medications reviewed    Admission Weight: Last Weight   Weight: 191 lb 12.8 oz (87 kg) Weight: 220 lb 10.9 oz (100.1 kg)     Intake / Output / Drain:  Current Shift: No intake/output data recorded. Last 24 hrs.:  1901 -  0700  In: 1600 [P.O.:1600]  Out: 1500 [Urine:1500]    EXAM:  Visit Vitals  /74   Pulse 80   Temp 98.5 °F (36.9 °C)   Resp 21   Ht 5' 5\" (1.651 m)   Wt 220 lb 10.9 oz (100.1 kg)   SpO2 96%   BMI 36.72 kg/m²                           Labs:  Recent Results (from the past 24 hour(s))   METABOLIC PANEL, COMPREHENSIVE    Collection Time: 22 10:22 AM   Result Value Ref Range    Sodium 137 136 - 145 mmol/L    Potassium 4.1 3.5 - 5.1 mmol/L    Chloride 106 97 - 108 mmol/L    CO2 27 21 - 32 mmol/L    Anion gap 4 (L) 5 - 15 mmol/L    Glucose 118 (H) 65 - 100 mg/dL    BUN 34 (H) 6 - 20 MG/DL    Creatinine 0.74 0.55 - 1.02 MG/DL    BUN/Creatinine ratio 46 (H) 12 - 20      GFR est AA >60 >60 ml/min/1.73m2    GFR est non-AA >60 >60 ml/min/1.73m2    Calcium 8.9 8.5 - 10.1 MG/DL    Bilirubin, total 0.8 0.2 - 1.0 MG/DL    ALT (SGPT) 11 (L) 12 - 78 U/L    AST (SGOT) 33 15 - 37 U/L    Alk.  phosphatase 53 45 - 117 U/L    Protein, total 5.9 (L) 6.4 - 8.2 g/dL    Albumin 3.7 3.5 - 5.0 g/dL    Globulin 2.2 2.0 - 4.0 g/dL    A-G Ratio 1.7 1.1 - 2.2     CBC W/O DIFF Collection Time: 04/23/22 10:22 AM   Result Value Ref Range    WBC 15.1 (H) 3.6 - 11.0 K/uL    RBC 2.51 (L) 3.80 - 5.20 M/uL    HGB 7.2 (L) 11.5 - 16.0 g/dL    HCT 22.9 (L) 35.0 - 47.0 %    MCV 91.2 80.0 - 99.0 FL    MCH 28.7 26.0 - 34.0 PG    MCHC 31.4 30.0 - 36.5 g/dL    RDW 15.9 (H) 11.5 - 14.5 %    PLATELET 692 (L) 001 - 400 K/uL    MPV 12.2 8.9 - 12.9 FL    NRBC 0.8 (H) 0  WBC    ABSOLUTE NRBC 0.12 (H) 0.00 - 0.01 K/uL   MAGNESIUM    Collection Time: 04/23/22 10:22 AM   Result Value Ref Range    Magnesium 2.4 1.6 - 2.4 mg/dL   PHOSPHORUS    Collection Time: 04/23/22 10:22 AM   Result Value Ref Range    Phosphorus 1.6 (L) 2.6 - 4.7 MG/DL   METABOLIC PANEL, COMPREHENSIVE    Collection Time: 04/24/22  4:19 AM   Result Value Ref Range    Sodium 139 136 - 145 mmol/L    Potassium 4.1 3.5 - 5.1 mmol/L    Chloride 104 97 - 108 mmol/L    CO2 30 21 - 32 mmol/L    Anion gap 5 5 - 15 mmol/L    Glucose 108 (H) 65 - 100 mg/dL    BUN 27 (H) 6 - 20 MG/DL    Creatinine 0.60 0.55 - 1.02 MG/DL    BUN/Creatinine ratio 45 (H) 12 - 20      GFR est AA >60 >60 ml/min/1.73m2    GFR est non-AA >60 >60 ml/min/1.73m2    Calcium 8.9 8.5 - 10.1 MG/DL    Bilirubin, total 0.8 0.2 - 1.0 MG/DL    ALT (SGPT) 14 12 - 78 U/L    AST (SGOT) 31 15 - 37 U/L    Alk.  phosphatase 58 45 - 117 U/L    Protein, total 5.8 (L) 6.4 - 8.2 g/dL    Albumin 3.4 (L) 3.5 - 5.0 g/dL    Globulin 2.4 2.0 - 4.0 g/dL    A-G Ratio 1.4 1.1 - 2.2     CBC W/O DIFF    Collection Time: 04/24/22  4:19 AM   Result Value Ref Range    WBC 12.3 (H) 3.6 - 11.0 K/uL    RBC 2.44 (L) 3.80 - 5.20 M/uL    HGB 6.9 (L) 11.5 - 16.0 g/dL    HCT 22.2 (L) 35.0 - 47.0 %    MCV 91.0 80.0 - 99.0 FL    MCH 28.3 26.0 - 34.0 PG    MCHC 31.1 30.0 - 36.5 g/dL    RDW 16.0 (H) 11.5 - 14.5 %    PLATELET 792 (L) 224 - 400 K/uL    MPV 11.8 8.9 - 12.9 FL    NRBC 0.8 (H) 0  WBC    ABSOLUTE NRBC 0.10 (H) 0.00 - 0.01 K/uL   MAGNESIUM    Collection Time: 04/24/22  4:19 AM   Result Value Ref Range    Magnesium 2.3 1.6 - 2.4 mg/dL   PHOSPHORUS    Collection Time: 04/24/22  4:19 AM   Result Value Ref Range    Phosphorus 1.7 (L) 2.6 - 4.7 MG/DL         Signed By: Micheline Griffin MD

## 2022-04-24 NOTE — PROGRESS NOTES
Critical Care Progress Note  Cuca Renee MD          Date of Service:  2022  NAME:  Manish Bo  :  1945  MRN:  [id number]      Subjective/Hospital course:      : Patient was extubated yesterday. She reports feeling better today, currently on 6 L of oxygen. Pressors are weaned off.  : Patient reports feeling better, on 4 lit oxygen. No acute events overnight.   : Patient reports feeling a little bit better than yesterday. She is weaned off of vasopressors now.   : off pressors, clinically doing well    Problem list:     Problem list:  Hospital Problems  Date Reviewed: 2013          Codes Class Noted POA    S/P MVR (mitral valve replacement) ICD-10-CM: Z95.2  ICD-9-CM: V43.3  2022 Unknown        S/P aortic aneurysm repair ICD-10-CM: Z98.890, Z86.79  ICD-9-CM: V45.89  2022 Unknown        Ascending aortic aneurysm (HCC) ICD-10-CM: I71.2  ICD-9-CM: 441.2  3/29/2022 Unknown              Assessment/Plan:     Post cardiac surgery care: s/p aortic aneurysm repair and MVR  -Extubated on .  -Hemodynamic management per CT surgery postoperative guidelines.   -Insulin for glycemic control. Shock. Vasoplegic vs cardiogenic.  - Weaned pressors. - hemodynamic management in collaboration with CT surgery    Ventilator Management. - Post-extubation pulmonary toilette with adequate postop pain control.   - Early mobilization with PT/OT as able. -Diuresis as tolerated for pulmonary edema. Anemia secondary to acute blood loss  - Follow CT output for now. - Follow CBC   - Supportive transfusions if needed. Thrombocytopenia  - follow CBC for now. Prophylaxis  - DVT prophylaxis. Start [facility] when ok with CTS. - VAP bundle with chlorhexidine    Code status: full code. I personally spent --- minutes of critical care time.   This is time spent at this critically ill patient's bedside actively involved in patient care as well as the coordination of care and discussions with the patient's family. This does not include any procedural time which has been billed separately. Review of Systems:   A comprehensive review of systems was negative. Vital Signs:     Patient Vitals for the past 4 hrs:   BP Pulse Resp SpO2   04/24/22 0700 136/74 80 21 96 %   04/24/22 0600 124/61 73 22 97 %        Intake/Output Summary (Last 24 hours) at 4/24/2022 0901  Last data filed at 4/24/2022 0700  Gross per 24 hour   Intake 1450 ml   Output 2200 ml   Net -750 ml        Physical Examination:    Physical Exam  Constitutional:       Appearance: Normal appearance. HENT:      Head: Normocephalic and atraumatic. Mouth/Throat:      Mouth: Mucous membranes are moist.   Eyes:      Extraocular Movements: Extraocular movements intact. Conjunctiva/sclera: Conjunctivae normal.   Cardiovascular:      Rate and Rhythm: Normal rate and regular rhythm. Pulmonary:      Effort: Pulmonary effort is normal. No respiratory distress. Breath sounds: Normal breath sounds. Abdominal:      General: Abdomen is flat. There is no distension. Palpations: Abdomen is soft. Tenderness: There is no abdominal tenderness. There is no guarding. Musculoskeletal:      Cervical back: Normal range of motion and neck supple. Neurological:      Mental Status: She is alert and oriented to person, place, and time. Labs:   Labs and imaging reviewed.       Medications:     Current Facility-Administered Medications   Medication Dose Route Frequency    potassium phosphate 20 mmol in 0.9% sodium chloride 500 mL infusion   IntraVENous ONCE    furosemide (LASIX) injection 20 mg  20 mg IntraVENous BID    chlorhexidine (PERIDEX) 0.12 % mouthwash 10 mL  10 mL Oral Q12H    gabapentin (NEURONTIN) capsule 100 mg  100 mg Oral Q8H    balsam peru-castor oiL (VENELEX) ointment   Topical BID    cholecalciferol (VITAMIN D3) (1000 Units /25 mcg) tablet 2,000 Units  2,000 Units Oral BID  multivitamin, tx-iron-ca-min (THERA-M w/ IRON) tablet 1 Tablet  1 Tablet Oral DAILY    alteplase (CATHFLO) 1 mg in sterile water (preservative free) 1 mL injection  1 mg InterCATHeter PRN    bacitracin 500 unit/gram packet 1 Packet  1 Packet Topical PRN    sodium chloride (NS) flush 5-40 mL  5-40 mL IntraVENous Q8H    sodium chloride (NS) flush 5-40 mL  5-40 mL IntraVENous PRN    acetaminophen (TYLENOL) tablet 1,000 mg  1,000 mg Oral Q6H    oxyCODONE IR (ROXICODONE) tablet 5 mg  5 mg Oral Q4H PRN    oxyCODONE IR (ROXICODONE) tablet 10 mg  10 mg Oral Q4H PRN    naloxone (NARCAN) injection 0.4 mg  0.4 mg IntraVENous PRN    mupirocin (BACTROBAN) 2 % ointment   Both Nostrils BID    ondansetron (ZOFRAN) injection 4 mg  4 mg IntraVENous Q4H PRN    albuterol (PROVENTIL VENTOLIN) nebulizer solution 2.5 mg  2.5 mg Nebulization Q4H PRN    aspirin chewable tablet 81 mg  81 mg Oral DAILY    magnesium oxide (MAG-OX) tablet 400 mg  400 mg Oral BID    calcium chloride 1 g in 0.9% sodium chloride 250 mL IVPB  1 g IntraVENous PRN    bisacodyL (DULCOLAX) suppository 10 mg  10 mg Rectal DAILY PRN    senna-docusate (PERICOLACE) 8.6-50 mg per tablet 1 Tablet  1 Tablet Oral BID    polyethylene glycol (MIRALAX) packet 17 g  17 g Oral DAILY    ELECTROLYTE REPLACEMENT NOTE: Nurse to review Serum Potassium and Magnesuim levels and Initiate Electrolyte Replacement Protocol as needed  1 Each Other PRN    magnesium sulfate 1 g/100 ml IVPB (premix or compounded)  1 g IntraVENous PRN    glucose chewable tablet 16 g  4 Tablet Oral PRN    glucagon (GLUCAGEN) injection 1 mg  1 mg IntraMUSCular PRN    dextrose 10% infusion 0-250 mL  0-250 mL IntraVENous PRN    diphenhydrAMINE (BENADRYL) capsule 25 mg  25 mg Oral Q6H PRN    diphenhydrAMINE (BENADRYL) injection 25 mg  25 mg IntraVENous Q6H PRN    HYDROmorphone (DILAUDID) injection 0.5 mg  0.5 mg IntraVENous Q2H PRN    melatonin tablet 3-6 mg  3-6 mg Oral QHS PRN    pantoprazole (PROTONIX) tablet 40 mg  40 mg Oral ACB     ______________________________________________________________________  EXPECTED LENGTH OF STAY: 5d 21h  ACTUAL LENGTH OF STAY:          [address] Mickey Yap MD   Pulmonary/[facility]  [address]  [phone]  4/24/2022

## 2022-04-24 NOTE — PROGRESS NOTES
Problem: Mobility Impaired (Adult and Pediatric)  Goal: *Acute Goals and Plan of Care (Insert Text)  Description: FUNCTIONAL STATUS PRIOR TO ADMISSION: Patient was independent and active without use of DME.     HOME SUPPORT PRIOR TO ADMISSION: The patient lived alone with children to provide assistance. Physical Therapy Goals  Initiated 4/21/2022  1. Patient will move from supine to sit and sit to supine  in bed with moderate assistance  within 5 days. 2.  Patient will perform sit to/from stand with minimal assistance/contact guard assist within 5 days. 3.  Patient will ambulate 60 feet with least restrictive assistive device and minimal assistance/contact guard assist within 5 days. 4.  Patient will perform cardiac exercises per protocol with modified independence within 5 days. 5.  Patient will verbally recall and functionally demonstrate mindful-based movements (\"move in the tube\") principles without cues within 5 days. Outcome: Progressing Towards Goal   PHYSICAL THERAPY TREATMENT  Patient: Aura Cardenas (9 y.o. female)  Date: 4/24/2022  Diagnosis: Mitral valve insufficiency, unspecified etiology [I34.0]  Ascending aortic aneurysm (HCC) [I71.2] <principal problem not specified>  Procedure(s) (LRB):  FELTON BY DR BRUCE - PFO CLOSURE -  MITRAL VALVE REPLACEMENT WITH 27 MM CHARLES MAGNA MITRAL EASE - RESUSPENSION OF AORTIC VALVE -  ASCENDING AORTIC ANEURYSM REPAIR AND LEFT ATRIAL APPENDAGE LIGATION (N/A) 5 Days Post-Op  Precautions: Sternal,Fall  Chart, physical therapy assessment, plan of care and goals were reviewed. ASSESSMENT  Patient continues with skilled PT services and is progressing towards goals. Patient received resting in bed, agreeable and cleared for therapy. Patient required min A x 1 for all mobility this date. She continues to be slightly impulsive, especially due to increased lines and leads.  Patient ambulated 5 feet x 2 with very unsteady gait, shuffled steps, and flexed trunk lean, attempting to reach for furniture in the room for balance. Balance improved with HHA x 1 and will benefit from use of RW/rollator next session. She seems fairly motivated, although required rest breaks due to increased SOB with activity. O2 sats remained stable on 2L O2 with activity. Patient is verbalizing and is demonstrating understanding of mindful-based movements (\"move in the tube\") principles of keeping UEs proximal to ribcage to prevent lateral pull on the sternum during load-bearing activities with visual and verbal cues required for compliance. Current Level of Function Impacting Discharge (mobility/balance): min A x 1     Other factors to consider for discharge: lives alone, pain, decreased endurance, SOB, unsteady gait         PLAN :  Patient continues to benefit from skilled intervention to address the above impairments. Continue treatment per established plan of care. to address goals. Recommendation for discharge: (in order for the patient to meet his/her long term goals)  IP rehab    This discharge recommendation:  Has been made in collaboration with the attending provider and/or case management    IF patient discharges home will need the following DME: to be determined (TBD)       SUBJECTIVE:   Patient stated I think I will sit in the chair for a little bit.     OBJECTIVE DATA SUMMARY:   Patient mobilized on continuous portable monitor/telemetry. Critical Behavior:  Neurologic State: Alert  Orientation Level: Oriented X4  Cognition: Follows commands       Functional Mobility Training:  Bed Mobility:  Rolling: Minimum assistance  Supine to Sit: Minimum assistance     Scooting: Minimum assistance          Transfers:  Sit to Stand: Minimum assistance;Assist x1  Stand to Sit: Minimum assistance;Assist x1        Bed to Chair: Minimum assistance;Assist x1                      Balance:  Sitting: Intact; Without support  Standing: Impaired; With support  Standing - Static: Constant support; Fair  Standing - Dynamic : Constant support; Fair    Ambulation/Gait Training:  Distance (ft): 10 Feet (ft)  Assistive Device: Gait belt (HHA x 1)  Ambulation - Level of Assistance: Minimal assistance;Assist x1        Gait Abnormalities: Decreased step clearance;Shuffling gait;Trunk sway increased        Base of Support: Widened     Speed/Lissy: Pace decreased (<100 feet/min); Shuffled  Step Length: Right shortened;Left shortened                   Stairs:             Cardiac diagnosis intervention:  Patient instructed and educated on mindful movement principles based on Move in The Tube concept to include maintaining bilateral elbows close to rib cage when performing any load-bearing activity such as getting in/out of bed, pushing up from a chair, opening a door, or lifting a box. Patient was given a handout with diagrams of each correct/incorrect method of performing each of the above tasks. Therapeutic Exercises:   Patient instructed on the benefits and demonstrated cardiac exercises while sitting with Stand-by assistance. Instructed and indicated understanding on how to progress reps, sets against gravity, pacing through progressive muscle strengthening standing based on surgeon clearance for more weight in prep for functional activity. Instruction on the use of household items in place of weights as needed.      CARDIAC  EXERCISE   Sets   Reps   Active Active Assist   Passive Self ROM   Comments   Shoulder flexion 1 5 [x]                                            []                                            []                                            []                                               Shoulder abduction 1      5 [x]                                            []                                            []                                            []                                               Scapular elevation 1 5 [x]                                            [] []                                            []                                               Scapular retraction 1 5 [x]                                            []                                            []                                            []                                               Trunk rotation 1 5 [x]                                            []                                            []                                            [x]                                               Trunk sidebending 1 5 [x]                                            []                                            []                                            []                                                  []                                            []                                            []                                            []                                                       Activity Tolerance:   Fair, requires rest breaks and observed SOB with activity    After treatment patient left in no apparent distress:   Sitting in chair and Call bell within reach    COMMUNICATION/COLLABORATION:   The patients plan of care was discussed with: Registered nurse and Case management.      Feliz Menard, PT, DPT   Time Calculation: 23 mins

## 2022-04-24 NOTE — PROGRESS NOTES
PT note:     Chart reviewed and attempted to see patient for PT treatment session. RN at bedside, just got patient back to bed after sitting up all morning. RN requesting PT follow up right after lunch time for PT session. Will follow up.      Edmond Anderson, PT, DPT

## 2022-04-25 LAB
ALBUMIN SERPL-MCNC: 3.2 G/DL (ref 3.5–5)
ALBUMIN/GLOB SERPL: 1.3 {RATIO} (ref 1.1–2.2)
ALP SERPL-CCNC: 59 U/L (ref 45–117)
ALT SERPL-CCNC: 18 U/L (ref 12–78)
ANION GAP SERPL CALC-SCNC: 4 MMOL/L (ref 5–15)
AST SERPL-CCNC: 28 U/L (ref 15–37)
ATRIAL RATE: 77 BPM
BILIRUB SERPL-MCNC: 0.8 MG/DL (ref 0.2–1)
BUN SERPL-MCNC: 24 MG/DL (ref 6–20)
BUN/CREAT SERPL: 39 (ref 12–20)
CALCIUM SERPL-MCNC: 8.8 MG/DL (ref 8.5–10.1)
CALCULATED P AXIS, ECG09: 52 DEGREES
CALCULATED R AXIS, ECG10: 26 DEGREES
CALCULATED T AXIS, ECG11: -27 DEGREES
CHLORIDE SERPL-SCNC: 103 MMOL/L (ref 97–108)
CO2 SERPL-SCNC: 32 MMOL/L (ref 21–32)
CREAT SERPL-MCNC: 0.62 MG/DL (ref 0.55–1.02)
DIAGNOSIS, 93000: NORMAL
ERYTHROCYTE [DISTWIDTH] IN BLOOD BY AUTOMATED COUNT: 16.4 % (ref 11.5–14.5)
GLOBULIN SER CALC-MCNC: 2.5 G/DL (ref 2–4)
GLUCOSE SERPL-MCNC: 111 MG/DL (ref 65–100)
HCT VFR BLD AUTO: 24 % (ref 35–47)
HGB BLD-MCNC: 7.3 G/DL (ref 11.5–16)
MAGNESIUM SERPL-MCNC: 2.1 MG/DL (ref 1.6–2.4)
MCH RBC QN AUTO: 28.7 PG (ref 26–34)
MCHC RBC AUTO-ENTMCNC: 30.4 G/DL (ref 30–36.5)
MCV RBC AUTO: 94.5 FL (ref 80–99)
NRBC # BLD: 0.13 K/UL (ref 0–0.01)
NRBC BLD-RTO: 1.1 PER 100 WBC
P-R INTERVAL, ECG05: 296 MS
PHOSPHATE SERPL-MCNC: 3 MG/DL (ref 2.6–4.7)
PLATELET # BLD AUTO: 169 K/UL (ref 150–400)
PMV BLD AUTO: 12.1 FL (ref 8.9–12.9)
POTASSIUM SERPL-SCNC: 3.8 MMOL/L (ref 3.5–5.1)
PROT SERPL-MCNC: 5.7 G/DL (ref 6.4–8.2)
Q-T INTERVAL, ECG07: 356 MS
QRS DURATION, ECG06: 82 MS
QTC CALCULATION (BEZET), ECG08: 402 MS
RBC # BLD AUTO: 2.54 M/UL (ref 3.8–5.2)
SODIUM SERPL-SCNC: 139 MMOL/L (ref 136–145)
VENTRICULAR RATE, ECG03: 77 BPM
WBC # BLD AUTO: 12 K/UL (ref 3.6–11)

## 2022-04-25 PROCEDURE — 97530 THERAPEUTIC ACTIVITIES: CPT

## 2022-04-25 PROCEDURE — 65270000046 HC RM TELEMETRY

## 2022-04-25 PROCEDURE — 80053 COMPREHEN METABOLIC PANEL: CPT

## 2022-04-25 PROCEDURE — 74011000250 HC RX REV CODE- 250: Performed by: PHYSICIAN ASSISTANT

## 2022-04-25 PROCEDURE — 83735 ASSAY OF MAGNESIUM: CPT

## 2022-04-25 PROCEDURE — 74011250637 HC RX REV CODE- 250/637: Performed by: PHYSICIAN ASSISTANT

## 2022-04-25 PROCEDURE — 97535 SELF CARE MNGMENT TRAINING: CPT

## 2022-04-25 PROCEDURE — 84100 ASSAY OF PHOSPHORUS: CPT

## 2022-04-25 PROCEDURE — 74011250637 HC RX REV CODE- 250/637: Performed by: NURSE PRACTITIONER

## 2022-04-25 PROCEDURE — 85027 COMPLETE CBC AUTOMATED: CPT

## 2022-04-25 PROCEDURE — 74011000250 HC RX REV CODE- 250: Performed by: THORACIC SURGERY (CARDIOTHORACIC VASCULAR SURGERY)

## 2022-04-25 PROCEDURE — 97116 GAIT TRAINING THERAPY: CPT

## 2022-04-25 PROCEDURE — 74011250637 HC RX REV CODE- 250/637: Performed by: THORACIC SURGERY (CARDIOTHORACIC VASCULAR SURGERY)

## 2022-04-25 PROCEDURE — 36415 COLL VENOUS BLD VENIPUNCTURE: CPT

## 2022-04-25 PROCEDURE — 77010033678 HC OXYGEN DAILY

## 2022-04-25 PROCEDURE — 74011250636 HC RX REV CODE- 250/636: Performed by: THORACIC SURGERY (CARDIOTHORACIC VASCULAR SURGERY)

## 2022-04-25 PROCEDURE — 99231 SBSQ HOSP IP/OBS SF/LOW 25: CPT | Performed by: CLINICAL NURSE SPECIALIST

## 2022-04-25 RX ORDER — TRAMADOL HYDROCHLORIDE 50 MG/1
100 TABLET ORAL 2 TIMES DAILY
Status: DISCONTINUED | OUTPATIENT
Start: 2022-04-25 | End: 2022-04-30 | Stop reason: HOSPADM

## 2022-04-25 RX ORDER — CELECOXIB 200 MG/1
200 CAPSULE ORAL DAILY
Status: DISCONTINUED | OUTPATIENT
Start: 2022-04-25 | End: 2022-04-30 | Stop reason: HOSPADM

## 2022-04-25 RX ADMIN — GABAPENTIN 100 MG: 100 CAPSULE ORAL at 13:55

## 2022-04-25 RX ADMIN — ACETAMINOPHEN 1000 MG: 500 TABLET ORAL at 06:04

## 2022-04-25 RX ADMIN — CHOLECALCIFEROL TAB 25 MCG (1000 UNIT) 2000 UNITS: 25 TAB at 08:21

## 2022-04-25 RX ADMIN — ASPIRIN 81 MG: 81 TABLET, CHEWABLE ORAL at 08:21

## 2022-04-25 RX ADMIN — CHLORHEXIDINE GLUCONATE 10 ML: 1.2 RINSE ORAL at 08:21

## 2022-04-25 RX ADMIN — SENNOSIDES AND DOCUSATE SODIUM 1 TABLET: 50; 8.6 TABLET ORAL at 17:26

## 2022-04-25 RX ADMIN — SODIUM CHLORIDE, PRESERVATIVE FREE 10 ML: 5 INJECTION INTRAVENOUS at 06:06

## 2022-04-25 RX ADMIN — TRAMADOL HYDROCHLORIDE 100 MG: 50 TABLET, COATED ORAL at 08:21

## 2022-04-25 RX ADMIN — SODIUM CHLORIDE, PRESERVATIVE FREE 10 ML: 5 INJECTION INTRAVENOUS at 22:20

## 2022-04-25 RX ADMIN — Medication 400 MG: at 08:21

## 2022-04-25 RX ADMIN — Medication 400 MG: at 17:26

## 2022-04-25 RX ADMIN — GABAPENTIN 100 MG: 100 CAPSULE ORAL at 06:04

## 2022-04-25 RX ADMIN — APIXABAN 5 MG: 5 TABLET, FILM COATED ORAL at 12:51

## 2022-04-25 RX ADMIN — APIXABAN 5 MG: 5 TABLET, FILM COATED ORAL at 17:26

## 2022-04-25 RX ADMIN — Medication 1 TABLET: at 08:21

## 2022-04-25 RX ADMIN — SODIUM CHLORIDE, PRESERVATIVE FREE 10 ML: 5 INJECTION INTRAVENOUS at 13:56

## 2022-04-25 RX ADMIN — CHLORHEXIDINE GLUCONATE 10 ML: 1.2 RINSE ORAL at 22:20

## 2022-04-25 RX ADMIN — ACETAMINOPHEN 1000 MG: 500 TABLET ORAL at 12:48

## 2022-04-25 RX ADMIN — ACETAMINOPHEN 1000 MG: 500 TABLET ORAL at 17:26

## 2022-04-25 RX ADMIN — GABAPENTIN 100 MG: 100 CAPSULE ORAL at 22:20

## 2022-04-25 RX ADMIN — PANTOPRAZOLE SODIUM 40 MG: 40 TABLET, DELAYED RELEASE ORAL at 08:21

## 2022-04-25 RX ADMIN — FUROSEMIDE 20 MG: 10 INJECTION, SOLUTION INTRAMUSCULAR; INTRAVENOUS at 17:26

## 2022-04-25 RX ADMIN — CASTOR OIL AND BALSAM, PERU: 788; 87 OINTMENT TOPICAL at 23:38

## 2022-04-25 RX ADMIN — TRAMADOL HYDROCHLORIDE 100 MG: 50 TABLET, COATED ORAL at 17:27

## 2022-04-25 RX ADMIN — CHOLECALCIFEROL TAB 25 MCG (1000 UNIT) 2000 UNITS: 25 TAB at 17:26

## 2022-04-25 RX ADMIN — FUROSEMIDE 20 MG: 10 INJECTION, SOLUTION INTRAMUSCULAR; INTRAVENOUS at 08:21

## 2022-04-25 RX ADMIN — ACETAMINOPHEN 1000 MG: 500 TABLET ORAL at 23:38

## 2022-04-25 RX ADMIN — CELECOXIB 200 MG: 200 CAPSULE ORAL at 08:29

## 2022-04-25 NOTE — PROGRESS NOTES
Cardiac Surgery Care Coordinator- Met with Manish Bo and reviewed plan of care. Reinforced sternal precautions and encouraged continued use of the incentive spirometer. Began discharge teaching and encouraged her to verbalize. Reviewed goals for the day and discussed the  importance of increased activity and continued activity after discharge. Will continue to follow for educational and emotional needs.  Tin Batres RN

## 2022-04-25 NOTE — PROGRESS NOTES
Problem: Mobility Impaired (Adult and Pediatric)  Goal: *Acute Goals and Plan of Care (Insert Text)  Description: FUNCTIONAL STATUS PRIOR TO ADMISSION: Patient was independent and active without use of DME.     HOME SUPPORT PRIOR TO ADMISSION: The patient lived alone with children to provide assistance. Physical Therapy Goals  Initiated 4/21/2022  1. Patient will move from supine to sit and sit to supine  in bed with moderate assistance  within 5 days. 2.  Patient will perform sit to/from stand with minimal assistance/contact guard assist within 5 days. 3.  Patient will ambulate 60 feet with least restrictive assistive device and minimal assistance/contact guard assist within 5 days. 4.  Patient will perform cardiac exercises per protocol with modified independence within 5 days. 5.  Patient will verbally recall and functionally demonstrate mindful-based movements (\"move in the tube\") principles without cues within 5 days. Outcome: Progressing Towards Goal   PHYSICAL THERAPY TREATMENT  Patient: Berna Torres (52 y.o. female)  Date: 4/25/2022  Diagnosis: Mitral valve insufficiency, unspecified etiology [I34.0]  Ascending aortic aneurysm (HCC) [I71.2] <principal problem not specified>  Procedure(s) (LRB):  FELTON BY DR BRUCE - PFO CLOSURE -  MITRAL VALVE REPLACEMENT WITH 27 MM CHARLES MAGNA MITRAL EASE - RESUSPENSION OF AORTIC VALVE -  ASCENDING AORTIC ANEURYSM REPAIR AND LEFT ATRIAL APPENDAGE LIGATION (N/A) 6 Days Post-Op  Precautions: Sternal,Fall  Chart, physical therapy assessment, plan of care and goals were reviewed. ASSESSMENT  Patient continues with skilled PT services and is progressing towards goals. Patient received sitting in the chair, agreeable and cleared for therapy. Patient required CGA-min A for all mobility. She was able to perform x4 sit to stands from various surfaces with minimal verbal cues for sternal precautions.  Patient ambulated 15 feet in the room with CGA and rollator. She demonstrates improved balance, more upright, midline posture, although continues to be unsteady. Patient tolerated standing at the sink x 3 minutes while performing grooming tasks with OT with CGA for balance and BUE on sink for support. VSS on 2L O2 with activity. She continues to tolerate more activity each date and continues to be more motivated each session. Recommending IP rehab at discharge. Patient is verbalizing and is demonstrating understanding of mindful-based movements (\"move in the tube\") principles of keeping UEs proximal to ribcage to prevent lateral pull on the sternum during load-bearing activities with visual and verbal cues required for compliance. Current Level of Function Impacting Discharge (mobility/balance): CGA-min A with rollator for ambulation    Other factors to consider for discharge: decreased endurance, SOB         PLAN :  Patient continues to benefit from skilled intervention to address the above impairments. Continue treatment per established plan of care. to address goals. Recommendation for discharge: (in order for the patient to meet his/her long term goals)  IP rehab    This discharge recommendation:  Has been made in collaboration with the attending provider and/or case management    IF patient discharges home will need the following DME: to be determined (TBD)       SUBJECTIVE:   Patient stated I can stand here a little longer.     OBJECTIVE DATA SUMMARY:   Patient mobilized on continuous portable monitor/telemetry. Critical Behavior:  Neurologic State: Alert,Eyes open spontaneously  Orientation Level: Oriented X4  Cognition: Follows commands       Functional Mobility Training:  Bed Mobility:           Scooting: Contact guard assistance; Additional time          Transfers:  Sit to Stand: Contact guard assistance;Minimum assistance  Stand to Sit: Contact guard assistance        Bed to Chair: Contact guard assistance Balance:  Sitting: Intact; Without support  Standing: Impaired; With support  Standing - Static: Constant support;Good  Standing - Dynamic : Constant support; Fair    Ambulation/Gait Training:  Distance (ft): 15 Feet (ft)  Assistive Device: Gait belt;Walker, rollator  Ambulation - Level of Assistance: Contact guard assistance;Minimal assistance        Gait Abnormalities: Decreased step clearance;Shuffling gait        Base of Support: Widened     Speed/Lissy: Pace decreased (<100 feet/min)  Step Length: Right shortened;Left shortened            Cardiac diagnosis intervention:  Patient instructed and educated on mindful movement principles based on Move in The Tube concept to include maintaining bilateral elbows close to rib cage when performing any load-bearing activity such as getting in/out of bed, pushing up from a chair, opening a door, or lifting a box. Patient was given a handout with diagrams of each correct/incorrect method of performing each of the above tasks. Therapeutic Exercises:   Patient instructed on the benefits and demonstrated cardiac exercises while sitting with Supervision. Instructed and indicated understanding on how to progress reps, sets against gravity, pacing through progressive muscle strengthening standing based on surgeon clearance for more weight in prep for functional activity. Instruction on the use of household items in place of weights as needed.      CARDIAC  EXERCISE   Sets   Reps   Active Active Assist   Passive Self ROM   Comments   Shoulder flexion 1 5 [x]                                            []                                            []                                            []                                               Shoulder abduction 1      5 [x]                                            []                                            []                                            []                                               Scapular elevation 1 5 [x]                                            []                                            []                                            []                                               Scapular retraction 1 5 [x]                                            []                                            []                                            []                                               Trunk rotation 1 5 [x]                                            []                                            []                                            [x]                                               Trunk sidebending 1 5 [x]                                            []                                            []                                            []                                                  []                                            []                                            []                                            []                                                   Pain Rating:  Endorses minimal sternal incision pain. Activity Tolerance:   Fair, Poor, requires rest breaks, and observed SOB with activity    After treatment patient left in no apparent distress:   Sitting in chair, Call bell within reach, and Caregiver / family present    COMMUNICATION/COLLABORATION:   The patients plan of care was discussed with: Occupational therapist, Registered nurse, and Case management.      Luisa Joseph, PT, DPT   Time Calculation: 28 mins

## 2022-04-25 NOTE — PROGRESS NOTES
1900 Bedside and Verbal shift change report given to [address] (oncoming nurse) by Jane Coffey RN (offgoing nurse). Report included the following information SBAR, Kardex, Intake/Output, MAR, Recent Results and Cardiac Rhythm NSR. End of Shift Note    Bedside shift change report given to Maddi Ramos RN (oncoming nurse) by Albert Cid RN (offgoing nurse). Report included the following information SBAR, Kardex, Intake/Output, MAR, Recent Results and Cardiac Rhythm NSR    Shift worked:  1900 - 0700     Shift summary and any significant changes:    Pt tolerated shift fairly well; c/o minimal pain at incision; 1-2L NC required periodically with sleep/ambulation. CHG bath and wound/wire care completed. Concerns for physician to address:       Zone phone for oncoming shift:          Activity:  Activity Level: Up with Assistance  Number times ambulated in hallways past shift: 0  Number of times OOB to chair past shift: 1    Cardiac:   Cardiac Monitoring: Yes      Cardiac Rhythm: Sinus Rhythm    Access:   Current line(s): PIV     Genitourinary:   Urinary status: voiding    Respiratory:   O2 Device: None (Room air)  Chronic home O2 use?: NO  Incentive spirometer at bedside: YES  Actual Volume (ml): 750 ml    GI:  Last Bowel Movement Date: 04/24/22  Current diet:  ADULT ORAL NUTRITION SUPPLEMENT Breakfast, Lunch, Dinner; Standard High Calorie/High Protein  DIET ONE TIME MESSAGE  ADULT DIET Regular; Low Fat/Low Chol/High Fiber/SAMY; No Concentrated Sweets  DIET ONE TIME MESSAGE  Passing flatus: YES  Tolerating current diet: YES       Pain Management:   Patient states pain is manageable on current regimen: YES    Skin:  Maynor Score: 19  Interventions: increase time out of bed, PT/OT consult and nutritional support     Patient Safety:  Fall Score:  Total Score: 3  Interventions: bed/chair alarm, gripper socks and pt to call before getting OOB  High Fall Risk: Yes    Length of Stay:  Expected LOS: 5d 21h  Actual LOS: [facility] Valentin Fortune

## 2022-04-25 NOTE — PROGRESS NOTES
[facility] ICU Progress Note    Admit Date: 2022  POD:  6 Day Post-Op    Procedure:  Procedure(s):  FELTON BY DR BRUCE - PFO CLOSURE -  MITRAL VALVE REPLACEMENT WITH 27 MM CHARLES MAGNA MITRAL EASE - RESUSPENSION OF AORTIC VALVE -  ASCENDING AORTIC ANEURYSM REPAIR AND LEFT ATRIAL APPENDAGE LIGATION        Subjective:   Pt seen with Dr. Christopher Liu. Pt sitting up in bed. No complaints. Requesting tramadol and celebrex which she takes at home. NSR 70-80 now, no need for PPM. Afebrile. Objective:   Vitals:  Blood pressure (!) 120/52, pulse 80, temperature 98.8 °F (37.1 °C), resp. rate 17, height 5' 5\" (1.651 m), weight 213 lb 6.5 oz (96.8 kg), SpO2 99 %. Temp (24hrs), Av.6 °F (37 °C), Min:98.3 °F (36.8 °C), Max:98.8 °F (37.1 °C)    EKG/Rhythm: NSR 70-80s    Oxygen Therapy:  Oxygen Therapy  O2 Sat (%): 99 % (22 0600)  Pulse via Oximetry: 79 beats per minute (22 0600)  O2 Device: Nasal cannula (22 0400)  Skin Assessment: Clean, dry, & intact (22 2000)  Skin Protection for O2 Device: No (22 1600)  O2 Flow Rate (L/min): 2 l/min (22 0400)  FIO2 (%): 40 % (22 0955)    CXR:   CXR Results  (Last 48 hours)    None          Admission Weight: Last Weight   Weight: 191 lb 12.8 oz (87 kg) Weight: 213 lb 6.5 oz (96.8 kg)     Intake / Output / Drain:  Current Shift: No intake/output data recorded. Last 24 hrs.:     Intake/Output Summary (Last 24 hours) at 2022 0805  Last data filed at 2022 0600  Gross per 24 hour   Intake 740 ml   Output 1475 ml   Net -735 ml       EXAM:  General:  NAD, pleasant mood      Lungs:   Clear to auscultation bilaterally. Incision:  Prineo intact. Heart:  Regular rate and rhythm, S1, S2 normal, no murmur, click, rub or gallop. Abdomen:   Soft, non-tender. Bowel sounds hypoactive. No masses,  No organomegaly. Extremities:  1+ b/l edema - nonpitting. PPP. Ecchymosis around left femoral a-line.    Neurologic:  Gross motor and sensory apparatus intact. Labs:   Recent Labs     22  0218 22  1022 22  2331   WBC 12.0*   < >  --    HGB 7.3*   < >  --    HCT 24.0*   < >  --       < >  --       < >  --    K 3.8   < >  --    BUN 24*   < >  --    CREA 0.62   < >  --    *   < >  --    GLUCPOC  --   --  142*    < > = values in this interval not displayed. Assessment:     Active Problems:    Ascending aortic aneurysm ([address] (3/29/2022)      S/P MVR (mitral valve replacement) (2022)      S/P aortic aneurysm repair (2022)       Plan/Recommendations/Medical Decision Makin. S/p ascending aortic aneurysm: Strict BP control. CT out. 2. S/p MVR (tissue valve): Will need AC x 3 months for valve. Discussed with Dr. Delonte Li today per Dr. Cuong Angulo  3. Pulmonary HTN: Improved following MVR  4. A-fib: s/p LAAL. On diltiazem, Tikosyn, and eliquis PTA. Cont to hold rate meds at this time. Resume Eliquis   5. Anemia secondary to acute blood loss: Monitor H/H and CT output. Received a unit PRBC . 6. Atelectasis, pulmonary edema, pleural effusions: IS hourly! OOB activity with PT/OT when appropriate. Down to 2L NC.   7. Chronic Diastolic HF: Monitor I/O. Low UOP. Hold diuretics, appears dry. Give IVFs and more albumin today. 8. Leukocytosis: WBC down to 12 today. Continue to monitor. 9. Pain management: Scheduled tylenol and gabapentin. PRN oxycodone and dilaudid for breakthrough pain. Avoid toradol d/t risk of STEVE. Add PTA tramadol and celebrex  10. Leukocytosis: Increase mobility. Pulm toileting. Daily incisional care. Afebrile  11. Nutrition: encourage PO. Cont Ensure High Protein     Dispo: PT/OT. Transfer to stepdown. Needs to be OOB TID, ambulating TID. Case management following to aid in d/c planning, home with [facility] services TBD based on rhythm.      Signed By: Josias Vasquez NP

## 2022-04-25 NOTE — PROGRESS NOTES
Transition of Care Plan:     RUR:13%     Disposition:Home with home health vs rehab     Follow up appointments: To be done prior to discharge.     DME needed: To be determined.     Transportation at Discharge: Family     [place] or means to access home:Daughter has keys. IM Medicare Letter: To be given prior to discharge.     Is patient a BCPI-A Bundle: No                       If yes, was Bundle Letter given?: N/A     Is patient a [place] and connected with the [place]? No               If yes, was [employer] transfer form completed and [facility] notified? N/A     Caregiver Contact:Daughter--Jose ShepardXxOdfaxr-[phone]     Discharge Caregiver contacted prior to discharge? Caregiver to be contacted prior to discharge.     Care Conference needed?: No      Patient recovering in the ccu. Spoke with her re dcp. She is agreeable to go to rehab when medically stable. Choice given and she would like to go to [facility]. Referral sent and will await their response. Sally Moore RN BSN CRM        [phone]

## 2022-04-25 NOTE — PROGRESS NOTES
0730- Bedside and Verbal shift change report given to [facility] (oncoming nurse) by Ishan Noguera RN (offgoing nurse). Report included the following information SBAR, Kardex, Intake/Output, Recent Results, Cardiac Rhythm NSR and Alarm Parameters . 3616- Shift assessment completed; see flow sheet for details. Pt A/V/Ox4; cooperative; following commands. Currently in NSR; Sinus arrhythmia at times, BP stable. Peripheral pulses palpable. LUE swelling and BLE swelling noted. Lungs are diminished; left lower lobes with very fine crackles; SOB continues on exertion and resolves with rest. ABD is semi-soft; BS active. Current diet order is NPO however pt is no longer going to pacemaker placement; order to be modified. Voiding via the BSC. Sternotomy and Rt. Groin site are approximated and CDI. Site where chest tubes were originally are oozing serous-sanginous fluid. Dressing changed. Pt reports chronic back pain otherwise no other complaints. Pulmonary toileting completed. Pt assisted to the recliner chair with one person assist; ambulated to the [facility] prior to sitting in the recliner. Voided and stooled    1015- PT/OT at the bedside; see progress note for details. 1025- Pt consuming breakfast meal.     1205- Reassessment completed; see flow sheet for details. No acute changes in pt assessment. Remains in the recliner chair with no complaints at this time. Scheduled Tylenol and Eliquis given      1430- Pt made aware of room change. Belongings packed, awaiting transfer at this time     1530- TRANSFER - OUT REPORT:    Verbal report given to Griselda Krone RN(name) on Chavarria Dk  being transferred to [facility] [address] RN(unit) for routine post - op       Report consisted of patients Situation, Background, Assessment and   Recommendations(SBAR).      Information from the following report(s) SBAR, Kardex, Intake/Output, Recent Results, Cardiac Rhythm NSR; Sinus arrythmia  and Alarm Parameters  was reviewed with the receiving nurse.    Lines:   Peripheral IV 04/24/22 Anterior;Proximal;Right Forearm (Active)   Site Assessment Clean, dry, & intact 04/25/22 1200   Phlebitis Assessment 0 04/25/22 1200   Infiltration Assessment 0 04/25/22 1200   Dressing Status Clean, dry, & intact 04/25/22 1200   Dressing Type Transparent 04/25/22 1200   Hub Color/Line Status Pink;Flushed 04/25/22 1200   Alcohol Cap Used No 04/25/22 0400        Opportunity for questions and clarification was provided.       Patient transported with:   Monitor  O2 @ 2 liters  Registered Nurse

## 2022-04-25 NOTE — PROGRESS NOTES
1 - Report received from Isidro Mortimer, [address] [place] [address] - Assessment completed. Patient alert and oriented x 4. Sinus rhythm with PVCs and PACs. On 2L nasal cannula. Right AC 20g flushed and patent. Midline incision is clean, dry, and intact. Large bruise on right leg noted but not new. 2030 - Patient up to bedside commode, 200 ml urine output. 2040 - Patient back to bed. 0000 - Reassessment completed. No changes noted. 0215 -  Patient up to bedside commode, 200 ml urine output. 0222 - Patient back to bed.    0400 - Reassessment completed. No changes noted. 2990 -  Patient up to bedside commode, 250 ml urine output. 0600 - Patient back to bed.    2792 - Patient bathed and linens changed. 4173 - Attempted to get patient up to chair. Patient reports she is tired and would like to wait until breakfast gets here to get up to chair.

## 2022-04-25 NOTE — PROGRESS NOTES
Problem: Self Care Deficits Care Plan (Adult)  Goal: *Acute Goals and Plan of Care (Insert Text)  Description: FUNCTIONAL STATUS PRIOR TO ADMISSION: Patient was independent and active without use of DME. Reports occasional use of SPC as needed. 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     HOME SUPPORT: The patient lived alone with 3 children as local support. Pt reports children [facility]. Occupational Therapy Goals  Initiated 4/21/2022  1. Patient will perform ADLs standing 5 mins without fatigue or LOB with supervision/set-up within 7 day(s). 2.  Patient will perform lower body ADLs with supervision/set-up within 7 day(s). 3.  Patient will perform gathering ADL items high and low 2/2 with supervision/set-up within 7 day(s). 4.  Patient will perform toilet transfers with supervision/set-up within 7 day(s). 5.  Patient will perform all aspects of toileting with supervision/set-up within 7 day(s). 6.  Patient will participate in cardiac/sternal upper extremity therapeutic exercise/activities to increase independence with ADLs with supervision/set-up for 5 minutes within 7 day(s). Outcome: Progressing Towards Goal    OCCUPATIONAL THERAPY TREATMENT  Patient: Lindsay Villagomez (49 y.o. female)  Date: 4/25/2022  Diagnosis: Mitral valve insufficiency, unspecified etiology [I34.0]  Ascending aortic aneurysm (HCC) [I71.2] <principal problem not specified>  Procedure(s) (LRB):  FELTON BY DR BRUCE - PFO CLOSURE -  MITRAL VALVE REPLACEMENT WITH 27 MM CHARLES MAGNA MITRAL EASE - RESUSPENSION OF AORTIC VALVE -  ASCENDING AORTIC ANEURYSM REPAIR AND LEFT ATRIAL APPENDAGE LIGATION (N/A) 6 Days Post-Op  Precautions: Sternal,Fall  Chart, occupational therapy assessment, plan of care, and goals were reviewed. ASSESSMENT  Patient continues with skilled OT services and is progressing towards goals. Patient received seated in recliner chair on 2L NC, requesting to use the bathroom.  Notable drainage from sternal incision on gown, pt also agreeable to changing gown during session. Pt with much improved activity tolerance on this date, able to complete bedroom mobility from recliner > BSC with slight fatigue. Following toileting, pt assisted with donning clean gown to front and separate clean gown to back. Pt educated on moving within the tube when reaching overhead to manipulate gown over BUEs and around backside. Overall required min A. Following pt tolerated standing grooming at sink for ~5 mins, then returned to recliner chair. Pt with improved activity tolerance and endurance on this date however remains well below baseline LOF, continue to recommend IPR. Patient is verbalizing and is demonstrating understanding of mindful-based movements (\"move in the tube\") principles of keeping UEs proximal to ribcage to prevent lateral pull on the sternum during load-bearing activities with verbal cues required for compliance. Current Level of Function Impacting Discharge (ADLs): min A - max A     Other factors to consider for discharge: MVR, below baseline          PLAN :  Patient continues to benefit from skilled intervention to address the above impairments. Continue treatment per established plan of care to address goals. Recommend with staff: up to chair daily    Recommend next OT session: LB dressing     Recommendation for discharge: (in order for the patient to meet his/her long term goals)  Therapy 3 hours per day 5-7 days per week    This discharge recommendation:  Has been made in collaboration with the attending provider and/or case management    IF patient discharges home will need the following DME: TBD - likely RW and shower chair       SUBJECTIVE:   Patient stated i'll do whatever it takes.     OBJECTIVE DATA SUMMARY:   Cognitive/Behavioral Status:  Neurologic State: Alert;Eyes open spontaneously  Orientation Level: Oriented X4  Cognition: Follows commands        Functional Mobility and Transfers for ADLs:  Bed Mobility:  Scooting: Contact guard assistance; Additional time    Transfers:  Sit to Stand: Contact guard assistance;Minimum assistance  Functional Transfers  Toilet Transfer : Contact guard assistance  Bed to Chair: Contact guard assistance    Balance:  Sitting: Intact; Without support  Standing: Impaired; With support  Standing - Static: Constant support;Good  Standing - Dynamic : Constant support; Fair    ADL Intervention:       Grooming  Washing Face: Stand-by assistance  Washing Hands: Stand-by assistance  Brushing Teeth: Stand-by assistance         Upper Body [address]: Minimum  assistance  Shirt simulation with hospital gown: Minimum  assistance         Toileting  Toileting Assistance: Minimum assistance  Bladder Hygiene: Set-up; Supervision  Clothing Management: Minimum assistance  Cues: Verbal cues provided         Patient instructed and educated on mindful movement principles based on Move in The Tube concept to include maintaining bilateral elbows close to rib cage when performing any load-bearing activity such as getting in/out of bed, pushing up from a chair, opening a door, or lifting a box. Patient was given a handout with diagrams of each correct/incorrect method of performing each of the above tasks. Patient instructed on the ability to utilize upper extremities outside the tube when doing any non-load bearing activity such as washing hair/body, brushing teeth, retrieving clothing items, or scratching your back. Patient encouraged to also perform upper extremity exercises \"outside of the tube\" to prevent scar tissue formation around sternal incision site. Patient instructed in detail about activities to heed with caution, allowing pain to be the guide.  These activities include but are not limited to: mowing the lawn, riding a bike, walking a dog, lifting a child, workshop hobbies, golfing, sexual activity, vacuuming, fishing, scrubbing the floors, and moving furniture. Patient was given the [address] in the [facility] handout to describe each of these activities in detail. Patient instructed no asymmetrical reaching over head to ensure B UEs when shoulders >90* i.e. reaching in cabinets and dressing. Instruction on upper body dressing techniques of over head, then arms through to decrease pain and unilateral shoulder flexion >90*. Instruction on the benefits of utilizing B UEs during functional tasks i.e. opening the fridge, stepping into the tub. Therapeutic Exercises:   Patient instructed on the benefits and demonstrated cardiac exercises while seated in recliner  with Supervision and verbal cues. Instructed and indicated understanding on how to progress reps, sets against gravity, pacing through progressive muscle strengthening standing based on surgeon clearance for more weight in prep for basic and instrumental ADLs. Instruction on the use of household items in place of weights as needed.     CARDIAC   EXERCISE    Sets    Reps    Active  Active Assist    Passive  Self ROM    Comments    Shoulder flexion  1  5   [x]                            []                             []                             []                                Shoulder abduction  1  5  [x]                             []                             []                             []                                Scapular elevation  1  5  [x]                             []                              []                             []                                Scapular retraction  1  5  [x]                             []                             []                             []                                Trunk rotation  1  5  [x]                             []                             []                             []                                Trunk sidebending  1  5  [x]                             []                              [] []                                          Pain:  Pt did endorse discomfort in sternal and abdominal region nears open wounds. Lots of drainage noted, required 2 gown changes, RN notified and at bedside at conclusion of session. Activity Tolerance:   Fair and requires rest breaks    After treatment patient left in no apparent distress:   Sitting in chair, Call bell within reach, and Rn at bedside    COMMUNICATION/COLLABORATION:   The patients plan of care was discussed with: Physical therapist, Occupational therapist, and Registered nurse.      Azeem Maxwell OT  Time Calculation: 40 mins

## 2022-04-25 NOTE — PROGRESS NOTES
Critical Care Progress Note  Cuca Renee MD          Date of Service:  2022  NAME:  Manish Bo  :  1945  MRN:  [id number]      Subjective/Hospital course:      : Patient was extubated yesterday. She reports feeling better today, currently on 6 L of oxygen. Pressors are weaned off.  : Patient reports feeling better, on 4 lit oxygen. No acute events overnight.   : Patient reports feeling a little bit better than yesterday. She is weaned off of vasopressors now.   : off pressors, clinically doing well   : no acute events overnight  Problem list:     Problem list:  Hospital Problems  Date Reviewed: 2013          Codes Class Noted POA    S/P MVR (mitral valve replacement) ICD-10-CM: Z95.2  ICD-9-CM: V43.3  2022 Unknown        S/P aortic aneurysm repair ICD-10-CM: Z98.890, Z86.79  ICD-9-CM: V45.89  2022 Unknown        Ascending aortic aneurysm (HCC) ICD-10-CM: I71.2  ICD-9-CM: 441.2  3/29/2022 Unknown              Assessment/Plan:     Post cardiac surgery care: s/p aortic aneurysm repair and MVR  -Extubated on .  -Hemodynamic management per CT surgery postoperative guidelines.   -Insulin for glycemic control. Shock. Vasoplegic vs cardiogenic.  - resolved    Ventilator Management. - Post-extubation pulmonary toilette with adequate postop pain control.   - Early mobilization with PT/OT. -Diuresis as tolerated for pulmonary edema. Anemia secondary to acute blood loss  - Follow CT output for now. - Follow CBC   - Supportive transfusions if needed. Thrombocytopenia  - follow CBC for now. Prophylaxis  - DVT prophylaxis. Start [facility] when ok with CTS. - VAP bundle with chlorhexidine    Code status: full code. I personally spent --- minutes of critical care time.   This is time spent at this critically ill patient's bedside actively involved in patient care as well as the coordination of care and discussions with the patient's family. This does not include any procedural time which has been billed separately. Will sign off at this time      Review of Systems:   A comprehensive review of systems was negative. Vital Signs:     Patient Vitals for the past 4 hrs:   BP Pulse Resp SpO2 Weight   04/25/22 0622     96.8 kg (213 lb 6.5 oz)   04/25/22 0600 (!) 120/52 80 17 99 %    04/25/22 0500 (!) 120/54 77 17 98 %         Intake/Output Summary (Last 24 hours) at 4/25/2022 0826  Last data filed at 4/25/2022 0600  Gross per 24 hour   Intake 740 ml   Output 1475 ml   Net -735 ml        Physical Examination:    Physical Exam  Constitutional:       Appearance: Normal appearance. HENT:      Head: Normocephalic and atraumatic. Mouth/Throat:      Mouth: Mucous membranes are moist.   Eyes:      Extraocular Movements: Extraocular movements intact. Conjunctiva/sclera: Conjunctivae normal.   Cardiovascular:      Rate and Rhythm: Normal rate and regular rhythm. Pulmonary:      Effort: Pulmonary effort is normal. No respiratory distress. Breath sounds: Normal breath sounds. Abdominal:      General: Abdomen is flat. There is no distension. Palpations: Abdomen is soft. Tenderness: There is no abdominal tenderness. There is no guarding. Musculoskeletal:      Cervical back: Normal range of motion and neck supple. Neurological:      Mental Status: She is alert and oriented to person, place, and time. Labs:   Labs and imaging reviewed.       Medications:     Current Facility-Administered Medications   Medication Dose Route Frequency    celecoxib (CELEBREX) capsule 200 mg  200 mg Oral DAILY    traMADoL (ULTRAM) tablet 100 mg  100 mg Oral BID    furosemide (LASIX) injection 20 mg  20 mg IntraVENous BID    chlorhexidine (PERIDEX) 0.12 % mouthwash 10 mL  10 mL Oral Q12H    gabapentin (NEURONTIN) capsule 100 mg  100 mg Oral Q8H    balsam peru-castor oiL (VENELEX) ointment   Topical BID    cholecalciferol (VITAMIN D3) (1000 Units /25 mcg) tablet 2,000 Units  2,000 Units Oral BID    multivitamin, tx-iron-ca-min (THERA-M w/ IRON) tablet 1 Tablet  1 Tablet Oral DAILY    alteplase (CATHFLO) 1 mg in sterile water (preservative free) 1 mL injection  1 mg InterCATHeter PRN    bacitracin 500 unit/gram packet 1 Packet  1 Packet Topical PRN    sodium chloride (NS) flush 5-40 mL  5-40 mL IntraVENous Q8H    sodium chloride (NS) flush 5-40 mL  5-40 mL IntraVENous PRN    acetaminophen (TYLENOL) tablet 1,000 mg  1,000 mg Oral Q6H    oxyCODONE IR (ROXICODONE) tablet 5 mg  5 mg Oral Q4H PRN    oxyCODONE IR (ROXICODONE) tablet 10 mg  10 mg Oral Q4H PRN    naloxone (NARCAN) injection 0.4 mg  0.4 mg IntraVENous PRN    ondansetron (ZOFRAN) injection 4 mg  4 mg IntraVENous Q4H PRN    albuterol (PROVENTIL VENTOLIN) nebulizer solution 2.5 mg  2.5 mg Nebulization Q4H PRN    aspirin chewable tablet 81 mg  81 mg Oral DAILY    magnesium oxide (MAG-OX) tablet 400 mg  400 mg Oral BID    calcium chloride 1 g in 0.9% sodium chloride 250 mL IVPB  1 g IntraVENous PRN    bisacodyL (DULCOLAX) suppository 10 mg  10 mg Rectal DAILY PRN    senna-docusate (PERICOLACE) 8.6-50 mg per tablet 1 Tablet  1 Tablet Oral BID    polyethylene glycol (MIRALAX) packet 17 g  17 g Oral DAILY    ELECTROLYTE REPLACEMENT NOTE: Nurse to review Serum Potassium and Magnesuim levels and Initiate Electrolyte Replacement Protocol as needed  1 Each Other PRN    magnesium sulfate 1 g/100 ml IVPB (premix or compounded)  1 g IntraVENous PRN    glucose chewable tablet 16 g  4 Tablet Oral PRN    glucagon (GLUCAGEN) injection 1 mg  1 mg IntraMUSCular PRN    dextrose 10% infusion 0-250 mL  0-250 mL IntraVENous PRN    diphenhydrAMINE (BENADRYL) capsule 25 mg  25 mg Oral Q6H PRN    diphenhydrAMINE (BENADRYL) injection 25 mg  25 mg IntraVENous Q6H PRN    HYDROmorphone (DILAUDID) injection 0.5 mg  0.5 mg IntraVENous Q2H PRN    melatonin tablet 3-6 mg  3-6 mg Oral QHS PRN    pantoprazole (PROTONIX) tablet 40 mg  40 mg Oral ACB     ______________________________________________________________________  EXPECTED LENGTH OF STAY: 5d 21h  ACTUAL LENGTH OF STAY:          [address] [address] [address], MD   [facility]  [address]  [phone]  4/25/2022

## 2022-04-25 NOTE — PROGRESS NOTES
Report received from Maite [address] [place] [address]. Patient brought over to room in a wheelchair. Patient is alert and oriented x4 with no complaints of pain. Patient placed in the recliner with call light in reach. Dual skin assessment completed. Will continue to monitor.      1900- report given to [address]

## 2022-04-25 NOTE — PROGRESS NOTES
Cardiology Progress Note      4/25/2022 1:04 PM    Admit Date: 4/19/2022          Subjective:  Now in SR. No longer requiring pacer. Visit Vitals  /71   Pulse 82   Temp 98 °F (36.7 °C)   Resp 20   Ht 5' 5\" (1.651 m)   Wt 96.8 kg (213 lb 6.5 oz)   SpO2 95%   BMI 35.51 kg/m²     04/23 1901 - 04/25 0700  In: 18 [P.O.:490; I.V.:500]  Out: 3075 [Urine:3075]        Objective:      Physical Exam:  VS as above     Data Review:   Labs:    Hgb 7.3  BUN 24  Creat 0.6     Telemetry: SR R 80       Assessment:       1.  Status post recent ascending aortic aneurysm repair and mitral valve replacement as described above. 2.  Postoperative bradycardia and junctional rhythm- resolved   3.  History of paroxysmal atrial fibrillation. 4.  Hypertension. 5.  History of coronary artery disease. 6.  Post op anemia     Plan:  No additional recc.  Will be available to see prn

## 2022-04-26 LAB
ALBUMIN SERPL-MCNC: 3.2 G/DL (ref 3.5–5)
ALBUMIN/GLOB SERPL: 1.1 {RATIO} (ref 1.1–2.2)
ALP SERPL-CCNC: 68 U/L (ref 45–117)
ALT SERPL-CCNC: 22 U/L (ref 12–78)
ANION GAP SERPL CALC-SCNC: 4 MMOL/L (ref 5–15)
AST SERPL-CCNC: 30 U/L (ref 15–37)
B PERT DNA SPEC QL NAA+PROBE: NOT DETECTED
BILIRUB SERPL-MCNC: 0.6 MG/DL (ref 0.2–1)
BORDETELLA PARAPERTUSSIS PCR, BORPAR: NOT DETECTED
BUN SERPL-MCNC: 26 MG/DL (ref 6–20)
BUN/CREAT SERPL: 42 (ref 12–20)
C PNEUM DNA SPEC QL NAA+PROBE: NOT DETECTED
CALCIUM SERPL-MCNC: 9.3 MG/DL (ref 8.5–10.1)
CHLORIDE SERPL-SCNC: 100 MMOL/L (ref 97–108)
CO2 SERPL-SCNC: 33 MMOL/L (ref 21–32)
CREAT SERPL-MCNC: 0.62 MG/DL (ref 0.55–1.02)
ERYTHROCYTE [DISTWIDTH] IN BLOOD BY AUTOMATED COUNT: 17.1 % (ref 11.5–14.5)
FLUAV H1 2009 PAND RNA SPEC QL NAA+PROBE: NOT DETECTED
FLUAV H1 RNA SPEC QL NAA+PROBE: NOT DETECTED
FLUAV H3 RNA SPEC QL NAA+PROBE: NOT DETECTED
FLUAV SUBTYP SPEC NAA+PROBE: NOT DETECTED
FLUBV RNA SPEC QL NAA+PROBE: NOT DETECTED
GLOBULIN SER CALC-MCNC: 3 G/DL (ref 2–4)
GLUCOSE BLD STRIP.AUTO-MCNC: 115 MG/DL (ref 65–117)
GLUCOSE SERPL-MCNC: 112 MG/DL (ref 65–100)
HADV DNA SPEC QL NAA+PROBE: NOT DETECTED
HCOV 229E RNA SPEC QL NAA+PROBE: NOT DETECTED
HCOV HKU1 RNA SPEC QL NAA+PROBE: NOT DETECTED
HCOV NL63 RNA SPEC QL NAA+PROBE: NOT DETECTED
HCOV OC43 RNA SPEC QL NAA+PROBE: NOT DETECTED
HCT VFR BLD AUTO: 26.2 % (ref 35–47)
HGB BLD-MCNC: 7.8 G/DL (ref 11.5–16)
HMPV RNA SPEC QL NAA+PROBE: NOT DETECTED
HPIV1 RNA SPEC QL NAA+PROBE: NOT DETECTED
HPIV2 RNA SPEC QL NAA+PROBE: NOT DETECTED
HPIV3 RNA SPEC QL NAA+PROBE: NOT DETECTED
HPIV4 RNA SPEC QL NAA+PROBE: NOT DETECTED
M PNEUMO DNA SPEC QL NAA+PROBE: NOT DETECTED
MAGNESIUM SERPL-MCNC: 2.2 MG/DL (ref 1.6–2.4)
MCH RBC QN AUTO: 28.7 PG (ref 26–34)
MCHC RBC AUTO-ENTMCNC: 29.8 G/DL (ref 30–36.5)
MCV RBC AUTO: 96.3 FL (ref 80–99)
NRBC # BLD: 0.04 K/UL (ref 0–0.01)
NRBC BLD-RTO: 0.3 PER 100 WBC
PHOSPHATE SERPL-MCNC: 3.7 MG/DL (ref 2.6–4.7)
PLATELET # BLD AUTO: 196 K/UL (ref 150–400)
PMV BLD AUTO: 11.7 FL (ref 8.9–12.9)
POTASSIUM SERPL-SCNC: 4.3 MMOL/L (ref 3.5–5.1)
PROT SERPL-MCNC: 6.2 G/DL (ref 6.4–8.2)
RBC # BLD AUTO: 2.72 M/UL (ref 3.8–5.2)
RSV RNA SPEC QL NAA+PROBE: NOT DETECTED
RV+EV RNA SPEC QL NAA+PROBE: NOT DETECTED
SARS-COV-2 PCR, COVPCR: NOT DETECTED
SERVICE CMNT-IMP: NORMAL
SODIUM SERPL-SCNC: 137 MMOL/L (ref 136–145)
WBC # BLD AUTO: 11.7 K/UL (ref 3.6–11)

## 2022-04-26 PROCEDURE — 77010033678 HC OXYGEN DAILY

## 2022-04-26 PROCEDURE — 74011000250 HC RX REV CODE- 250: Performed by: NURSE PRACTITIONER

## 2022-04-26 PROCEDURE — 80053 COMPREHEN METABOLIC PANEL: CPT

## 2022-04-26 PROCEDURE — 65270000046 HC RM TELEMETRY

## 2022-04-26 PROCEDURE — 97535 SELF CARE MNGMENT TRAINING: CPT

## 2022-04-26 PROCEDURE — 74011250637 HC RX REV CODE- 250/637: Performed by: THORACIC SURGERY (CARDIOTHORACIC VASCULAR SURGERY)

## 2022-04-26 PROCEDURE — 74011250637 HC RX REV CODE- 250/637: Performed by: NURSE PRACTITIONER

## 2022-04-26 PROCEDURE — 74011250637 HC RX REV CODE- 250/637: Performed by: PHYSICIAN ASSISTANT

## 2022-04-26 PROCEDURE — 85027 COMPLETE CBC AUTOMATED: CPT

## 2022-04-26 PROCEDURE — 0202U NFCT DS 22 TRGT SARS-COV-2: CPT

## 2022-04-26 PROCEDURE — 74011000250 HC RX REV CODE- 250: Performed by: PHYSICIAN ASSISTANT

## 2022-04-26 PROCEDURE — 83735 ASSAY OF MAGNESIUM: CPT

## 2022-04-26 PROCEDURE — 84100 ASSAY OF PHOSPHORUS: CPT

## 2022-04-26 PROCEDURE — 36415 COLL VENOUS BLD VENIPUNCTURE: CPT

## 2022-04-26 PROCEDURE — 74011000250 HC RX REV CODE- 250: Performed by: THORACIC SURGERY (CARDIOTHORACIC VASCULAR SURGERY)

## 2022-04-26 PROCEDURE — 97116 GAIT TRAINING THERAPY: CPT

## 2022-04-26 PROCEDURE — 82962 GLUCOSE BLOOD TEST: CPT

## 2022-04-26 RX ORDER — POTASSIUM CHLORIDE 750 MG/1
20 TABLET, FILM COATED, EXTENDED RELEASE ORAL
Status: COMPLETED | OUTPATIENT
Start: 2022-04-26 | End: 2022-04-26

## 2022-04-26 RX ORDER — SODIUM CHLORIDE 0.9 % (FLUSH) 0.9 %
5-40 SYRINGE (ML) INJECTION AS NEEDED
Status: DISCONTINUED | OUTPATIENT
Start: 2022-04-26 | End: 2022-04-30 | Stop reason: HOSPADM

## 2022-04-26 RX ORDER — SODIUM CHLORIDE 0.9 % (FLUSH) 0.9 %
5-40 SYRINGE (ML) INJECTION EVERY 8 HOURS
Status: DISCONTINUED | OUTPATIENT
Start: 2022-04-26 | End: 2022-04-30 | Stop reason: HOSPADM

## 2022-04-26 RX ORDER — HYDROMORPHONE HYDROCHLORIDE 1 MG/ML
0.2 INJECTION, SOLUTION INTRAMUSCULAR; INTRAVENOUS; SUBCUTANEOUS
Status: DISCONTINUED | OUTPATIENT
Start: 2022-04-26 | End: 2022-04-30 | Stop reason: HOSPADM

## 2022-04-26 RX ORDER — CHLORHEXIDINE GLUCONATE 1.2 MG/ML
10 RINSE ORAL EVERY 12 HOURS
Status: DISCONTINUED | OUTPATIENT
Start: 2022-04-26 | End: 2022-04-30 | Stop reason: HOSPADM

## 2022-04-26 RX ORDER — BUMETANIDE 0.25 MG/ML
1 INJECTION INTRAMUSCULAR; INTRAVENOUS ONCE
Status: COMPLETED | OUTPATIENT
Start: 2022-04-26 | End: 2022-04-26

## 2022-04-26 RX ORDER — FUROSEMIDE 10 MG/ML
40 INJECTION INTRAMUSCULAR; INTRAVENOUS ONCE
Status: DISCONTINUED | OUTPATIENT
Start: 2022-04-26 | End: 2022-04-26

## 2022-04-26 RX ADMIN — ACETAMINOPHEN 1000 MG: 500 TABLET ORAL at 17:23

## 2022-04-26 RX ADMIN — CHOLECALCIFEROL TAB 25 MCG (1000 UNIT) 2000 UNITS: 25 TAB at 17:22

## 2022-04-26 RX ADMIN — Medication 400 MG: at 17:23

## 2022-04-26 RX ADMIN — SODIUM CHLORIDE, PRESERVATIVE FREE 10 ML: 5 INJECTION INTRAVENOUS at 08:56

## 2022-04-26 RX ADMIN — CHLORHEXIDINE GLUCONATE 10 ML: 1.2 RINSE ORAL at 22:31

## 2022-04-26 RX ADMIN — GABAPENTIN 100 MG: 100 CAPSULE ORAL at 22:31

## 2022-04-26 RX ADMIN — APIXABAN 5 MG: 5 TABLET, FILM COATED ORAL at 08:52

## 2022-04-26 RX ADMIN — APIXABAN 5 MG: 5 TABLET, FILM COATED ORAL at 17:23

## 2022-04-26 RX ADMIN — SODIUM CHLORIDE, PRESERVATIVE FREE 10 ML: 5 INJECTION INTRAVENOUS at 14:00

## 2022-04-26 RX ADMIN — ACETAMINOPHEN 1000 MG: 500 TABLET ORAL at 05:52

## 2022-04-26 RX ADMIN — ASPIRIN 81 MG: 81 TABLET, CHEWABLE ORAL at 08:51

## 2022-04-26 RX ADMIN — POTASSIUM CHLORIDE 20 MEQ: 750 TABLET, EXTENDED RELEASE ORAL at 08:51

## 2022-04-26 RX ADMIN — CELECOXIB 200 MG: 200 CAPSULE ORAL at 08:52

## 2022-04-26 RX ADMIN — TRAMADOL HYDROCHLORIDE 100 MG: 50 TABLET, COATED ORAL at 17:22

## 2022-04-26 RX ADMIN — SODIUM CHLORIDE, PRESERVATIVE FREE 10 ML: 5 INJECTION INTRAVENOUS at 09:00

## 2022-04-26 RX ADMIN — GABAPENTIN 100 MG: 100 CAPSULE ORAL at 14:34

## 2022-04-26 RX ADMIN — SODIUM CHLORIDE, PRESERVATIVE FREE 10 ML: 5 INJECTION INTRAVENOUS at 22:32

## 2022-04-26 RX ADMIN — ACETAMINOPHEN 1000 MG: 500 TABLET ORAL at 12:02

## 2022-04-26 RX ADMIN — CASTOR OIL AND BALSAM, PERU: 788; 87 OINTMENT TOPICAL at 22:32

## 2022-04-26 RX ADMIN — CASTOR OIL AND BALSAM, PERU: 788; 87 OINTMENT TOPICAL at 08:53

## 2022-04-26 RX ADMIN — CHLORHEXIDINE GLUCONATE 10 ML: 1.2 RINSE ORAL at 09:00

## 2022-04-26 RX ADMIN — SODIUM CHLORIDE, PRESERVATIVE FREE 10 ML: 5 INJECTION INTRAVENOUS at 05:52

## 2022-04-26 RX ADMIN — PANTOPRAZOLE SODIUM 40 MG: 40 TABLET, DELAYED RELEASE ORAL at 08:52

## 2022-04-26 RX ADMIN — CHOLECALCIFEROL TAB 25 MCG (1000 UNIT) 2000 UNITS: 25 TAB at 08:52

## 2022-04-26 RX ADMIN — BUMETANIDE 1 MG: 0.25 INJECTION, SOLUTION INTRAMUSCULAR; INTRAVENOUS at 08:50

## 2022-04-26 RX ADMIN — TRAMADOL HYDROCHLORIDE 100 MG: 50 TABLET, COATED ORAL at 08:52

## 2022-04-26 RX ADMIN — Medication 400 MG: at 08:52

## 2022-04-26 RX ADMIN — GABAPENTIN 100 MG: 100 CAPSULE ORAL at 05:52

## 2022-04-26 RX ADMIN — Medication 1 TABLET: at 08:51

## 2022-04-26 RX ADMIN — ACETAMINOPHEN 1000 MG: 500 TABLET ORAL at 23:42

## 2022-04-26 NOTE — PROGRESS NOTES
[facility] ICU Progress Note    Admit Date: 2022  POD:  7 Day Post-Op    Procedure:  Procedure(s):  FELTON BY DR BRUCE - PFO CLOSURE -  MITRAL VALVE REPLACEMENT WITH 27 MM CHARLES MAGNA MITRAL EASE - RESUSPENSION OF AORTIC VALVE -  ASCENDING AORTIC ANEURYSM REPAIR AND LEFT ATRIAL APPENDAGE LIGATION        Subjective:   Pt seen with Dr. Carl Mccall. Pt sitting up in recliner. She is feeling much better this morning after starting her tramadol and celebrex. Still c/o LE edema    Remains in NSR. On 1L NC. Afebrile. Objective:   Vitals:  Blood pressure (!) 119/56, pulse 86, temperature 98.2 °F (36.8 °C), resp. rate 19, height 5' 5\" (1.651 m), weight 216 lb 4.3 oz (98.1 kg), SpO2 94 %. Temp (24hrs), Av.2 °F (36.8 °C), Min:98 °F (36.7 °C), Max:98.3 °F (36.8 °C)    EKG/Rhythm: NSR 70-80s    Oxygen Therapy:  Oxygen Therapy  O2 Sat (%): 94 % (22)  Pulse via Oximetry: 86 beats per minute (22)  O2 Device: Nasal cannula (22)  Skin Assessment: Clean, dry, & intact (22)  Skin Protection for O2 Device: N/A (22)  O2 Flow Rate (L/min): 1 l/min (22)  FIO2 (%): 40 % (22 09)    CXR:   CXR Results  (Last 48 hours)    None          Admission Weight: Last Weight   Weight: 191 lb 12.8 oz (87 kg) Weight: 216 lb 4.3 oz (98.1 kg)     Intake / Output / Drain:  Current Shift: No intake/output data recorded. Last 24 hrs.:     Intake/Output Summary (Last 24 hours) at 2022 0846  Last data filed at 2022 0456  Gross per 24 hour   Intake    Output 450 ml   Net -450 ml       EXAM:  General:  NAD, pleasant mood      Lungs:   Clear to auscultation bilaterally. Incision:  Prineo intact. Heart:  Regular rate and rhythm, S1, S2 normal, no murmur, click, rub or gallop. Abdomen:   Soft, non-tender. Bowel sounds hypoactive. No masses,  No organomegaly. Extremities:  1+ b/l edema - nonpitting. PPP. Ecchymosis around left femoral a-line.    Neurologic:  Gross motor and sensory apparatus intact. Labs:   Recent Labs     22  0741 22  0600   WBC  --  11.7*   HGB  --  7.8*   HCT  --  26.2*   PLT  --  196   NA  --  137   K  --  4.3   BUN  --  26*   CREA  --  0.62   GLU  --  112*   GLUCPOC 115  --         Assessment:     Active Problems:    Ascending aortic aneurysm (HCC) (3/29/2022)      S/P MVR (mitral valve replacement) (2022)      S/P aortic aneurysm repair (2022)       Plan/Recommendations/Medical Decision Makin. S/p ascending aortic aneurysm: Strict BP control. CT out. 2. S/p MVR (tissue valve): Will need AC x 3 months for valve. Discussed with Dr. Brit Solomon   3. Pulmonary HTN: Improved following MVR  4. A-fib: s/p LAAL. On diltiazem, Tikosyn, and eliquis PTA. Cont to hold rate meds at this time. Resume Eliquis   5. Anemia secondary to acute blood loss: Monitor H/H and CT output. Received a unit PRBC . 6. Atelectasis, pulmonary edema, pleural effusions: IS hourly! OOB activity with PT/OT when appropriate. Down to 1L NC.   7. Chronic Diastolic HF: Monitor I/O. Up 3 lbs. hold PO lasix and give IV bumex today. Strict I/Os  8. Leukocytosis: WBC down to 12 today. Continue to monitor. 9. Pain management: Scheduled tylenol and gabapentin. PRN oxycodone and dilaudid for breakthrough pain. Avoid toradol d/t risk of STEVE. Cont tramadol and celebrex  10. Leukocytosis: Increase mobility. Pulm toileting. Daily incisional care. Afebrile  11. Nutrition: encourage PO. Cont Ensure High Protein     Dispo: PT/OT. Stepdown. Needs to be OOB TID, ambulating TID. Case management following to aid in d/c planning, IPR referral sent yesterday ().      Signed By: Radha Carpio NP

## 2022-04-26 NOTE — PROGRESS NOTES
Problem: Self Care Deficits Care Plan (Adult)  Goal: *Acute Goals and Plan of Care (Insert Text)  Description: FUNCTIONAL STATUS PRIOR TO ADMISSION: Patient was independent and active without use of DME. Reports occasional use of SPC as needed. 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     HOME SUPPORT: The patient lived alone with 3 children as local support. Pt reports children plan to stay with her at [place]. Occupational Therapy Goals  Initiated 4/21/2022  1. Patient will perform ADLs standing 5 mins without fatigue or LOB with supervision/set-up within 7 day(s). 2.  Patient will perform lower body ADLs with supervision/set-up within 7 day(s). 3.  Patient will perform gathering ADL items high and low 2/2 with supervision/set-up within 7 day(s). 4.  Patient will perform toilet transfers with supervision/set-up within 7 day(s). 5.  Patient will perform all aspects of toileting with supervision/set-up within 7 day(s). 6.  Patient will participate in cardiac/sternal upper extremity therapeutic exercise/activities to increase independence with ADLs with supervision/set-up for 5 minutes within 7 day(s). Outcome: Progressing Towards Goal     OCCUPATIONAL THERAPY TREATMENT  Patient: Flaquita Granados (7 y.o. female)  Date: 4/26/2022  Diagnosis: Mitral valve insufficiency, unspecified etiology [I34.0]  Ascending aortic aneurysm (HCC) [I71.2] <principal problem not specified>  Procedure(s) (LRB):  FELTON BY DR BRUCE - PFO CLOSURE -  MITRAL VALVE REPLACEMENT WITH 27 MM CHARLES MAGNA MITRAL EASE - RESUSPENSION OF AORTIC VALVE -  ASCENDING AORTIC ANEURYSM REPAIR AND LEFT ATRIAL APPENDAGE LIGATION (N/A) 7 Days Post-Op  Precautions: Sternal,Fall  Chart, occupational therapy assessment, plan of care, and goals were reviewed. ASSESSMENT  Patient continues with skilled OT services and is progressing towards goals. Patient received supine in bed on 1L NC agreeable to session.  Pt O2 level at 100%, transitioned to RA and remained >90%. Pt able to come to EOB verbal cues provided for precautions. Pt able to complete bedroom mobility using Rw with CGA, required cues for hand placement when completing toilet transfer. Increased time provided for seated rest break. Pts O2 remaining >90% with activity. Able to manage all aspects of hygiene with SPV, assist provided for gown mgmt. Following pt complete bedroom mobility using RW, endorsing slight SOB however motivated to complete mobility to recliner chair. Vitals assessed and stable once seated in recliner chair. Patient remains highly motivated and is making slow but steady progress toward set goals, continue to recommend IPR at [place]. Patient is verbalizing and is demonstrating understanding of mindful-based movements (\"move in the tube\") principles of keeping UEs proximal to ribcage to prevent lateral pull on the sternum during load-bearing activities with visual, verbal, manual, and tactile cues required for compliance. Current Level of Function Impacting Discharge (ADLs): min A - mod A     Other factors to consider for discharge: MVR         PLAN :  Patient continues to benefit from skilled intervention to address the above impairments. Continue treatment per established plan of care to address goals. Recommend with staff: up to chair daily, RW for mobility    Recommend next OT session: LB dressing     Recommendation for discharge: (in order for the patient to meet his/her long term goals)  Therapy 3 hours per day 5-7 days per week    This discharge recommendation:  Has been made in collaboration with the attending provider and/or case management    IF patient discharges home will need the following DME: AE: long handled bathing, AE: long handled dressing, bedside commode, shower chair, and walker: rolling       SUBJECTIVE:   Patient stated Harrisjennyfer Colmenares can make it back I think .     OBJECTIVE DATA SUMMARY:   Cognitive/Behavioral Status:  Neurologic State: Alert  Orientation Level: Oriented X4  Cognition: Appropriate decision making; Appropriate for age attention/concentration; Appropriate safety awareness; Follows commands             Functional Mobility and Transfers for ADLs:  Bed Mobility:  Rolling: Stand-by assistance  Supine to Sit: Stand-by assistance  Scooting: Supervision    Transfers:  Sit to Stand: Contact guard assistance  Functional Transfers  Bathroom Mobility: Contact guard assistance  Toilet Transfer : Minimum assistance; Additional time  Adaptive Equipment: Walker (comment);Grab bars       Balance:  Sitting: Intact  Sitting - Static: Good (unsupported)  Sitting - Dynamic: Good (unsupported)  Standing: Impaired  Standing - Static: Good  Standing - Dynamic : Fair    ADL Intervention:       Toileting  Toileting Assistance: Minimum assistance  Bladder Hygiene: Set-up; Supervision  Clothing Management: Minimum assistance  Adaptive Equipment: Walker;Grab bars         Patient instructed and educated on mindful movement principles based on Move in The Tube concept to include maintaining bilateral elbows close to rib cage when performing any load-bearing activity such as getting in/out of bed, pushing up from a chair, opening a door, or lifting a box. Patient was given a handout with diagrams of each correct/incorrect method of performing each of the above tasks. Patient instructed on the ability to utilize upper extremities outside the tube when doing any non-load bearing activity such as washing hair/body, brushing teeth, retrieving clothing items, or scratching your back. Patient encouraged to also perform upper extremity exercises \"outside of the tube\" to prevent scar tissue formation around sternal incision site. Patient instructed in detail about activities to heed with caution, allowing pain to be the guide.  These activities include but are not limited to: mowing the lawn, riding a bike, walking a dog, lifting a child, workshop hobbies, golfing, sexual activity, vacuuming, fishing, scrubbing the floors, and moving furniture. Patient was given the [address] [place] the [place] handout to describe each of these activities in detail. Pain:  Pt endorsing discomfort with increased BLE edema     Activity Tolerance:   Fair, SpO2 stable on RA, requires rest breaks, and observed SOB with activity    After treatment patient left in no apparent distress:   Sitting in chair and Call bell within reach    COMMUNICATION/COLLABORATION:   The patients plan of care was discussed with: Physical therapist, Occupational therapist, and Registered nurse.      Tio Driscoll OT  Time Calculation: 12 mins

## 2022-04-26 NOTE — PROGRESS NOTES
PT visit attempted pt reporting she had been up in recliner since 0400, just returned to bed following toileting. Pt endorsing 9/10 fatigue levels and reporting pain in BLE 2/2 edema. Pt continuing to decline PT session despite education. Will re attempt as able. Continue to recommend IPR at [place].

## 2022-04-26 NOTE — PROGRESS NOTES
Problem: Mobility Impaired (Adult and Pediatric)  Goal: *Acute Goals and Plan of Care (Insert Text)  Description: FUNCTIONAL STATUS PRIOR TO ADMISSION: Patient was independent and active without use of DME.     HOME SUPPORT PRIOR TO ADMISSION: The patient lived alone with children to provide assistance. Physical Therapy Goals  Initiated 4/21/2022  1. Patient will move from supine to sit and sit to supine  in bed with moderate assistance  within 5 days. 2.  Patient will perform sit to/from stand with minimal assistance/contact guard assist within 5 days. 3.  Patient will ambulate 60 feet with least restrictive assistive device and minimal assistance/contact guard assist within 5 days. 4.  Patient will perform cardiac exercises per protocol with modified independence within 5 days. 5.  Patient will verbally recall and functionally demonstrate mindful-based movements (\"move in the tube\") principles without cues within 5 days. Outcome: Progressing Towards Goal   PHYSICAL THERAPY TREATMENT  Patient: Reva [place] (52 y.o. female)  Date: 4/26/2022  Diagnosis: Mitral valve insufficiency, unspecified etiology [I34.0]  Ascending aortic aneurysm (HCC) [I71.2] <principal problem not specified>  Procedure(s) (LRB):  FELTON BY DR BRUCE - PFO CLOSURE -  MITRAL VALVE REPLACEMENT WITH 27 MM CHARLES MAGNA MITRAL EASE - RESUSPENSION OF AORTIC VALVE -  ASCENDING AORTIC ANEURYSM REPAIR AND LEFT ATRIAL APPENDAGE LIGATION (N/A) 7 Days Post-Op  Precautions: Sternal,Fall  Chart, physical therapy assessment, plan of care and goals were reviewed. ASSESSMENT  Patient continues with skilled PT services and is progressing towards goals. Pt presents with decreased strength and endurance. Pt received on 1 LPM but able to mobilize on RA. Pt performed bed mobility at SBA with additional time. Pt performed sit to stand transfer at CGA/min A with rocking for momentum. Pt ambulated 8ft and 30ft with RW at [facility].  Pt requiring cueing to stand upright. Pt able to correct for short periods. Pts able to mobilize on RA with o2 stats stable. Pt with improved mobility tolerance today. Patient is demonstrating understanding of mindful-based movements (\"move in the tube\") principles of keeping UEs proximal to ribcage to prevent lateral pull on the sternum during load-bearing activities with verbal cues required for compliance. Current Level of Function Impacting Discharge (mobility/balance):  bed mobility at [facility]/CGA, sit to stand transfer CGA/min A, ambulation at [facility]    Other factors to consider for discharge: decreased endurance. PLAN :  Patient continues to benefit from skilled intervention to address the above impairments. Continue treatment per established plan of care. to address goals. Recommendation for discharge: (in order for the patient to meet his/her long term goals)  Therapy 3 hours per day 5-7 days per week    This discharge recommendation:  Has been made in collaboration with the attending provider and/or case management    IF patient discharges home will need the following DME: rolling walker       SUBJECTIVE:   Patient stated  I feel a little better than this morning.     OBJECTIVE DATA SUMMARY:   Patient mobilized on continuous portable monitor/telemetry. Critical Behavior:  Neurologic State: Alert  Orientation Level: Oriented X4  Cognition: Appropriate decision making,Appropriate for age attention/concentration,Appropriate safety awareness,Follows commands       Functional Mobility Training:  Bed Mobility:  Rolling: Stand-by assistance  Supine to Sit: Stand-by assistance; Additional time     Scooting: Supervision          Transfers:  Sit to Stand: Contact guard assistance;Minimum assistance  Stand to Sit: Contact guard assistance                               Balance:  Sitting: Intact  Sitting - Static: Good (unsupported)  Sitting - Dynamic: Good (unsupported)  Standing: Impaired  Standing - Static: Good  Standing - Dynamic : Fair    Ambulation/Gait Training:  Distance (ft): 38 Feet (ft)  Assistive Device: Gait belt;Walker, rolling  Ambulation - Level of Assistance: Contact guard assistance        Gait Abnormalities: Decreased step clearance;Shuffling gait        Base of Support: Widened     Speed/Lissy: Pace decreased (<100 feet/min); Shuffled  Step Length: Right shortened;Left shortened           Cardiac diagnosis intervention:  Patient instructed and educated on mindful movement principles based on Move in The Tube concept to include maintaining bilateral elbows close to rib cage when performing any load-bearing activity such as getting in/out of bed, pushing up from a chair, opening a door, or lifting a box. Patient was given a handout with diagrams of each correct/incorrect method of performing each of the above tasks. Therapeutic Exercises:   Patient instructed on the benefits and demonstrated cardiac exercises while sitting with Contact guard assistance. Instructed and indicated understanding on how to progress reps, sets against gravity, pacing through progressive muscle strengthening standing based on surgeon clearance for more weight in prep for functional activity. Instruction on the use of household items in place of weights as needed.      CARDIAC  EXERCISE   Sets   Reps   Active Active Assist   Passive Self ROM   Comments   Shoulder flexion 1 5 [x]                                            []                                            []                                            []                                               Shoulder abduction 1      5 [x]                                            []                                            []                                            []                                               Scapular elevation 1 5 [x]                                            []                                            [] []                                               Scapular retraction 1 5 [x]                                            []                                            []                                            []                                               Trunk rotation 1 5 [x]                                            []                                            []                                            [x]                                               Trunk sidebending 1 5 [x]                                            []                                            []                                            []                                                  []                                            []                                            []                                            []                                                   Pain Rating:  Pt with no complaints of pain     Activity Tolerance:   Fair, SpO2 stable on RA, and requires rest breaks    After treatment patient left in no apparent distress:   Sitting in chair and Call bell within reach    COMMUNICATION/COLLABORATION:   The patients plan of care was discussed with: Occupational therapist and Registered nurse.      Deven Rich PTA   Time Calculation: 16 mins

## 2022-04-26 NOTE — PROGRESS NOTES
Comprehensive Nutrition Assessment    Type and Reason for Visit: Initial,RD nutrition re-screen/LOS    Nutrition Recommendations/Plan:   1. Cardiac/CCD  2. Continue ensure enlive TID     Nutrition Assessment:    Patient medically noted for AAA repair and MVR. PMH GERD and CHF. Chart reviewed for length of stay. Patient sleeping soundly at time of attempted visit. Poor PO intake of meals noted per flowsheets. Ensure enlive ordered TID. Currently receiving a no concentrated sweets diet; will adjust to CCD to allow more options while still aiming to control BG post op. Encourage intake of meals. .    Patient Vitals for the past 168 hrs:   % Diet Eaten   04/23/22 1800 26 - 50%   04/23/22 1200 26 - 50%   04/23/22 0800 1 - 25%   04/22/22 1600 1 - 25%   04/22/22 1200 1 - 25%   04/22/22 0800 1 - 25%     Patient Vitals for the past 168 hrs:   Supplement intake %   04/22/22 1600 51 - 75%   04/22/22 1200 76 - 100%     Nutrition Related Findings:    [phone]  BM 4/24  1-2+ edema  Vitamin D, Mag-ox, MVI, Protonix, Miralax, Pericolace    Wound Type: Surgical incision    Current Nutrition Intake & Therapies:  Average Meal Intake: 26-50%  Average Supplement Intake: 51-75%  ADULT ORAL NUTRITION SUPPLEMENT Breakfast, Lunch, Dinner; Standard High Calorie/High Protein  DIET ONE TIME MESSAGE  ADULT DIET Regular; Low Fat/Low Chol/High Fiber/SAMY; No Concentrated Sweets  DIET ONE TIME MESSAGE    Anthropometric Measures:  Height: 5' 5\" (165.1 cm)  Ideal Body Weight (IBW): 125 lbs (57 kg)  Current Body Wt:  98.1 kg (216 lb 4.3 oz), 173 % IBW. Current BMI (kg/m2): 36  BMI Category: Obese class 2 (BMI 35.0-39. 9)    Estimated Daily Nutrient Needs:  Energy Requirements Based On: Formula  Weight Used for Energy Requirements: Current  Energy (kcal/day): 1919 kcal (BMR 1476 x 1. 3AF)  Weight Used for Protein Requirements: Current  Protein (g/day): 98g (1.0 g/kg bw)  Method Used for Fluid Requirements: 1 ml/kcal  Fluid (ml/day): 1900 mL    Nutrition Diagnosis:   · Inadequate protein-energy intake related to  (poor appetite post op) as evidenced by intake 0-25%,intake 26-50%    Nutrition Interventions:   Food and/or Nutrient Delivery: Modify current diet,Continue oral nutrition supplement  Nutrition Education/Counseling: No recommendations at this time  Coordination of Nutrition Care: Continue to monitor while inpatient    Goals:  Goals:  (PO intake >50% of meals and 70% of ONS next 4-6 days)    Nutrition Monitoring and Evaluation:   Behavioral-Environmental Outcomes: None identified  Food/Nutrient Intake Outcomes: Food and nutrient intake,Supplement intake  Physical Signs/Symptoms Outcomes: Biochemical data,Weight,Fluid status or edema    Discharge Planning:    Continue oral nutrition supplement (Heart healthy diet)    Jane Boas, RD  Contact: ext [phone]

## 2022-04-26 NOTE — PROGRESS NOTES
Problem: Falls - Risk of  Goal: *Absence of Falls  Description: Document Juan Jose Cano Fall Risk and appropriate interventions in the flowsheet.   4/26/2022 1052 by Lisset Mckeon RN  Outcome: Progressing Towards Goal  Note: Fall Risk Interventions:  Mobility Interventions: Bed/chair exit alarm,Patient to call before getting OOB,Communicate number of staff needed for ambulation/transfer,Utilize walker, cane, or other assistive device,Utilize gait belt for transfers/ambulation    Mentation Interventions: Adequate sleep, hydration, pain control,Bed/chair exit alarm,Increase mobility,More frequent rounding,Toileting rounds,Update white board    Medication Interventions: Bed/chair exit alarm,Patient to call before getting OOB,Teach patient to arise slowly    Elimination Interventions: Bed/chair exit alarm,Call light in reach,Patient to call for help with toileting needs,Stay With Me (per policy),Toilet paper/wipes in reach,Toileting schedule/hourly rounds           4/26/2022 1051 by Lisset Mckeon RN  Outcome: Progressing Towards Goal  Note: Fall Risk Interventions:  Mobility Interventions: Bed/chair exit alarm,Patient to call before getting OOB,Communicate number of staff needed for ambulation/transfer,Utilize walker, cane, or other assistive device,Utilize gait belt for transfers/ambulation    Mentation Interventions: Adequate sleep, hydration, pain control,Bed/chair exit alarm,Increase mobility,More frequent rounding,Toileting rounds,Update white board    Medication Interventions: Bed/chair exit alarm,Patient to call before getting OOB,Teach patient to arise slowly    Elimination Interventions: Bed/chair exit alarm,Call light in reach,Patient to call for help with toileting needs,Stay With Me (per policy),Toilet paper/wipes in reach,Toileting schedule/hourly rounds           4/26/2022 1050 by Lisset Mckeon RN  Outcome: Progressing Towards Goal  Note: Fall Risk Interventions:  Mobility Interventions: Bed/chair exit alarm,Patient to call before getting OOB,Communicate number of staff needed for ambulation/transfer,Utilize walker, cane, or other assistive device,Utilize gait belt for transfers/ambulation    Mentation Interventions: Adequate sleep, hydration, pain control,Bed/chair exit alarm,Increase mobility,More frequent rounding,Toileting rounds,Update white board    Medication Interventions: Bed/chair exit alarm,Patient to call before getting OOB,Teach patient to arise slowly    Elimination Interventions: Bed/chair exit alarm,Call light in reach,Patient to call for help with toileting needs,Stay With Me (per policy),Toilet paper/wipes in reach,Toileting schedule/hourly rounds

## 2022-04-26 NOTE — PROGRESS NOTES
Occupational Therapy    Attempted to see pt for OT session. Pt reporting she had been up in recliner since 0400, just returned to bed following toileting. Pt endorsing 9/10 fatigue levels and reporting pain in BLE 2/2 edema. Pt continuing to decline OT session despite education. Will re attempt as able. Continue to recommend IPR at [place].     PIA Dalal, OTR/L

## 2022-04-26 NOTE — PROGRESS NOTES
3925 Bedside and Verbal shift change report given to LAURITA Neff (oncoming nurse) by Damaris Argueta (offgoing nurse). Report included the following information SBAR, Kardex, ED Summary, OR Summary, Procedure Summary, Intake/Output, MAR, Recent Results and Cardiac Rhythm NSR.     0810 The patient achieved 750 mL using incentive spirometer, ate breakfast in recliner and ambulated to the bathroom    0905 Attempted to walk patient down the hallway- she was only able to walk around the room before becoming SOB. Will check back in a few hours and will continue to monitor. 0945 PT/OT at bedside with patient- tolerated walking to bathroom but was too fatigue to walk the hallway. 1005 The patient completed morning arm exercises and achieved 750 mL on incentive spirometer     1205 The patient ate lunch in recliner and ambulated half the length of the hallway before feeling SOB and requesting to return to bed. Currently, VSS and patient is resting quietly with O2 at 93 % on room air. 1450 The patient achieved 750 mL using incentive spirometer    1501 Patient on room air - currently, O2 is stable at 93% and patient is resting quietly with no complaints of SOB    1627 Reached out to cardiac surgery April CORONA Fitzgerald to inform the surgery team that the patient's pacer box is still hooked up to current settings (see associated flowsheet for more information in regards to pacer settings), no new orders yet and will continue to monitor. 1640 Patient achieved 1000 mL using incentive spirometer and ambulated to the restroom - after the restroom we attempted to walk the hallway again- however, the patient became SOB and fatigue as we approached the door to her room and she requested to return to bed. Currently, O2 is 91% on room air. Will continue to monitor.     1728 Sternal incision cleaned with dial soap and afternoon arm exercises completed     1805 Per request of patient - she ate her dinner sitting up on the edge of the bed 7288 Respiratory panel sent   End of Shift Note    Bedside shift change report given to Redd Sherman  (oncoming nurse) by Vida Cordero RN (offgoing nurse). Report included the following information SBAR, Kardex, ED Summary, Procedure Summary, Intake/Output, MAR, Recent Results and Cardiac Rhythm NSR    Shift worked:  7am-7pm     Shift summary and any significant changes:     read note above      Concerns for physician to address: The patient is having difficulty walking the entire length of hallway without being extremely SOB     Zone phone for oncoming shift:          Activity:  Activity Level: Up with Assistance  Number times ambulated in hallways past shift: 2  Number of times OOB to chair past shift: 3    Cardiac:   Cardiac Monitoring: Yes      Cardiac Rhythm: Sinus Rhythm    Access:   Current line(s): PIV     Genitourinary:   Urinary status: voiding    Respiratory:   O2 Device: None (Room air)  Chronic home O2 use?: NO  Incentive spirometer at bedside: YES  Actual Volume (ml): 1000 ml    GI:  Last Bowel Movement Date: 04/25/22  Current diet:  ADULT ORAL NUTRITION SUPPLEMENT Breakfast, Lunch, Dinner; Standard High Calorie/High Protein  DIET ONE TIME MESSAGE  DIET ONE TIME MESSAGE  ADULT DIET Regular; 4 carb choices (60 gm/meal); Low Fat/Low Chol/High Fiber/SAMY  Passing flatus: YES  Tolerating current diet: YES       Pain Management:   Patient states pain is manageable on current regimen: YES    Skin:  Maynor Score: 19  Interventions: float heels, increase time out of bed, PT/OT consult, limit briefs and nutritional support     Patient Safety:  Fall Score:  Total Score: 3  Interventions: bed/chair alarm, assistive device (walker, cane, etc), gripper socks, pt to call before getting OOB and stay with me (per policy)  High Fall Risk: Yes    Length of Stay:  Expected LOS: 5d 21h  Actual LOS: 7      Vida Cordero, RN

## 2022-04-26 NOTE — PROGRESS NOTES
Transition of Care Plan:     RUR:13%     Disposition:Home with home health vs rehab     Follow up appointments: To be done prior to discharge.     DME needed: To be determined.     Transportation at [address] or means to access home:Daughter has keys.         IM Medicare Letter: To be given prior to discharge.     Is patient a BCPI-A Bundle: No                       If yes, was Bundle Letter given?: N/A     Is patient a [place] and connected with the [facility]?   No               If yes, was [employer] transfer form completed and [facility] notified? N/A     Caregiver Contact:Daughter--Jose ShepardUqOapccz-[phone]     Discharge Caregiver contacted prior to discharge? Caregiver to be contacted prior to discharge.     Care Conference needed?: No      Spoke with liason from [address] [place] [address] and they have accepted patient when  Medically stable. They do not have a bed today. Patient informed and in agreement. FOC obtained and placed with medical records. Sally Moore RN BSN CRM        [phone]

## 2022-04-26 NOTE — PROGRESS NOTES
1900 Bedside and Verbal shift change report given to [address] (oncoming nurse) by Cornelius Rodriguez RN (offgoing nurse). Report included the following information SBAR, Kardex, Intake/Output, MAR, Recent Results and Cardiac Rhythm NSR. End of Shift Note    Bedside shift change report given to Johnny Zuleta RN and Micaela Foss RN (oncoming nurse) by Camilla Ramos RN (offgoing nurse). Report included the following information SBAR, Kardex, Intake/Output, MAR, Recent Results and Cardiac Rhythm NSR    Shift worked:  1900 - 0700     Shift summary and any significant changes:    Pt tolerated shift fairly well, but remains dyspneic with exertion. CHG bath and wound/wire care completed. Concerns for physician to address:       Zone phone for oncoming shift:          Activity:  Activity Level: Up with Assistance  Number times ambulated in hallways past shift: 0  Number of times OOB to chair past shift: 1    Cardiac:   Cardiac Monitoring: Yes      Cardiac Rhythm: Sinus Rhythm    Access:   Current line(s): PIV     Genitourinary:   Urinary status: voiding    Respiratory:   O2 Device: None (Room air)  Chronic home O2 use?: NO  Incentive spirometer at bedside: YES  Actual Volume (ml): 1000 ml    GI:  Last Bowel Movement Date: 04/26/22  Current diet:  ADULT ORAL NUTRITION SUPPLEMENT Breakfast, Lunch, Dinner; Standard High Calorie/High Protein  DIET ONE TIME MESSAGE  DIET ONE TIME MESSAGE  ADULT DIET Regular; 4 carb choices (60 gm/meal); Low Fat/Low Chol/High Fiber/SAMY  Passing flatus: YES  Tolerating current diet: YES       Pain Management:   Patient states pain is manageable on current regimen: YES    Skin:  Maynor Score: 19  Interventions: float heels, increase time out of bed, PT/OT consult and nutritional support     Patient Safety:  Fall Score:  Total Score: 3  Interventions: bed/chair alarm, assistive device (walker, cane, etc), gripper socks and pt to call before getting OOB  High Fall Risk: Yes    Length of Stay:  Expected LOS: 5d 21h  Actual LOS: 2[address] [place] , RN

## 2022-04-27 LAB
ALBUMIN SERPL-MCNC: 3.1 G/DL (ref 3.5–5)
ALBUMIN/GLOB SERPL: 1 {RATIO} (ref 1.1–2.2)
ALP SERPL-CCNC: 69 U/L (ref 45–117)
ALT SERPL-CCNC: 19 U/L (ref 12–78)
ANION GAP SERPL CALC-SCNC: 1 MMOL/L (ref 5–15)
AST SERPL-CCNC: 22 U/L (ref 15–37)
BILIRUB SERPL-MCNC: 0.7 MG/DL (ref 0.2–1)
BUN SERPL-MCNC: 26 MG/DL (ref 6–20)
BUN/CREAT SERPL: 39 (ref 12–20)
CALCIUM SERPL-MCNC: 9.2 MG/DL (ref 8.5–10.1)
CHLORIDE SERPL-SCNC: 100 MMOL/L (ref 97–108)
CO2 SERPL-SCNC: 33 MMOL/L (ref 21–32)
CREAT SERPL-MCNC: 0.67 MG/DL (ref 0.55–1.02)
ERYTHROCYTE [DISTWIDTH] IN BLOOD BY AUTOMATED COUNT: 17.6 % (ref 11.5–14.5)
GLOBULIN SER CALC-MCNC: 3 G/DL (ref 2–4)
GLUCOSE SERPL-MCNC: 119 MG/DL (ref 65–100)
HCT VFR BLD AUTO: 24.7 % (ref 35–47)
HGB BLD-MCNC: 7.4 G/DL (ref 11.5–16)
MAGNESIUM SERPL-MCNC: 2.3 MG/DL (ref 1.6–2.4)
MCH RBC QN AUTO: 28.7 PG (ref 26–34)
MCHC RBC AUTO-ENTMCNC: 30 G/DL (ref 30–36.5)
MCV RBC AUTO: 95.7 FL (ref 80–99)
NRBC # BLD: 0.04 K/UL (ref 0–0.01)
NRBC BLD-RTO: 0.4 PER 100 WBC
PHOSPHATE SERPL-MCNC: 4 MG/DL (ref 2.6–4.7)
PLATELET # BLD AUTO: 228 K/UL (ref 150–400)
PMV BLD AUTO: 11.7 FL (ref 8.9–12.9)
POTASSIUM SERPL-SCNC: 4.7 MMOL/L (ref 3.5–5.1)
PROT SERPL-MCNC: 6.1 G/DL (ref 6.4–8.2)
RBC # BLD AUTO: 2.58 M/UL (ref 3.8–5.2)
SODIUM SERPL-SCNC: 134 MMOL/L (ref 136–145)
WBC # BLD AUTO: 10.8 K/UL (ref 3.6–11)

## 2022-04-27 PROCEDURE — 77010033678 HC OXYGEN DAILY

## 2022-04-27 PROCEDURE — 74011250637 HC RX REV CODE- 250/637: Performed by: NURSE PRACTITIONER

## 2022-04-27 PROCEDURE — 83735 ASSAY OF MAGNESIUM: CPT

## 2022-04-27 PROCEDURE — 84100 ASSAY OF PHOSPHORUS: CPT

## 2022-04-27 PROCEDURE — 65270000046 HC RM TELEMETRY

## 2022-04-27 PROCEDURE — 36415 COLL VENOUS BLD VENIPUNCTURE: CPT

## 2022-04-27 PROCEDURE — 74011250637 HC RX REV CODE- 250/637: Performed by: THORACIC SURGERY (CARDIOTHORACIC VASCULAR SURGERY)

## 2022-04-27 PROCEDURE — 85027 COMPLETE CBC AUTOMATED: CPT

## 2022-04-27 PROCEDURE — 74011000250 HC RX REV CODE- 250: Performed by: THORACIC SURGERY (CARDIOTHORACIC VASCULAR SURGERY)

## 2022-04-27 PROCEDURE — 80053 COMPREHEN METABOLIC PANEL: CPT

## 2022-04-27 PROCEDURE — 97535 SELF CARE MNGMENT TRAINING: CPT

## 2022-04-27 PROCEDURE — 97116 GAIT TRAINING THERAPY: CPT

## 2022-04-27 PROCEDURE — 74011000250 HC RX REV CODE- 250: Performed by: NURSE PRACTITIONER

## 2022-04-27 RX ORDER — BUMETANIDE 0.25 MG/ML
1 INJECTION INTRAMUSCULAR; INTRAVENOUS 2 TIMES DAILY
Status: DISCONTINUED | OUTPATIENT
Start: 2022-04-27 | End: 2022-04-28

## 2022-04-27 RX ADMIN — ACETAMINOPHEN 1000 MG: 500 TABLET ORAL at 17:09

## 2022-04-27 RX ADMIN — ASPIRIN 81 MG: 81 TABLET, CHEWABLE ORAL at 08:51

## 2022-04-27 RX ADMIN — SENNOSIDES AND DOCUSATE SODIUM 1 TABLET: 50; 8.6 TABLET ORAL at 17:09

## 2022-04-27 RX ADMIN — APIXABAN 5 MG: 5 TABLET, FILM COATED ORAL at 17:09

## 2022-04-27 RX ADMIN — SODIUM CHLORIDE, PRESERVATIVE FREE 10 ML: 5 INJECTION INTRAVENOUS at 06:08

## 2022-04-27 RX ADMIN — Medication 400 MG: at 08:51

## 2022-04-27 RX ADMIN — TRAMADOL HYDROCHLORIDE 100 MG: 50 TABLET, COATED ORAL at 08:27

## 2022-04-27 RX ADMIN — CASTOR OIL AND BALSAM, PERU: 788; 87 OINTMENT TOPICAL at 08:51

## 2022-04-27 RX ADMIN — TRAMADOL HYDROCHLORIDE 100 MG: 50 TABLET, COATED ORAL at 17:09

## 2022-04-27 RX ADMIN — BUMETANIDE 1 MG: 0.25 INJECTION, SOLUTION INTRAMUSCULAR; INTRAVENOUS at 17:08

## 2022-04-27 RX ADMIN — APIXABAN 5 MG: 5 TABLET, FILM COATED ORAL at 08:26

## 2022-04-27 RX ADMIN — PANTOPRAZOLE SODIUM 40 MG: 40 TABLET, DELAYED RELEASE ORAL at 08:27

## 2022-04-27 RX ADMIN — ACETAMINOPHEN 1000 MG: 500 TABLET ORAL at 06:08

## 2022-04-27 RX ADMIN — GABAPENTIN 100 MG: 100 CAPSULE ORAL at 06:08

## 2022-04-27 RX ADMIN — BUMETANIDE 1 MG: 0.25 INJECTION, SOLUTION INTRAMUSCULAR; INTRAVENOUS at 08:27

## 2022-04-27 RX ADMIN — GABAPENTIN 100 MG: 100 CAPSULE ORAL at 21:31

## 2022-04-27 RX ADMIN — SODIUM CHLORIDE, PRESERVATIVE FREE 10 ML: 5 INJECTION INTRAVENOUS at 14:20

## 2022-04-27 RX ADMIN — CHOLECALCIFEROL TAB 25 MCG (1000 UNIT) 2000 UNITS: 25 TAB at 08:27

## 2022-04-27 RX ADMIN — SENNOSIDES AND DOCUSATE SODIUM 1 TABLET: 50; 8.6 TABLET ORAL at 08:27

## 2022-04-27 RX ADMIN — Medication 400 MG: at 17:09

## 2022-04-27 RX ADMIN — POLYETHYLENE GLYCOL 3350 17 G: 17 POWDER, FOR SOLUTION ORAL at 08:27

## 2022-04-27 RX ADMIN — ACETAMINOPHEN 1000 MG: 500 TABLET ORAL at 13:10

## 2022-04-27 RX ADMIN — SODIUM CHLORIDE, PRESERVATIVE FREE 10 ML: 5 INJECTION INTRAVENOUS at 21:33

## 2022-04-27 RX ADMIN — CHOLECALCIFEROL TAB 25 MCG (1000 UNIT) 2000 UNITS: 25 TAB at 17:09

## 2022-04-27 RX ADMIN — Medication 1 TABLET: at 08:27

## 2022-04-27 RX ADMIN — GABAPENTIN 100 MG: 100 CAPSULE ORAL at 13:10

## 2022-04-27 RX ADMIN — CASTOR OIL AND BALSAM, PERU: 788; 87 OINTMENT TOPICAL at 21:31

## 2022-04-27 RX ADMIN — CHLORHEXIDINE GLUCONATE 10 ML: 1.2 RINSE ORAL at 08:51

## 2022-04-27 RX ADMIN — CELECOXIB 200 MG: 200 CAPSULE ORAL at 08:27

## 2022-04-27 NOTE — PROGRESS NOTES
1900: Bedside shift change report given to [facility] (oncoming nurse) by Ngozi Quevedo and [facility] RN  (offgoing nurse). Report included the following information SBAR, Kardex, Intake/Output, MAR, Recent Results and Cardiac Rhythm NSR/Sinus Arrythmia.     0700: End of Shift Note    Bedside shift change report given to Ngozi Quevedo (oncoming nurse) by Erica Colvin RN (offgoing nurse). Report included the following information SBAR, Kardex, Intake/Output, MAR, Recent Results and Cardiac Rhythm NSR    Shift worked:  [id number]     Shift summary and any significant changes:     none     Concerns for physician to address:  none     [facility]          Activity:  Activity Level: Up with Assistance  Number times ambulated in hallways past shift: 0  Number of times OOB to chair past shift: 1    Cardiac:   Cardiac Monitoring: Yes      Cardiac Rhythm: Sinus Arrhythmia,Sinus Rhythm    Access:   Current line(s): PIV     Genitourinary:   Urinary status: voiding    Respiratory:   O2 Device: Nasal cannula  Chronic home O2 use?: NO  Incentive spirometer at bedside: YES  Actual Volume (ml): 750 ml    GI:  Last Bowel Movement Date: 04/27/22  Current diet:  ADULT ORAL NUTRITION SUPPLEMENT Breakfast, Lunch, Dinner; Standard High Calorie/High Protein  DIET ONE TIME MESSAGE  DIET ONE TIME MESSAGE  ADULT DIET Regular; 4 carb choices (60 gm/meal); Low Fat/Low Chol/High Fiber/SAMY  Passing flatus: YES  Tolerating current diet: YES       Pain Management:   Patient states pain is manageable on current regimen: YES    Skin:  Maynor Score: 18  Interventions: increase time out of bed    Patient Safety:  Fall Score:  Total Score: 3  Interventions: bed/chair alarm, assistive device (walker, cane, etc), gripper socks and pt to call before getting OOB  High Fall Risk: Yes    Length of Stay:  Expected LOS: 5d 21h  Actual LOS: 9 Maren Yap RN

## 2022-04-27 NOTE — PROGRESS NOTES
Problem: Self Care Deficits Care Plan (Adult)  Goal: *Acute Goals and Plan of Care (Insert Text)  Description: FUNCTIONAL STATUS PRIOR TO ADMISSION: Patient was independent and active without use of DME. Reports occasional use of SPC as needed. 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     HOME SUPPORT: The patient lived alone with 3 children as local support. Pt reports children plan to stay with her at [place]. Occupational Therapy Goals  Initiated 4/21/2022  1. Patient will perform ADLs standing 5 mins without fatigue or LOB with supervision/set-up within 7 day(s). 2.  Patient will perform lower body ADLs with supervision/set-up within 7 day(s). 3.  Patient will perform gathering ADL items high and low 2/2 with supervision/set-up within 7 day(s). 4.  Patient will perform toilet transfers with supervision/set-up within 7 day(s). 5.  Patient will perform all aspects of toileting with supervision/set-up within 7 day(s). 6.  Patient will participate in cardiac/sternal upper extremity therapeutic exercise/activities to increase independence with ADLs with supervision/set-up for 5 minutes within 7 day(s). Outcome: Progressing Towards Goal       OCCUPATIONAL THERAPY TREATMENT  Patient: [place] (11 y.o. female)  Date: 4/27/2022  Diagnosis: Mitral valve insufficiency, unspecified etiology [I34.0]  Ascending aortic aneurysm (HCC) [I71.2] <principal problem not specified>  Procedure(s) (LRB):  FELTON BY DR BRUCE - PFO CLOSURE -  MITRAL VALVE REPLACEMENT WITH 27 MM CHARLES MAGNA MITRAL EASE - RESUSPENSION OF AORTIC VALVE -  ASCENDING AORTIC ANEURYSM REPAIR AND LEFT ATRIAL APPENDAGE LIGATION (N/A) 8 Days Post-Op  Precautions: Sternal,Fall  Chart, occupational therapy assessment, plan of care, and goals were reviewed. ASSESSMENT  Patient continues with skilled OT services and is progressing towards goals.   Patient received on 1L O2 via NC, supine in bed endorsing significant fatigue, reporting she woke up at [address] and sat in chair until 8AM. Pt agreeable to toileting and grooming at sink in bathroom. Continues to require cues for hand placement during all bed mobility and functional transfers to remain within the tube. Required max A to complete poster toileting hygiene as pt with limited shoulder ROM and trunk rotation. Patient continues to require min - mod A to ascend from toilet, may benefit from elevated commode/ BSC over toilet to assist with accessibility as well as toilet tongs to assist with posterior hygiene. Noted SOB with all activity however O2 sats remaining >90% throughout session with activity. Pt declined offer to sit in recliner chair reporting increased fatigue and requesting to return to bed despite encouragement. Continue to recommend IPR at CT. Patient is verbalizing and is not demonstrating understanding of mindful-based movements (\"move in the tube\") principles of keeping UEs proximal to ribcage to prevent lateral pull on the sternum during load-bearing activities with verbal, manual, and tactile cues required for compliance. Current Level of Function Impacting Discharge (ADLs): min - mod A     Other factors to consider for discharge: below baseline, MVR, needs IPR          PLAN :  Patient continues to benefit from skilled intervention to address the above impairments. Continue treatment per established plan of care to address goals.     Recommend with staff: up to chair daily     Recommend next OT session: BSC over toilet, hip kit for LB dressing     Recommendation for discharge: (in order for the patient to meet his/her long term goals)  Therapy 3 hours per day 5-7 days per week    This discharge recommendation:  Has been made in collaboration with the attending provider and/or case management    IF patient discharges home will need the following DME: AE: long handled bathing, AE: long handled dressing, bedside commode, shower chair, and walker: rolling       SUBJECTIVE:   Patient stated thanks for making me get up, I feel a little better.     OBJECTIVE DATA SUMMARY:   Cognitive/Behavioral Status:  Neurologic State: Alert  Orientation Level: Oriented X4  Cognition: Appropriate decision making; Appropriate for age attention/concentration; Appropriate safety awareness; Follows commands             Functional Mobility and Transfers for ADLs:  Bed Mobility:  Rolling: Supervision  Supine to Sit: Stand-by assistance (cues for hand placement)  Sit to Supine: Minimum assistance (assist for BLEs)    Transfers:     Functional Transfers  Bathroom Mobility: Contact guard assistance  Toilet Transfer : Moderate assistance  Cues: Verbal cues provided (hand placement)       Balance:  Sitting: Intact  Standing: Impaired  Standing - Static: Fair  Standing - Dynamic : Fair    ADL Intervention:       Grooming  Grooming Assistance: Supervision  Position Performed: Standing  Washing Hands: Supervision  Brushing Teeth: Supervision  Cues: Verbal cues provided (to not lean on sink )                        Toileting  Toileting Assistance: Maximum assistance  Bladder Hygiene: Minimum assistance  Bowel Hygiene: Maximum assistance  Clothing Management: Supervision  Cues: Verbal cues provided  Adaptive Equipment: Walker;Grab bars         Patient instructed and educated on mindful movement principles based on Move in The Tube concept to include maintaining bilateral elbows close to rib cage when performing any load-bearing activity such as getting in/out of bed, pushing up from a chair, opening a door, or lifting a box. Patient was given a handout with diagrams of each correct/incorrect method of performing each of the above tasks. Patient instructed on the ability to utilize upper extremities outside the tube when doing any non-load bearing activity such as washing hair/body, brushing teeth, retrieving clothing items, or scratching your back.  Patient encouraged to also perform upper extremity exercises \"outside of the tube\" to prevent scar tissue formation around sternal incision site. Patient instructed in detail about activities to heed with caution, allowing pain to be the guide. These activities include but are not limited to: mowing the lawn, riding a bike, walking a dog, lifting a child, workshop hobbies, golfing, sexual activity, vacuuming, fishing, scrubbing the floors, and moving furniture. Patient was given the [address] in the [place] handout to describe each of these activities in detail. Patient instructed no asymmetrical reaching over head to ensure B UEs when shoulders >90* i.e. reaching in cabinets and dressing. Instruction on upper body dressing techniques of over head, then arms through to decrease pain and unilateral shoulder flexion >90*. Instruction on the benefits of utilizing B UEs during functional tasks i.e. opening the fridge, stepping into the tub. Instruction if continued pain at home with shoulder IR for BM hygiene can use wet wipes and toilet tongs PRN. Avoid valsalva maneuvers. Therapeutic Exercises:   Patient reporting she completed cardiac exercises just prior  to session. Pain:  Pt endorsing pain from BLE edema. Activity Tolerance:   Fair, requires rest breaks, and observed SOB with activity    After treatment patient left in no apparent distress:   Supine in bed, Call bell within reach, Bed / chair alarm activated, and Side rails x 3    COMMUNICATION/COLLABORATION:   The patients plan of care was discussed with: Occupational therapist and Registered nurse.      Estela Arellano OT  Time Calculation: 28 mins

## 2022-04-27 NOTE — PROGRESS NOTES
Problem: Falls - Risk of  Goal: *Absence of Falls  Description: Document Mello Daniel Fall Risk and appropriate interventions in the flowsheet.   Outcome: Progressing Towards Goal  Note: Fall Risk Interventions:  Mobility Interventions: Bed/chair exit alarm,Patient to call before getting OOB,Utilize walker, cane, or other assistive device,Strengthening exercises (ROM-active/passive),Utilize gait belt for transfers/ambulation    Mentation Interventions: Bed/chair exit alarm    Medication Interventions: Bed/chair exit alarm,Patient to call before getting OOB,Utilize gait belt for transfers/ambulation,Teach patient to arise slowly    Elimination Interventions: Bed/chair exit alarm,Call light in reach,Patient to call for help with toileting needs,Stay With Me (per policy)              Problem: Cardiac Valve Surgery: Post-Op Day 6  Goal: Discharge Planning  Outcome: Progressing Towards Goal

## 2022-04-27 NOTE — PROGRESS NOTES
Bedside shift change report given to Abhinav Lo RN (oncoming nurse) by Beatrice Graham RN (offgoing nurse). Report included the following information SBAR, Kardex, Intake/Output, MAR, Recent Results and Cardiac Rhythm sinus rthythm.     0901: RN and tech assisted patient to bathroom with x2 assist, walker, and gait belt. Pt. Requested 1L NC for dyspnea for ambulation. Pt. Required mod-max assist with transfer from chair to standing and then back to bed. Pt. Steady when walking with walker and x1 assist.     1618: Pt. Continues to require x2 assist with walker to stand, but more steady with walking with wheelchair through out shift. End of Shift Note    Bedside shift change report given to Mojgan Perera RN (oncoming nurse) by Dmitry Wray RN (offgoing nurse). Report included the following information SBAR, Kardex, Intake/Output, MAR, Recent Results and Cardiac Rhythm sinus rhythm/ arrythmia    Shift worked:  7a-7p2     Shift summary and any significant changes:     Pt. Comfortable on 1L NC. Dyspnea with ambulation. Concerns for physician to address:  placement     Zone phone for oncoming shift:   [phone]       Activity:  Activity Level: Up with Assistance  Number times ambulated in hallways past shift: 3  Number of times OOB to chair past shift: 3    Cardiac:   Cardiac Monitoring: Yes      Cardiac Rhythm: Sinus Arrhythmia    Access:   Current line(s): PIV     Genitourinary:   Urinary status: voiding    Respiratory:   O2 Device: Nasal cannula  Chronic home O2 use?: NO  Incentive spirometer at bedside: YES  Actual Volume (ml): 750 ml    GI:  Last Bowel Movement Date: 04/27/22  Current diet:  ADULT ORAL NUTRITION SUPPLEMENT Breakfast, Lunch, Dinner; Standard High Calorie/High Protein  DIET ONE TIME MESSAGE  DIET ONE TIME MESSAGE  ADULT DIET Regular; 4 carb choices (60 gm/meal);  Low Fat/Low Chol/High Fiber/SAMY  Passing flatus: YES  Tolerating current diet: YES       Pain Management:   Patient states pain is manageable on current regimen: YES    Skin:  Maynor Score: 18  Interventions: float heels, increase time out of bed and PT/OT consult    Patient Safety:  Fall Score:  Total Score: 3  Interventions: bed/chair alarm, assistive device (walker, cane, etc), gripper socks, pt to call before getting OOB and stay with me (per policy)  High Fall Risk: Yes    Length of Stay:  Expected LOS: 5d 21h  Actual LOS: 1 Jaya Pena RN

## 2022-04-27 NOTE — PROGRESS NOTES
Problem: Mobility Impaired (Adult and Pediatric)  Goal: *Acute Goals and Plan of Care (Insert Text)  Description: FUNCTIONAL STATUS PRIOR TO ADMISSION: Patient was independent and active without use of DME.     HOME SUPPORT PRIOR TO ADMISSION: The patient lived alone with children to provide assistance. Physical Therapy Goals  Revised 4/27/2022  1. Patient will move from supine to sit and sit to supine  and roll side to side in bed with modified independence within 7 day(s). 2.  Patient will transfer from bed to chair and chair to bed with supervision/set-up using the least restrictive device within 7 day(s). 3.  Patient will perform sit to stand with modified independence within 7 day(s). 4.  Patient will ambulate with supervision/set-up for 125 feet with the least restrictive device within 7 day(s). 5.  Patient will perform cardiac exercises per protocol with modified independence within 5 days. 6.  Patient will verbally recall and functionally demonstrate mindful-based movements (\"move in the tube\") principles without cues within 5 days. Met 4/27/22. Physical Therapy Goals  Initiated 4/21/2022  1. Patient will move from supine to sit and sit to supine  in bed with moderate assistance  within 5 days. Met 4/27/22. 2.  Patient will perform sit to/from stand with minimal assistance/contact guard assist within 5 days. Met 4/27/22. 3.  Patient will ambulate 60 feet with least restrictive assistive device and minimal assistance/contact guard assist within 5 days. 4.  Patient will perform cardiac exercises per protocol with modified independence within 5 days. 5.  Patient will verbally recall and functionally demonstrate mindful-based movements (\"move in the tube\") principles without cues within 5 days. Met 4/27/22.     Outcome: Progressing Towards Goal  PHYSICAL THERAPY TREATMENT: WEEKLY REASSESSMENT  Patient: Aura Cardenas (14 y.o. female)  Date: 4/27/2022  Diagnosis: Mitral valve insufficiency, unspecified etiology [I34.0]  Ascending aortic aneurysm (HCC) [I71.2] <principal problem not specified>  Procedure(s) (LRB):  FELTON BY DR BRUCE - PFO CLOSURE -  MITRAL VALVE REPLACEMENT WITH 27 MM CHARLES MAGNA MITRAL EASE - RESUSPENSION OF AORTIC VALVE -  ASCENDING AORTIC ANEURYSM REPAIR AND LEFT ATRIAL APPENDAGE LIGATION (N/A) 8 Days Post-Op  Precautions: Sternal,Fall  Chart, physical therapy assessment, plan of care and goals were reviewed. ASSESSMENT  Patient continues with skilled PT services and is progressing towards goals. Slowly increasing her activity tolerance. Today she was able to ambulate 35 feet with RW and cg assistance, but then needed a sitting rest due to sob and fatigue. After recovering she was able to ambulate 35 more feet before finishing gait training and staying up in the bedside recliner. Her O2 sats on 1L/min O2 was 98% at rest and 90-91% after gait   training each time. She will benefit from IPR and is able to tolerate 15 hours per week of intensive therapy. Patient is verbalizing and is demonstrating understanding of mindful-based movements (\"move in the tube\") principles of keeping UEs proximal to ribcage to prevent lateral pull on the sternum during load-bearing activities with minimal verbal cues required for compliance. Patient's progression toward goals since last assessment: Barthel improved from 35 to 40. Met 3 of 5 goals. New goals updated for the coming week - see above for details. Current Level of Function Impacting Discharge (mobility/balance): min a supine to sit and sit to stand; cg bed to chair transfers with RW; cg ambulation with RW for 35 feet x 2 with sitting rest due to sob. Functional Outcome Measure: The patient scored 45/100 on the Barthel outcome measure which is indicative of being partially dependent for ADLs and mobility skills.       Other factors to consider for discharge: independent and active w/o devices plof; lives alone; supportive family; currently a high risk for falls and functioning far below her baseline. PLAN :  Goals have been updated based on progression since last assessment. Patient continues to benefit from skilled intervention to address the above impairments. Recommendations and Planned Interventions: bed mobility training, transfer training, gait training, therapeutic exercises, neuromuscular re-education, edema management/control, patient and family training/education, and therapeutic activities      Frequency/Duration: Patient will be followed by physical therapy:  daily to address goals. Recommendation for discharge: (in order for the patient to meet his/her long term goals)  Therapy 3 hours per day 5-7 days per week    This discharge recommendation:  Has been made in collaboration with the attending provider and/or case management    IF patient discharges home will need the following DME: rollator to allow sitting rest as needed for sob. SUBJECTIVE:   Patient stated I feel a little better today.     OBJECTIVE DATA SUMMARY:   HISTORY:    Past Medical History:   Diagnosis Date    Anemia 7/12/2010    Aortic aneurysm (HCC)     Arrhythmia     afib r/t low potassium     Arthritis 7/12/2010    Atrial fibrillation (HCC)     cardioversion times 2    Chronic pain     Eczema 7/12/2010    Edema 7/12/2010    GERD (gastroesophageal reflux disease) 7/12/2010    Heart failure (HCC)     Hepatitis     antibody for Hepatitis B - not a carrier    Hiatal hernia 7/12/2010    Smoker 7/12/2010     Past Surgical History:   Procedure Laterality Date    HX HEART CATHETERIZATION      HX HYSTERECTOMY  1998    HX OTHER SURGICAL      facelift    HX OTHER SURGICAL      Cardioversion x2     HX WRIST FRACTURE TX Left     left wrist open reduction internal fixation.     HX WRIST FRACTURE TX Right     ME ABDOMEN SURGERY PROC UNLISTED      Lap band    ME COLONOSCOPY FLX DX W/COLLJ SPEC WHEN PFRMD  2/9/2012            Personal factors and/or comorbidities impacting plan of care: recent mvr with sternal precautions, heart failure, afib, scoliosis with chronic pain. Patient mobilized on continuous portable monitor/telemetry. Critical Behavior:  Neurologic State: Alert  Orientation Level: Oriented X4  Cognition: Appropriate decision making,Appropriate for age attention/concentration,Appropriate safety awareness,Follows commands  Safety/Judgement: Awareness of environment,Good awareness of safety precautions,Fall prevention (decreased strength and balance)    Functional Mobility Training:  Bed Mobility:  Rolling: Stand-by assistance  Supine to Sit: Minimum assistance  Sit to Supine: Minimum assistance (assist for BLEs)  Scooting: Minimum assistance          Transfers:  Sit to Stand: Minimum assistance  Stand to Sit: Contact guard assistance        Bed to Chair: Contact guard assistance; Adaptive equipment (rw)                      Balance:  Sitting: Intact  Sitting - Static: Good (unsupported)  Sitting - Dynamic: Good (unsupported)  Standing: Impaired  Standing - Static: Good;Constant support  Standing - Dynamic : Fair;Constant support    Ambulation/Gait Training:  Distance (ft): 70 Feet (ft) (35 feet x 2 with sitting rest)  Assistive Device: Walker, rolling  Ambulation - Level of Assistance: Contact guard assistance        Gait Abnormalities: Decreased step clearance;Trunk sway increased; Step to gait (fwd trunk flexion/scoliosis)        Base of Support: Widened     Speed/Lissy: Pace decreased (<100 feet/min)  Step Length: Right shortened;Left shortened        Cardiac diagnosis intervention:  Patient instructed and educated on mindful movement principles based on Move in The Tube concept to include maintaining bilateral elbows close to rib cage when performing any load-bearing activity such as getting in/out of bed, pushing up from a chair, opening a door, or lifting a box.   Patient was given a handout with diagrams of each correct/incorrect method of performing each of the above tasks. Functional Measure:  Barthel Index:    Bathin  Bladder: 5  Bowels: 5  Groomin (limited by UE weakness/dec rom)  Dressin  Feeding: 10  Mobility: 0  Stairs: 0  Toilet Use: 5  Transfer (Bed to Chair and Back): 10  Total: 40/100       The Barthel ADL Index: Guidelines  1. The index should be used as a record of what a patient does, not as a record of what a patient could do. 2. The main aim is to establish degree of independence from any help, physical or verbal, however minor and for whatever reason. 3. The need for supervision renders the patient not independent. 4. A patient's performance should be established using the best available evidence. Asking the patient, friends/relatives and nurses are the usual sources, but direct observation and common sense are also important. However direct testing is not needed. 5. Usually the patient's performance over the preceding 24-48 hours is important, but occasionally longer periods will be relevant. 6. Middle categories imply that the patient supplies over 50 per cent of the effort. 7. Use of aids to be independent is allowed. Score Interpretation (from [address] [address])    Independent   60-79 Minimally independent   40-59 Partially dependent   20-39 Very dependent   <20 Totally dependent     -Berna Mccormick., Barthel, D.W. (1965). Functional evaluation: the Barthel Index. [facility] ([address] (1997). The Barthel activities of daily living index: self-reporting versus actual performance in the old (> or = 75 years). Journal [address] 45(7), [facility], J.J.M.F, Mary Loja., Onur Najera (1999). Measuring the change in disability after inpatient rehabilitation; comparison of the responsiveness of the Barthel Index and Functional [place]ula Measure. Journal of Neurology, Neurosurgery, and Psychiatry, 66(4), 365-893.   -Darrelyn Mcburney, N.J.A, Yne Stern M.A. (2004) Assessment of post-stroke quality of life in cost-effectiveness studies: The usefulness of the Barthel Index and the EuroQoL-5D. Quality of Life Research, 13, 427-43     Pain Rating:  Intermittent sternal pain 3-4/10    Activity Tolerance:   Fair, desaturates with exertion and requires oxygen, requires frequent rest breaks, and observed SOB with activity      After treatment patient left in no apparent distress:   Sitting in chair and Call bell within reach    COMMUNICATION/COLLABORATION:   The patients plan of care was discussed with: Registered nurse and Case management.      Niall Huffman, PT   Time Calculation: 18 mins

## 2022-04-27 NOTE — PROGRESS NOTES
[facility] ICU Progress Note    Admit Date: 2022  POD:  8 Day Post-Op    Procedure:  Procedure(s):  FELTON BY DR BRUCE - PFO CLOSURE -  MITRAL VALVE REPLACEMENT WITH 27 MM CHARLES MAGNA MITRAL EASE - RESUSPENSION OF AORTIC VALVE -  ASCENDING AORTIC ANEURYSM REPAIR AND LEFT ATRIAL APPENDAGE LIGATION        Subjective:   Pt seen with Dr. Morrissey Ax. Pt sitting up in recliner. Complaining of LE edema. On RA, afebrile. Objective:   Vitals:  Blood pressure (!) 118/56, pulse 75, temperature 98.3 °F (36.8 °C), resp. rate 22, height 5' 5\" (1.651 m), weight 215 lb 13.3 oz (97.9 kg), SpO2 94 %. Temp (24hrs), Av.1 °F (36.7 °C), Min:97.9 °F (36.6 °C), Max:98.3 °F (36.8 °C)    EKG/Rhythm: NSR 70-80s    Oxygen Therapy:  Oxygen Therapy  O2 Sat (%): 94 % (22 0729)  Pulse via Oximetry: 75 beats per minute (22 0729)  O2 Device: None (Room air) (22 0317)  Skin Assessment: Clean, dry, & intact (22 1051)  Skin Protection for O2 Device: N/A (22 1051)  O2 Flow Rate (L/min): 1 l/min (22 1051)  FIO2 (%): 40 % (22 0955)    CXR:   CXR Results  (Last 48 hours)    None          Admission Weight: Last Weight   Weight: 191 lb 12.8 oz (87 kg) Weight: 215 lb 13.3 oz (97.9 kg)     Intake / Output / Drain:  Current Shift: No intake/output data recorded. Last 24 hrs.:     Intake/Output Summary (Last 24 hours) at 2022  Last data filed at 2022  Gross per 24 hour   Intake 740 ml   Output 1585 ml   Net -845 ml       EXAM:  General:  NAD, pleasant mood      Lungs:   Clear to auscultation bilaterally. Incision:  Prineo intact. Heart:  Regular rate and rhythm, S1, S2 normal, no murmur, click, rub or gallop. Abdomen:   Soft, non-tender. Bowel sounds hypoactive. No masses,  No organomegaly. Extremities:  2+ b/l edema - nonpitting. PPP. Ecchymosis around left femoral a-line. Neurologic:  Gross motor and sensory apparatus intact.      Labs:   Recent Labs     22  0403 22  0823 22  0600   WBC 10.8  --    < >   HGB 7.4*  --    < >   HCT 24.7*  --    < >     --    < >   *  --    < >   K 4.7  --    < >   BUN 26*  --    < >   CREA 0.67  --    < >   *  --    < >   GLUCPOC  --  115  --     < > = values in this interval not displayed. Assessment:     Active Problems:    Ascending aortic aneurysm ([address] (3/29/2022)      S/P MVR (mitral valve replacement) (2022)      S/P aortic aneurysm repair (2022)       Plan/Recommendations/Medical Decision Makin. S/p ascending aortic aneurysm: Strict BP control. CT out. 2. S/p MVR (tissue valve): Will need AC x 3 months for valve. Discussed with Dr. Chadwick Dee   3. Pulmonary HTN: Improved following MVR  4. A-fib: s/p LAAL. On diltiazem, Tikosyn, and eliquis PTA. Cont to hold rate meds at this time. Resumed Eliquis   5. Anemia secondary to acute blood loss: Monitor H/H and CT output. Received a unit PRBC . 6. Atelectasis, pulmonary edema, pleural effusions: IS hourly! OOB activity with PT/OT when appropriate. Down to RA   7. Chronic Diastolic HF: Monitor I/O. Weight down slightly. IV bumex BID. Strict I/Os  8. Leukocytosis: WBC down to 10 today. Continue to monitor. 9. Pain management: Scheduled tylenol and gabapentin. PRN oxycodone and dilaudid for breakthrough pain. Avoid toradol d/t risk of STEVE. Cont tramadol and celebrex  10. Leukocytosis: Increase mobility. Pulm toileting. Daily incisional care. Afebrile  11. Nutrition: encourage PO. Cont Ensure High Protein     Dispo: PT/OT. Stepdown. Needs to be OOB TID, ambulating TID. Case management following to aid in d/c planning, IPR referral sent () -pt has been accepted. Cont PCU care while getting IV diuretics.  Covid negative      Signed By: Alan Mosqueda NP

## 2022-04-27 NOTE — WOUND CARE
Wound care consult for the redness to the gluteal cleft skin:  Chart reviewed and patient assessed. Pt. Has had cardio-vascular surgery for the Thoracic Aortic aneurysm and is post op day 8. Assessment of the skin:  Large amount of ecchymosis of the hip / groin and part of the buttocks. The gluteal cleft is red but completely blanchable and without drainage. The lip is healing well. Continue same treatment with Venelex. Wound Mouth Left;Upper (Active)   Wound Image   04/21/22 1603   Wound Etiology Traumatic 04/26/22 1940   Dressing Status Other (Comment) 04/26/22 1501   Cleansed Not cleansed 04/27/22 0900   Dressing/Treatment Pharmaceutical agent (see MAR) 04/27/22 0900   Wound Length (cm) 1 cm 04/21/22 1603   Wound Width (cm) 1 cm 04/21/22 1603   Wound Surface Area (cm^2) 1 cm^2 04/21/22 1603   Wound Assessment Purple/maroon 04/27/22 0900   Drainage Amount None 04/27/22 0900   Wound Odor None 04/27/22 0900   [place]-Wound/Incision Assessment Dry/flaky 04/27/22 0900   Wound Sacrum Mid red, tear, in mid sacrum (Active)   Wound Image   04/27/22 1158   Wound Etiology Other (Comment) 04/27/22 1158   Dressing Status Other (Comment) 04/26/22 1501   Cleansed Soap and water 04/27/22 1158   Dressing/Treatment Open to air 04/27/22 1158   Wound Assessment Pink/red 04/27/22 1158   Drainage Amount None 04/27/22 1158   Wound Odor None 04/27/22 1158   Argelia-Wound/Incision Assessment Blanchable erythema 04/27/22 1158   Wound Thickness Description Partial thickness 04/27/22 1158      Treatment Recommended:  While in the hospital, Pt. Should continue the Venelex already being used on the skin. As she regains more strength and is walking around, she can use zinc oxide cream to manage the moisture (thin layer). Pt. Was encouraged to turn on her sides more (partially or fully) to [place] out\" the buttocks skin.    Raul Delaney RN, BSN, [employer]

## 2022-04-28 LAB
ALBUMIN SERPL-MCNC: 2.3 G/DL (ref 3.5–5)
ALBUMIN/GLOB SERPL: 1 {RATIO} (ref 1.1–2.2)
ALP SERPL-CCNC: 61 U/L (ref 45–117)
ALT SERPL-CCNC: 16 U/L (ref 12–78)
ANION GAP SERPL CALC-SCNC: 5 MMOL/L (ref 5–15)
AST SERPL-CCNC: 16 U/L (ref 15–37)
BILIRUB SERPL-MCNC: 0.6 MG/DL (ref 0.2–1)
BUN SERPL-MCNC: 20 MG/DL (ref 6–20)
BUN/CREAT SERPL: 47 (ref 12–20)
CALCIUM SERPL-MCNC: 6.8 MG/DL (ref 8.5–10.1)
CHLORIDE SERPL-SCNC: 110 MMOL/L (ref 97–108)
CO2 SERPL-SCNC: 26 MMOL/L (ref 21–32)
CREAT SERPL-MCNC: 0.43 MG/DL (ref 0.55–1.02)
ERYTHROCYTE [DISTWIDTH] IN BLOOD BY AUTOMATED COUNT: 18.5 % (ref 11.5–14.5)
GLOBULIN SER CALC-MCNC: 2.3 G/DL (ref 2–4)
GLUCOSE SERPL-MCNC: 74 MG/DL (ref 65–100)
HCT VFR BLD AUTO: 26.1 % (ref 35–47)
HGB BLD-MCNC: 7.5 G/DL (ref 11.5–16)
MAGNESIUM SERPL-MCNC: 1.8 MG/DL (ref 1.6–2.4)
MCH RBC QN AUTO: 27.9 PG (ref 26–34)
MCHC RBC AUTO-ENTMCNC: 28.7 G/DL (ref 30–36.5)
MCV RBC AUTO: 97 FL (ref 80–99)
NRBC # BLD: 0 K/UL (ref 0–0.01)
NRBC BLD-RTO: 0 PER 100 WBC
PHOSPHATE SERPL-MCNC: 3 MG/DL (ref 2.6–4.7)
PLATELET # BLD AUTO: 262 K/UL (ref 150–400)
PMV BLD AUTO: 11.4 FL (ref 8.9–12.9)
POTASSIUM SERPL-SCNC: 3.2 MMOL/L (ref 3.5–5.1)
PROT SERPL-MCNC: 4.6 G/DL (ref 6.4–8.2)
RBC # BLD AUTO: 2.69 M/UL (ref 3.8–5.2)
SODIUM SERPL-SCNC: 141 MMOL/L (ref 136–145)
WBC # BLD AUTO: 10.4 K/UL (ref 3.6–11)

## 2022-04-28 PROCEDURE — 97116 GAIT TRAINING THERAPY: CPT

## 2022-04-28 PROCEDURE — 97530 THERAPEUTIC ACTIVITIES: CPT

## 2022-04-28 PROCEDURE — 74011250637 HC RX REV CODE- 250/637: Performed by: THORACIC SURGERY (CARDIOTHORACIC VASCULAR SURGERY)

## 2022-04-28 PROCEDURE — 74011000250 HC RX REV CODE- 250: Performed by: NURSE PRACTITIONER

## 2022-04-28 PROCEDURE — 83735 ASSAY OF MAGNESIUM: CPT

## 2022-04-28 PROCEDURE — 74011000250 HC RX REV CODE- 250: Performed by: THORACIC SURGERY (CARDIOTHORACIC VASCULAR SURGERY)

## 2022-04-28 PROCEDURE — 80053 COMPREHEN METABOLIC PANEL: CPT

## 2022-04-28 PROCEDURE — 74011250637 HC RX REV CODE- 250/637: Performed by: NURSE PRACTITIONER

## 2022-04-28 PROCEDURE — 77010033678 HC OXYGEN DAILY

## 2022-04-28 PROCEDURE — 65270000046 HC RM TELEMETRY

## 2022-04-28 PROCEDURE — 84100 ASSAY OF PHOSPHORUS: CPT

## 2022-04-28 PROCEDURE — 85027 COMPLETE CBC AUTOMATED: CPT

## 2022-04-28 PROCEDURE — 36415 COLL VENOUS BLD VENIPUNCTURE: CPT

## 2022-04-28 PROCEDURE — 97535 SELF CARE MNGMENT TRAINING: CPT

## 2022-04-28 RX ORDER — BUMETANIDE 0.25 MG/ML
1 INJECTION INTRAMUSCULAR; INTRAVENOUS ONCE
Status: COMPLETED | OUTPATIENT
Start: 2022-04-28 | End: 2022-04-28

## 2022-04-28 RX ORDER — BUMETANIDE 0.25 MG/ML
2 INJECTION INTRAMUSCULAR; INTRAVENOUS 2 TIMES DAILY
Status: DISCONTINUED | OUTPATIENT
Start: 2022-04-28 | End: 2022-04-29

## 2022-04-28 RX ORDER — LANOLIN ALCOHOL/MO/W.PET/CERES
1 CREAM (GRAM) TOPICAL 2 TIMES DAILY WITH MEALS
Status: DISCONTINUED | OUTPATIENT
Start: 2022-04-28 | End: 2022-04-30 | Stop reason: HOSPADM

## 2022-04-28 RX ORDER — POTASSIUM CHLORIDE 750 MG/1
40 TABLET, FILM COATED, EXTENDED RELEASE ORAL 2 TIMES DAILY
Status: DISCONTINUED | OUTPATIENT
Start: 2022-04-28 | End: 2022-04-29

## 2022-04-28 RX ADMIN — ACETAMINOPHEN 1000 MG: 500 TABLET ORAL at 18:34

## 2022-04-28 RX ADMIN — BUMETANIDE 2 MG: 0.25 INJECTION, SOLUTION INTRAMUSCULAR; INTRAVENOUS at 18:35

## 2022-04-28 RX ADMIN — SODIUM CHLORIDE, PRESERVATIVE FREE 10 ML: 5 INJECTION INTRAVENOUS at 06:00

## 2022-04-28 RX ADMIN — APIXABAN 5 MG: 5 TABLET, FILM COATED ORAL at 18:34

## 2022-04-28 RX ADMIN — PANTOPRAZOLE SODIUM 40 MG: 40 TABLET, DELAYED RELEASE ORAL at 08:02

## 2022-04-28 RX ADMIN — CHOLECALCIFEROL TAB 25 MCG (1000 UNIT) 2000 UNITS: 25 TAB at 08:03

## 2022-04-28 RX ADMIN — Medication 400 MG: at 08:03

## 2022-04-28 RX ADMIN — GABAPENTIN 100 MG: 100 CAPSULE ORAL at 21:47

## 2022-04-28 RX ADMIN — CHLORHEXIDINE GLUCONATE 10 ML: 1.2 RINSE ORAL at 00:28

## 2022-04-28 RX ADMIN — CASTOR OIL AND BALSAM, PERU: 788; 87 OINTMENT TOPICAL at 21:47

## 2022-04-28 RX ADMIN — SODIUM CHLORIDE, PRESERVATIVE FREE 10 ML: 5 INJECTION INTRAVENOUS at 13:20

## 2022-04-28 RX ADMIN — SENNOSIDES AND DOCUSATE SODIUM 1 TABLET: 50; 8.6 TABLET ORAL at 18:34

## 2022-04-28 RX ADMIN — GABAPENTIN 100 MG: 100 CAPSULE ORAL at 13:21

## 2022-04-28 RX ADMIN — ACETAMINOPHEN 1000 MG: 500 TABLET ORAL at 06:00

## 2022-04-28 RX ADMIN — POTASSIUM CHLORIDE 40 MEQ: 750 TABLET, EXTENDED RELEASE ORAL at 18:34

## 2022-04-28 RX ADMIN — APIXABAN 5 MG: 5 TABLET, FILM COATED ORAL at 08:03

## 2022-04-28 RX ADMIN — POLYETHYLENE GLYCOL 3350 17 G: 17 POWDER, FOR SOLUTION ORAL at 08:00

## 2022-04-28 RX ADMIN — TRAMADOL HYDROCHLORIDE 100 MG: 50 TABLET, COATED ORAL at 08:03

## 2022-04-28 RX ADMIN — Medication 1 TABLET: at 08:02

## 2022-04-28 RX ADMIN — ACETAMINOPHEN 1000 MG: 500 TABLET ORAL at 23:39

## 2022-04-28 RX ADMIN — SENNOSIDES AND DOCUSATE SODIUM 1 TABLET: 50; 8.6 TABLET ORAL at 08:02

## 2022-04-28 RX ADMIN — BUMETANIDE 1 MG: 0.25 INJECTION, SOLUTION INTRAMUSCULAR; INTRAVENOUS at 08:03

## 2022-04-28 RX ADMIN — CHLORHEXIDINE GLUCONATE 10 ML: 1.2 RINSE ORAL at 21:49

## 2022-04-28 RX ADMIN — ACETAMINOPHEN 1000 MG: 500 TABLET ORAL at 00:28

## 2022-04-28 RX ADMIN — POTASSIUM CHLORIDE 40 MEQ: 750 TABLET, EXTENDED RELEASE ORAL at 11:05

## 2022-04-28 RX ADMIN — CASTOR OIL AND BALSAM, PERU: 788; 87 OINTMENT TOPICAL at 08:16

## 2022-04-28 RX ADMIN — ACETAMINOPHEN 1000 MG: 500 TABLET ORAL at 13:21

## 2022-04-28 RX ADMIN — Medication 400 MG: at 18:34

## 2022-04-28 RX ADMIN — TRAMADOL HYDROCHLORIDE 100 MG: 50 TABLET, COATED ORAL at 18:34

## 2022-04-28 RX ADMIN — SODIUM CHLORIDE, PRESERVATIVE FREE 10 ML: 5 INJECTION INTRAVENOUS at 21:47

## 2022-04-28 RX ADMIN — BUMETANIDE 1 MG: 0.25 INJECTION INTRAMUSCULAR; INTRAVENOUS at 11:05

## 2022-04-28 RX ADMIN — CELECOXIB 200 MG: 200 CAPSULE ORAL at 08:03

## 2022-04-28 RX ADMIN — OXYCODONE 10 MG: 5 TABLET ORAL at 07:45

## 2022-04-28 RX ADMIN — FERROUS SULFATE TAB 325 MG (65 MG ELEMENTAL FE) 325 MG: 325 (65 FE) TAB at 18:34

## 2022-04-28 RX ADMIN — FERROUS SULFATE TAB 325 MG (65 MG ELEMENTAL FE) 325 MG: 325 (65 FE) TAB at 11:04

## 2022-04-28 RX ADMIN — GABAPENTIN 100 MG: 100 CAPSULE ORAL at 06:00

## 2022-04-28 RX ADMIN — ASPIRIN 81 MG: 81 TABLET, CHEWABLE ORAL at 08:03

## 2022-04-28 RX ADMIN — CHOLECALCIFEROL TAB 25 MCG (1000 UNIT) 2000 UNITS: 25 TAB at 18:34

## 2022-04-28 NOTE — PROGRESS NOTES
Problem: Self Care Deficits Care Plan (Adult)  Goal: *Acute Goals and Plan of Care (Insert Text)  Description: FUNCTIONAL STATUS PRIOR TO ADMISSION: Patient was independent and active without use of DME. Reports occasional use of SPC as needed. 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     HOME SUPPORT: The patient lived alone with 3 children as local support. Pt reports children plan to stay with her at [place]. Occupational Therapy Goals  Initiated 4/21/2022 (goals reviewed 04/28/2022)  1. Patient will perform ADLs standing 5 mins without fatigue or LOB with supervision/set-up within 7 day(s). 2.  Patient will perform lower body ADLs with supervision/set-up within 7 day(s). 3.  Patient will perform gathering ADL items high and low 2/2 with supervision/set-up within 7 day(s). 4.  Patient will perform toilet transfers with supervision/set-up within 7 day(s). 5.  Patient will perform all aspects of toileting with supervision/set-up within 7 day(s). 6.  Patient will participate in cardiac/sternal upper extremity therapeutic exercise/activities to increase independence with ADLs with supervision/set-up for 5 minutes within 7 day(s). 4/28/2022 1621 by Kerri Johnson  Outcome: Progressing Towards Goal     OCCUPATIONAL THERAPY TREATMENT/WEEKLY RE-EVALUATION  Patient: Nik Joyce (78 y.o. female)  Date: 4/28/2022  Diagnosis: Mitral valve insufficiency, unspecified etiology [I34.0]  Ascending aortic aneurysm (HCC) [I71.2] <principal problem not specified>  Procedure(s) (LRB):  FELTON BY DR BRUCE - PFO CLOSURE -  MITRAL VALVE REPLACEMENT WITH 27 MM CHARLES MAGNA MITRAL EASE - RESUSPENSION OF AORTIC VALVE -  ASCENDING AORTIC ANEURYSM REPAIR AND LEFT ATRIAL APPENDAGE LIGATION (N/A) 9 Days Post-Op  Precautions: Sternal,Fall  Chart, occupational therapy assessment, plan of care, and goals were reviewed. ASSESSMENT  Based on the objective data described below, patient is making slow, but steady progress.  Pt has made improvements in overall functional endurance, but limited in BLE edema affecting LE ADLs. Pt able to perform \"Move in the Tube\" exercises and up for most of meals. Pt motivated to regain independence. Pt follows sternal precautions w/ rare need for cuing. Pt will benefit from continued OT w/ emphasis on LE ADLs and standing ADLs. Current Level of Function Impacting Discharge (ADLs): moderate assist    Other factors to consider for discharge:           PLAN :  Goals have been updated based on progression since last assessment. Patient continues to benefit from skilled intervention to address the above impairments. Continue to follow patient 4 times a week to address goals. Recommend with staff: up for all meals    Recommend next OT session: standing ADLs sinkside    Recommendation for discharge: (in order for the patient to meet his/her long term goals)  Therapy 3 hours per day 5-7 days per week    This discharge recommendation:  Has been made in collaboration with the attending provider and/or case management    Equipment recommendations for successful discharge (if) home: To Be Determined (TBD) at next level of care        SUBJECTIVE:   Patient stated I have to force myself to eat with all these pills.     OBJECTIVE DATA SUMMARY:   Cognitive/Behavioral Status:  Neurologic State: Alert  Orientation Level: Oriented X4  Cognition: Follows commands  Perception: Appears intact  Perseveration: No perseveration noted  Safety/Judgement: Awareness of environment; Fall prevention; Insight into deficits    Functional Mobility and Transfers for ADLs:  Bed Mobility:  Rolling: Stand-by assistance  Supine to Sit: Contact guard assistance  Scooting: Contact guard assistance    Transfers:  Sit to Stand: Contact guard assistance  Bed to Chair: Contact guard assistance    Balance:  Sitting: Intact  Sitting - Static: Good (unsupported)  Sitting - Dynamic: Good (unsupported)  Standing: Impaired  Standing - Static: Good;Constant support  Standing - Dynamic : Fair;Constant support    ADL Intervention:  Feeding  Food to Mouth: Set-up  Drink to Mouth: Set-up    Grooming  Position Performed: Seated in chair  Washing Hands: Set-up    Lower Body Dressing Assistance  Socks: Total assistance (dependent)  Antiembolitic Stockings: Total assistance (dependent)    Cognitive Retraining  Problem Solving: Identifying the task; Identifying the problem;General alternative solution  Safety/Judgement: Awareness of environment; Fall prevention; Insight into deficits    Pain:  0/10    Activity Tolerance:   requires rest breaks    After treatment patient left in no apparent distress:   Sitting in chair, Call bell within reach, and Bed / chair alarm activated    COMMUNICATION/COLLABORATION:   The patients plan of care was discussed with: Registered Nurse    Charlie Sanchez, OTDARI, OTR/L, CSRS, ECDCS, CFPS, CEAS, CGCP  Time Calculation: 23 mins

## 2022-04-28 NOTE — PROGRESS NOTES
Problem: Mobility Impaired (Adult and Pediatric)  Goal: *Acute Goals and Plan of Care (Insert Text)  Description: FUNCTIONAL STATUS PRIOR TO ADMISSION: Patient was independent and active without use of DME.     HOME SUPPORT PRIOR TO ADMISSION: The patient lived alone with children to provide assistance. Physical Therapy Goals  Revised 4/27/2022  1. Patient will move from supine to sit and sit to supine  and roll side to side in bed with modified independence within 7 day(s). 2.  Patient will transfer from bed to chair and chair to bed with supervision/set-up using the least restrictive device within 7 day(s). 3.  Patient will perform sit to stand with modified independence within 7 day(s). 4.  Patient will ambulate with supervision/set-up for 125 feet with the least restrictive device within 7 day(s). 5.  Patient will perform cardiac exercises per protocol with modified independence within 5 days. 6.  Patient will verbally recall and functionally demonstrate mindful-based movements (\"move in the tube\") principles without cues within 5 days. Met 4/27/22. Physical Therapy Goals  Initiated 4/21/2022  1. Patient will move from supine to sit and sit to supine  in bed with moderate assistance  within 5 days. Met 4/27/22. 2.  Patient will perform sit to/from stand with minimal assistance/contact guard assist within 5 days. Met 4/27/22. 3.  Patient will ambulate 60 feet with least restrictive assistive device and minimal assistance/contact guard assist within 5 days. 4.  Patient will perform cardiac exercises per protocol with modified independence within 5 days. 5.  Patient will verbally recall and functionally demonstrate mindful-based movements (\"move in the tube\") principles without cues within 5 days. Met 4/27/22.           Outcome: Not Met   PHYSICAL THERAPY TREATMENT  Patient: Paulette Montero (7 y.o. female)  Date: 4/28/2022  Diagnosis: Mitral valve insufficiency, unspecified etiology [I34.0]  Ascending aortic aneurysm (HCC) [I71.2] <principal problem not specified>  Procedure(s) (LRB):  FELTON BY DR BRUCE - PFO CLOSURE -  MITRAL VALVE REPLACEMENT WITH 27 MM CHARLES MAGNA MITRAL EASE - RESUSPENSION OF AORTIC VALVE -  ASCENDING AORTIC ANEURYSM REPAIR AND LEFT ATRIAL APPENDAGE LIGATION (N/A) 9 Days Post-Op  Precautions: Sternal,Fall  Chart, physical therapy assessment, plan of care and goals were reviewed. ASSESSMENT  Patient continues with skilled PT services and is progressing towards goals. Pt presents with decreased strength and endurance. Pt received on 1LPM with o2 stats at 95%. Pt performed bed mobility at [facility] with additional time with cueing for sequencing. Pt performed  sit to stand transfer at min A with cueing for hand placement. Pt ambulated 10ft and 35ft with RW at [facility]. Pt requiring cueing to stand upright and stay within walker. Pt able to correct for short periods. Pt able to stand at sink to [facility] for x5 mins. Pt requiring cueing for posture. Pt with improved mobility  tolerance and balance. Patient is demonstrating understanding of mindful-based movements (\"move in the tube\") principles of keeping UEs proximal to ribcage to prevent lateral pull on the sternum during load-bearing activities with verbal cues required for compliance. Current Level of Function Impacting Discharge (mobility/balance): bed mobility at [facility], sit to stand transfer at min A, ambulation at [facility] with RW     Other factors to consider for discharge: decreased strength and endurance. PLAN :  Patient continues to benefit from skilled intervention to address the above impairments. Continue treatment per established plan of care. to address goals.     Recommendation for discharge: (in order for the patient to meet his/her long term goals)  Therapy 3 hours per day 5-7 days per week    This discharge recommendation:  Has been made in collaboration with the attending provider and/or case management    IF patient discharges home will need the following DME: to be determined (TBD)       SUBJECTIVE:   Patient stated  I still need to get this fluid off of me.     OBJECTIVE DATA SUMMARY:   Patient mobilized on continuous portable monitor/telemetry. Critical Behavior:  Neurologic State: Alert  Orientation Level: Oriented X4  Cognition: Follows commands  Safety/Judgement: Awareness of environment,Good awareness of safety precautions,Fall prevention (decreased strength and balance)    Functional Mobility Training:  Bed Mobility:  Rolling: Stand-by assistance  Supine to Sit: Contact guard assistance     Scooting: Contact guard assistance          Transfers:  Sit to Stand: Minimum assistance  Stand to Sit: Contact guard assistance        Bed to Chair: Contact guard assistance                      Balance:  Sitting: Intact  Sitting - Static: Good (unsupported)  Sitting - Dynamic: Good (unsupported)  Standing: Impaired  Standing - Static: Good;Constant support  Standing - Dynamic : Fair;Constant support    Ambulation/Gait Training:  Distance (ft): 45 Feet (ft) (10ft and 35ft )  Assistive Device: Gait belt;Walker, rolling  Ambulation - Level of Assistance: Contact guard assistance        Gait Abnormalities: Decreased step clearance (trunk flexion)        Base of Support: Widened     Speed/Lissy: Pace decreased (<100 feet/min)  Step Length: Right shortened;Left shortened              Cardiac diagnosis intervention:  Patient instructed and educated on mindful movement principles based on Move in The Tube concept to include maintaining bilateral elbows close to rib cage when performing any load-bearing activity such as getting in/out of bed, pushing up from a chair, opening a door, or lifting a box. Patient was given a handout with diagrams of each correct/incorrect method of performing each of the above tasks.     Therapeutic Exercises:   Patient instructed on the benefits and demonstrated cardiac exercises while sitting with Supervision. Instructed and indicated understanding on how to progress reps, sets against gravity, pacing through progressive muscle strengthening standing based on surgeon clearance for more weight in prep for functional activity. Instruction on the use of household items in place of weights as needed.      CARDIAC  EXERCISE   Sets   Reps   Active Active Assist   Passive Self ROM   Comments   Shoulder flexion 1 5 [x]                                            []                                            []                                            []                                               Shoulder abduction 1      5 [x]                                            []                                            []                                            []                                               Scapular elevation 1 5 [x]                                            []                                            []                                            []                                               Scapular retraction 1 5 [x]                                            []                                            []                                            []                                               Trunk rotation 1 5 [x]                                            []                                            []                                            [x]                                               Trunk sidebending 1 5 [x]                                            []                                            []                                            []                                                  []                                            []                                            []                                            []                                                   Pain Rating:  Pt with no complaints of pain     Activity Tolerance: Good, Fair, desaturates with exertion and requires oxygen, and requires rest breaks    After treatment patient left in no apparent distress:   Sitting in chair and Call bell within reach    COMMUNICATION/COLLABORATION:   The patients plan of care was discussed with: Registered nurse.      Missael Woodward PTA   Time Calculation: 23 mins

## 2022-04-28 NOTE — PROGRESS NOTES
1900 Bedside and Verbal shift change report given to [facility] (oncoming nurse) by Bette Pedraza RN (offgoing nurse). Report included the following information SBAR, Kardex, Intake/Output, MAR and Cardiac Rhythm NSR. End of Shift Note    Bedside shift change report given to Magi Pang RN (oncoming nurse) by Shasta Cota RN (offgoing nurse). Report included the following information SBAR, Kardex, Intake/Output, MAR, Recent Results and Cardiac Rhythm NSR    Shift worked:  1900 - 0700     Shift summary and any significant changes:    Pt continues to require 1L NC intermittently while asleep and during ambulation to maintain O2 sats >90%. CHG bath and wound/wire care completed. Concerns for physician to address:       Zone phone for oncoming shift:          Activity:  Activity Level: Up ad eric  Number times ambulated in hallways past shift: 0  Number of times OOB to chair past shift: 2    Cardiac:   Cardiac Monitoring: Yes      Cardiac Rhythm: Sinus Rhythm    Access:   Current line(s): PIV     Genitourinary:   Urinary status: voiding    Respiratory:   O2 Device: Nasal cannula  Chronic home O2 use?: NO  Incentive spirometer at bedside: YES  Actual Volume (ml): 750 ml    GI:  Last Bowel Movement Date: 04/28/22  Current diet:  ADULT ORAL NUTRITION SUPPLEMENT Breakfast, Lunch, Dinner; Standard High Calorie/High Protein  DIET ONE TIME MESSAGE  DIET ONE TIME MESSAGE  ADULT DIET Regular; 4 carb choices (60 gm/meal); Low Fat/Low Chol/High Fiber/SAMY  Passing flatus: YES  Tolerating current diet: YES       Pain Management:   Patient states pain is manageable on current regimen: YES    Skin:  Maynor Score: 18  Interventions: float heels, increase time out of bed, PT/OT consult and nutritional support     Patient Safety:  Fall Score:  Total Score: 3  Interventions: bed/chair alarm, assistive device (walker, cane, etc), gripper socks and pt to call before getting OOB  High Fall Risk: Yes    Length of Stay:  Expected LOS: 5d 21h  Actual LOS: [id number] [facility] Yunior, RN

## 2022-04-29 VITALS
TEMPERATURE: 97.7 F | RESPIRATION RATE: 20 BRPM | BODY MASS INDEX: 35.92 KG/M2 | WEIGHT: 215.61 LBS | HEART RATE: 88 BPM | OXYGEN SATURATION: 96 % | DIASTOLIC BLOOD PRESSURE: 63 MMHG | SYSTOLIC BLOOD PRESSURE: 124 MMHG | HEIGHT: 65 IN

## 2022-04-29 LAB
ALBUMIN SERPL-MCNC: 3 G/DL (ref 3.5–5)
ALBUMIN/GLOB SERPL: 1 {RATIO} (ref 1.1–2.2)
ALP SERPL-CCNC: 86 U/L (ref 45–117)
ALT SERPL-CCNC: 19 U/L (ref 12–78)
ANION GAP SERPL CALC-SCNC: 2 MMOL/L (ref 5–15)
ANION GAP SERPL CALC-SCNC: 2 MMOL/L (ref 5–15)
AST SERPL-CCNC: 20 U/L (ref 15–37)
BILIRUB SERPL-MCNC: 0.6 MG/DL (ref 0.2–1)
BUN SERPL-MCNC: 32 MG/DL (ref 6–20)
BUN SERPL-MCNC: 33 MG/DL (ref 6–20)
BUN/CREAT SERPL: 36 (ref 12–20)
BUN/CREAT SERPL: 38 (ref 12–20)
CALCIUM SERPL-MCNC: 9.3 MG/DL (ref 8.5–10.1)
CALCIUM SERPL-MCNC: 9.4 MG/DL (ref 8.5–10.1)
CHLORIDE SERPL-SCNC: 96 MMOL/L (ref 97–108)
CHLORIDE SERPL-SCNC: 98 MMOL/L (ref 97–108)
CO2 SERPL-SCNC: 35 MMOL/L (ref 21–32)
CO2 SERPL-SCNC: 36 MMOL/L (ref 21–32)
CREAT SERPL-MCNC: 0.86 MG/DL (ref 0.55–1.02)
CREAT SERPL-MCNC: 0.89 MG/DL (ref 0.55–1.02)
ERYTHROCYTE [DISTWIDTH] IN BLOOD BY AUTOMATED COUNT: 18.6 % (ref 11.5–14.5)
ERYTHROCYTE [DISTWIDTH] IN BLOOD BY AUTOMATED COUNT: 18.6 % (ref 11.5–14.5)
GLOBULIN SER CALC-MCNC: 3.1 G/DL (ref 2–4)
GLUCOSE SERPL-MCNC: 116 MG/DL (ref 65–100)
GLUCOSE SERPL-MCNC: 98 MG/DL (ref 65–100)
HCT VFR BLD AUTO: 23.3 % (ref 35–47)
HCT VFR BLD AUTO: 25 % (ref 35–47)
HGB BLD-MCNC: 6.8 G/DL (ref 11.5–16)
HGB BLD-MCNC: 7.4 G/DL (ref 11.5–16)
MAGNESIUM SERPL-MCNC: 2.2 MG/DL (ref 1.6–2.4)
MCH RBC QN AUTO: 28.2 PG (ref 26–34)
MCH RBC QN AUTO: 28.6 PG (ref 26–34)
MCHC RBC AUTO-ENTMCNC: 29.2 G/DL (ref 30–36.5)
MCHC RBC AUTO-ENTMCNC: 29.6 G/DL (ref 30–36.5)
MCV RBC AUTO: 96.5 FL (ref 80–99)
MCV RBC AUTO: 96.7 FL (ref 80–99)
NRBC # BLD: 0.03 K/UL (ref 0–0.01)
NRBC # BLD: 0.04 K/UL (ref 0–0.01)
NRBC BLD-RTO: 0.3 PER 100 WBC
NRBC BLD-RTO: 0.4 PER 100 WBC
PHOSPHATE SERPL-MCNC: 4.8 MG/DL (ref 2.6–4.7)
PLATELET # BLD AUTO: 244 K/UL (ref 150–400)
PLATELET # BLD AUTO: 265 K/UL (ref 150–400)
PMV BLD AUTO: 11 FL (ref 8.9–12.9)
PMV BLD AUTO: 11.3 FL (ref 8.9–12.9)
POTASSIUM SERPL-SCNC: 5.4 MMOL/L (ref 3.5–5.1)
POTASSIUM SERPL-SCNC: 5.6 MMOL/L (ref 3.5–5.1)
PROT SERPL-MCNC: 6.1 G/DL (ref 6.4–8.2)
RBC # BLD AUTO: 2.41 M/UL (ref 3.8–5.2)
RBC # BLD AUTO: 2.59 M/UL (ref 3.8–5.2)
SODIUM SERPL-SCNC: 134 MMOL/L (ref 136–145)
SODIUM SERPL-SCNC: 135 MMOL/L (ref 136–145)
WBC # BLD AUTO: 10.4 K/UL (ref 3.6–11)
WBC # BLD AUTO: 10.8 K/UL (ref 3.6–11)

## 2022-04-29 PROCEDURE — 74011250637 HC RX REV CODE- 250/637: Performed by: NURSE PRACTITIONER

## 2022-04-29 PROCEDURE — 74011000250 HC RX REV CODE- 250: Performed by: NURSE PRACTITIONER

## 2022-04-29 PROCEDURE — 85027 COMPLETE CBC AUTOMATED: CPT

## 2022-04-29 PROCEDURE — 84100 ASSAY OF PHOSPHORUS: CPT

## 2022-04-29 PROCEDURE — 77010033678 HC OXYGEN DAILY

## 2022-04-29 PROCEDURE — 74011000250 HC RX REV CODE- 250: Performed by: THORACIC SURGERY (CARDIOTHORACIC VASCULAR SURGERY)

## 2022-04-29 PROCEDURE — 74011250637 HC RX REV CODE- 250/637: Performed by: PHYSICIAN ASSISTANT

## 2022-04-29 PROCEDURE — 74011250637 HC RX REV CODE- 250/637: Performed by: THORACIC SURGERY (CARDIOTHORACIC VASCULAR SURGERY)

## 2022-04-29 PROCEDURE — 36415 COLL VENOUS BLD VENIPUNCTURE: CPT

## 2022-04-29 PROCEDURE — 97116 GAIT TRAINING THERAPY: CPT

## 2022-04-29 PROCEDURE — 83735 ASSAY OF MAGNESIUM: CPT

## 2022-04-29 PROCEDURE — 97535 SELF CARE MNGMENT TRAINING: CPT

## 2022-04-29 PROCEDURE — 80053 COMPREHEN METABOLIC PANEL: CPT

## 2022-04-29 RX ORDER — ACETAMINOPHEN 500 MG
500 TABLET ORAL
Qty: 30 TABLET | Refills: 0 | Status: SHIPPED
Start: 2022-04-29

## 2022-04-29 RX ORDER — BUMETANIDE 2 MG/1
2 TABLET ORAL 2 TIMES DAILY
Qty: 14 TABLET | Refills: 0 | Status: SHIPPED
Start: 2022-04-29 | End: 2022-05-06

## 2022-04-29 RX ORDER — GUAIFENESIN 100 MG/5ML
81 LIQUID (ML) ORAL DAILY
Qty: 30 TABLET | Refills: 5 | Status: SHIPPED
Start: 2022-04-30

## 2022-04-29 RX ORDER — GABAPENTIN 100 MG/1
100 CAPSULE ORAL EVERY 8 HOURS
Qty: 30 CAPSULE | Refills: 0 | Status: SHIPPED | OUTPATIENT
Start: 2022-04-29 | End: 2022-05-09

## 2022-04-29 RX ORDER — AMOXICILLIN 250 MG
1 CAPSULE ORAL
Qty: 30 TABLET | Refills: 0 | Status: SHIPPED | OUTPATIENT
Start: 2022-04-29 | End: 2022-05-24

## 2022-04-29 RX ORDER — BUMETANIDE 1 MG/1
2 TABLET ORAL 2 TIMES DAILY
Status: DISCONTINUED | OUTPATIENT
Start: 2022-04-29 | End: 2022-04-30 | Stop reason: HOSPADM

## 2022-04-29 RX ADMIN — Medication 400 MG: at 17:29

## 2022-04-29 RX ADMIN — CHOLECALCIFEROL TAB 25 MCG (1000 UNIT) 2000 UNITS: 25 TAB at 08:47

## 2022-04-29 RX ADMIN — SODIUM CHLORIDE, PRESERVATIVE FREE 10 ML: 5 INJECTION INTRAVENOUS at 06:01

## 2022-04-29 RX ADMIN — Medication 400 MG: at 08:49

## 2022-04-29 RX ADMIN — ASPIRIN 81 MG: 81 TABLET, CHEWABLE ORAL at 08:47

## 2022-04-29 RX ADMIN — BUMETANIDE 2 MG: 1 TABLET ORAL at 17:29

## 2022-04-29 RX ADMIN — ACETAMINOPHEN 1000 MG: 500 TABLET ORAL at 17:28

## 2022-04-29 RX ADMIN — FERROUS SULFATE TAB 325 MG (65 MG ELEMENTAL FE) 325 MG: 325 (65 FE) TAB at 08:49

## 2022-04-29 RX ADMIN — TRAMADOL HYDROCHLORIDE 100 MG: 50 TABLET, COATED ORAL at 17:28

## 2022-04-29 RX ADMIN — PANTOPRAZOLE SODIUM 40 MG: 40 TABLET, DELAYED RELEASE ORAL at 08:50

## 2022-04-29 RX ADMIN — SENNOSIDES AND DOCUSATE SODIUM 1 TABLET: 50; 8.6 TABLET ORAL at 08:49

## 2022-04-29 RX ADMIN — Medication 1 TABLET: at 08:46

## 2022-04-29 RX ADMIN — GABAPENTIN 100 MG: 100 CAPSULE ORAL at 20:15

## 2022-04-29 RX ADMIN — POLYETHYLENE GLYCOL 3350 17 G: 17 POWDER, FOR SOLUTION ORAL at 08:50

## 2022-04-29 RX ADMIN — TRAMADOL HYDROCHLORIDE 100 MG: 50 TABLET, COATED ORAL at 08:47

## 2022-04-29 RX ADMIN — SODIUM ZIRCONIUM CYCLOSILICATE 10 G: 10 POWDER, FOR SUSPENSION ORAL at 12:09

## 2022-04-29 RX ADMIN — FERROUS SULFATE TAB 325 MG (65 MG ELEMENTAL FE) 325 MG: 325 (65 FE) TAB at 17:29

## 2022-04-29 RX ADMIN — APIXABAN 5 MG: 5 TABLET, FILM COATED ORAL at 08:49

## 2022-04-29 RX ADMIN — BUMETANIDE 2 MG: 1 TABLET ORAL at 10:04

## 2022-04-29 RX ADMIN — CELECOXIB 200 MG: 200 CAPSULE ORAL at 08:49

## 2022-04-29 RX ADMIN — CHOLECALCIFEROL TAB 25 MCG (1000 UNIT) 2000 UNITS: 25 TAB at 17:28

## 2022-04-29 RX ADMIN — GABAPENTIN 100 MG: 100 CAPSULE ORAL at 15:23

## 2022-04-29 RX ADMIN — ACETAMINOPHEN 1000 MG: 500 TABLET ORAL at 06:01

## 2022-04-29 RX ADMIN — GABAPENTIN 100 MG: 100 CAPSULE ORAL at 06:01

## 2022-04-29 RX ADMIN — CHLORHEXIDINE GLUCONATE 10 ML: 1.2 RINSE ORAL at 09:00

## 2022-04-29 RX ADMIN — APIXABAN 5 MG: 5 TABLET, FILM COATED ORAL at 17:29

## 2022-04-29 NOTE — PROGRESS NOTES
Problem: Falls - Risk of  Goal: *Absence of Falls  Description: Document Jesse Leal Fall Risk and appropriate interventions in the flowsheet. Outcome: Progressing Towards Goal  Note: Fall Risk Interventions:  Mobility Interventions: Bed/chair exit alarm,Communicate number of staff needed for ambulation/transfer,OT consult for ADLs,Patient to call before getting OOB,PT Consult for assist device competence,PT Consult for mobility concerns,Strengthening exercises (ROM-active/passive),Utilize walker, cane, or other assistive device    Mentation Interventions: Bed/chair exit alarm    Medication Interventions: Bed/chair exit alarm,Evaluate medications/consider consulting pharmacy,Patient to call before getting OOB,Teach patient to arise slowly    Elimination Interventions: Bed/chair exit alarm,Call light in reach,Patient to call for help with toileting needs,Toileting schedule/hourly rounds              Problem: Pressure Injury - Risk of  Goal: *Prevention of pressure injury  Description: Document Maynor Scale and appropriate interventions in the flowsheet.   Outcome: Progressing Towards Goal  Note: Pressure Injury Interventions:  Sensory Interventions: Assess changes in LOC,Check visual cues for pain,Chair cushion,Float heels    Moisture Interventions: Absorbent underpads,Apply protective barrier, creams and emollients,Contain wound drainage,Limit adult briefs,Maintain skin hydration (lotion/cream),Minimize layers,Moisture barrier    Activity Interventions: Increase time out of bed,Pressure redistribution bed/mattress(bed type),PT/OT evaluation,Chair cushion    Mobility Interventions: Float heels,HOB 30 degrees or less,Pressure redistribution bed/mattress (bed type),PT/OT evaluation,Chair cushion    Nutrition Interventions: Document food/fluid/supplement intake,Discuss nutritional consult with provider,Offer support with meals,snacks and hydration    Friction and Shear Interventions: HOB 30 degrees or less,Lift sheet,Minimize layers,Apply protective barrier, creams and emollients                Problem: Cardiac Valve Surgery: Discharge Outcomes  Goal: *Stable cardiac rhythm  Outcome: Progressing Towards Goal  Goal: *Optimal pain control at patient's stated goal  Outcome: Progressing Towards Goal  Goal: *Verbalizes understanding and describes prescribed diet  Outcome: Progressing Towards Goal  Goal: *No signs and symptoms of infection or wound complications  Outcome: Progressing Towards Goal

## 2022-04-29 NOTE — PROGRESS NOTES
Bedside and Verbal shift change report given to Emanuel (oncoming nurse) by [place] (offgoing nurse). Report included the following information SBAR, Kardex, Procedure Summary, Intake/Output, MAR and Recent Results. 0599: Blood work drawn and sent to lab per orders    [phone]: Physical therapy and occupational therapy at bedside to work with patient    [phone]: Pacer wires cut at bedside by nurse practitioner. Plan for patient to be discharged to [facility] today    [phone]: Sternotomy site cleaned, small area continues to drain where chest tube was located. Serosanguinous fluid noted, area cleaned and 4x4/tape applied to site. Patient continues to deny pain at this time. 1700: Patient dressed, discharge instructions reviewed. Patient sitting in eating dinner. Plan for discharge tonight to [facility].  [facility] to  patient at 7:30pm tonight    1800: Report given to Brent Santiago at [facility], patient now to be transported at 9:45 pm. Updated [facility] team on new pickup time for transport

## 2022-04-29 NOTE — PROGRESS NOTES
CSS Floor Progress Note    Admit Date: 2022  POD:  10 Day Post-Op    Procedure:  Procedure(s):  FELTON BY DR BRUCE - PFO CLOSURE -  MITRAL VALVE REPLACEMENT WITH 27 MM CHARLES MAGNA MITRAL EASE - RESUSPENSION OF AORTIC VALVE -  ASCENDING AORTIC ANEURYSM REPAIR AND LEFT ATRIAL APPENDAGE LIGATION        Subjective:   Pt seen with Dr. Asim Cazares. Ready for discharge. Compression stockings in place. On 1LNC with a sat of 100%. Objective:   Vitals:  Blood pressure (!) 99/55, pulse 83, temperature 98.5 °F (36.9 °C), resp. rate 20, height 5' 5\" (1.651 m), weight 215 lb 9.8 oz (97.8 kg), SpO2 100 %. Temp (24hrs), Av.9 °F (36.6 °C), Min:97.5 °F (36.4 °C), Max:98.5 °F (36.9 °C)    EKG/Rhythm: NSR 70-90s    Oxygen Therapy:  Oxygen Therapy  O2 Sat (%): 100 % (22 0715)  Pulse via Oximetry: 75 beats per minute (22 1913)  O2 Device: Nasal cannula (22)  Skin Assessment: Clean, dry, & intact (22)  Skin Protection for O2 Device: N/A (22 1051)  O2 Flow Rate (L/min): 1 l/min (22)  FIO2 (%): 40 % (22 0955)    CXR:   CXR Results  (Last 48 hours)    None          Admission Weight: Last Weight   Weight: 191 lb 12.8 oz (87 kg) Weight: 215 lb 9.8 oz (97.8 kg)     Intake / Output / Drain:  Current Shift: No intake/output data recorded. Last 24 hrs.:     Intake/Output Summary (Last 24 hours) at 2022 0919  Last data filed at 2022 9515  Gross per 24 hour   Intake 917 ml   Output 1185 ml   Net -268 ml       EXAM:  General:  NAD, pleasant mood      Lungs:   Clear to auscultation bilaterally. Incision:  Prineo intact. Heart:  Regular rate and rhythm, S1, S2 normal, no murmur, click, rub or gallop. Abdomen:   Soft, non-tender. Bowel sounds hypoactive. No masses,  No organomegaly. Extremities:  3+ b/l edema - nonpitting. PPP. Ecchymosis around left femoral a-line. Neurologic:  Gross motor and sensory apparatus intact.      Labs:   Recent Labs     22  0531 WBC 10.4   HGB 6.8*   HCT 23.3*      *   K 5.4*   BUN 33*   CREA 0.86   GLU 98        Assessment:     Active Problems:    Ascending aortic aneurysm (HCC) (3/29/2022)      S/P MVR (mitral valve replacement) (2022)      S/P aortic aneurysm repair (2022)       Plan/Recommendations/Medical Decision Makin. S/p ascending aortic aneurysm: Strict BP control. CT out. 2. S/p MVR (tissue valve): Will need AC x 3 months for valve. Discussed with Dr. Joslyn Shannon   3. Pulmonary HTN: Improved following MVR  4. A-fib: s/p LAAL. On diltiazem, Tikosyn, and eliquis PTA. Cont to hold rate meds at this time. Cont Eliquis   5. Anemia secondary to acute blood loss: Monitor H/H and CT output. Received a unit PRBC . Add PO iron today  6. Atelectasis, pulmonary edema, pleural effusions: IS hourly! OOB activity with PT/OT when appropriate. Back on 1L NC. On bumex today to 2mg BID, hold potassium replacement today for K of 5.4.  7. Chronic Diastolic HF: Monitor I/O. Weight up, increase IV bumex BID. Strict I/Os  8. Leukocytosis: WBC down to 10.4 today. Continue to monitor. 9. Pain management: Scheduled tylenol and gabapentin. PRN oxycodone and dilaudid for breakthrough pain. Avoid toradol d/t risk of STEVE. Cont tramadol and celebrex  10. Leukocytosis: Increase mobility. Pulm toileting. Daily incisional care. Afebrile  11. Nutrition: encourage PO. Cont Ensure High Protein     Dispo: PT/OT. Stepdown. Needs to be OOB TID, ambulating TID. Case management following to aid in d/c planning, IPR referral sent () - pt has been accepted. Covid negative . Awaiting IPR bed. Repeat CBC and BMP.       Signed By: NEENA Marcial

## 2022-04-29 NOTE — PROGRESS NOTES
Physician Progress Note      Josh Baron  CSN #:                  [id number]  :                       1945  ADMIT DATE:       2022 6:01 AM  DISCH DATE:  RESPONDING  PROVIDER #:        Catie MARTINS NP          QUERY TEXT:    Good Afternoon    This patient admitted for planned Mitral homar replacement and repair of ascending aorta. The patient has known documented chronic diastolic CHF. The discharge summary notes \"post op operative course complicated by pulmonary edema and pleural effusion that improved wit diuresis. If possible, can you plesae clarify the acuity of the CHF, and please document in progress notes and discharge summary further specificity regarding  acuity of CHF for this admission: The medical record reflects the following:  Risk Factors: Known CHF Post op mitral valve replacement and AAA repair. Clinical Indicators: Discharge summary notes \" \"post op operative course complicated by pulmonary edema and pleural effusion that improved wit diuresis  CXR on --Stable cardiomegaly and left sided effusion. , documented weight up, increased the IV bumex , noted with hector. Lower ext. edema. Treatment: IV bumex increased BID   CXR, strict I&O, Weight  , PCU care while getting IV diuretics. Thank you for clarifying  Luly Marks, [address] [facility], [address] [place] [address], [address] [place] [address]  Options provided:  -- Acute on Chronic Diastolic CHF/HFpEF  -- Chronic Diastolic CHF/HFpEF only  -- Other - I will add my own diagnosis  -- Disagree - Not applicable / Not valid  -- Disagree - Clinically unable to determine / Unknown  -- Refer to Clinical Documentation Reviewer    PROVIDER RESPONSE TEXT:    This patient is in acute on chronic diastolic CHF/HFpEF.     Query created by: Danny Ly on 2022 2:46 PM      Electronically signed by:  Catie Wilson NP 2022 2:58 PM

## 2022-04-29 NOTE — PROGRESS NOTES
Problem: Mobility Impaired (Adult and Pediatric)  Goal: *Acute Goals and Plan of Care (Insert Text)  Description: FUNCTIONAL STATUS PRIOR TO ADMISSION: Patient was independent and active without use of DME.     HOME SUPPORT PRIOR TO ADMISSION: The patient lived alone with children to provide assistance. Physical Therapy Goals  Revised 4/27/2022  1. Patient will move from supine to sit and sit to supine  and roll side to side in bed with modified independence within 7 day(s). 2.  Patient will transfer from bed to chair and chair to bed with supervision/set-up using the least restrictive device within 7 day(s). 3.  Patient will perform sit to stand with modified independence within 7 day(s). 4.  Patient will ambulate with supervision/set-up for 125 feet with the least restrictive device within 7 day(s). 5.  Patient will perform cardiac exercises per protocol with modified independence within 5 days. 6.  Patient will verbally recall and functionally demonstrate mindful-based movements (\"move in the tube\") principles without cues within 5 days. Met 4/27/22. Physical Therapy Goals  Initiated 4/21/2022  1. Patient will move from supine to sit and sit to supine  in bed with moderate assistance  within 5 days. Met 4/27/22. 2.  Patient will perform sit to/from stand with minimal assistance/contact guard assist within 5 days. Met 4/27/22. 3.  Patient will ambulate 60 feet with least restrictive assistive device and minimal assistance/contact guard assist within 5 days. 4.  Patient will perform cardiac exercises per protocol with modified independence within 5 days. 5.  Patient will verbally recall and functionally demonstrate mindful-based movements (\"move in the tube\") principles without cues within 5 days. Met 4/27/22.     Outcome: Progressing Towards Goal    PHYSICAL THERAPY TREATMENT  Patient: Paulette Montero (24 y.o. female)  Date: 4/29/2022  Diagnosis: Mitral valve insufficiency, unspecified etiology [I34.0]  Ascending aortic aneurysm (HCC) [I71.2] <principal problem not specified>  Procedure(s) (LRB):  FELTON BY DR BRUCE - PFO CLOSURE -  MITRAL VALVE REPLACEMENT WITH 27 MM CHARLES MAGNA MITRAL EASE - RESUSPENSION OF AORTIC VALVE -  ASCENDING AORTIC ANEURYSM REPAIR AND LEFT ATRIAL APPENDAGE LIGATION (N/A) 10 Days Post-Op  Precautions: Sternal,Fall  Chart, physical therapy assessment, plan of care and goals were reviewed. ASSESSMENT  Patient continues with skilled PT services and is progressing towards goals. Pt was received sitting up in the chair on 2L and cleared by nursing to mobilize. She needed increased assistance to stand with multiple attempts. Able to ambulate around the room and maintain good vitals ambulated into the bathroom and worked on toilet transfers with difficulty. She fatigued quickly and was returned to supine at the end of the session. Pt off pathway and dropped down to 5x/wk. Nursing in agreement to mobilize pt to continue to work on pt's mobility. Patient is demonstrating understanding of mindful-based movements (\"move in the tube\") principles of keeping UEs proximal to ribcage to prevent lateral pull on the sternum during load-bearing activities with verbal cues required for compliance. Current Level of Function Impacting Discharge (mobility/balance): mod for transfers and CGA for gait    Other factors to consider for discharge:          PLAN :  Patient continues to benefit from skilled intervention to address the above impairments. Continue treatment per established plan of care. to address goals.     Recommendation for discharge: (in order for the patient to meet his/her long term goals)  Therapy 3 hours per day 5-7 days per week    This discharge recommendation:  Has not yet been discussed the attending provider and/or case management    IF patient discharges home will need the following DME: to be determined (TBD)       SUBJECTIVE:   Patient stated I have been sitting here for a while.     OBJECTIVE DATA SUMMARY:   Patient mobilized on continuous portable monitor/telemetry. Critical Behavior:  Neurologic State: Agitated,Appropriate for age  Orientation Level: Oriented X4  Cognition: Appropriate decision making,Appropriate for age attention/concentration,Appropriate safety awareness,Follows commands  Safety/Judgement: Awareness of environment,Fall prevention,Insight into deficits    Functional Mobility Training:  Bed Mobility:        Sit to Supine: Moderate assistance  Scooting: Contact guard assistance          Transfers:  Sit to Stand: Moderate assistance  Stand to Sit: Contact guard assistance                               Balance:  Sitting: Intact  Standing: Impaired  Standing - Static: Good;Constant support  Standing - Dynamic : Fair;Constant support    Ambulation/Gait Training:  Distance (ft): 50 Feet (ft)  Assistive Device: Gait belt;Walker, rolling  Ambulation - Level of Assistance: Contact guard assistance        Gait Abnormalities: Decreased step clearance        Base of Support: Widened     Speed/Ilssy: Pace decreased (<100 feet/min); Shuffled  Step Length: Left shortened;Right shortened                  Cardiac diagnosis intervention:  Patient instructed and educated on mindful movement principles based on Move in The Tube concept to include maintaining bilateral elbows close to rib cage when performing any load-bearing activity such as getting in/out of bed, pushing up from a chair, opening a door, or lifting a box. Patient was given a handout with diagrams of each correct/incorrect method of performing each of the above tasks. Activity Tolerance:   Fair    After treatment patient left in no apparent distress:   Supine in bed and Call bell within reach    COMMUNICATION/COLLABORATION:   The patients plan of care was discussed with: Occupational therapist and Registered nurse.      Davin Weiner PT, DPT   Time Calculation: 17 mins

## 2022-04-29 NOTE — PROGRESS NOTES
Transition of Care Plan:     RUR:13%     Disposition:Home with home health vs rehab     Follow up appointments: To be done prior to discharge.     DME needed: To be determined.     Transportation at [address] or means to access home:Daughter has keys.         IM Medicare Letter: To be given prior to discharge.     Is patient a BCPI-A Bundle: No                       If yes, was Bundle Letter given?: N/A     Is patient a [place] and connected with the [facility]?   No               If yes, was [place] transfer form completed and [facility] notified? N/A     Caregiver Contact:Daughter--Jose ShepardFuNvtzsd-[phone]     Discharge Caregiver contacted prior to discharge? Caregiver to be contacted prior to discharge.     Care Conference needed?: No     Called liason at [address] and they will not have a bed until the first of the week. Discussed with patient and she is fine with [facility]. Referral sent to [facility] and will await their decision. Sally Moore RN BSN CRM        [phone]

## 2022-04-29 NOTE — PROGRESS NOTES
Cardiac Surgery Care Coordinator-  Met with Bhupindersaraamrtin Johnson, reviewed plan of care and discharge instructions. Reinforced move in the tube, sternal precautions and continued use of the incentive spirometer. Barrington Johnson is able to pull 750cc. Reviewed the importance of daily temp and weight monitoring, discussed incisional care and reviewed signs and symptoms of infection. Red reminder bracelet on right wrist, reviewed purpose of the bracelet and when to call the MD.  Reminded pt of appts and encouraged participation in the Cardiac Wellness and rehab program after discharge. Encouraged Bhupindersaramartin Johnson to verbalize and emotional support given. Barrington Johnson is without questions or concerns at this time. Will follow up with a phone call after discharge.  Marta Pugh RN

## 2022-04-29 NOTE — DISCHARGE INSTRUCTIONS
[facility]     [address]  [address]                                                           [address] [place] [address]  Office- [phone]  Fax- [phone]        Office- [phone]  Fax- [phone]  _________________________________________________________________  Dr. Brayden Maxwell, NP          Jatin De [place], [place]  Dr. Christine Caban, NP                   Lesvia Farfan [place]     Dr. Emerson Ferrari, NP            Teresa Nunez [place]  Dr. Yeimi Benson, NP              NEENA Sky                                                                                                                                              Name:Kasia Lopez Yanickdimitri     Surgery & Date: Procedure(s):  FELTON BY  Medical Geraldine - PFO CLOSURE -  MITRAL VALVE REPLACEMENT WITH 27 MM CHARLES MAGNA MITRAL EASE - RESUSPENSION OF AORTIC VALVE -  ASCENDING AORTIC ANEURYSM REPAIR AND LEFT ATRIAL APPENDAGE LIGATION    Discharge Date: 4/29/22    MEDICATIONS:  Please refer to your After Visit Summary for your medication list. If you do not have a prescription for a new medication, you may purchase the medication over the counter. DO NOT TAKE ANY MEDICATIONS THAT ARE NOT ON THIS LIST      INSTRUCTIONS:  1. NO SMOKING OR TOBACCO PRODUCTS  2. Follow all the instructions in your discharge book  3. Shower daily. Wash all incisions twice daily with Dial soap and water. After each wash with soap and water, wash incisions with Dynahex 4 solution and rinse. Do this for 14 days. No lotions, ointments or powder. 4. Call the office immediately for any redness, swelling, or drainage from your incision.    5. Take your temperature daily and call for a temperature of 101 degrees or higher or for any symptoms that make you think you have and infection. 6. Weigh yourself each morning. Call if you gain more than 5 pounds in 48 hours. 7. Use the incentive spirometer 6-8 times a day-10 breaths each time. Use a pillow or your bear to splint your breastbone when coughing or sneezing. 8. If you feel your breast bone clicking or popping, notify the office immediately. 9. Walk several hundred feet several times daily. DIET  Eat an American Heart Association diet. If you are having trouble with your appetite, eat what you can. Try eating small, frequent meals throughout the day. Diabetic patients should follow an ADA diet. Check your blood sugars  And keep a log of your results. ACTIVITY  1. NO DRIVING--you will be evaluated to drive at your follow up visit. 2. Increase your activity by walking several times a day. Stay out of bed most of the day. 3. When sitting, keep your legs elevated. 4. You may ride in a car, but you must get out every hour and walk around. If you ride in a car with an airbag that can not be switched off, put the seat ALL the way back or ride in the back seat. 5. Any load bearing activity can be performed as long as it can be performed \"in the tube\". You can reach \"out of the tube\" when performing activities that don't require heavy lifting. Let pain be your guide, your pain level will keep you from doing anything extreme. Normal Problems After Discharge:   Some fatigue and difficulty sleeping   Poor appetite initially, with temporary change in taste   Temperature variability; feeling hot and cold   Chest wall soreness and paresthesia (numbness)   Some musculoskeletal pain along joint lines in chest and back.     Tylenol or NSAIDs will help unless contraindicated    Patients with EVH (Endoscopic Vein [place]colu) will have mild swelling in that leg due to temporary venous insufficiency   Elevation & compression stockings will help to reduce the swelling        FOLLOW UP  1. Your follow up appointment will be on 5/17/22 at 1:45pm. Our office is located in [address] [place] [address], [address]. Please call our office at [phone] if you are unable to make either one of these appointments. 2. You will be receiving a call before your 5 day appointment to begin cardiac rehab. They are located in the [address] [place] [address], [address]. Their phone number is [phone]. Please call if you have not been contacted 2-3 weeks after discharge from the hospital.  3. Your appointment with Dr. Iain Hand will be on 6/7/22 at 1:00pm. Office phone is [phone]. 4. Consult you primary care physician regarding your influenza & pneumovax vaccines. 5. Please bring all medications (or a complete list) with you to your appointment. 6. Do not hesitate to contact our office 24/7 with any questions or concerns. Call the number on your red bracelet ([phone])      Signature:___________________________________________________      Matias Carton Dressing Instructions:    Herchel Nadir is a lightweight mesh thats applied over your incision, then coated with a skin adhesive to create a strong, flexible seal that protects against water and bacteria. Your healthcare team chose to use Dermabond Prineo system on your sternal incision. Please share the instruction sheet in your discharge packet with your home health nurse. They will assist with dressing removal 10-14 days after surgery.  Prineo stays on for 10-14 days. If you notice the edge of the dressing peeling up, trim the edge but do not remove the dressing.  Dont scratch, rub or pick at it.  Do not apply topical lotions, ointments, or liquids   You may shower and let soap and water water run on the dressing, but do not scrub it. Be sure to lightly pat it dry when you exit the shower.

## 2022-04-29 NOTE — PROGRESS NOTES
Problem: Self Care Deficits Care Plan (Adult)  Goal: *Acute Goals and Plan of Care (Insert Text)  Description: FUNCTIONAL STATUS PRIOR TO ADMISSION: Patient was independent and active without use of DME. Reports occasional use of SPC as needed. 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     HOME SUPPORT: The patient lived alone with 3 children as local support. Pt reports children plan to stay with her at [place]. Occupational Therapy Goals  Initiated 4/21/2022 (goals reviewed 04/28/2022)  1. Patient will perform ADLs standing 5 mins without fatigue or LOB with supervision/set-up within 7 day(s). 2.  Patient will perform lower body ADLs with supervision/set-up within 7 day(s). 3.  Patient will perform gathering ADL items high and low 2/2 with supervision/set-up within 7 day(s). 4.  Patient will perform toilet transfers with supervision/set-up within 7 day(s). 5.  Patient will perform all aspects of toileting with supervision/set-up within 7 day(s). 6.  Patient will participate in cardiac/sternal upper extremity therapeutic exercise/activities to increase independence with ADLs with supervision/set-up for 5 minutes within 7 day(s). Outcome: Progressing Towards Goal    OCCUPATIONAL THERAPY TREATMENT  Patient: Sahara Murray (64 y.o. female)  Date: 4/29/2022  Diagnosis: Mitral valve insufficiency, unspecified etiology [I34.0]  Ascending aortic aneurysm (HCC) [I71.2] <principal problem not specified>  Procedure(s) (LRB):  FELTON BY DR BRCUE - PFO CLOSURE -  MITRAL VALVE REPLACEMENT WITH 27 MM CHARLES MAGNA MITRAL EASE - RESUSPENSION OF AORTIC VALVE -  ASCENDING AORTIC ANEURYSM REPAIR AND LEFT ATRIAL APPENDAGE LIGATION (N/A) 10 Days Post-Op  Precautions: Sternal,Fall  Chart, occupational therapy assessment, plan of care, and goals were reviewed. ASSESSMENT  Patient continues with skilled OT services and is progressing towards goals.   Patient completed toileting and bedroom mobility on this date, received seated in recliner chair on 1L NC. Pt continues to required assist to complete sit to stand transitions, often attempting to push outside of tube. Educated pt on hand placement, improving with multiple trials. Pt continues to require max A to manage posterior toileting hygiene, unable to reach due to limit should ROM. May benefit from toileting tongs. At conclusion of session pt endorsing fatigue and returned to bed. Assisted pt with doffing compression stockings, noted slight redness. Rn notified. Cont to recommend IPR at DC. Patient is verbalizing and is demonstrating understanding of mindful-based movements (\"move in the tube\") principles of keeping UEs proximal to ribcage to prevent lateral pull on the sternum during load-bearing activities with verbal, manual, and tactile cues required for compliance. Current Level of Function Impacting Discharge (ADLs): min - max A     Other factors to consider for discharge: below baseline, MVR         PLAN :  Patient continues to benefit from skilled intervention to address the above impairments. Continue treatment per established plan of care to address goals. Recommend with staff: up to chair daily, assist x1 > BR    Recommend next OT session: hip kit/sock aid    Recommendation for discharge: (in order for the patient to meet his/her long term goals)  Therapy 3 hours per day 5-7 days per week    This discharge recommendation:  Has been made in collaboration with the attending provider and/or case management    IF patient discharges home will need the following DME: bedside commode, portable oxygen, shower chair, and walker: rolling       SUBJECTIVE:   Patient stated phew I am tired.     OBJECTIVE DATA SUMMARY:   Cognitive/Behavioral Status:  Neurologic State: Agitated; Appropriate for age  Orientation Level: Oriented X4  Cognition: Appropriate decision making; Appropriate for age attention/concentration; Appropriate safety awareness; Follows commands Functional Mobility and Transfers for ADLs:  Bed Mobility:  Sit to Supine: Moderate assistance  Scooting: Contact guard assistance    Transfers:  Sit to Stand: Moderate assistance          Balance:  Sitting: Intact  Standing: Impaired  Standing - Static: Good;Constant support  Standing - Dynamic : Fair;Constant support    ADL Intervention:                           Lower Body Dressing Assistance  Socks: Maximum assistance  Antiembolitic Stockings: Maximum assistance    Toileting  Toileting Assistance: Maximum assistance  Bladder Hygiene: Supervision  Bowel Hygiene: Maximum assistance  Clothing Management: Minimum assistance         Patient instructed and educated on mindful movement principles based on Move in The Tube concept to include maintaining bilateral elbows close to rib cage when performing any load-bearing activity such as getting in/out of bed, pushing up from a chair, opening a door, or lifting a box. Patient was given a handout with diagrams of each correct/incorrect method of performing each of the above tasks. Patient instructed on the ability to utilize upper extremities outside the tube when doing any non-load bearing activity such as washing hair/body, brushing teeth, retrieving clothing items, or scratching your back. Patient encouraged to also perform upper extremity exercises \"outside of the tube\" to prevent scar tissue formation around sternal incision site. Patient instructed in detail about activities to heed with caution, allowing pain to be the guide. These activities include but are not limited to: mowing the lawn, riding a bike, walking a dog, lifting a child, workshop hobbies, golfing, sexual activity, vacuuming, fishing, scrubbing the floors, and moving furniture. Patient was given the [address] in the [facility] handout to describe each of these activities in detail.    Patient instructed no asymmetrical reaching over head to ensure B UEs when shoulders >90* i.e. reaching in cabinets and dressing. Instruction on upper body dressing techniques of over head, then arms through to decrease pain and unilateral shoulder flexion >90*. Instruction on the benefits of utilizing B UEs during functional tasks i.e. opening the fridge, stepping into the tub. Instruction if continued pain at home with shoulder IR for BM hygiene can use wet wipes and toilet tongs PRN. Avoid valsalva maneuvers. May have to adjust home setup to increase ease with items closer to waist height to prevent deep bending and the automatic  of asymmetrical UE WB/pushing for stabilization during bending. Benefit to don clothing tailor sitting and don all clothing while sitting prior to standing. Patient demonstrated lower body dressing with Maximum assistance. Instruction and indicated understanding on the benefits of loose clothing throughout to accommodate for post surgical swelling, decreased ROM and increased pain. Instruction and indicated understanding the technique of pull over shirt versus front open clothing. Pain:  Pt did not endorse pain during session     Activity Tolerance:   Fair and requires rest breaks    After treatment patient left in no apparent distress:   Supine in bed, Call bell within reach, Bed / chair alarm activated, and Side rails x 3    COMMUNICATION/COLLABORATION:   The patients plan of care was discussed with: Physical therapist, Occupational therapist, and Registered nurse.      Adonay Jeffery OT  Time Calculation: 16 mins

## 2022-04-29 NOTE — PROGRESS NOTES
Transition of Care Plan: [facility] will transport patient today between 19:15-19:30pm .     RUR:13%     Disposition:Acute Rehab     Follow up appointments:Placed on AVS     DME needed:None     Transportation at [address] or means to access home:Patient will be going to rehab.          IM Medicare Letter:Given 4/29/22     Is patient a BCPI-A Bundle: No                       If yes, was Bundle Letter given?: N/A     Is patient a [place] and connected with the [facility]               If yes, was [employer] transfer form completed and [facility] notified? N/A     Caregiver Contact:Daughter--Jose ShepardZeUfrceg-[phone]     Discharge Caregiver contacted prior to discharge? Caregiver aware of discharge plan for today.     Care Conference needed?: No        [address] called and they now have a bed for the patient this pm. Patient informed and in agreement. FOC obtained and placed with medical records. Referral sent to [facility] and they can transport patient today between 19:15 and 19:30pm today. PCS with medicals given to nursing. Nursing to call report to [phone] and patient will be going to room 3110. The accepting md is Dr Dulce Qureshi. This is EMTALA and nursing and NP aware. Sally Moore  RN BSN CRM        [phone]

## 2022-04-29 NOTE — DISCHARGE SUMMARY
[facility] Discharge Summary     Patient ID:  Barrington Johnson  [id number]  77 y.o.  1945    Admit date: 4/19/2022    Discharge date: 4/29/2022     Admitting Physician: Katina Astorga MD     Referring Cardiologist:   Dr. Isa Borges    PCP:  Dr. Michell Maradiaga    Admitting Diagnoses: MR, Ascending aortic aneurysm    Discharge Diagnoses:     Hospital Problems  Date Reviewed: 4/28/2013          Codes Class Noted POA    S/P MVR (mitral valve replacement) ICD-10-CM: Z95.2  ICD-9-CM: V43.3  4/19/2022 Unknown        S/P aortic aneurysm repair ICD-10-CM: Z98.890, Z86.79  ICD-9-CM: V45.89  4/19/2022 Unknown        Ascending aortic aneurysm ([address] ICD-10-CM: I71.2  ICD-9-CM: 441.2  3/29/2022 Unknown              Procedures for this admission:  Procedure(s):  FELTON BY DR BRUCE - PFO CLOSURE -  MITRAL VALVE REPLACEMENT WITH 27 MM CHARLES MAGNA MITRAL EASE - RESUSPENSION OF AORTIC VALVE -  ASCENDING AORTIC ANEURYSM REPAIR AND LEFT ATRIAL APPENDAGE LIGATION    Discharge Condition:  stable    Discharge Medications:   Current Discharge Medication List      START taking these medications    Details   acetaminophen (TYLENOL) 500 mg tablet Take 1 Tablet by mouth every four (4) hours as needed for Pain. Qty: 30 Tablet, Refills: 0  Start date: 4/29/2022      aspirin 81 mg chewable tablet Take 1 Tablet by mouth daily. Qty: 30 Tablet, Refills: 5  Start date: 4/30/2022      gabapentin (NEURONTIN) 100 mg capsule Take 1 Capsule by mouth every eight (8) hours for 10 days. Max Daily Amount: 300 mg. Qty: 30 Capsule, Refills: 0  Start date: 4/29/2022, End date: 5/9/2022    Associated Diagnoses: S/P aortic aneurysm repair; S/P MVR (mitral valve replacement)      senna-docusate (PERICOLACE) 8.6-50 mg per tablet Take 1 Tablet by mouth two (2) times daily as needed for Constipation. Qty: 30 Tablet, Refills: 0  Start date: 4/29/2022      bumetanide (BUMEX) 2 mg tablet Take 1 Tablet by mouth two (2) times a day for 7 days.   Qty: 14 Tablet, Refills: 0  Start date: 4/29/2022, End date: 5/6/2022         CONTINUE these medications which have NOT CHANGED    Details   cholecalciferol, vitamin D3, 50 mcg (2,000 unit) tab Take 2 Tablets by mouth two (2) times a day. biotin 10,000 mcg cap Take 1 Tablet by mouth daily. melatonin 10 mg tab Take 1 Tablet by mouth nightly. albuterol (PROVENTIL HFA, VENTOLIN HFA, PROAIR HFA) 90 mcg/actuation inhaler Take 2 Puffs by inhalation every four (4) hours as needed for Wheezing.      magnesium oxide (MAG-OX) 400 mg tablet Take 1 Tablet by mouth daily. Qty: 30 Tablet, Refills: 5      multivitamin, tx-iron-ca-min (THERA-M w/ IRON) 9 mg iron-400 mcg tab tablet Take 1 Tablet by mouth daily. omeprazole (PRILOSEC) 20 mg capsule Take 20 mg by mouth daily. traMADol (ULTRAM) 50 mg tablet Take 100 mg by mouth two (2) times a day. apixaban (Eliquis) 5 mg tablet Take 5 mg by mouth two (2) times a day. celecoxib (CELEBREX) 200 mg capsule Take 200 mg by mouth daily. STOP taking these medications       mupirocin (BACTROBAN) 2 % ointment Comments:   Reason for Stopping:         chlorhexidine (Peridex) 0.12 % solution Comments:   Reason for Stopping:         dilTIAZem ER (CARDIZEM CD) 240 mg capsule Comments:   Reason for Stopping:         dofetilide (TIKOSYN) 250 mcg capsule Comments:   Reason for Stopping:         potassium chloride SR (KLOR-CON 10) 10 mEq tablet Comments:   Reason for Stopping:         furosemide (LASIX) 20 mg tablet Comments:   Reason for Stopping:             HPI:  Carlos Alexander is a 77 y.o. female who was referred by Dr. Markel Rodriguez for evaluation of an ascending aortic aneurysm, a-fib, and multi-valvular disease. PMH significant for paroxysmal atrial fibrillation (eliquis/tikosyn) s/p cardioversion x2, diastolic HF, and pulmonary hypertension. She presented to hospital last November with c/o chest pain and dyspnea.  Found to be in acute congestive heart failure and a-fib with RVR resulted in hospital admission. Had an echocardiogram on 11/15 demonstrating EF 50-55% with severe LV diastolic dysfunction, moderate MR and TR, mild AI, and severe elevated PA pressures 80mmHg. After diuresis, she had a L/RHC on 11/18 showing improvement in PA pressures and normal coronary angio. Chest CT showed an incidental finding of an ascending aortic aneurysm measuring ~5.4cm without dissection or rupture. She improved following diuresis and was eventually discharged. Since discharge, she reports being back to her baseline health other than occasional OSORIO. She denies chest pain, palpitations, dizziness, syncope, PND, orthopnea, LE edema.      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 She is a former smoker - quit 4 years ago.        Hospital Course: Underwent MVR, resuspension of aortic valve, ascending aorta repair and LAAL on 4-19-22 with Ene Sanchez and Charis. Transferred to CCU in stable condition on steve. Patient's post operative course complicated by anemia secondary to acute blood loss for which she received 1 unit pRBCs 4-19-22. She also had pulmonary edema and pleural effusions that improved with diuresis. POD# 10:  1. S/p ascending aortic aneurysm: Strict BP control. CT out. 2. S/p MVR (tissue valve): Will need AC x 3 months for valve. Discussed with Dr. Tristen Estrada   3. Pulmonary HTN: Improved following MVR  4. A-fib: s/p LAAL. On diltiazem, Tikosyn, and eliquis PTA. Cont to hold rate meds at this time. Cont Eliquis   5. Anemia secondary to acute blood loss: Monitor H/H and CT output. Received a unit PRBC 4/19. Add PO iron today  6. Atelectasis, pulmonary edema, pleural effusions: IS hourly! OOB activity with PT/OT when appropriate. Back on 1L NC. Switch to PO bumex today 2mg BID, hold potassium replacement today for K of 5.4.  7. Chronic Diastolic HF: Monitor I/O. Weight up, increase IV bumex BID. Strict I/Os  8. Leukocytosis: WBC down to 10.4 today. Continue to monitor.    9. Pain management: Scheduled tylenol and gabapentin. PRN oxycodone and dilaudid for breakthrough pain. Avoid toradol d/t risk of STEVE. Cont tramadol and celebrex  10. Leukocytosis: Increase mobility. Pulm toileting. Daily incisional care. Afebrile  11. Nutrition: encourage PO. Cont Ensure High Protein      Dispo: PT/OT. Stepdown. Needs to be OOB TID, ambulating TID. Case management following to aid in d/c planning, IPR referral sent (4/25/) - pt has been accepted. Covid negative 4/26. Awaiting IPR bed. Patient has staples in her groin that need to be removed at follow up appointment. Prineo sternal dressing can be removed on Tuesday 5/3/22,    Referral to outpatient cardiac rehab. Discharge Vital Signs:   Visit Vitals  BP (!) 112/46 (BP 1 Location: Left upper arm, BP Patient Position: Sitting)   Pulse 79   Temp 98.2 °F (36.8 °C)   Resp 20   Ht 5' 5\" (1.651 m)   Wt 215 lb 9.8 oz (97.8 kg)   SpO2 95%   BMI 35.88 kg/m²     Labs:   Recent Labs     04/29/22  0952   WBC 10.8   HGB 7.4*   HCT 25.0*      *   K 5.6*   BUN 32*   CREA 0.89   *     Diagnostics:   CXR:     FINDINGS: Portable chest shows removal of right IJ sheath with apparent stable  chest remains since yesterday. There is no apparent pneumothorax. Lungs show  stable nonspecific bibasilar haziness. Heart size is stable. There is no overt  pulmonary edema.     IMPRESSION  No significant change. Patient Instructions:  Discharge instructions were reviewed with the patient and family present. Questions were also answered at this time. Prescriptions and medications were reviewed. The patient has a follow up appointment with the Nurse Practitioner or Physician Assistant on and with Dr. Mary Beth Hernandez on 5/17/22. The patient was also instructed to follow up with her primary care physician as needed. The patient and family were encouraged to call with any questions or concerns.      Signed:  Lilliana Ramsey NP  4/29/2022  9:23 AM

## 2022-04-29 NOTE — PROGRESS NOTES
Spiritual Care Assessment/Progress Note  [facility]      NAME: Tamiko Fink      MRN: [id number]  AGE: 77 y.o.  SEX: female  Zoroastrianism Affiliation: Lutheran   Language: English     4/29/2022     Total Time (in minutes): 10     Spiritual Assessment begun in [facility] 2 PROGRESSIVE CARE through conversation with:         [x]Patient        [] Family    [] Friend(s)        Reason for Consult: Initial/Spiritual assessment, patient floor     Spiritual beliefs: (Please include comment if needed)     [] Identifies with a mercedes tradition:         [] Supported by a mercedes community:            [] Claims no spiritual orientation:           [] Seeking spiritual identity:                [] Adheres to an individual form of spirituality:           [] Not able to assess:                           Identified resources for coping:      [x] Prayer                               [] Music                  [] Guided Imagery     [] Family/friends                 [] Pet visits     [] Devotional reading                         [] Unknown     [] Other:                                               Interventions offered during this visit: (See comments for more details)    Patient Interventions: Affirmation of emotions/emotional suffering,Catharsis/review of pertinent events in supportive environment,Initial/Spiritual assessment, patient floor,Prayer (assurance of)           Plan of Care:     [x] Support spiritual and/or cultural needs    [] Support AMD and/or advance care planning process      [] Support grieving process   [] Coordinate Rites and/or Rituals    [] Coordination with community clergy   [] No spiritual needs identified at this time   [] Detailed Plan of Care below (See Comments)  [] Make referral to Music Therapy  [] Make referral to Pet Therapy     [] Make referral to Addiction services  [] Make referral to [facility]  [] Make referral to Spiritual Care Partner  [] No future visits requested        [x] Contact Spiritual Care for further referrals     Comments: Visited Ms Purvi Retana in room 2262 for initial spiritual assessment; reviewed patient's chart prior to the visit. Ms Purvi Retana was sitting up in a chair; her facial affect was flat. Provided spiritual presence and active listening as patient shared that she was tired and was waiting to be assisted back to bed so she could rest. She shared her disappointment in learning that [address] [place] [address] did not have a bed available for her. Acknowledged her feelings and concerns and offered words of support. Patient accepted 's offer to keep her in prayer. Assured her of ongoing  availability for support. : Rev. Merrill Olivo.  Dora Walker; [facility], to contact [phone] [address] call: [phone]

## 2022-04-29 NOTE — PROGRESS NOTES
End of Shift Note    Bedside shift change report given to [facility] (oncoming nurse) by Solange Villanueva (offgoing nurse). Report included the following information SBAR, Kardex, Intake/Output, MAR, Recent Results and Cardiac Rhythm NSR    Shift worked:  7 am to 7 pm     Shift summary and any significant changes:     Pt worked with PT/OT. Pt tolerating diet. Pain meds given once during shift. Pt worked on incentive spirometer. Compression stockings applied. Dressings changed twice around pacer wire site/chest tube site (oozing). Concerns for physician to address:  none     Zone phone for oncoming shift:   [phone]       Activity:  Activity Level: Up ad eric  Number times ambulated in hallways past shift: 0; ambulated in room twice  Number of times OOB to chair past shift: 3    Cardiac:   Cardiac Monitoring: Yes      Cardiac Rhythm: Sinus Rhythm    Access:   Current line(s): PIV     Genitourinary:   Urinary status: voiding    Respiratory:   O2 Device: Nasal cannula  Chronic home O2 use?: NO  Incentive spirometer at bedside: YES  Actual Volume (ml): 625 ml    GI:  Last Bowel Movement Date: 04/28/22  Current diet:  ADULT ORAL NUTRITION SUPPLEMENT Breakfast, Lunch, Dinner; Standard High Calorie/High Protein  DIET ONE TIME MESSAGE  DIET ONE TIME MESSAGE  ADULT DIET Regular; 4 carb choices (60 gm/meal); Low Fat/Low Chol/High Fiber/SAMY  Passing flatus: YES  Tolerating current diet: YES       Pain Management:   Patient states pain is manageable on current regimen: YES    Skin:  Maynor Score: 18  Interventions: float heels, increase time out of bed and PT/OT consult    Patient Safety:  Fall Score:  Total Score: 3  Interventions: bed/chair alarm, assistive device (walker, cane, etc), gripper socks, pt to call before getting OOB and stay with me (per policy)  High Fall Risk: Yes    Length of Stay:  Expected LOS: 5d 21h  Actual LOS: [place]

## 2022-04-30 NOTE — PROGRESS NOTES
1900: Bedside shift change report given to Tamar Garnett (oncoming nurse) by Seun Lopez (offgoing nurse). Report included the following information SBAR, Kardex, Intake/Output, MAR, Recent Results and Cardiac Rhythm NSR.     2015: [facility]. Report given to [facility] by dayshift nurse Seun Lopez. Report given to [facility] by this RN. IV removed and Pt's belongings sent with pt. Pt has no complaints at this time.

## 2022-05-04 ENCOUNTER — TRANSCRIBE ORDER (OUTPATIENT)
Dept: CARDIOTHORACIC SURGERY | Age: 77
End: 2022-05-04

## 2022-05-04 DIAGNOSIS — Z95.2 MITRAL VALVE REPLACED: Primary | ICD-10-CM

## 2022-05-24 ENCOUNTER — OFFICE VISIT (OUTPATIENT)
Dept: CARDIOTHORACIC SURGERY | Age: 77
End: 2022-05-24
Payer: MEDICARE

## 2022-05-24 VITALS
OXYGEN SATURATION: 94 % | WEIGHT: 174.4 LBS | RESPIRATION RATE: 18 BRPM | HEART RATE: 96 BPM | TEMPERATURE: 98.2 F | SYSTOLIC BLOOD PRESSURE: 132 MMHG | DIASTOLIC BLOOD PRESSURE: 74 MMHG | HEIGHT: 65 IN | BODY MASS INDEX: 29.06 KG/M2

## 2022-05-24 DIAGNOSIS — Z86.79 S/P AORTIC ANEURYSM REPAIR: Primary | ICD-10-CM

## 2022-05-24 DIAGNOSIS — I48.91 ATRIAL FIBRILLATION WITH RAPID VENTRICULAR RESPONSE (HCC): ICD-10-CM

## 2022-05-24 DIAGNOSIS — Z98.890 S/P AORTIC ANEURYSM REPAIR: Primary | ICD-10-CM

## 2022-05-24 DIAGNOSIS — I71.21 ASCENDING AORTIC ANEURYSM: ICD-10-CM

## 2022-05-24 DIAGNOSIS — Z95.2 S/P MVR (MITRAL VALVE REPLACEMENT): ICD-10-CM

## 2022-05-24 PROCEDURE — 99024 POSTOP FOLLOW-UP VISIT: CPT | Performed by: THORACIC SURGERY (CARDIOTHORACIC VASCULAR SURGERY)

## 2022-05-24 RX ORDER — GABAPENTIN 100 MG/1
100 CAPSULE ORAL 3 TIMES DAILY
COMMUNITY

## 2022-05-24 NOTE — PROGRESS NOTES
Patient: Jad Sandoval   Age: 77 y.o. Patient Care Team:  Jeanette Cook MD as PCP - General (Family Medicine)  Jeanette Cook MD as PCP - [facility]  Geeta Lockhart MD (Cardio Vascular Surgery)  Jessika Lindsey MD (Cardiothoracic Surgery)    Diagnosis: The primary encounter diagnosis was S/P aortic aneurysm repair. Diagnoses of S/P MVR (mitral valve replacement), Ascending aortic aneurysm ([address] and Atrial fibrillation with rapid ventricular response ([address] were also pertinent to this visit. Problem List:   Patient Active Problem List   Diagnosis Code    Smoker F17.200    Anemia D64.9    Arthritis M19.90    Edema R60.9    Eczema L30.9    GERD (gastroesophageal reflux disease) K21.9    Hiatal hernia K44.9    Screen for colon cancer Z12.11    Right wrist fracture S62.101A    CHF (congestive heart failure) ([facility]) I50.9    Atrial fibrillation with rapid ventricular response ([facility]) I48.91    Ascending aortic aneurysm ([facility]) I71.2    S/P MVR (mitral valve replacement) Z95.2    S/P aortic aneurysm repair L92.379, Z86.79      Date of Surgery: 4/19/22    Surgery: MITRAL VALVE REPLACEMENT WITH 27 MM CHARLES MAGNA MITRAL EASE - RESUSPENSION OF AORTIC VALVE - ASCENDING AORTIC ANEURYSM REPAIR AND LEFT ATRIAL APPENDAGE LIGATION    HPI:  Pt is here for post op follow up, seen with Dr. Joshua Coates. Pt has been home from facility for a couple weeks. She developed Shingles and is now 2 weeks out. Lesions are crusted over, still with c/o pain. Postoperatively, she is feeling great, minus the shingles. She is getting around well. She denies any surgical pain, only pain with shingles which she as prescribed gabapentin. She is sleeping well. Her appetite is minimal, she is going to increase Ensures and she is having regular BMs. She is following strict wound care instructions, as well as her move in the tube.      Current Medications:   Current Outpatient Medications   Medication Sig Dispense Refill    gabapentin (NEURONTIN) 100 mg capsule Take 100 mg by mouth three (3) times daily.  acetaminophen (TYLENOL) 500 mg tablet Take 1 Tablet by mouth every four (4) hours as needed for Pain. 30 Tablet 0    aspirin 81 mg chewable tablet Take 1 Tablet by mouth daily. 30 Tablet 5    apixaban (Eliquis) 5 mg tablet Take 5 mg by mouth two (2) times a day.  cholecalciferol, vitamin D3, 50 mcg (2,000 unit) tab Take 2 Tablets by mouth two (2) times a day.  biotin 10,000 mcg cap Take 1 Tablet by mouth daily.  melatonin 10 mg tab Take 1 Tablet by mouth nightly.  albuterol (PROVENTIL HFA, VENTOLIN HFA, PROAIR HFA) 90 mcg/actuation inhaler Take 2 Puffs by inhalation every four (4) hours as needed for Wheezing.  magnesium oxide (MAG-OX) 400 mg tablet Take 1 Tablet by mouth daily. 30 Tablet 5    multivitamin, tx-iron-ca-min (THERA-M w/ IRON) 9 mg iron-400 mcg tab tablet Take 1 Tablet by mouth daily.  omeprazole (PRILOSEC) 20 mg capsule Take 20 mg by mouth daily.  celecoxib (CELEBREX) 200 mg capsule Take 200 mg by mouth daily.  traMADol (ULTRAM) 50 mg tablet Take 100 mg by mouth two (2) times a day.  senna-docusate (PERICOLACE) 8.6-50 mg per tablet Take 1 Tablet by mouth two (2) times daily as needed for Constipation. (Patient not taking: Reported on 5/24/2022) 30 Tablet 0       Vitals: Blood pressure 132/74, pulse 96, temperature 98.2 °F (36.8 °C), temperature source Oral, resp. rate 18, height 5' 5\" (1.651 m), weight 174 lb 6.4 oz (79.1 kg), SpO2 94 %. Allergies: is allergic to penicillin g, bactrim [sulfamethoprim], levaquin [levofloxacin], and amoxicillin. Physical Exam:  Wounds: healed    Lungs: clear to auscultation bilaterally    Heart: regular rate and rhythm, S1, S2 normal, no murmur, click, rub or gallop    Extremities: no edema    Assessment/Plan:   1. S/p ascending aortic aneurysm: Strict BP control. 2. S/p MVR (tissue valve):  Will need AC x 3 months for valve. Discussed with Dr. Asuncion Bucio   3. Pulmonary HTN: Improved following MVR  4. A-fib: s/p LAAL. On diltiazem, Tikosyn, and eliquis PTA. Cont to hold rate meds at this time. Cont Eliquis   5. Chronic Diastolic HF: Monitor I/O.   6. Nutrition: encourage PO. Cont Ensure High Protein    Pt is ready to start cardiac rehab.  FU with PCP and Cardiologist    Rehab - yes  Walking: yes  Antibiotic card for valves: yes

## 2022-05-24 NOTE — PROGRESS NOTES
Chief Complaint   Patient presents with    Post OP Follow Up     1. Have you been to the ER, urgent care clinic since your last visit? Hospitalized since your last visit? No    2. Have you seen or consulted any other health care providers outside of the [address] [place] [address] since your last visit? Include any pap smears or colon screening.  No

## 2022-07-21 DIAGNOSIS — Z98.890 S/P MITRAL VALVE REPAIR: ICD-10-CM

## 2022-10-25 ENCOUNTER — TRANSCRIBE ORDER (OUTPATIENT)
Dept: SCHEDULING | Age: 77
End: 2022-10-25

## 2022-10-25 DIAGNOSIS — Z12.31 SCREENING MAMMOGRAM FOR HIGH-RISK PATIENT: Primary | ICD-10-CM

## 2023-02-27 NOTE — CONSENT
I have discussed with the patient the rationale for blood component transfusion; its benefits in treating or preventing fatigue, organ damage, or death; and its risk which includes mild transfusion reactions, rare risk of blood borne infection, or more serious but rare reactions. I have discussed the alternatives to transfusion, including the risk and consequences of not receiving transfusion. The patient had an opportunity to ask questions and had agreed to proceed with transfusion of blood components, as necessary. No

## 2023-03-07 ENCOUNTER — HOSPITAL ENCOUNTER (INPATIENT)
Age: 78
LOS: 3 days | Discharge: HOME | DRG: 309 | End: 2023-03-10
Attending: INTERNAL MEDICINE | Admitting: INTERNAL MEDICINE
Payer: MEDICARE

## 2023-03-07 PROBLEM — I48.19 OTHER PERSISTENT ATRIAL FIBRILLATION (HCC): Status: ACTIVE | Noted: 2023-03-07

## 2023-03-07 LAB
ALBUMIN SERPL-MCNC: 3.5 G/DL (ref 3.5–5)
ALBUMIN/GLOB SERPL: 1 (ref 1.1–2.2)
ALP SERPL-CCNC: 63 U/L (ref 45–117)
ALT SERPL-CCNC: 14 U/L (ref 12–78)
ANION GAP SERPL CALC-SCNC: 4 MMOL/L (ref 5–15)
AST SERPL-CCNC: 14 U/L (ref 15–37)
BILIRUB SERPL-MCNC: 1.2 MG/DL (ref 0.2–1)
BUN SERPL-MCNC: 27 MG/DL (ref 6–20)
BUN/CREAT SERPL: 23 (ref 12–20)
CALCIUM SERPL-MCNC: 9.3 MG/DL (ref 8.5–10.1)
CALCULATED R AXIS, ECG10: -2 DEGREES
CALCULATED T AXIS, ECG11: 115 DEGREES
CHLORIDE SERPL-SCNC: 103 MMOL/L (ref 97–108)
CO2 SERPL-SCNC: 30 MMOL/L (ref 21–32)
CREAT SERPL-MCNC: 1.19 MG/DL (ref 0.55–1.02)
DIAGNOSIS, 93000: NORMAL
ERYTHROCYTE [DISTWIDTH] IN BLOOD BY AUTOMATED COUNT: 14.8 % (ref 11.5–14.5)
GLOBULIN SER CALC-MCNC: 3.6 G/DL (ref 2–4)
GLUCOSE SERPL-MCNC: 102 MG/DL (ref 65–100)
HCT VFR BLD AUTO: 37.8 % (ref 35–47)
HGB BLD-MCNC: 11.8 G/DL (ref 11.5–16)
MAGNESIUM SERPL-MCNC: 2.1 MG/DL (ref 1.6–2.4)
MCH RBC QN AUTO: 28.2 PG (ref 26–34)
MCHC RBC AUTO-ENTMCNC: 31.2 G/DL (ref 30–36.5)
MCV RBC AUTO: 90.2 FL (ref 80–99)
NRBC # BLD: 0 K/UL (ref 0–0.01)
NRBC BLD-RTO: 0 PER 100 WBC
PLATELET # BLD AUTO: 228 K/UL (ref 150–400)
PMV BLD AUTO: 11.7 FL (ref 8.9–12.9)
POTASSIUM SERPL-SCNC: 4.2 MMOL/L (ref 3.5–5.1)
PROT SERPL-MCNC: 7.1 G/DL (ref 6.4–8.2)
Q-T INTERVAL, ECG07: 366 MS
QRS DURATION, ECG06: 102 MS
QTC CALCULATION (BEZET), ECG08: 445 MS
RBC # BLD AUTO: 4.19 M/UL (ref 3.8–5.2)
SODIUM SERPL-SCNC: 137 MMOL/L (ref 136–145)
VENTRICULAR RATE, ECG03: 89 BPM
WBC # BLD AUTO: 5.2 K/UL (ref 3.6–11)

## 2023-03-07 PROCEDURE — 36415 COLL VENOUS BLD VENIPUNCTURE: CPT

## 2023-03-07 PROCEDURE — 74011000250 HC RX REV CODE- 250: Performed by: INTERNAL MEDICINE

## 2023-03-07 PROCEDURE — 74011250637 HC RX REV CODE- 250/637: Performed by: INTERNAL MEDICINE

## 2023-03-07 PROCEDURE — 83735 ASSAY OF MAGNESIUM: CPT

## 2023-03-07 PROCEDURE — 80053 COMPREHEN METABOLIC PANEL: CPT

## 2023-03-07 PROCEDURE — 65270000046 HC RM TELEMETRY

## 2023-03-07 PROCEDURE — 93005 ELECTROCARDIOGRAM TRACING: CPT

## 2023-03-07 PROCEDURE — 85027 COMPLETE CBC AUTOMATED: CPT

## 2023-03-07 RX ORDER — ADHESIVE BANDAGE
30 BANDAGE TOPICAL DAILY PRN
Status: DISCONTINUED | OUTPATIENT
Start: 2023-03-07 | End: 2023-03-10 | Stop reason: HOSPADM

## 2023-03-07 RX ORDER — ALBUTEROL SULFATE 0.83 MG/ML
2.5 SOLUTION RESPIRATORY (INHALATION)
Status: DISCONTINUED | OUTPATIENT
Start: 2023-03-07 | End: 2023-03-10 | Stop reason: HOSPADM

## 2023-03-07 RX ORDER — BUMETANIDE 2 MG/1
2 TABLET ORAL DAILY
COMMUNITY

## 2023-03-07 RX ORDER — CELECOXIB 200 MG/1
200 CAPSULE ORAL DAILY
Status: DISCONTINUED | OUTPATIENT
Start: 2023-03-07 | End: 2023-03-10 | Stop reason: HOSPADM

## 2023-03-07 RX ORDER — LANOLIN ALCOHOL/MO/W.PET/CERES
9 CREAM (GRAM) TOPICAL
Status: DISCONTINUED | OUTPATIENT
Start: 2023-03-07 | End: 2023-03-10 | Stop reason: HOSPADM

## 2023-03-07 RX ORDER — BUMETANIDE 1 MG/1
2 TABLET ORAL DAILY
Status: DISCONTINUED | OUTPATIENT
Start: 2023-03-07 | End: 2023-03-10 | Stop reason: HOSPADM

## 2023-03-07 RX ORDER — METOPROLOL SUCCINATE 50 MG/1
50 TABLET, EXTENDED RELEASE ORAL DAILY
COMMUNITY

## 2023-03-07 RX ORDER — ACETAMINOPHEN 325 MG/1
650 TABLET ORAL
Status: DISCONTINUED | OUTPATIENT
Start: 2023-03-07 | End: 2023-03-10 | Stop reason: HOSPADM

## 2023-03-07 RX ORDER — LANOLIN ALCOHOL/MO/W.PET/CERES
400 CREAM (GRAM) TOPICAL DAILY
Status: DISCONTINUED | OUTPATIENT
Start: 2023-03-07 | End: 2023-03-10 | Stop reason: HOSPADM

## 2023-03-07 RX ORDER — SODIUM CHLORIDE 0.9 % (FLUSH) 0.9 %
5-40 SYRINGE (ML) INJECTION AS NEEDED
Status: DISCONTINUED | OUTPATIENT
Start: 2023-03-07 | End: 2023-03-10 | Stop reason: HOSPADM

## 2023-03-07 RX ORDER — METOPROLOL SUCCINATE 50 MG/1
50 TABLET, EXTENDED RELEASE ORAL DAILY
Status: DISCONTINUED | OUTPATIENT
Start: 2023-03-07 | End: 2023-03-10 | Stop reason: HOSPADM

## 2023-03-07 RX ORDER — SODIUM CHLORIDE 0.9 % (FLUSH) 0.9 %
5-40 SYRINGE (ML) INJECTION EVERY 8 HOURS
Status: DISCONTINUED | OUTPATIENT
Start: 2023-03-07 | End: 2023-03-10 | Stop reason: HOSPADM

## 2023-03-07 RX ORDER — PANTOPRAZOLE SODIUM 40 MG/1
40 TABLET, DELAYED RELEASE ORAL
Status: DISCONTINUED | OUTPATIENT
Start: 2023-03-07 | End: 2023-03-10 | Stop reason: HOSPADM

## 2023-03-07 RX ORDER — PREGABALIN 25 MG/1
75 CAPSULE ORAL 2 TIMES DAILY
Status: DISCONTINUED | OUTPATIENT
Start: 2023-03-07 | End: 2023-03-10 | Stop reason: HOSPADM

## 2023-03-07 RX ORDER — GABAPENTIN 100 MG/1
100 CAPSULE ORAL 3 TIMES DAILY
Status: DISCONTINUED | OUTPATIENT
Start: 2023-03-07 | End: 2023-03-07

## 2023-03-07 RX ORDER — PREGABALIN 75 MG/1
75 CAPSULE ORAL 2 TIMES DAILY
COMMUNITY

## 2023-03-07 RX ORDER — DOFETILIDE 0.25 MG/1
250 CAPSULE ORAL EVERY 12 HOURS
Status: DISCONTINUED | OUTPATIENT
Start: 2023-03-07 | End: 2023-03-10 | Stop reason: HOSPADM

## 2023-03-07 RX ADMIN — MELATONIN 9 MG: at 23:12

## 2023-03-07 RX ADMIN — PREGABALIN 75 MG: 25 CAPSULE ORAL at 17:36

## 2023-03-07 RX ADMIN — DOFETILIDE 250 MCG: 0.25 CAPSULE ORAL at 21:09

## 2023-03-07 RX ADMIN — SODIUM CHLORIDE, PRESERVATIVE FREE 10 ML: 5 INJECTION INTRAVENOUS at 23:12

## 2023-03-07 RX ADMIN — DOFETILIDE 250 MCG: 0.25 CAPSULE ORAL at 10:46

## 2023-03-07 RX ADMIN — SODIUM CHLORIDE, PRESERVATIVE FREE 10 ML: 5 INJECTION INTRAVENOUS at 09:00

## 2023-03-07 RX ADMIN — APIXABAN 5 MG: 5 TABLET, FILM COATED ORAL at 17:36

## 2023-03-07 NOTE — PROGRESS NOTES
Transition of Care Plan:    RUR: 8% - \"low risk\"  Disposition: Home with follow up apts  Follow up appointments: PCP & specialist as indicated  DME needed: No DME needs identified for d/c  Transportation at Discharge: Pt plans to transport herself home at the time of d/c; car in ED parking lot  [address] or means to access home: Pt has access to her home       IM Medicare Letter: 1st IM received 3/7/23  Is patient a  and connected with the [address]? No              Caregiver Contact: Pt's daughter Chacha Grime: [phone])  Discharge Caregiver contacted prior to discharge? Per request  Care Conference needed?: Not at this time      Initial note: Chart reviewed, POLLY pending cardiology clearance. CM met with pt at bedside, introduced role, and confirmed demographic information is up-to-date in the chart. Pt confirmed preferred pharmacy as [address] [facility] ([phone]). Pt confirmed emergency contact as her daughter Chacha Grandria: [phone]). Pt reported she plans to drive herself home at the time of d/c (pending medical clearance); car in ED parking lot. Pt reported no immediate questions/concerns related to the d/c plan. Full assessment detailed below:    Reason for Admission: Afib                   RUR Score: 8% - \"low risk\"                    Plan for utilizing home health: No PT/OT consults in place currently; anticipate no post-acute therapy needs for d/c. Pt reported being independent with ADL's at baseline; pt actively drives & plans to transport herself home at the time of d/c. Pt reported having access to DME in the form of a RW; pt specified she doesn't actively utilize the RW but has it in the event needs arise. Pt [facility]; pt unable to recall name of servicing agency. Pt reported prior hx of utilizing IPR intervention in 2022; services provided by [facility].  Pt declined prior hx of utilizing SNF intervention          PCP: First and Last name: Qasim Thomas MD   Name of Practice: [facility]   Are you a current patient: Yes/No: Yes   Approximate date of last visit: 3-4 months ago   Can you participate in a virtual visit with your PCP: In-person visits preferred                    Current Advanced Directive/Advance Care Plan: Full Code - no AMD on file. Pt declined to complete AMD while admitted    Care Management Interventions  PCP Verified by CM:  Yes  Palliative Care Criteria Met (RRAT>21 & CHF Dx)?: Yes (Heart Failure Dx)  Palliative Consult Recommended?: No  Reason Palliative Care Not Recommended?: Palliative Care not utilized by provider  Mode of Transport at Discharge: Self  Transition of Care Consult (CM Consult): Discharge Planning  Discharge Durable Medical Equipment: No  Physical Therapy Consult: No  Occupational Therapy Consult: No  Speech Therapy Consult: No  Support Systems: Child(daryl) (Pt lives alone in a two story home with 5 MARCOS)  Confirm Follow Up Transport: [address] Provided?: No  Discharge Location  Patient Expects to be Discharged to[de-identified] Home (f/u apts)    Carson Godinez, MSW  [address] Team [address] [facility] [address], [address]

## 2023-03-07 NOTE — ACP (ADVANCE CARE PLANNING)
Advance Care Planning   Advance Care Planning Inpatient Note  [address]  [facility] Care Department    Today's Date: 3/7/2023  Unit: [facility] 2 INTRVNTNL CARDIO    Received request from  in basket request for advance medical directive consult on IVCU . Upon review of chart and communication with care team, patient's decision making abilities are not in question. Patient was present in the room during visit. Goals of ACP Conversation:  Discuss Advance Care planning documents    Health Care Decision Makers:    No healthcare decision makers have been documented. Click here to complete [address] including selection of the Healthcare Decision Maker Relationship (ie \"Primary\")    Summary:  No Decision Maker named by patient at this time    Advance Care Planning Documents (Patient Wishes) on file:  None     Assessment:    Received request from in basket request for advance medical directive consult on IVCU. Upon review of chart and communication with care team, patient's decision making abilities are not in question. Patient was present in the room during visit. Explained advance medical directive to patient as well as the state hierarchy of [facility] decision makers. She has spoken to her children in the past about her wishes, but has not completed an AMD.  Left a copy of the document and booklet \"Your Right to Decide\" to guide further conversation with her children.       Interventions:  Provided education on documents for clarity and greater understanding  Discussed and provided education on state decision maker hierarchy  Deferred conversation as patient not interested in completing an advance directive at this time    Care Preferences Communicated:  No    Outcomes/Plan:  ACP Discussion Completed    Peter Ibrahim [address] [facility]  on 3/7/2023 at 11:31 AM

## 2023-03-07 NOTE — PROGRESS NOTES
Pharmacy Monitoring of Dofetilide [facility]) for Atrial Arrhythmias    Indication: A. Fibrillation     Patients will be initiated in [facility], [place], [place] [address], and CCU. Initial dofetilide dose ordered: 250 mcg BID  Baseline QTc interval (on admission prior to dose) for new starts only: 445  Creatinine clearance (using actual body weight)^ (ml/min): 0.0    Confirm that the patient is on an appropriate unit for dofetilide administration per ED [facility] IV Infusions & Select Oral Medications Guidelines    Labs:  No results for input(s): K, MG, CREA in the last 72 hours. Date and Time Dose (mcg) QTc* (2 hours after each dose)                                 *Contact the prescriber, if the QTc increases by >15%    Significant drug interactions:  None noted  For contraindication details, please see package insert    Electrolyte replacement:   Potassium supplementation ordered: NO  Magnesium supplementation ordered: NO      Impression/Plan: To begin at 250 mcg twice daily   Labs pending for electrolyte repletion. Will replete if low.    Will order QTc checks     Thank you,  LADI Hughes the clinical trial, the actual body weight was used to calculate the CrCl    Dofetilide Documents  Protocol is located on pharmacy Teams site: Clinical Practice -> Anticoagulation & Cardiology   [facility] IV Infusions & Select Oral Medications Guidelines document is located on [address] [place] [address]

## 2023-03-07 NOTE — H&P
EP/ ARRHYTHMIA Admission Note    Patient ID:  Patient: Gavi Savage  MRN: [id number]  Age: 69 y.o.  : 1945    Date of  Admission: 3/7/2023  8:12 AM   PCP:  Osbaldo Zelaya MD    Assessment:   Dofetilide initiation. Persistent atrial fibrillation. TARIK was clipped during her surgery in , still on Eliquis. Plan:     Dofetilide initiation, pharmacy consult, (labs, tele, ECG's). She was on 250 mcg before, will start with this. Plan for cardioversion once dofetilide at steady-state, this admission or as an OP. [x]       High complexity decision making was performed in this patient    Gavi Savage is a 69 y.o. female with a history of the below here for initiation of dofetilide. See the office visit below for details:        Return Office Visit    2023      Estevan Guevara  69year old F (1945)  Account #: [id number]  PRIMARY CARE PHYSICIAN:  Gilberto Arroyo MD  CARDIOLOGIST:  Woody Colmenares MD    REASON FOR VISIT:  This 69year old female presents for AFib and Hypertension.     HISTORY OF PRESENT ILLNESS:   Mitral valve disease, mitral regurgitation Status post mitral replacement with 27 mm Giles Magna mitral valve on 2022  Ascending aortic aneurysm status post surgery replacement of ascending aorta with 34 mm Dacron graft and resuspension of aortic valve on 2022  Paroxysmal atrial fibrillation, left atrial appendage occlusion with a 40 mm clip during cardiac surgery on 2022  PFO status post closure during cardiac surgery on 2022  Heart failure with preserved ejection fraction, in the setting of valvular heart disease  Left heart catheterization on 2021 shows no significant CAD  Hypertension    Retired dentist.     Ms Person with PMH of cardiac surgery with MV replacement, prosthetic, Ascending aortic aneurysm surgery with 34 mm Dacron graft and resuspension of aortic valve, TARIK occlusion with 40 mm clip and PFO closure in 4/2022. Has paroxysmal atrial fibrillation. She was on Tikosyn previously but now she is off. She is here for follow-up. She reports to be doing okay. She denied any significant chest pain or shortness of breath. She denied any palpitations. Duration noted to have atrial fibrillation with mildly elevated heart rate. She reports having no significant symptoms. Denied any pedal edema or heart failure symptoms. 2/2023 EP F/U:  Denies syncope, dizziness, palpitations. No chest, jaw, neck, shoulder, or arm pain. No orthopnea, PND, or edema. Patient denies claudication. There is no discoloration or ulceration of the lower extremities. The patient has had no TIA or stroke-like symptoms. CARDIAC HISTORY  ARRHYTHMIA:  1 PAF     VALVULAR:  1 Moderate MR - 7/5/2019   2 Severe TR - 7/5/2019     RISK FACTORS:  1 Dyslipidemia [Type:  Hyperlipidemia]   2 Hypertension   3 Tobacco Use: Cigarette       CARDIOVASCULAR PROCEDURES  Procedure Date Results   Cath 11/18/2021 No significant CAD   DCCV 11/18/2021 Initial Rhythm A Fib, Final Rhythm Sinus, Max Joules 300, 1 Shock   DCCV 09/19/2019 Initial Rhythm A Fib, Final Rhythm Sinus, Max Joules 100, 1 Shock   Echo 01/18/2022 EF range 55%-60%, Bi-atrial dilatation. Dilated left ventricle. Dilated Ao Root to 5.3 cm. Grade I diastolic dysfunction:  delayed relaxation. Normal LV systolic function. Normal RV systolic function. Moderate mitral regurgitation. Mild aortic regurgitation. Moderate pulmonic regurgitation. Moderate tricuspid regurgitation. Echo 07/05/2019 EF range 65%-70%, Bi-atrial dilatation. Diastolic function is indeterminate. Normal LV systolic function. Normal RV systolic function. Moderate mitral regurgitation. Mild aortic regurgitation. Moderate pulmonic regurgitation. Severe tricuspid regurgitation. Echo 09/19/2006 Normal chamber dimensions are noted.  The systolic function appears normal with an ejection fraction of 60% and no regional wall motion abnormalities. The valvular structures are normal with trace mitral regurgitation and trace tricuspid regurgitation noted. EKG 2012 SB  Low Voltage  PRWP   EVR 10/04/2022 Patient monitored for 5 days and 12 hours   Frequent episodes of Atrial fibrillation for up to 12 minutes, HR range from  bpm. total 2% Afib burden. MPI 2006 Normal myocardial perfusion study with no evidence of ischemia or scar. Global left ventricular ejection fraction 75% with normal wall motion   and thickening. Thoracic Ao CTA 2022 - Ascending aorta aneurysm with maximal size 53 x 51 mm, measured at the level of the main PA bifurcation. No evidence of dissection. ACTIVE ALLERGIES:  Ingredient Reaction (Severity) Comment   PENICILLINS  Penicillin   SULFAMETHOXAZOLE     TRIMETHOPRIM         PROBLEM LIST:  Problem Description Chronic   Severe TR N   Hyperlipidemia Y   Atrial fibrillation Y   Moderate MR N       PAST MEDICAL/SURGICAL HISTORY  (Detailed)    Disease/disorder Onset Date Surgical History Date Comments     Blood Transfusion     Arthritis, Rheumatoid       Atrial fibrillation       Hyperlipidemia       Hypertension         Family History  (Detailed)  Relationship Family Member Name  Age at Death Condition Onset Age Cause of Death   Father  N  Cancer  N   Mother  N  Cancer  N     SOCIAL HISTORY  (Detailed)  Tobacco use reviewed. Preferred language is [place]. EDUCATION/EMPLOYMENT/OCCUPATION  Employment History Status Retired Restrictions     full-time       retired       MARITAL STATUS/FAMILY/SOCIAL SUPPORT  Currently . 0 son(s). 0 daughter(s). Smoking status: Former smoker. SMOKING STATUS  Use Status Type Smoking Status Usage Per Day Years Used Total Pack Years   yes  Former smoker            ALCOHOL  There is a history of alcohol use. consumed occasionally. CAFFEINE  The patient uses caffeine: coffee - 1 cup a day. LIFESTYLE  Exercises occasionally.       REVIEW OF SYMPTOMS:    CONST - Negative for weight gain, weight loss, fever. EYES - Negative for visual changes. ENT - Negative for hearing loss. RESP - Negative for snoring, hemoptysis, dyspnea. Positive for dyspnea on exertion. CARD - Negative for chest pain, diaphoresis, orthopnea, palpitation, syncope, PND.  VASC - Negative for claudication, edema. GI - Negative for nausea, reflux, bleeding.  - Negative for hematuria, nocturia. REPROD - Negative for Hx of OCP.  ENDO - Negative for goiter, tremors. NEURO - Negative for dizziness, memory loss, seizures. PSYCH - Negative for depression, hallucinations. DERM - Negative for rash, skin sores. M/S - Negative for joint pain, myalgia. HEMAT - Negative for acute anemia, thrombocytopenia. VITAL SIGNS  Time BP mm/Hg Pulse /min Resp /min Temp F Ht ft Ht in Ht cm Wt lb Wt kg BMI kg/m2 BSA m2 O2 Sat%   12:33 /70 90   5.0 3.00 160.02 192.00 87.090 34.01         PHYSICAL EXAM:  Exam Findings Details   Const Neg Level of Distress - Awake / Alert, No Acute Distress. Nourishment - Well Nourished. Eyes Neg Lids/External - Bilateral Normal.  Conjunctiva - Bilateral Normal.   NMT Neg Oral Mucosa - Moist, No Cyanosis, No Pallor. Neck Neg JVP - Less than 8 cm of H20. Resp Neg Respirations - Nonlabored. Breath Sounds - Clear Throughout. Rales - Absent. Wheezes - Absent. Rhonchi - Absent. Cardiac Neg Palpation - PMI Normal.  Heart Sounds - S1 Normal, S2 Normal, No S3, No S4.   Cardiac Pos Rhythm - Irregular. Murmurs - I/VI systolic ejection murmur. Vasc Neg Carotid - Bilateral Normal Pulse. Abd Neg Tenderness - None. Hepatomegaly - Absent. Splenomegaly - Absent. M/S Neg Gait - Normal.  Able to Exercise - Yes. EXT Neg Clubbing - Absent. Ischemic Ulcers - Absent. Lower Extremity Edema - Absent. Skin Neg Skin - Warm. Venous Stasis Ulcers - Absent. Psych Neg Orientation - Oriented to Time, Person, Place. IMPRESSION AND PLAN  01.  Paroxysmal atrial fibrillation (I48.0):  Had been on dofetilide prior. Unclear why this was stopped (she can't remember). ECG done I discussed the options of AA therapy (she wants to avoid amio if possible) and AFib ablation. She had been on dofetilide prior. We have decided to initiate dofetilide in the inpatient setting. Her QTc is acceptable (<450 msec). All questions answered. .  If AA therapy fails to treat her symptomatic AFib, then will move to ablation. 02. Nonrheumatic mitral (valve) insufficiency (I34.0):  Status post mitral replacement with 27 mm Giles Magna mitral valve on 4/19/2022   03. Essential (primary) hypertension (I10): Well controlled on medical therapy. We will continue to monitor the patient closely. I have made no changes to the present regimen. 04. Chronic heart failure with preserved ejection fraction (HFpEF) (I50.32):  Heart failure in the setting of valvular heart disease, appears to be euvolemic, continue current dose of Bumex   05. Aneurysm of ascending aorta without rupture (I71.21):  Ascending aortic aneurysm status post surgery replacement of ascending aorta with 34 mm Dacron graft and resuspension of aortic valve on 04/19/2022   06.  Body mass index [BMI] 34.0-34.9, adult (M61.66)     ORDERS:   Dietary management education, guidance, and counseling    1 ECG done        FINAL MEDICATION LIST  Medication Sig Desc Non-VCS   albuterol sulfate HFA 90 mcg/actuation aerosol inhaler inhale 2 puff by inhalation route  every 4 - 6 hours as needed Y   bumetanide 2 mg tablet take 1 tablet by oral route  every day N   Celebrex 200 mg capsule take 1 capsule by oral route  every day N   ELIQUIS 5MG TABLETS TAKE 1 TABLET BY MOUTH TWICE DAILY N   ferrous sulfate 325 mg (65 mg iron) tablet,delayed release  Y   gabapentin 100 mg capsule take 1 capsule by oral route  every day N   metoprolol succinate ER 50 mg tablet,extended release 24 hr take 1 tablet by oral route  every day N   multivitamin tablet take 1 tablet by oral route  every day with food Y   pantoprazole 40 mg tablet,delayed release take 1 tablet by oral route  every day Y   potassium chloride ER 10 mEq tablet,extended release take 1 by Oral route  every day N   tramadol 50 mg Tab take 1 tablet (50MG)  by oral route 2 times every day as needed Y   Vitamin D3 25 mcg (1,000 unit) capsule  Y         Past Medical History:   Diagnosis Date    Anemia 2010    Aortic aneurysm ([facility] [address]     Arrhythmia     afib r/t low potassium     Arthritis 2010    Atrial fibrillation (HCC)     cardioversion times 2    Chronic pain     Eczema 2010    Edema 2010    GERD (gastroesophageal reflux disease) 2010    Heart failure ([facility] [address]     Hepatitis     antibody for Hepatitis B - not a carrier    Hiatal hernia 2010    Smoker 2010        Past Surgical History:   Procedure Laterality Date    HX HEART CATHETERIZATION      HX HYSTERECTOMY  1998    HX OTHER SURGICAL      facelift    HX OTHER SURGICAL      Cardioversion x2     HX WRIST FRACTURE TX Left     left wrist open reduction internal fixation. HX WRIST FRACTURE TX Right     NV ABDOMEN SURGERY PROC UNLISTED      Lap band    NV COLONOSCOPY FLX DX W/COLLJ SPEC WHEN PFRMD  2012            Social History     Tobacco Use    Smoking status: Former     Packs/day: 1.00     Years: 50.00     Pack years: 50.00     Types: Cigarettes     Quit date:      Years since quittin.1    Smokeless tobacco: Never   Substance Use Topics    Alcohol use:  Yes     Alcohol/week: 1.0 standard drink     Types: 1 Cans of beer per week     Comment: twice a month        Family History   Problem Relation Age of Onset    Cancer Mother         Lung    Cancer Father         Kidney    Breast Cancer Maternal Aunt         not sure    Arrhythmia Sister         Allergies   Allergen Reactions    Penicillin G Rash    Bactrim [Sulfamethoprim] Rash    Levaquin [Levofloxacin] Rash    Amoxicillin Rash          No current facility-administered medications for this encounter. Review of Symptoms:  See OV above. Objective:      Physical Exam:  No data recorded. No data found. No data found. No data found. No intake or output data in the 24 hours ending 03/07/23 0827    Nondiaphoretic, not in acute distress. Unlabored, clear to auscultation bilaterally, symmetric air movement. Irregular rate and rhythm. Abdomen, soft, nontender, nondistended. Extremities without cyanosis or clubbing. Muscle tone and bulk normal.  Skin warm and dry. No rashes or ulcers. Neuro grossly nonfocal.  No tremor. Awake and appropriate. CARDIOGRAPHICS and STUDIES, I reviewed:    Telemetry:  Afib. ECG:  Pending. Labs:  No results for input(s): CPK, CKMB, CKNDX, TROIQ in the last 72 hours. No lab exists for component: CPKMB  No results found for: CHOL, CHOLX, CHLST, CHOLV, HDL, HDLP, LDL, LDLC, DLDLP, TGLX, TRIGL, TRIGP, CHHD, CHHDX  No results for input(s): INR, PTP, APTT, INREXT in the last 72 hours. No results for input(s): NA, K, CL, CO2, BUN, CREA, GLU, PHOS, CA, ALB, WBC, HGB, HCT, PLT, HGBEXT, HCTEXT, PLTEXT in the last 72 hours. No results for input(s): AP, TBIL, TP, ALB, GLOB, GGT, AML, LPSE in the last 72 hours. No lab exists for component: SGOT, GPT, AMYP, HLPSE  No components found for: GLPOC  No results for input(s): PH, PCO2, PO2 in the last 72 hours.         Spencer Richey MD  3/7/2023

## 2023-03-07 NOTE — PROGRESS NOTES
Spiritual Care Assessment/Progress Note  [facility]      NAME: Yuki Shea      MRN: [id number]  AGE: 69 y.o.  SEX: female  Anabaptist Affiliation: Catholic   Language: English     3/7/2023     Total Time (in minutes): 71     Spiritual Assessment begun in [facility] 2 INTRVNTNL CARDIO through conversation with:         [x]Patient        [] Family    [] Friend(s)        Reason for Consult: Advance medical directive consult     Spiritual beliefs: (Please include comment if needed)     [x] Identifies with a mercedes tradition:  Jasmin Saavedra       [] Supported by a mercedes community:            [] Claims no spiritual orientation:           [] Seeking spiritual identity:                [] Adheres to an individual form of spirituality:           [] Not able to assess:                           Identified resources for coping:      [] Prayer                               [] Music                  [] Guided Imagery     [x] Family/friends                 [] Pet visits     [] Devotional reading                         [] Unknown     [] Other:                                                Interventions offered during this visit: (See comments for more details)    Patient Interventions: Advance medical directive consult, Affirmation of emotions/emotional suffering, Affirmation of mercedes, Catharsis/review of pertinent events in supportive environment, Coping skills reviewed/reinforced, Iconic (affirming the presence of God/Higher Power), Initial/Spiritual assessment, patient floor, Life review/legacy, Anabaptist beliefs/image of God discussed           Plan of Care:     [] Support spiritual and/or cultural needs    [] Support AMD and/or advance care planning process      [] Support grieving process   [] Coordinate Rites and/or Rituals    [] Coordination with community clergy   [x] No spiritual needs identified at this time   [] Detailed Plan of Care below (See Comments)  [] Make referral to Music Therapy  [] Make referral to Pet Therapy     [] Make referral to Addiction services  [] Make referral to [facility]  [] Make referral to Spiritual Care Partner  [] No future visits requested        [x] Contact Spiritual Care for further referrals     Comments:  ACP Assessment:    Received request from in basket request for advance medical directive consult on IVCU. Upon review of chart and communication with care team, patient's decision making abilities are not in question. Patient was present in the room during visit. Explained advance medical directive to patient as well as the state hierarchy of [facility] decision makers. She has spoken to her children in the past about her wishes, but has not completed an AMD.  Left a copy of the document and booklet \"Your Right to Decide\" to guide further conversation with her children. Initiated spiritual assessment during visit. Patient spoke briefly about her medical history and most recent medical concerns. She engaged in life review; sharing about [place] [facility]. [place]. She has three adult children; all live in [place]. She shared she identifies as [email or web address] and [place]. She later attended a [employer] with her , but has retained her [email or web address] identity. She expressed no spiritual needs or concerns during visit. 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 She appreciated information about advance medical directive. Offered supportive listening presence and words of encouragement.       MAKI Pruitt, [facility], Staff [address]    [address] Paging Service  [phone]

## 2023-03-08 LAB
ANION GAP SERPL CALC-SCNC: 3 MMOL/L (ref 5–15)
BUN SERPL-MCNC: 28 MG/DL (ref 6–20)
BUN/CREAT SERPL: 25 (ref 12–20)
CALCIUM SERPL-MCNC: 8.7 MG/DL (ref 8.5–10.1)
CHLORIDE SERPL-SCNC: 103 MMOL/L (ref 97–108)
CO2 SERPL-SCNC: 31 MMOL/L (ref 21–32)
CREAT SERPL-MCNC: 1.13 MG/DL (ref 0.55–1.02)
GLUCOSE SERPL-MCNC: 95 MG/DL (ref 65–100)
MAGNESIUM SERPL-MCNC: 2 MG/DL (ref 1.6–2.4)
POTASSIUM SERPL-SCNC: 4.2 MMOL/L (ref 3.5–5.1)
SODIUM SERPL-SCNC: 137 MMOL/L (ref 136–145)

## 2023-03-08 PROCEDURE — 80048 BASIC METABOLIC PNL TOTAL CA: CPT

## 2023-03-08 PROCEDURE — 93005 ELECTROCARDIOGRAM TRACING: CPT

## 2023-03-08 PROCEDURE — 74011000250 HC RX REV CODE- 250: Performed by: INTERNAL MEDICINE

## 2023-03-08 PROCEDURE — 36415 COLL VENOUS BLD VENIPUNCTURE: CPT

## 2023-03-08 PROCEDURE — 74011250637 HC RX REV CODE- 250/637: Performed by: INTERNAL MEDICINE

## 2023-03-08 PROCEDURE — 65270000046 HC RM TELEMETRY

## 2023-03-08 PROCEDURE — 83735 ASSAY OF MAGNESIUM: CPT

## 2023-03-08 RX ADMIN — PREGABALIN 75 MG: 25 CAPSULE ORAL at 09:09

## 2023-03-08 RX ADMIN — APIXABAN 5 MG: 5 TABLET, FILM COATED ORAL at 09:09

## 2023-03-08 RX ADMIN — CELECOXIB 200 MG: 200 CAPSULE ORAL at 09:09

## 2023-03-08 RX ADMIN — APIXABAN 5 MG: 5 TABLET, FILM COATED ORAL at 17:34

## 2023-03-08 RX ADMIN — MELATONIN 9 MG: at 21:31

## 2023-03-08 RX ADMIN — METOPROLOL SUCCINATE 50 MG: 50 TABLET, EXTENDED RELEASE ORAL at 09:09

## 2023-03-08 RX ADMIN — BUMETANIDE 2 MG: 1 TABLET ORAL at 09:09

## 2023-03-08 RX ADMIN — DOFETILIDE 250 MCG: 0.25 CAPSULE ORAL at 21:31

## 2023-03-08 RX ADMIN — MAGNESIUM OXIDE TAB 400 MG (241.3 MG ELEMENTAL MG) 400 MG: 400 (241.3 MG) TAB at 09:09

## 2023-03-08 RX ADMIN — SODIUM CHLORIDE, PRESERVATIVE FREE 10 ML: 5 INJECTION INTRAVENOUS at 14:51

## 2023-03-08 RX ADMIN — PREGABALIN 75 MG: 25 CAPSULE ORAL at 17:34

## 2023-03-08 RX ADMIN — SODIUM CHLORIDE, PRESERVATIVE FREE 10 ML: 5 INJECTION INTRAVENOUS at 06:30

## 2023-03-08 RX ADMIN — PANTOPRAZOLE SODIUM 40 MG: 40 TABLET, DELAYED RELEASE ORAL at 09:09

## 2023-03-08 RX ADMIN — SODIUM CHLORIDE, PRESERVATIVE FREE 10 ML: 5 INJECTION INTRAVENOUS at 21:31

## 2023-03-08 RX ADMIN — DOFETILIDE 250 MCG: 0.25 CAPSULE ORAL at 09:09

## 2023-03-08 NOTE — ROUTINE PROCESS
0730- Report received per Majorisia. Afib. Denies CP SOB. 1200-EKG obtained and reviewed with pharmacist Butch Galvez. No changes in status. 1615- Report given to Luzmaria for transfer to PCU. VSS. Afib. Meds, H&P and status reviewed with nurse prior to transfer. Can you let mom know Rx sent in for her and sibling? Thanks!

## 2023-03-08 NOTE — PROGRESS NOTES
Problem: Falls - Risk of  Goal: *Absence of Falls  Description: Document Reji Tobar Fall Risk and appropriate interventions in the flowsheet.   Outcome: Progressing Towards Goal  Note: Fall Risk Interventions:                                Problem: Patient Education: Go to Patient Education Activity  Goal: Patient/Family Education  Outcome: Progressing Towards Goal

## 2023-03-08 NOTE — PROGRESS NOTES
EP/ ARRHYTHMIA Progress Note    Patient ID:  Patient: Yuki Shea  MRN: [id number]  Age: 69 y.o.  : 1945    Date of  Admission: 3/7/2023  8:12 AM   PCP:  Leslye Montgomery MD    Assessment:   Dofetilide initiation. Persistent atrial fibrillation. TARIK was clipped during her surgery in , still on Eliquis. Full code. Plan:     Dofetilide initiation, pharmacy consult, (labs, tele, ECG's). She was on 250 mcg before, using this dose. Plan for cardioversion once dofetilide at steady-state, this admission--plan for Friday AM after she's had at least 5 doses in. I answered her questions. [x]       High complexity decision making was performed in this patient    Yuki Shea is a 69 y.o. female with a history of the below here for initiation of dofetilide. See the office visit below for details:        Return Office Visit    2023      Bony Camacho  69year old F (1945)  Account #: [id number]  PRIMARY CARE PHYSICIAN:  Aaron Farris MD  CARDIOLOGIST:  Shannan Hook MD    REASON FOR VISIT:  This 69year old female presents for AFib and Hypertension.     HISTORY OF PRESENT ILLNESS:   Mitral valve disease, mitral regurgitation Status post mitral replacement with 27 mm Giles Magna mitral valve on 2022  Ascending aortic aneurysm status post surgery replacement of ascending aorta with 34 mm Dacron graft and resuspension of aortic valve on 2022  Paroxysmal atrial fibrillation, left atrial appendage occlusion with a 40 mm clip during cardiac surgery on 2022  PFO status post closure during cardiac surgery on 2022  Heart failure with preserved ejection fraction, in the setting of valvular heart disease  Left heart catheterization on 2021 shows no significant CAD  Hypertension    Retired dentist.     Ms Person with PMH of cardiac surgery with MV replacement, prosthetic, Ascending aortic aneurysm surgery with 34 mm Dacron graft and resuspension of aortic valve, TARIK occlusion with 40 mm clip and PFO closure in 4/2022. Has paroxysmal atrial fibrillation. She was on Tikosyn previously but now she is off. She is here for follow-up. She reports to be doing okay. She denied any significant chest pain or shortness of breath. She denied any palpitations. Duration noted to have atrial fibrillation with mildly elevated heart rate. She reports having no significant symptoms. Denied any pedal edema or heart failure symptoms. 2/2023 EP F/U:  Denies syncope, dizziness, palpitations. No chest, jaw, neck, shoulder, or arm pain. No orthopnea, PND, or edema. Patient denies claudication. There is no discoloration or ulceration of the lower extremities. The patient has had no TIA or stroke-like symptoms. CARDIAC HISTORY  ARRHYTHMIA:  1 PAF     VALVULAR:  1 Moderate MR - 7/5/2019   2 Severe TR - 7/5/2019     RISK FACTORS:  1 Dyslipidemia [Type:  Hyperlipidemia]   2 Hypertension   3 Tobacco Use: Cigarette       CARDIOVASCULAR PROCEDURES  Procedure Date Results   Cath 11/18/2021 No significant CAD   DCCV 11/18/2021 Initial Rhythm A Fib, Final Rhythm Sinus, Max Joules 300, 1 Shock   DCCV 09/19/2019 Initial Rhythm A Fib, Final Rhythm Sinus, Max Joules 100, 1 Shock   Echo 01/18/2022 EF range 55%-60%, Bi-atrial dilatation. Dilated left ventricle. Dilated Ao Root to 5.3 cm. Grade I diastolic dysfunction:  delayed relaxation. Normal LV systolic function. Normal RV systolic function. Moderate mitral regurgitation. Mild aortic regurgitation. Moderate pulmonic regurgitation. Moderate tricuspid regurgitation. Echo 07/05/2019 EF range 65%-70%, Bi-atrial dilatation. Diastolic function is indeterminate. Normal LV systolic function. Normal RV systolic function. Moderate mitral regurgitation. Mild aortic regurgitation. Moderate pulmonic regurgitation. Severe tricuspid regurgitation. Echo 09/19/2006 Normal chamber dimensions are noted.  The systolic function appears normal with an ejection fraction of 60% and no regional wall motion abnormalities. The valvular structures are normal with trace mitral regurgitation and trace tricuspid regurgitation noted. EKG 2012 SB  Low Voltage  PRWP   EVR 10/04/2022 Patient monitored for 5 days and 12 hours   Frequent episodes of Atrial fibrillation for up to 12 minutes, HR range from  bpm. total 2% Afib burden. MPI 2006 Normal myocardial perfusion study with no evidence of ischemia or scar. Global left ventricular ejection fraction 75% with normal wall motion   and thickening. Thoracic Ao CTA 2022 - Ascending aorta aneurysm with maximal size 53 x 51 mm, measured at the level of the main PA bifurcation. No evidence of dissection. ACTIVE ALLERGIES:  Ingredient Reaction (Severity) Comment   PENICILLINS  Penicillin   SULFAMETHOXAZOLE     TRIMETHOPRIM         PROBLEM LIST:  Problem Description Chronic   Severe TR N   Hyperlipidemia Y   Atrial fibrillation Y   Moderate MR N       PAST MEDICAL/SURGICAL HISTORY  (Detailed)    Disease/disorder Onset Date Surgical History Date Comments     Blood Transfusion     Arthritis, Rheumatoid       Atrial fibrillation       Hyperlipidemia       Hypertension         Family History  (Detailed)  Relationship Family Member Name  Age at Death Condition Onset Age Cause of Death   Father  N  Cancer  N   Mother  N  Cancer  N     SOCIAL HISTORY  (Detailed)  Tobacco use reviewed. Preferred language is [place]. EDUCATION/EMPLOYMENT/OCCUPATION  Employment History Status Retired Restrictions     full-time       retired       MARITAL STATUS/FAMILY/SOCIAL SUPPORT  Currently . 0 son(s). 0 daughter(s). Smoking status: Former smoker. SMOKING STATUS  Use Status Type Smoking Status Usage Per Day Years Used Total Pack Years   yes  Former smoker            ALCOHOL  There is a history of alcohol use. consumed occasionally.     CAFFEINE  The patient uses caffeine: coffee - 1 cup a day. LIFESTYLE  Exercises occasionally. REVIEW OF SYMPTOMS:    CONST - Negative for weight gain, weight loss, fever. EYES - Negative for visual changes. ENT - Negative for hearing loss. RESP - Negative for snoring, hemoptysis, dyspnea. Positive for dyspnea on exertion. CARD - Negative for chest pain, diaphoresis, orthopnea, palpitation, syncope, PND.  VASC - Negative for claudication, edema. GI - Negative for nausea, reflux, bleeding.  - Negative for hematuria, nocturia. REPROD - Negative for Hx of OCP.  ENDO - Negative for goiter, tremors. NEURO - Negative for dizziness, memory loss, seizures. PSYCH - Negative for depression, hallucinations. DERM - Negative for rash, skin sores. M/S - Negative for joint pain, myalgia. HEMAT - Negative for acute anemia, thrombocytopenia. VITAL SIGNS  Time BP mm/Hg Pulse /min Resp /min Temp F Ht ft Ht in Ht cm Wt lb Wt kg BMI kg/m2 BSA m2 O2 Sat%   12:33 /70 90   5.0 3.00 160.02 192.00 87.090 34.01         PHYSICAL EXAM:  Exam Findings Details   Const Neg Level of Distress - Awake / Alert, No Acute Distress. Nourishment - Well Nourished. Eyes Neg Lids/External - Bilateral Normal.  Conjunctiva - Bilateral Normal.   NMT Neg Oral Mucosa - Moist, No Cyanosis, No Pallor. Neck Neg JVP - Less than 8 cm of H20. Resp Neg Respirations - Nonlabored. Breath Sounds - Clear Throughout. Rales - Absent. Wheezes - Absent. Rhonchi - Absent. Cardiac Neg Palpation - PMI Normal.  Heart Sounds - S1 Normal, S2 Normal, No S3, No S4.   Cardiac Pos Rhythm - Irregular. Murmurs - I/VI systolic ejection murmur. Vasc Neg Carotid - Bilateral Normal Pulse. Abd Neg Tenderness - None. Hepatomegaly - Absent. Splenomegaly - Absent. M/S Neg Gait - Normal.  Able to Exercise - Yes. EXT Neg Clubbing - Absent. Ischemic Ulcers - Absent. Lower Extremity Edema - Absent. Skin Neg Skin - Warm. Venous Stasis Ulcers - Absent.    Psych Neg Orientation - Oriented to Time, Person, Place. IMPRESSION AND PLAN  01. Paroxysmal atrial fibrillation (I48.0):  Had been on dofetilide prior. Unclear why this was stopped (she can't remember). ECG done I discussed the options of AA therapy (she wants to avoid amio if possible) and AFib ablation. She had been on dofetilide prior. We have decided to initiate dofetilide in the inpatient setting. Her QTc is acceptable (<450 msec). All questions answered. .  If AA therapy fails to treat her symptomatic AFib, then will move to ablation. 02. Nonrheumatic mitral (valve) insufficiency (I34.0):  Status post mitral replacement with 27 mm Giles Magna mitral valve on 4/19/2022   03. Essential (primary) hypertension (I10): Well controlled on medical therapy. We will continue to monitor the patient closely. I have made no changes to the present regimen. 04. Chronic heart failure with preserved ejection fraction (HFpEF) (I50.32):  Heart failure in the setting of valvular heart disease, appears to be euvolemic, continue current dose of Bumex   05. Aneurysm of ascending aorta without rupture (I71.21):  Ascending aortic aneurysm status post surgery replacement of ascending aorta with 34 mm Dacron graft and resuspension of aortic valve on 04/19/2022   06.  Body mass index [BMI] 34.0-34.9, adult (M76.63)     ORDERS:   Dietary management education, guidance, and counseling    1 ECG done        FINAL MEDICATION LIST  Medication Sig Desc Non-VCS   albuterol sulfate HFA 90 mcg/actuation aerosol inhaler inhale 2 puff by inhalation route  every 4 - 6 hours as needed Y   bumetanide 2 mg tablet take 1 tablet by oral route  every day N   Celebrex 200 mg capsule take 1 capsule by oral route  every day N   ELIQUIS 5MG TABLETS TAKE 1 TABLET BY MOUTH TWICE DAILY N   ferrous sulfate 325 mg (65 mg iron) tablet,delayed release  Y   gabapentin 100 mg capsule take 1 capsule by oral route  every day N   metoprolol succinate ER 50 mg tablet,extended release 24 hr take 1 tablet by oral route  every day N   multivitamin tablet take 1 tablet by oral route  every day with food Y   pantoprazole 40 mg tablet,delayed release take 1 tablet by oral route  every day Y   potassium chloride ER 10 mEq tablet,extended release take 1 by Oral route  every day N   tramadol 50 mg Tab take 1 tablet (50MG)  by oral route 2 times every day as needed Y   Vitamin D3 25 mcg (1,000 unit) capsule  Y         Past Medical History:   Diagnosis Date    Anemia 2010    Aortic aneurysm ([facility] [address]     Arrhythmia     afib r/t low potassium     Arthritis 2010    Atrial fibrillation ([facility])     cardioversion times 2    Chronic pain     Eczema 2010    Edema 2010    GERD (gastroesophageal reflux disease) 2010    Heart failure ([facility] [address]     Hepatitis     antibody for Hepatitis B - not a carrier    Hiatal hernia 2010    Smoker 2010        Past Surgical History:   Procedure Laterality Date    HX HEART CATHETERIZATION      HX HYSTERECTOMY      HX OTHER SURGICAL      facelift    HX OTHER SURGICAL      Cardioversion x2     HX WRIST FRACTURE TX Left     left wrist open reduction internal fixation. HX WRIST FRACTURE TX Right     AL ABDOMEN SURGERY PROC UNLISTED      Lap band    AL COLONOSCOPY FLX DX W/COLLJ SPEC WHEN PFRMD  2012            Social History     Tobacco Use    Smoking status: Former     Packs/day: 1.00     Years: 50.00     Pack years: 50.00     Types: Cigarettes     Quit date:      Years since quittin.1    Smokeless tobacco: Never   Substance Use Topics    Alcohol use:  Yes     Alcohol/week: 1.0 standard drink     Types: 1 Cans of beer per week     Comment: twice a month        Family History   Problem Relation Age of Onset    Cancer Mother         Lung    Cancer Father         Kidney    Breast Cancer Maternal Aunt         not sure    Arrhythmia Sister         Allergies   Allergen Reactions    Penicillin G Rash    Bactrim [Sulfamethoprim] Rash    Levaquin [Levofloxacin] Rash    Amoxicillin Rash          Current Facility-Administered Medications   Medication Dose Route Frequency    sodium chloride (NS) flush 5-40 mL  5-40 mL IntraVENous Q8H    sodium chloride (NS) flush 5-40 mL  5-40 mL IntraVENous PRN    acetaminophen (TYLENOL) tablet 650 mg  650 mg Oral Q4H PRN    magnesium hydroxide (MILK OF MAGNESIA) 400 mg/5 mL oral suspension 30 mL  30 mL Oral DAILY PRN    albuterol (PROVENTIL VENTOLIN) nebulizer solution 2.5 mg  2.5 mg Nebulization Q6H PRN    apixaban (ELIQUIS) tablet 5 mg  5 mg Oral BID    celecoxib (CELEBREX) capsule 200 mg  200 mg Oral DAILY    magnesium oxide (MAG-OX) tablet 400 mg  400 mg Oral DAILY    melatonin tablet 9 mg  9 mg Oral QHS    pantoprazole (PROTONIX) tablet 40 mg  40 mg Oral ACB    bumetanide (BUMEX) tablet 2 mg  2 mg Oral DAILY    metoprolol succinate (TOPROL-XL) XL tablet 50 mg  50 mg Oral DAILY    dofetilide (TIKOSYN) capsule 250 mcg  250 mcg Oral Q12H    dofetilide (TIKOSYN) QTc documentation reminder  1 Each Other Q12H    pregabalin (LYRICA) capsule 75 mg  75 mg Oral BID       Review of Symptoms:  See OV above. Objective:      Physical Exam:  Temp (24hrs), Av.8 °F (36.6 °C), Min:96.9 °F (36.1 °C), Max:98.3 °F (36.8 °C)    Patient Vitals for the past 8 hrs:   Pulse   23 1449 79   23 1119 78    Patient Vitals for the past 8 hrs:   Resp   23 1449 22   23 1119 16      Patient Vitals for the past 8 hrs:   BP   23 1449 (!) 100/58   23 1119 (!) 114/57          Intake/Output Summary (Last 24 hours) at 3/8/2023 1710  Last data filed at 3/7/2023 1737  Gross per 24 hour   Intake 200 ml   Output --   Net 200 ml       Nondiaphoretic, not in acute distress. Unlabored, clear to auscultation bilaterally, symmetric air movement. Irregular rate and rhythm. Abdomen, soft, nontender, nondistended. Extremities without cyanosis or clubbing.   Muscle tone and bulk normal.  Skin warm and dry. No rashes or ulcers. Neuro grossly nonfocal.  No tremor. Awake and appropriate. CARDIOGRAPHICS and STUDIES, I reviewed:    Telemetry:  Afib with PVC's. ECG:  Afib with acceptable QTc, PVC's. Labs:  No results for input(s): CPK, CKMB, CKNDX, TROIQ in the last 72 hours. No lab exists for component: CPKMB  No results found for: CHOL, CHOLX, CHLST, CHOLV, HDL, HDLP, LDL, LDLC, DLDLP, TGLX, TRIGL, TRIGP, CHHD, CHHDX  No results for input(s): INR, PTP, APTT, INREXT, INREXT in the last 72 hours. Recent Labs     03/08/23  0234 03/07/23  0922    137   K 4.2 4.2    103   CO2 31 30   BUN 28* 27*   CREA 1.13* 1.19*   GLU 95 102*   CA 8.7 9.3   ALB  --  3.5   WBC  --  5.2   HGB  --  11.8   HCT  --  37.8   PLT  --  228     Recent Labs     03/07/23  0922   AP 63   TP 7.1   ALB 3.5   GLOB 3.6     No components found for: GLPOC  No results for input(s): PH, PCO2, PO2 in the last 72 hours.         Jacobo Trevizo MD  3/8/2023

## 2023-03-08 NOTE — PROGRESS NOTES
Patient arrived on floor, A/Ox4, afib 80-90's placed on monitor [facility] 112, SOB absent, denies pain, with no current complaints at this time. Skin is intact with no breakdown noted. Will continue to monitor and assess.

## 2023-03-09 LAB
ANION GAP SERPL CALC-SCNC: 3 MMOL/L (ref 5–15)
BUN SERPL-MCNC: 26 MG/DL (ref 6–20)
BUN/CREAT SERPL: 25 (ref 12–20)
CALCIUM SERPL-MCNC: 8.8 MG/DL (ref 8.5–10.1)
CALCULATED R AXIS, ECG10: 0 DEGREES
CALCULATED R AXIS, ECG10: 2 DEGREES
CALCULATED R AXIS, ECG10: 4 DEGREES
CALCULATED T AXIS, ECG11: 124 DEGREES
CALCULATED T AXIS, ECG11: 21 DEGREES
CALCULATED T AXIS, ECG11: 25 DEGREES
CALCULATED T AXIS, ECG11: 83 DEGREES
CHLORIDE SERPL-SCNC: 106 MMOL/L (ref 97–108)
CO2 SERPL-SCNC: 31 MMOL/L (ref 21–32)
CREAT SERPL-MCNC: 1.06 MG/DL (ref 0.55–1.02)
DIAGNOSIS, 93000: NORMAL
GLUCOSE SERPL-MCNC: 107 MG/DL (ref 65–100)
MAGNESIUM SERPL-MCNC: 2.1 MG/DL (ref 1.6–2.4)
POTASSIUM SERPL-SCNC: 3.6 MMOL/L (ref 3.5–5.1)
Q-T INTERVAL, ECG07: 370 MS
Q-T INTERVAL, ECG07: 378 MS
Q-T INTERVAL, ECG07: 392 MS
Q-T INTERVAL, ECG07: 392 MS
QRS DURATION, ECG06: 88 MS
QRS DURATION, ECG06: 90 MS
QRS DURATION, ECG06: 90 MS
QRS DURATION, ECG06: 98 MS
QTC CALCULATION (BEZET), ECG08: 419 MS
QTC CALCULATION (BEZET), ECG08: 432 MS
QTC CALCULATION (BEZET), ECG08: 437 MS
QTC CALCULATION (BEZET), ECG08: 449 MS
SODIUM SERPL-SCNC: 140 MMOL/L (ref 136–145)
VENTRICULAR RATE, ECG03: 74 BPM
VENTRICULAR RATE, ECG03: 75 BPM
VENTRICULAR RATE, ECG03: 79 BPM
VENTRICULAR RATE, ECG03: 82 BPM

## 2023-03-09 PROCEDURE — 74011000250 HC RX REV CODE- 250: Performed by: INTERNAL MEDICINE

## 2023-03-09 PROCEDURE — 83735 ASSAY OF MAGNESIUM: CPT

## 2023-03-09 PROCEDURE — 93005 ELECTROCARDIOGRAM TRACING: CPT

## 2023-03-09 PROCEDURE — 80048 BASIC METABOLIC PNL TOTAL CA: CPT

## 2023-03-09 PROCEDURE — 65270000046 HC RM TELEMETRY

## 2023-03-09 PROCEDURE — 36415 COLL VENOUS BLD VENIPUNCTURE: CPT

## 2023-03-09 PROCEDURE — 74011250637 HC RX REV CODE- 250/637: Performed by: INTERNAL MEDICINE

## 2023-03-09 RX ORDER — POTASSIUM CHLORIDE 750 MG/1
40 TABLET, FILM COATED, EXTENDED RELEASE ORAL ONCE
Status: COMPLETED | OUTPATIENT
Start: 2023-03-09 | End: 2023-03-09

## 2023-03-09 RX ADMIN — DOFETILIDE 250 MCG: 0.25 CAPSULE ORAL at 21:30

## 2023-03-09 RX ADMIN — MAGNESIUM OXIDE TAB 400 MG (241.3 MG ELEMENTAL MG) 400 MG: 400 (241.3 MG) TAB at 09:00

## 2023-03-09 RX ADMIN — PANTOPRAZOLE SODIUM 40 MG: 40 TABLET, DELAYED RELEASE ORAL at 09:02

## 2023-03-09 RX ADMIN — METOPROLOL SUCCINATE 50 MG: 50 TABLET, EXTENDED RELEASE ORAL at 09:02

## 2023-03-09 RX ADMIN — MELATONIN 9 MG: at 21:30

## 2023-03-09 RX ADMIN — APIXABAN 5 MG: 5 TABLET, FILM COATED ORAL at 17:43

## 2023-03-09 RX ADMIN — POTASSIUM CHLORIDE 40 MEQ: 750 TABLET, EXTENDED RELEASE ORAL at 12:30

## 2023-03-09 RX ADMIN — SODIUM CHLORIDE, PRESERVATIVE FREE 10 ML: 5 INJECTION INTRAVENOUS at 21:30

## 2023-03-09 RX ADMIN — SODIUM CHLORIDE, PRESERVATIVE FREE 10 ML: 5 INJECTION INTRAVENOUS at 17:43

## 2023-03-09 RX ADMIN — ACETAMINOPHEN 325MG 650 MG: 325 TABLET ORAL at 04:03

## 2023-03-09 RX ADMIN — BUMETANIDE 2 MG: 1 TABLET ORAL at 09:01

## 2023-03-09 RX ADMIN — CELECOXIB 200 MG: 200 CAPSULE ORAL at 09:02

## 2023-03-09 RX ADMIN — PREGABALIN 75 MG: 25 CAPSULE ORAL at 09:02

## 2023-03-09 RX ADMIN — APIXABAN 5 MG: 5 TABLET, FILM COATED ORAL at 09:02

## 2023-03-09 RX ADMIN — DOFETILIDE 250 MCG: 0.25 CAPSULE ORAL at 10:38

## 2023-03-09 RX ADMIN — PREGABALIN 75 MG: 25 CAPSULE ORAL at 17:43

## 2023-03-09 NOTE — PROGRESS NOTES
EP/ ARRHYTHMIA Progress Note    Patient ID:  Patient: Zack Hunt  MRN: [id number]  Age: 69 y.o.  : 1945    Date of  Admission: 3/7/2023  8:12 AM   PCP:  Cyndi Haque MD    Assessment:   Dofetilide initiation. Persistent atrial fibrillation. TARIK was clipped during her surgery in , still on Eliquis. Full code. Plan:     Dofetilide initiation, pharmacy consult, (labs, tele, ECG's). She was on 250 mcg before, using this dose. Plan for cardioversion once dofetilide at steady-state--Friday AM.    I answered her questions today. NPO after MN. [x]       High complexity decision making was performed in this patient    Zack Hunt is a 69 y.o. female with a history of the below here for initiation of dofetilide. She denies dizziness, chest pain, SOB. Remains in Afib. See the office visit below for details:    Return Office Visit    2023      Anahy Cope  69year old F (1945)  Account #: [id number]  PRIMARY CARE PHYSICIAN:  Ra Mota MD  CARDIOLOGIST:  Mary Dior MD    REASON FOR VISIT:  This 69year old female presents for AFib and Hypertension.     HISTORY OF PRESENT ILLNESS:   Mitral valve disease, mitral regurgitation Status post mitral replacement with 27 mm Giles Magna mitral valve on 2022  Ascending aortic aneurysm status post surgery replacement of ascending aorta with 34 mm Dacron graft and resuspension of aortic valve on 2022  Paroxysmal atrial fibrillation, left atrial appendage occlusion with a 40 mm clip during cardiac surgery on 2022  PFO status post closure during cardiac surgery on 2022  Heart failure with preserved ejection fraction, in the setting of valvular heart disease  Left heart catheterization on 2021 shows no significant CAD  Hypertension    Retired dentist.     Ms Person with PMH of cardiac surgery with MV replacement, prosthetic, Ascending aortic aneurysm surgery with 34 mm Dacron graft and resuspension of aortic valve, TARIK occlusion with 40 mm clip and PFO closure in 4/2022. Has paroxysmal atrial fibrillation. She was on Tikosyn previously but now she is off. She is here for follow-up. She reports to be doing okay. She denied any significant chest pain or shortness of breath. She denied any palpitations. Duration noted to have atrial fibrillation with mildly elevated heart rate. She reports having no significant symptoms. Denied any pedal edema or heart failure symptoms. 2/2023 EP F/U:  Denies syncope, dizziness, palpitations. No chest, jaw, neck, shoulder, or arm pain. No orthopnea, PND, or edema. Patient denies claudication. There is no discoloration or ulceration of the lower extremities. The patient has had no TIA or stroke-like symptoms. CARDIAC HISTORY  ARRHYTHMIA:  1 PAF     VALVULAR:  1 Moderate MR - 7/5/2019   2 Severe TR - 7/5/2019     RISK FACTORS:  1 Dyslipidemia [Type:  Hyperlipidemia]   2 Hypertension   3 Tobacco Use: Cigarette       CARDIOVASCULAR PROCEDURES  Procedure Date Results   Cath 11/18/2021 No significant CAD   DCCV 11/18/2021 Initial Rhythm A Fib, Final Rhythm Sinus, Max Joules 300, 1 Shock   DCCV 09/19/2019 Initial Rhythm A Fib, Final Rhythm Sinus, Max Joules 100, 1 Shock   Echo 01/18/2022 EF range 55%-60%, Bi-atrial dilatation. Dilated left ventricle. Dilated Ao Root to 5.3 cm. Grade I diastolic dysfunction:  delayed relaxation. Normal LV systolic function. Normal RV systolic function. Moderate mitral regurgitation. Mild aortic regurgitation. Moderate pulmonic regurgitation. Moderate tricuspid regurgitation. Echo 07/05/2019 EF range 65%-70%, Bi-atrial dilatation. Diastolic function is indeterminate. Normal LV systolic function. Normal RV systolic function. Moderate mitral regurgitation. Mild aortic regurgitation. Moderate pulmonic regurgitation. Severe tricuspid regurgitation. Echo 09/19/2006 Normal chamber dimensions are noted.  The systolic function appears normal with an ejection fraction of 60% and no regional wall motion abnormalities. The valvular structures are normal with trace mitral regurgitation and trace tricuspid regurgitation noted. EKG 2012 SB  Low Voltage  PRWP   EVR 10/04/2022 Patient monitored for 5 days and 12 hours   Frequent episodes of Atrial fibrillation for up to 12 minutes, HR range from  bpm. total 2% Afib burden. MPI 2006 Normal myocardial perfusion study with no evidence of ischemia or scar. Global left ventricular ejection fraction 75% with normal wall motion   and thickening. Thoracic Ao CTA 2022 - Ascending aorta aneurysm with maximal size 53 x 51 mm, measured at the level of the main PA bifurcation. No evidence of dissection. ACTIVE ALLERGIES:  Ingredient Reaction (Severity) Comment   PENICILLINS  Penicillin   SULFAMETHOXAZOLE     TRIMETHOPRIM         PROBLEM LIST:  Problem Description Chronic   Severe TR N   Hyperlipidemia Y   Atrial fibrillation Y   Moderate MR N       PAST MEDICAL/SURGICAL HISTORY  (Detailed)    Disease/disorder Onset Date Surgical History Date Comments     Blood Transfusion     Arthritis, Rheumatoid       Atrial fibrillation       Hyperlipidemia       Hypertension         Family History  (Detailed)  Relationship Family Member Name  Age at Death Condition Onset Age Cause of Death   Father  N  Cancer  N   Mother  N  Cancer  N     SOCIAL HISTORY  (Detailed)  Tobacco use reviewed. Preferred language is [place]. EDUCATION/EMPLOYMENT/OCCUPATION  Employment History Status Retired Restrictions     full-time       retired       MARITAL STATUS/FAMILY/SOCIAL SUPPORT  Currently . 0 son(s). 0 daughter(s). Smoking status: Former smoker. SMOKING STATUS  Use Status Type Smoking Status Usage Per Day Years Used Total Pack Years   yes  Former smoker            ALCOHOL  There is a history of alcohol use. consumed occasionally.     CAFFEINE  The patient uses caffeine: coffee - 1 cup a day. LIFESTYLE  Exercises occasionally. REVIEW OF SYMPTOMS:    CONST - Negative for weight gain, weight loss, fever. EYES - Negative for visual changes. ENT - Negative for hearing loss. RESP - Negative for snoring, hemoptysis, dyspnea. Positive for dyspnea on exertion. CARD - Negative for chest pain, diaphoresis, orthopnea, palpitation, syncope, PND.  VASC - Negative for claudication, edema. GI - Negative for nausea, reflux, bleeding.  - Negative for hematuria, nocturia. REPROD - Negative for Hx of OCP.  ENDO - Negative for goiter, tremors. NEURO - Negative for dizziness, memory loss, seizures. PSYCH - Negative for depression, hallucinations. DERM - Negative for rash, skin sores. M/S - Negative for joint pain, myalgia. HEMAT - Negative for acute anemia, thrombocytopenia. VITAL SIGNS  Time BP mm/Hg Pulse /min Resp /min Temp F Ht ft Ht in Ht cm Wt lb Wt kg BMI kg/m2 BSA m2 O2 Sat%   12:33 /70 90   5.0 3.00 160.02 192.00 87.090 34.01         PHYSICAL EXAM:  Exam Findings Details   Const Neg Level of Distress - Awake / Alert, No Acute Distress. Nourishment - Well Nourished. Eyes Neg Lids/External - Bilateral Normal.  Conjunctiva - Bilateral Normal.   NMT Neg Oral Mucosa - Moist, No Cyanosis, No Pallor. Neck Neg JVP - Less than 8 cm of H20. Resp Neg Respirations - Nonlabored. Breath Sounds - Clear Throughout. Rales - Absent. Wheezes - Absent. Rhonchi - Absent. Cardiac Neg Palpation - PMI Normal.  Heart Sounds - S1 Normal, S2 Normal, No S3, No S4.   Cardiac Pos Rhythm - Irregular. Murmurs - I/VI systolic ejection murmur. Vasc Neg Carotid - Bilateral Normal Pulse. Abd Neg Tenderness - None. Hepatomegaly - Absent. Splenomegaly - Absent. M/S Neg Gait - Normal.  Able to Exercise - Yes. EXT Neg Clubbing - Absent. Ischemic Ulcers - Absent. Lower Extremity Edema - Absent. Skin Neg Skin - Warm. Venous Stasis Ulcers - Absent.    Psych Neg Orientation - Oriented to Time, Person, Place. IMPRESSION AND PLAN  01. Paroxysmal atrial fibrillation (I48.0):  Had been on dofetilide prior. Unclear why this was stopped (she can't remember). ECG done I discussed the options of AA therapy (she wants to avoid amio if possible) and AFib ablation. She had been on dofetilide prior. We have decided to initiate dofetilide in the inpatient setting. Her QTc is acceptable (<450 msec). All questions answered. .  If AA therapy fails to treat her symptomatic AFib, then will move to ablation. 02. Nonrheumatic mitral (valve) insufficiency (I34.0):  Status post mitral replacement with 27 mm Giles Magna mitral valve on 4/19/2022   03. Essential (primary) hypertension (I10): Well controlled on medical therapy. We will continue to monitor the patient closely. I have made no changes to the present regimen. 04. Chronic heart failure with preserved ejection fraction (HFpEF) (I50.32):  Heart failure in the setting of valvular heart disease, appears to be euvolemic, continue current dose of Bumex   05. Aneurysm of ascending aorta without rupture (I71.21):  Ascending aortic aneurysm status post surgery replacement of ascending aorta with 34 mm Dacron graft and resuspension of aortic valve on 04/19/2022   06.  Body mass index [BMI] 34.0-34.9, adult (U80.26)     ORDERS:   Dietary management education, guidance, and counseling    1 ECG done        FINAL MEDICATION LIST  Medication Sig Desc Non-VCS   albuterol sulfate HFA 90 mcg/actuation aerosol inhaler inhale 2 puff by inhalation route  every 4 - 6 hours as needed Y   bumetanide 2 mg tablet take 1 tablet by oral route  every day N   Celebrex 200 mg capsule take 1 capsule by oral route  every day N   ELIQUIS 5MG TABLETS TAKE 1 TABLET BY MOUTH TWICE DAILY N   ferrous sulfate 325 mg (65 mg iron) tablet,delayed release  Y   gabapentin 100 mg capsule take 1 capsule by oral route  every day N   metoprolol succinate ER 50 mg tablet,extended release 24 hr take 1 tablet by oral route  every day N   multivitamin tablet take 1 tablet by oral route  every day with food Y   pantoprazole 40 mg tablet,delayed release take 1 tablet by oral route  every day Y   potassium chloride ER 10 mEq tablet,extended release take 1 by Oral route  every day N   tramadol 50 mg Tab take 1 tablet (50MG)  by oral route 2 times every day as needed Y   Vitamin D3 25 mcg (1,000 unit) capsule  Y         Past Medical History:   Diagnosis Date    Anemia 2010    Aortic aneurysm ([facility] [address]     Arrhythmia     afib r/t low potassium     Arthritis 2010    Atrial fibrillation (HCC)     cardioversion times 2    Chronic pain     Eczema 2010    Edema 2010    GERD (gastroesophageal reflux disease) 2010    Heart failure ([facility] [address]     Hepatitis     antibody for Hepatitis B - not a carrier    Hiatal hernia 2010    Smoker 2010        Past Surgical History:   Procedure Laterality Date    HX HEART CATHETERIZATION      HX HYSTERECTOMY      HX OTHER SURGICAL      facelift    HX OTHER SURGICAL      Cardioversion x2     HX WRIST FRACTURE TX Left     left wrist open reduction internal fixation. HX WRIST FRACTURE TX Right     [place] ABDOMEN SURGERY PROC UNLISTED      Lap band    [place] COLONOSCOPY FLX DX W/COLLJ SPEC WHEN PFRMD  2012            Social History     Tobacco Use    Smoking status: Former     Packs/day: 1.00     Years: 50.00     Pack years: 50.00     Types: Cigarettes     Quit date:      Years since quittin.1    Smokeless tobacco: Never   Substance Use Topics    Alcohol use:  Yes     Alcohol/week: 1.0 standard drink     Types: 1 Cans of beer per week     Comment: twice a month        Family History   Problem Relation Age of Onset    Cancer Mother         Lung    Cancer Father         Kidney    Breast Cancer Maternal Aunt         not sure    Arrhythmia Sister         Allergies   Allergen Reactions    Penicillin G Rash    Bactrim [Sulfamethoprim] Rash    Levaquin [Levofloxacin] Rash    Amoxicillin Rash          Current Facility-Administered Medications   Medication Dose Route Frequency    sodium chloride (NS) flush 5-40 mL  5-40 mL IntraVENous Q8H    sodium chloride (NS) flush 5-40 mL  5-40 mL IntraVENous PRN    acetaminophen (TYLENOL) tablet 650 mg  650 mg Oral Q4H PRN    magnesium hydroxide (MILK OF MAGNESIA) 400 mg/5 mL oral suspension 30 mL  30 mL Oral DAILY PRN    albuterol (PROVENTIL VENTOLIN) nebulizer solution 2.5 mg  2.5 mg Nebulization Q6H PRN    apixaban (ELIQUIS) tablet 5 mg  5 mg Oral BID    celecoxib (CELEBREX) capsule 200 mg  200 mg Oral DAILY    magnesium oxide (MAG-OX) tablet 400 mg  400 mg Oral DAILY    melatonin tablet 9 mg  9 mg Oral QHS    pantoprazole (PROTONIX) tablet 40 mg  40 mg Oral ACB    bumetanide (BUMEX) tablet 2 mg  2 mg Oral DAILY    metoprolol succinate (TOPROL-XL) XL tablet 50 mg  50 mg Oral DAILY    dofetilide (TIKOSYN) capsule 250 mcg  250 mcg Oral Q12H    dofetilide (TIKOSYN) QTc documentation reminder  1 Each Other Q12H    pregabalin (LYRICA) capsule 75 mg  75 mg Oral BID       Review of Symptoms:  See OV above. Objective:      Physical Exam:  Temp (24hrs), Av.9 °F (36.6 °C), Min:97.6 °F (36.4 °C), Max:98.1 °F (36.7 °C)    Patient Vitals for the past 8 hrs:   Pulse   23 0357 85      Patient Vitals for the past 8 hrs:   Resp   23 0357 19      Patient Vitals for the past 8 hrs:   BP   23 0357 134/81          Intake/Output Summary (Last 24 hours) at 3/9/2023 1021  Last data filed at 3/9/2023 0830  Gross per 24 hour   Intake 240 ml   Output --   Net 240 ml         Nondiaphoretic, not in acute distress. Unlabored, clear to auscultation bilaterally, symmetric air movement. Irregular rate and rhythm. Abdomen, soft, nontender, nondistended. Extremities without cyanosis or clubbing. Muscle tone and bulk normal.  Skin warm and dry. No rashes or ulcers.   Neuro grossly nonfocal.  No tremor. Awake and appropriate. CARDIOGRAPHICS and STUDIES, I reviewed:    Telemetry:  Afib with PVC's. ECG:  Afib with acceptable QTc, PVC's. Labs:  No results for input(s): CPK, CKMB, CKNDX, TROIQ in the last 72 hours. No lab exists for component: CPKMB  No results found for: CHOL, CHOLX, CHLST, CHOLV, HDL, HDLP, LDL, LDLC, DLDLP, TGLX, TRIGL, TRIGP, CHHD, CHHDX  No results for input(s): INR, PTP, APTT, INREXT, INREXT in the last 72 hours. Recent Labs     03/09/23  0412 03/08/23  0234 03/07/23  0922    137 137   K 3.6 4.2 4.2    103 103   CO2 31 31 30   BUN 26* 28* 27*   CREA 1.06* 1.13* 1.19*   * 95 102*   CA 8.8 8.7 9.3   ALB  --   --  3.5   WBC  --   --  5.2   HGB  --   --  11.8   HCT  --   --  37.8   PLT  --   --  228       Recent Labs     03/07/23 0922   AP 63   TP 7.1   ALB 3.5   GLOB 3.6       No components found for: GLPOC  No results for input(s): PH, PCO2, PO2 in the last 72 hours.         Kaushik Alex MD  3/9/2023

## 2023-03-09 NOTE — PROGRESS NOTES
End of Shift Note    Bedside shift change report given to [employer], RN (oncoming nurse) by Corrie Lemus (offgoing nurse).   Report included the following information SBAR, Kardex, ED Summary, Intake/Output, MAR, and Recent Results    Shift worked:  [id number]     Shift summary and any significant changes:     No changes overnight      Concerns for physician to address:       Zone phone for oncoming shift:              Corrie Lemus

## 2023-03-10 ENCOUNTER — APPOINTMENT (OUTPATIENT)
Dept: NON INVASIVE DIAGNOSTICS | Age: 78
DRG: 309 | End: 2023-03-10
Attending: INTERNAL MEDICINE
Payer: MEDICARE

## 2023-03-10 VITALS
SYSTOLIC BLOOD PRESSURE: 94 MMHG | HEIGHT: 66 IN | WEIGHT: 190 LBS | RESPIRATION RATE: 19 BRPM | DIASTOLIC BLOOD PRESSURE: 58 MMHG | BODY MASS INDEX: 30.53 KG/M2 | TEMPERATURE: 98 F | HEART RATE: 102 BPM | OXYGEN SATURATION: 94 %

## 2023-03-10 LAB
ANION GAP SERPL CALC-SCNC: 3 MMOL/L (ref 5–15)
ATRIAL RATE: 91 BPM
BUN SERPL-MCNC: 22 MG/DL (ref 6–20)
BUN/CREAT SERPL: 23 (ref 12–20)
CALCIUM SERPL-MCNC: 8.7 MG/DL (ref 8.5–10.1)
CALCULATED P AXIS, ECG09: 115 DEGREES
CALCULATED R AXIS, ECG10: 3 DEGREES
CALCULATED T AXIS, ECG11: 156 DEGREES
CHLORIDE SERPL-SCNC: 108 MMOL/L (ref 97–108)
CO2 SERPL-SCNC: 30 MMOL/L (ref 21–32)
CREAT SERPL-MCNC: 0.96 MG/DL (ref 0.55–1.02)
DIAGNOSIS, 93000: NORMAL
GLUCOSE SERPL-MCNC: 97 MG/DL (ref 65–100)
MAGNESIUM SERPL-MCNC: 2 MG/DL (ref 1.6–2.4)
P-R INTERVAL, ECG05: 278 MS
POTASSIUM SERPL-SCNC: 4 MMOL/L (ref 3.5–5.1)
Q-T INTERVAL, ECG07: 296 MS
QRS DURATION, ECG06: 90 MS
QTC CALCULATION (BEZET), ECG08: 364 MS
SODIUM SERPL-SCNC: 141 MMOL/L (ref 136–145)
VENTRICULAR RATE, ECG03: 91 BPM

## 2023-03-10 PROCEDURE — 74011250636 HC RX REV CODE- 250/636: Performed by: INTERNAL MEDICINE

## 2023-03-10 PROCEDURE — 92960 CARDIOVERSION ELECTRIC EXT: CPT

## 2023-03-10 PROCEDURE — 83735 ASSAY OF MAGNESIUM: CPT

## 2023-03-10 PROCEDURE — 93005 ELECTROCARDIOGRAM TRACING: CPT

## 2023-03-10 PROCEDURE — 36415 COLL VENOUS BLD VENIPUNCTURE: CPT

## 2023-03-10 PROCEDURE — 74011000250 HC RX REV CODE- 250: Performed by: INTERNAL MEDICINE

## 2023-03-10 PROCEDURE — 74011250637 HC RX REV CODE- 250/637: Performed by: INTERNAL MEDICINE

## 2023-03-10 PROCEDURE — 5A2204Z RESTORATION OF CARDIAC RHYTHM, SINGLE: ICD-10-PCS | Performed by: INTERNAL MEDICINE

## 2023-03-10 PROCEDURE — 80048 BASIC METABOLIC PNL TOTAL CA: CPT

## 2023-03-10 PROCEDURE — 77030018729 HC ELECTRD DEFIB PAD CARD -B

## 2023-03-10 PROCEDURE — 99152 MOD SED SAME PHYS/QHP 5/>YRS: CPT

## 2023-03-10 RX ORDER — FENTANYL CITRATE 50 UG/ML
12.5-5 INJECTION, SOLUTION INTRAMUSCULAR; INTRAVENOUS
Status: DISCONTINUED | OUTPATIENT
Start: 2023-03-10 | End: 2023-03-10

## 2023-03-10 RX ORDER — DOFETILIDE 0.25 MG/1
250 CAPSULE ORAL 2 TIMES DAILY
Qty: 60 CAPSULE | Refills: 5 | Status: SHIPPED | OUTPATIENT
Start: 2023-03-10

## 2023-03-10 RX ORDER — MIDAZOLAM HYDROCHLORIDE 1 MG/ML
.5-2 INJECTION, SOLUTION INTRAMUSCULAR; INTRAVENOUS
Status: DISCONTINUED | OUTPATIENT
Start: 2023-03-10 | End: 2023-03-10

## 2023-03-10 RX ADMIN — DOFETILIDE 250 MCG: 0.25 CAPSULE ORAL at 09:40

## 2023-03-10 RX ADMIN — MIDAZOLAM 1 MG: 1 INJECTION INTRAMUSCULAR; INTRAVENOUS at 08:32

## 2023-03-10 RX ADMIN — PREGABALIN 75 MG: 25 CAPSULE ORAL at 17:11

## 2023-03-10 RX ADMIN — APIXABAN 5 MG: 5 TABLET, FILM COATED ORAL at 17:11

## 2023-03-10 RX ADMIN — CELECOXIB 200 MG: 200 CAPSULE ORAL at 09:40

## 2023-03-10 RX ADMIN — APIXABAN 5 MG: 5 TABLET, FILM COATED ORAL at 09:40

## 2023-03-10 RX ADMIN — METOPROLOL SUCCINATE 50 MG: 50 TABLET, EXTENDED RELEASE ORAL at 09:49

## 2023-03-10 RX ADMIN — MIDAZOLAM 2 MG: 1 INJECTION INTRAMUSCULAR; INTRAVENOUS at 08:28

## 2023-03-10 RX ADMIN — PREGABALIN 75 MG: 25 CAPSULE ORAL at 09:40

## 2023-03-10 RX ADMIN — FENTANYL CITRATE 50 MCG: 50 INJECTION, SOLUTION INTRAMUSCULAR; INTRAVENOUS at 08:28

## 2023-03-10 RX ADMIN — SODIUM CHLORIDE, PRESERVATIVE FREE 10 ML: 5 INJECTION INTRAVENOUS at 15:24

## 2023-03-10 RX ADMIN — PANTOPRAZOLE SODIUM 40 MG: 40 TABLET, DELAYED RELEASE ORAL at 09:40

## 2023-03-10 RX ADMIN — MAGNESIUM OXIDE TAB 400 MG (241.3 MG ELEMENTAL MG) 400 MG: 400 (241.3 MG) TAB at 09:40

## 2023-03-10 RX ADMIN — BUMETANIDE 2 MG: 1 TABLET ORAL at 09:44

## 2023-03-10 NOTE — PROGRESS NOTES
Dr. Onur Falcon will cardiovert her tomorrow morning. I would watch her into the early afternoon, if she maintains sinus at reasonable HR's, will prescribe dofetilide and then discharge her. If she has early recurrence of AFib or can't be cardioverted, then I'll talk with her about options.

## 2023-03-10 NOTE — PROGRESS NOTES
Patient arrived to Non-Invasive Cardiology Lab for Out Patient [facility] Procedure. Staff introduced to patient. Patient identifiers verified with Name and Date of Birth. Procedure verified with patient. Consent forms reviewed and signed by patient or authorized representative and verified. Allergies verified. Patient informed of procedure and plan of care. Questions answered with review. Patient on cardiac monitor, non-invasive blood pressure, SPO2 monitor. On RA. Patient is A&Ox3. Patient reports no complaints. Patient on stretcher, in low position, with side rails up. Patient instructed to call for assistance as needed.

## 2023-03-10 NOTE — DISCHARGE SUMMARY
EP/ ARRHYTHMIA Discharge Summary    Patient ID:  Patient: Teri Stein  MRN: [id number]  Age: 69 y.o.  : 1945    Date of  Admission: 3/7/2023  8:12 AM   PCP:  Courtney Montgomery MD    Assessment:   Dofetilide initiated. Persistent atrial fibrillation s/p cardioversion 3/10. Ectopic atrial rhythm. TARIK was clipped during her surgery in , still on Eliquis. Full code. Plan:     Continue dofetilide. No change to her other medications. Plan for OP monitor (48 h Holter). I answered questions for her and her daughter. F/U in the office with the NP in 2-4 weeks. [x]       >30 minutes involved in discharge planning and management    Teri Stein is a 69 y.o. female with a history of the below here for initiation of dofetilide. She was cardioverted prior to discharge. Showed some ectopic atrial rhythm with HR's ~100 bpm.  We agreed that we'd see if this resolves, monitor as an OP.       FINAL MEDICATION LIST from the last office visit:  Medication Sig Desc Non-VCS   albuterol sulfate HFA 90 mcg/actuation aerosol inhaler inhale 2 puff by inhalation route  every 4 - 6 hours as needed Y   bumetanide 2 mg tablet take 1 tablet by oral route  every day N   Celebrex 200 mg capsule take 1 capsule by oral route  every day N   ELIQUIS 5MG TABLETS TAKE 1 TABLET BY MOUTH TWICE DAILY N   ferrous sulfate 325 mg (65 mg iron) tablet,delayed release  Y   gabapentin 100 mg capsule take 1 capsule by oral route  every day N   metoprolol succinate ER 50 mg tablet,extended release 24 hr take 1 tablet by oral route  every day N   multivitamin tablet take 1 tablet by oral route  every day with food Y   pantoprazole 40 mg tablet,delayed release take 1 tablet by oral route  every day Y   potassium chloride ER 10 mEq tablet,extended release take 1 by Oral route  every day N   tramadol 50 mg Tab take 1 tablet (50MG)  by oral route 2 times every day as needed Y   Vitamin D3 25 mcg (1,000 unit) capsule  Y         Past Medical History:   Diagnosis Date    Anemia 2010    Aortic aneurysm ([facility] [address]     Arrhythmia     afib r/t low potassium     Arthritis 2010    Atrial fibrillation (HCC)     cardioversion times 2    Chronic pain     Eczema 2010    Edema 2010    GERD (gastroesophageal reflux disease) 2010    Heart failure ([facility] [address]     Hepatitis     antibody for Hepatitis B - not a carrier    Hiatal hernia 2010    Smoker 2010        Past Surgical History:   Procedure Laterality Date    HX HEART CATHETERIZATION      HX HYSTERECTOMY  1998    HX OTHER SURGICAL      facelift    HX OTHER SURGICAL      Cardioversion x2     HX WRIST FRACTURE TX Left     left wrist open reduction internal fixation. HX WRIST FRACTURE TX Right     [place] ABDOMEN SURGERY PROC UNLISTED      Lap band    [place] COLONOSCOPY FLX DX W/COLLJ SPEC WHEN PFRMD  2012            Social History     Tobacco Use    Smoking status: Former     Packs/day: 1.00     Years: 50.00     Pack years: 50.00     Types: Cigarettes     Quit date:      Years since quittin.1    Smokeless tobacco: Never   Substance Use Topics    Alcohol use:  Yes     Alcohol/week: 1.0 standard drink     Types: 1 Cans of beer per week     Comment: twice a month        Family History   Problem Relation Age of Onset    Cancer Mother         Lung    Cancer Father         Kidney    Breast Cancer Maternal Aunt         not sure    Arrhythmia Sister         Allergies   Allergen Reactions    Penicillin G Rash    Bactrim [Sulfamethoprim] Rash    Levaquin [Levofloxacin] Rash    Amoxicillin Rash          Current Facility-Administered Medications   Medication Dose Route Frequency    sodium chloride (NS) flush 5-40 mL  5-40 mL IntraVENous Q8H    sodium chloride (NS) flush 5-40 mL  5-40 mL IntraVENous PRN    acetaminophen (TYLENOL) tablet 650 mg  650 mg Oral Q4H PRN    magnesium hydroxide (MILK OF MAGNESIA) 400 mg/5 mL oral suspension 30 mL  30 mL Oral DAILY PRN albuterol (PROVENTIL VENTOLIN) nebulizer solution 2.5 mg  2.5 mg Nebulization Q6H PRN    apixaban (ELIQUIS) tablet 5 mg  5 mg Oral BID    celecoxib (CELEBREX) capsule 200 mg  200 mg Oral DAILY    magnesium oxide (MAG-OX) tablet 400 mg  400 mg Oral DAILY    melatonin tablet 9 mg  9 mg Oral QHS    pantoprazole (PROTONIX) tablet 40 mg  40 mg Oral ACB    bumetanide (BUMEX) tablet 2 mg  2 mg Oral DAILY    metoprolol succinate (TOPROL-XL) XL tablet 50 mg  50 mg Oral DAILY    dofetilide (TIKOSYN) capsule 250 mcg  250 mcg Oral Q12H    pregabalin (LYRICA) capsule 75 mg  75 mg Oral BID       Review of Symptoms:  See OV above. Objective:      Physical Exam:  Temp (24hrs), Av °F (36.7 °C), Min:97.9 °F (36.6 °C), Max:98.1 °F (36.7 °C)    Patient Vitals for the past 8 hrs:   Pulse   03/10/23 1500 (!) 102   03/10/23 1115 (!) 110   03/10/23 0935 (!) 58   03/10/23 0930 (!) 59   03/10/23 0925 (!) 114   03/10/23 0920 97   03/10/23 0915 (!) 58      Patient Vitals for the past 8 hrs:   Resp   03/10/23 1500 19   03/10/23 1115 17   03/10/23 0935 13   03/10/23 0930 17   03/10/23 0925 23   03/10/23 0920 20   03/10/23 0915 16      Patient Vitals for the past 8 hrs:   BP   03/10/23 1500 (!) 94/58   03/10/23 1115 99/68   03/10/23 0935 116/76   03/10/23 0930 128/61   03/10/23 0925 114/61   03/10/23 0920 113/61   03/10/23 0915 124/60          Intake/Output Summary (Last 24 hours) at 3/10/2023 1710  Last data filed at 3/10/2023 1500  Gross per 24 hour   Intake 360 ml   Output 600 ml   Net -240 ml         Nondiaphoretic, not in acute distress. Neuro grossly nonfocal.  No tremor. Awake and appropriate. Labs:  No results for input(s): CPK, CKMB, CKNDX, TROIQ in the last 72 hours. No lab exists for component: CPKMB  No results found for: CHOL, CHOLX, CHLST, CHOLV, HDL, HDLP, LDL, LDLC, DLDLP, TGLX, TRIGL, TRIGP, CHHD, CHHDX  No results for input(s): INR, PTP, APTT, INREXT, INREXT in the last 72 hours.    Recent Labs     03/10/23  1311 03/09/23  0412 03/08/23  0234    140 137   K 4.0 3.6 4.2    106 103   CO2 30 31 31   BUN 22* 26* 28*   CREA 0.96 1.06* 1.13*   GLU 97 107* 95   CA 8.7 8.8 8.7       No results for input(s): AP, TBIL, TP, ALB, GLOB, GGT, AML, LPSE in the last 72 hours. No lab exists for component: SGOT, GPT, AMYP, HLPSE    No components found for: GLPOC  No results for input(s): PH, PCO2, PO2 in the last 72 hours.         Signed By: Keila Britton MD     March 10, 2023

## 2023-03-10 NOTE — PROGRESS NOTES
End of Shift Note    Bedside shift change report given to LAURITA Mckinnon (oncoming nurse) by Olivia Dennison (offgoing nurse).   Report included the following information SBAR, Kardex, ED Summary, Intake/Output, MAR, and Recent Results    Shift worked:  1900-700     Shift summary and any significant changes:     No acute changes overnight     Concerns for physician to address:  Cardioversion today      Zone phone for oncoming shift:            Olivia Dennison

## 2023-03-10 NOTE — PROGRESS NOTES
TRANSFER - OUT REPORT:    Verbal report given to Abraham Leigh (name) on Phoebe Acharya being transferred to PCU (unit) for routine progression of care       Report consisted of patient's Situation, Background, Assessment and   Recommendations(SBAR). Information from the following report(s) Procedure Summary was reviewed with the receiving nurse. Opportunity for questions and clarification was provided.       Patient transported with:   [employer]

## 2023-03-10 NOTE — PROGRESS NOTES
Brief Procedure Note    Patient: Yuki Shea MRN: [id number]  SSN: [id number]    YOB: 1945  Age: 69 y.o. Sex: female      Date of Procedure: 3/10/2023     Pre-procedure Diagnosis: AF    Post-procedure Diagnosis: CV    Procedure: CV    Performed By: Sabas Connelly III, DO     Anesthesia: Moderate Sedation    Estimated Blood Loss: Less than 10 mL      Specimens:  None    Findings: as above    Complications: None    Implants: None    Recommendations: Continue medical therapy per Dr. Annabel Hung.     Signed By: Ronal Lujan DO     March 10, 2023

## 2023-03-10 NOTE — PROGRESS NOTES
Transition of Care Plan:     RUR: 7% - \"low risk\"  Disposition: Home with follow up apts  Follow up appointments: PCP & specialist as indicated  DME needed: No DME needs identified for d/c  Transportation at Discharge: Pt plans to transport herself home at the time of d/c; car in ED parking lot  [address] or means to access home: Pt has access to her home       IM Medicare Letter: 2nd IM letter needed at d/c  Is patient a [address] [facility] [address] and connected with the [place]? No              Caregiver Contact: Pt's daughter Eden Dy: [phone])  Discharge Caregiver contacted prior to discharge? Per request  Care Conference needed?: Not at this time    Initial note: Chart reviewed for updates. It was discussed during IDR that pt will likely d/c today after cardiology clearance. CM to meet with pt at bedside to discuss d/c planning and give 2nd IM letter. Pt will drive herself home at time of d/c. CM will continue to follow up. Care Management Interventions  PCP Verified by CM:  Yes  Palliative Care Criteria Met (RRAT>21 & CHF Dx)?: No  Palliative Consult Recommended?: No  Reason Palliative Care Not Recommended?: Palliative Care not utilized by provider  Mode of Transport at Discharge: Self  Transition of Care Consult (CM Consult): Discharge Planning  Discharge Durable Medical Equipment: No  Physical Therapy Consult: No  Occupational Therapy Consult: No  Speech Therapy Consult: No  Support Systems: Child(daryl) (Pt's daughter is her main support system)  Confirm Follow Up Transport: Self  The Plan for Transition of Care is Related to the Following Treatment Goals : Home with follow up [place] Provided?: No  Discharge Location  Patient Expects to be Discharged to[de-identified] Home (Pt is d/c home with follow up apts)     NAMITA Martinez    [phone]

## 2023-03-10 NOTE — PROGRESS NOTES
8:46 AM   Pt sedated with 3mg Versed and 50 mcg Fentanyl, given 1 synchronized shock(s) at 360 Joules, Afib  converted to NSR.(monitored sedation from 0827 to 0838)

## 2023-03-11 LAB
CALCULATED R AXIS, ECG10: 1 DEGREES
CALCULATED R AXIS, ECG10: 14 DEGREES
CALCULATED T AXIS, ECG11: 42 DEGREES
CALCULATED T AXIS, ECG11: 44 DEGREES
DIAGNOSIS, 93000: NORMAL
DIAGNOSIS, 93000: NORMAL
Q-T INTERVAL, ECG07: 388 MS
Q-T INTERVAL, ECG07: 398 MS
QRS DURATION, ECG06: 88 MS
QRS DURATION, ECG06: 90 MS
QTC CALCULATION (BEZET), ECG08: 430 MS
QTC CALCULATION (BEZET), ECG08: 459 MS
VENTRICULAR RATE, ECG03: 74 BPM
VENTRICULAR RATE, ECG03: 80 BPM

## 2023-04-21 DIAGNOSIS — Z12.31 SCREENING MAMMOGRAM FOR HIGH-RISK PATIENT: Primary | ICD-10-CM

## 2023-07-18 ENCOUNTER — HOSPITAL ENCOUNTER (OUTPATIENT)
Facility: HOSPITAL | Age: 78
Discharge: HOME | End: 2023-07-21
Payer: MEDICARE

## 2023-07-18 DIAGNOSIS — Z12.31 SCREENING MAMMOGRAM FOR HIGH-RISK PATIENT: ICD-10-CM

## 2023-07-18 DIAGNOSIS — M54.50 LOW BACK PAIN, UNSPECIFIED BACK PAIN LATERALITY, UNSPECIFIED CHRONICITY, UNSPECIFIED WHETHER SCIATICA PRESENT: ICD-10-CM

## 2023-07-18 PROCEDURE — 77063 BREAST TOMOSYNTHESIS BI: CPT

## 2023-07-18 PROCEDURE — 72148 MRI LUMBAR SPINE W/O DYE: CPT

## 2023-12-26 ENCOUNTER — HOSPITAL ENCOUNTER (OUTPATIENT)
Facility: HOSPITAL | Age: 78
Discharge: HOME | End: 2023-12-28
Attending: INTERNAL MEDICINE

## 2023-12-26 VITALS — WEIGHT: 195 LBS | BODY MASS INDEX: 31.47 KG/M2

## 2023-12-26 DIAGNOSIS — I48.91 ATRIAL FIBRILLATION (HCC): ICD-10-CM

## 2023-12-26 NOTE — PROGRESS NOTES
Patient arrived for cardioversion procedure. EKG performed on arrival and patient in sinus rhythm. Provider Roxana notified and canceled procedure.

## 2023-12-27 LAB
EKG ATRIAL RATE: 90 BPM
EKG DIAGNOSIS: NORMAL
EKG P AXIS: 26 DEGREES
EKG P-R INTERVAL: 264 MS
EKG Q-T INTERVAL: 394 MS
EKG QRS DURATION: 98 MS
EKG QTC CALCULATION (BAZETT): 481 MS
EKG R AXIS: 38 DEGREES
EKG T AXIS: 68 DEGREES
EKG VENTRICULAR RATE: 90 BPM

## 2024-11-20 ENCOUNTER — APPOINTMENT (OUTPATIENT)
Facility: HOSPITAL | Age: 79
DRG: 291 | End: 2024-11-20
Payer: MEDICARE

## 2024-11-20 ENCOUNTER — HOSPITAL ENCOUNTER (INPATIENT)
Facility: HOSPITAL | Age: 79
LOS: 6 days | Discharge: HOME | DRG: 291 | End: 2024-11-26
Attending: EMERGENCY MEDICINE | Admitting: STUDENT IN AN ORGANIZED HEALTH CARE EDUCATION/TRAINING PROGRAM
Payer: MEDICARE

## 2024-11-20 DIAGNOSIS — I48.91 ATRIAL FIBRILLATION, UNSPECIFIED TYPE (HCC): ICD-10-CM

## 2024-11-20 DIAGNOSIS — I48.92 ATRIAL FLUTTER, UNSPECIFIED TYPE (HCC): ICD-10-CM

## 2024-11-20 DIAGNOSIS — J96.01 ACUTE RESPIRATORY FAILURE WITH HYPOXIA: Primary | ICD-10-CM

## 2024-11-20 DIAGNOSIS — I48.92 ATRIAL FLUTTER, PAROXYSMAL (HCC): ICD-10-CM

## 2024-11-20 DIAGNOSIS — J81.0 ACUTE PULMONARY EDEMA: ICD-10-CM

## 2024-11-20 DIAGNOSIS — J90 PLEURAL EFFUSION: ICD-10-CM

## 2024-11-20 LAB
ALBUMIN SERPL-MCNC: 3.5 G/DL (ref 3.5–5)
ALBUMIN/GLOB SERPL: 0.9 (ref 1.1–2.2)
ALP SERPL-CCNC: 78 U/L (ref 45–117)
ALT SERPL-CCNC: 13 U/L (ref 12–78)
ANION GAP SERPL CALC-SCNC: 8 MMOL/L (ref 2–12)
APPEARANCE UR: ABNORMAL
AST SERPL-CCNC: 12 U/L (ref 15–37)
BACTERIA URNS QL MICRO: ABNORMAL /HPF
BASOPHILS # BLD: 0.1 K/UL (ref 0–0.1)
BASOPHILS NFR BLD: 1 % (ref 0–1)
BILIRUB SERPL-MCNC: 0.9 MG/DL (ref 0.2–1)
BILIRUB UR QL: NEGATIVE
BUN SERPL-MCNC: 16 MG/DL (ref 6–20)
BUN/CREAT SERPL: 14 (ref 12–20)
CALCIUM SERPL-MCNC: 10 MG/DL (ref 8.5–10.1)
CHLORIDE SERPL-SCNC: 106 MMOL/L (ref 97–108)
CO2 SERPL-SCNC: 24 MMOL/L (ref 21–32)
COLOR UR: ABNORMAL
COMMENT:: NORMAL
CREAT SERPL-MCNC: 1.17 MG/DL (ref 0.55–1.02)
DIFFERENTIAL METHOD BLD: ABNORMAL
EOSINOPHIL # BLD: 0.1 K/UL (ref 0–0.4)
EOSINOPHIL NFR BLD: 1 % (ref 0–7)
EPITH CASTS URNS QL MICRO: ABNORMAL /LPF
ERYTHROCYTE [DISTWIDTH] IN BLOOD BY AUTOMATED COUNT: 15.5 % (ref 11.5–14.5)
FLUAV RNA SPEC QL NAA+PROBE: NOT DETECTED
FLUBV RNA SPEC QL NAA+PROBE: NOT DETECTED
GLOBULIN SER CALC-MCNC: 4.1 G/DL (ref 2–4)
GLUCOSE BLD STRIP.AUTO-MCNC: 81 MG/DL (ref 65–117)
GLUCOSE SERPL-MCNC: 95 MG/DL (ref 65–100)
GLUCOSE UR STRIP.AUTO-MCNC: NEGATIVE MG/DL
HCT VFR BLD AUTO: 36.3 % (ref 35–47)
HGB BLD-MCNC: 10.7 G/DL (ref 11.5–16)
HGB UR QL STRIP: ABNORMAL
HYALINE CASTS URNS QL MICRO: ABNORMAL /LPF (ref 0–5)
IMM GRANULOCYTES # BLD AUTO: 0.1 K/UL (ref 0–0.04)
IMM GRANULOCYTES NFR BLD AUTO: 1 % (ref 0–0.5)
KETONES UR QL STRIP.AUTO: 15 MG/DL
LACTATE BLD-SCNC: 1.65 MMOL/L (ref 0.4–2)
LEUKOCYTE ESTERASE UR QL STRIP.AUTO: ABNORMAL
LYMPHOCYTES # BLD: 0.7 K/UL (ref 0.8–3.5)
LYMPHOCYTES NFR BLD: 6 % (ref 12–49)
MAGNESIUM SERPL-MCNC: 2 MG/DL (ref 1.6–2.4)
MCH RBC QN AUTO: 26.8 PG (ref 26–34)
MCHC RBC AUTO-ENTMCNC: 29.5 G/DL (ref 30–36.5)
MCV RBC AUTO: 91 FL (ref 80–99)
MONOCYTES # BLD: 0.8 K/UL (ref 0–1)
MONOCYTES NFR BLD: 7 % (ref 5–13)
NEUTS SEG # BLD: 9.7 K/UL (ref 1.8–8)
NEUTS SEG NFR BLD: 84 % (ref 32–75)
NITRITE UR QL STRIP.AUTO: NEGATIVE
NRBC # BLD: 0 K/UL (ref 0–0.01)
NRBC BLD-RTO: 0 PER 100 WBC
NT PRO BNP: ABNORMAL PG/ML
PH UR STRIP: 5 (ref 5–8)
PLATELET # BLD AUTO: 390 K/UL (ref 150–400)
PMV BLD AUTO: 11.8 FL (ref 8.9–12.9)
POTASSIUM SERPL-SCNC: 4.3 MMOL/L (ref 3.5–5.1)
PROCALCITONIN SERPL-MCNC: <0.05 NG/ML
PROT SERPL-MCNC: 7.6 G/DL (ref 6.4–8.2)
PROT UR STRIP-MCNC: 30 MG/DL
RBC # BLD AUTO: 3.99 M/UL (ref 3.8–5.2)
RBC #/AREA URNS HPF: ABNORMAL /HPF (ref 0–5)
RBC MORPH BLD: ABNORMAL
RBC MORPH BLD: ABNORMAL
SARS-COV-2 RNA RESP QL NAA+PROBE: NOT DETECTED
SERVICE CMNT-IMP: NORMAL
SODIUM SERPL-SCNC: 138 MMOL/L (ref 136–145)
SOURCE: NORMAL
SP GR UR REFRACTOMETRY: 1.03
SPECIMEN HOLD: NORMAL
TROPONIN I SERPL HS-MCNC: 8 NG/L (ref 0–51)
TROPONIN I SERPL HS-MCNC: 9 NG/L (ref 0–51)
URINE CULTURE IF INDICATED: ABNORMAL
UROBILINOGEN UR QL STRIP.AUTO: 1 EU/DL (ref 0.2–1)
WBC # BLD AUTO: 11.5 K/UL (ref 3.6–11)
WBC URNS QL MICRO: ABNORMAL /HPF (ref 0–4)

## 2024-11-20 PROCEDURE — 83605 ASSAY OF LACTIC ACID: CPT

## 2024-11-20 PROCEDURE — 84484 ASSAY OF TROPONIN QUANT: CPT

## 2024-11-20 PROCEDURE — 93005 ELECTROCARDIOGRAM TRACING: CPT | Performed by: EMERGENCY MEDICINE

## 2024-11-20 PROCEDURE — 6370000000 HC RX 637 (ALT 250 FOR IP): Performed by: STUDENT IN AN ORGANIZED HEALTH CARE EDUCATION/TRAINING PROGRAM

## 2024-11-20 PROCEDURE — 93005 ELECTROCARDIOGRAM TRACING: CPT | Performed by: STUDENT IN AN ORGANIZED HEALTH CARE EDUCATION/TRAINING PROGRAM

## 2024-11-20 PROCEDURE — 6360000002 HC RX W HCPCS: Performed by: EMERGENCY MEDICINE

## 2024-11-20 PROCEDURE — 84145 PROCALCITONIN (PCT): CPT

## 2024-11-20 PROCEDURE — 83735 ASSAY OF MAGNESIUM: CPT

## 2024-11-20 PROCEDURE — 71275 CT ANGIOGRAPHY CHEST: CPT

## 2024-11-20 PROCEDURE — 85025 COMPLETE CBC W/AUTO DIFF WBC: CPT

## 2024-11-20 PROCEDURE — 6360000004 HC RX CONTRAST MEDICATION: Performed by: EMERGENCY MEDICINE

## 2024-11-20 PROCEDURE — 2580000003 HC RX 258: Performed by: EMERGENCY MEDICINE

## 2024-11-20 PROCEDURE — 36415 COLL VENOUS BLD VENIPUNCTURE: CPT

## 2024-11-20 PROCEDURE — 99285 EMERGENCY DEPT VISIT HI MDM: CPT

## 2024-11-20 PROCEDURE — 87086 URINE CULTURE/COLONY COUNT: CPT

## 2024-11-20 PROCEDURE — 71045 X-RAY EXAM CHEST 1 VIEW: CPT

## 2024-11-20 PROCEDURE — 87040 BLOOD CULTURE FOR BACTERIA: CPT

## 2024-11-20 PROCEDURE — 87636 SARSCOV2 & INF A&B AMP PRB: CPT

## 2024-11-20 PROCEDURE — 80053 COMPREHEN METABOLIC PANEL: CPT

## 2024-11-20 PROCEDURE — 2580000003 HC RX 258: Performed by: STUDENT IN AN ORGANIZED HEALTH CARE EDUCATION/TRAINING PROGRAM

## 2024-11-20 PROCEDURE — 74176 CT ABD & PELVIS W/O CONTRAST: CPT

## 2024-11-20 PROCEDURE — 83880 ASSAY OF NATRIURETIC PEPTIDE: CPT

## 2024-11-20 PROCEDURE — 81001 URINALYSIS AUTO W/SCOPE: CPT

## 2024-11-20 PROCEDURE — 82962 GLUCOSE BLOOD TEST: CPT

## 2024-11-20 PROCEDURE — 96374 THER/PROPH/DIAG INJ IV PUSH: CPT

## 2024-11-20 PROCEDURE — 1100000003 HC PRIVATE W/ TELEMETRY

## 2024-11-20 RX ORDER — SODIUM CHLORIDE 9 MG/ML
INJECTION, SOLUTION INTRAVENOUS PRN
Status: DISCONTINUED | OUTPATIENT
Start: 2024-11-20 | End: 2024-11-26 | Stop reason: HOSPADM

## 2024-11-20 RX ORDER — 0.9 % SODIUM CHLORIDE 0.9 %
30 INTRAVENOUS SOLUTION INTRAVENOUS ONCE
Status: COMPLETED | OUTPATIENT
Start: 2024-11-20 | End: 2024-11-20

## 2024-11-20 RX ORDER — FUROSEMIDE 10 MG/ML
60 INJECTION INTRAMUSCULAR; INTRAVENOUS DAILY
Status: DISCONTINUED | OUTPATIENT
Start: 2024-11-21 | End: 2024-11-23

## 2024-11-20 RX ORDER — ACETAMINOPHEN 325 MG/1
650 TABLET ORAL EVERY 6 HOURS PRN
Status: DISCONTINUED | OUTPATIENT
Start: 2024-11-20 | End: 2024-11-26 | Stop reason: HOSPADM

## 2024-11-20 RX ORDER — ACETAMINOPHEN 325 MG/1
650 TABLET ORAL EVERY 4 HOURS PRN
Status: DISCONTINUED | OUTPATIENT
Start: 2024-11-20 | End: 2024-11-20 | Stop reason: SDUPTHER

## 2024-11-20 RX ORDER — ALBUTEROL SULFATE 0.83 MG/ML
2.5 SOLUTION RESPIRATORY (INHALATION) EVERY 6 HOURS PRN
Status: DISCONTINUED | OUTPATIENT
Start: 2024-11-20 | End: 2024-11-26 | Stop reason: HOSPADM

## 2024-11-20 RX ORDER — PREGABALIN 75 MG/1
75 CAPSULE ORAL 2 TIMES DAILY
Status: DISCONTINUED | OUTPATIENT
Start: 2024-11-20 | End: 2024-11-26 | Stop reason: HOSPADM

## 2024-11-20 RX ORDER — SOTALOL HYDROCHLORIDE 80 MG/1
40 TABLET ORAL 2 TIMES DAILY
Status: DISCONTINUED | OUTPATIENT
Start: 2024-11-20 | End: 2024-11-21

## 2024-11-20 RX ORDER — FUROSEMIDE 10 MG/ML
60 INJECTION INTRAMUSCULAR; INTRAVENOUS
Status: COMPLETED | OUTPATIENT
Start: 2024-11-20 | End: 2024-11-20

## 2024-11-20 RX ORDER — IOPAMIDOL 755 MG/ML
100 INJECTION, SOLUTION INTRAVASCULAR
Status: COMPLETED | OUTPATIENT
Start: 2024-11-20 | End: 2024-11-20

## 2024-11-20 RX ORDER — ONDANSETRON 2 MG/ML
4 INJECTION INTRAMUSCULAR; INTRAVENOUS EVERY 6 HOURS PRN
Status: DISCONTINUED | OUTPATIENT
Start: 2024-11-20 | End: 2024-11-21

## 2024-11-20 RX ORDER — SODIUM CHLORIDE 0.9 % (FLUSH) 0.9 %
5-40 SYRINGE (ML) INJECTION PRN
Status: DISCONTINUED | OUTPATIENT
Start: 2024-11-20 | End: 2024-11-26 | Stop reason: HOSPADM

## 2024-11-20 RX ORDER — ACETAMINOPHEN 650 MG/1
650 SUPPOSITORY RECTAL EVERY 6 HOURS PRN
Status: DISCONTINUED | OUTPATIENT
Start: 2024-11-20 | End: 2024-11-26 | Stop reason: HOSPADM

## 2024-11-20 RX ORDER — POLYETHYLENE GLYCOL 3350 17 G/17G
17 POWDER, FOR SOLUTION ORAL DAILY PRN
Status: DISCONTINUED | OUTPATIENT
Start: 2024-11-20 | End: 2024-11-26 | Stop reason: HOSPADM

## 2024-11-20 RX ORDER — SOTALOL HYDROCHLORIDE 80 MG/1
40 TABLET ORAL 2 TIMES DAILY
Status: ON HOLD | COMMUNITY
Start: 2024-08-29 | End: 2024-11-26 | Stop reason: HOSPADM

## 2024-11-20 RX ORDER — TERBINAFINE HYDROCHLORIDE 250 MG/1
250 TABLET ORAL NIGHTLY
Status: ON HOLD | COMMUNITY
End: 2024-11-26 | Stop reason: HOSPADM

## 2024-11-20 RX ORDER — PANTOPRAZOLE SODIUM 40 MG/1
40 TABLET, DELAYED RELEASE ORAL 2 TIMES DAILY
Status: ON HOLD | COMMUNITY
End: 2024-11-26 | Stop reason: HOSPADM

## 2024-11-20 RX ORDER — TRAMADOL HYDROCHLORIDE 50 MG/1
100 TABLET ORAL 2 TIMES DAILY PRN
Status: DISCONTINUED | OUTPATIENT
Start: 2024-11-20 | End: 2024-11-26 | Stop reason: HOSPADM

## 2024-11-20 RX ORDER — PANTOPRAZOLE SODIUM 40 MG/1
40 TABLET, DELAYED RELEASE ORAL
Status: DISCONTINUED | OUTPATIENT
Start: 2024-11-21 | End: 2024-11-26 | Stop reason: HOSPADM

## 2024-11-20 RX ORDER — SODIUM CHLORIDE 0.9 % (FLUSH) 0.9 %
5-40 SYRINGE (ML) INJECTION EVERY 12 HOURS SCHEDULED
Status: DISCONTINUED | OUTPATIENT
Start: 2024-11-20 | End: 2024-11-26 | Stop reason: HOSPADM

## 2024-11-20 RX ADMIN — PREGABALIN 75 MG: 75 CAPSULE ORAL at 20:30

## 2024-11-20 RX ADMIN — Medication 10.5 MG: at 20:30

## 2024-11-20 RX ADMIN — SOTALOL HYDROCHLORIDE 40 MG: 80 TABLET ORAL at 20:31

## 2024-11-20 RX ADMIN — FUROSEMIDE 60 MG: 10 INJECTION, SOLUTION INTRAMUSCULAR; INTRAVENOUS at 13:55

## 2024-11-20 RX ADMIN — WATER 1000 MG: 1 INJECTION INTRAMUSCULAR; INTRAVENOUS; SUBCUTANEOUS at 12:09

## 2024-11-20 RX ADMIN — IOPAMIDOL 100 ML: 755 INJECTION, SOLUTION INTRAVENOUS at 12:56

## 2024-11-20 RX ADMIN — SODIUM CHLORIDE 2586 ML: 9 INJECTION, SOLUTION INTRAVENOUS at 11:03

## 2024-11-20 RX ADMIN — APIXABAN 5 MG: 5 TABLET, FILM COATED ORAL at 20:30

## 2024-11-20 RX ADMIN — SODIUM CHLORIDE, PRESERVATIVE FREE 10 ML: 5 INJECTION INTRAVENOUS at 20:31

## 2024-11-20 ASSESSMENT — LIFESTYLE VARIABLES
HOW OFTEN DO YOU HAVE A DRINK CONTAINING ALCOHOL: NEVER
HOW MANY STANDARD DRINKS CONTAINING ALCOHOL DO YOU HAVE ON A TYPICAL DAY: PATIENT DOES NOT DRINK

## 2024-11-20 ASSESSMENT — PAIN SCALES - GENERAL: PAINLEVEL_OUTOF10: 0

## 2024-11-20 NOTE — ED NOTES
Patient sats dropped to 88% on room air.  Patient placed on 2L nasal cannula at this time bu Dr. Betancourt.

## 2024-11-20 NOTE — H&P
Hospitalist Admission Note    NAME:   Vani Fontaine   : 1945   MRN: [id number]     Date/Time: 2024 2:04 PM    Patient PCP: Pj Aecvedo MD    ______________________________________________________________________  Given the patient's current clinical presentation, I have a high level of concern for decompensation if discharged from the emergency department.  Complex decision making was performed, which includes reviewing the patient's available past medical records, laboratory results, and x-ray films.       My assessment of this patient's clinical condition and my plan of care is as follows.    Assessment / Plan:    Acute on chronic diastolic heart failure  Atrial flutter  Hx of afib - on sotalol, apixaban  Hypercoagulable state  Pulmonary HTN  BNP ~17k. CTA with no PE, however showed trace bilateral pleural effusions, cardiomegaly, dilated PA.  Trop 8. COVID/flu neg, procal <0.05. Mild bilateral pitting edema on exam.   - echo ordered, last on file was 2022 with normal EF  - repeat trop for trend  - Initial EKG sinus tach nonspecific t wave abnormalities with however repeat with Aflutter. Asked nurse to scan this EKG into system.  - continue lasix 60 IV daily, takes bumex 2mg po qd at home  - Cardiology consulted, sees [facility]  - continue home sotalol, states she's not on metop or tikosyn  - continue home apixaban, states she's not on ASA  - daily standing weights, strict I/O    UTI?  Hematuria  States she was treated for UTI last week however UA with evidence of UTI. Will continue CTX and follow urine culture.  - ordered CT abd noncon to eval for stone precipitating recurrent UTIs and hematuria  - bladder checks    ?Chronic pain  - home tramadol, lyrica    Thyroid nodule  - will need PCP outpatient followup      Medical Decision Making:   I personally reviewed labs: cbc bmp trop procal LFTs BNP  I personally reviewed imaging: CTA chest  I personally reviewed EKG: sinus tach -> aflutter,

## 2024-11-20 NOTE — ED PROVIDER NOTES
Cardiac Output Measures , and labs  INTERVENTIONS - respiratory mgmt and IV Lasix  CASE REVIEW - Hospitalist/Intensivist and Nursing  TREATMENT RESPONSE -Stable  PERFORMED BY - Self    NOTES:  I have spent 40 minutes of critical care time involved in lab review, consultations with specialist, family decision- making, bedside attention and documentation. Time is exclusive of EKG interpretation, imaging interpretation and separately billed procedures.  During this entire length of time I was immediately available to the patient.  Pj Betancourt, DO      EMERGENCY DEPARTMENT COURSE and DIFFERENTIAL DIAGNOSIS/MDM   Vitals:    Vitals:    11/20/24 1200 11/20/24 1215 11/20/24 1300 11/20/24 1330   BP: 121/77 128/72 117/80 (!) 149/75   Pulse: 88 93 97 90   Resp: 23 22 20 21   Temp:       TempSrc:       SpO2:  91% 91% 91%   Weight:       Height:            Patient was given the following medications:  Medications   sodium chloride 0.9 % bolus 2,586 mL (0 mLs IntraVENous Stopped 11/20/24 1355)   iopamidol (ISOVUE-370) 76 % injection 100 mL (100 mLs IntraVENous Given 11/20/24 1256)   cefTRIAXone (ROCEPHIN) 1,000 mg in sterile water 10 mL IV syringe (1,000 mg IntraVENous Given 11/20/24 1209)   furosemide (LASIX) injection 60 mg (60 mg IntraVENous Given 11/20/24 1355)       Medical Decision Making  Amount and/or Complexity of Data Reviewed  Labs: ordered.  Radiology: ordered.  ECG/medicine tests: ordered.    Risk  Prescription drug management.  Decision regarding hospitalization.      Patient presents to the emergency department for evaluation of shortness of breath.  Patient states that she has had worsening shortness of breath over the past week.  She states the shortness of breath is moderate in severity at rest and severe with any exertion.  She states that she is only able to walk a few feet without getting significantly winded.  Patient did have a knee replacement on September 13.  She states she had been off of her Eliquis

## 2024-11-21 LAB
ANION GAP SERPL CALC-SCNC: 6 MMOL/L (ref 2–12)
APPEARANCE UR: CLEAR
BACTERIA SPEC CULT: NORMAL
BACTERIA URNS QL MICRO: NEGATIVE /HPF
BASOPHILS # BLD: 0.1 K/UL (ref 0–0.1)
BASOPHILS NFR BLD: 1 % (ref 0–1)
BILIRUB UR QL: NEGATIVE
BUN SERPL-MCNC: 11 MG/DL (ref 6–20)
BUN/CREAT SERPL: 11 (ref 12–20)
CALCIUM SERPL-MCNC: 8.6 MG/DL (ref 8.5–10.1)
CC UR VC: NORMAL
CHLORIDE SERPL-SCNC: 107 MMOL/L (ref 97–108)
CO2 SERPL-SCNC: 27 MMOL/L (ref 21–32)
COLOR UR: ABNORMAL
CREAT SERPL-MCNC: 1.02 MG/DL (ref 0.55–1.02)
DIFFERENTIAL METHOD BLD: ABNORMAL
EKG ATRIAL RATE: 130 BPM
EKG DIAGNOSIS: NORMAL
EKG P AXIS: 85 DEGREES
EKG P-R INTERVAL: 170 MS
EKG Q-T INTERVAL: 276 MS
EKG QRS DURATION: 78 MS
EKG QTC CALCULATION (BAZETT): 406 MS
EKG R AXIS: 115 DEGREES
EKG T AXIS: -14 DEGREES
EKG VENTRICULAR RATE: 130 BPM
EOSINOPHIL # BLD: 0.2 K/UL (ref 0–0.4)
EOSINOPHIL NFR BLD: 3 % (ref 0–7)
EPITH CASTS URNS QL MICRO: ABNORMAL /LPF
ERYTHROCYTE [DISTWIDTH] IN BLOOD BY AUTOMATED COUNT: 15.6 % (ref 11.5–14.5)
GLUCOSE BLD STRIP.AUTO-MCNC: 189 MG/DL (ref 65–117)
GLUCOSE SERPL-MCNC: 71 MG/DL (ref 65–100)
GLUCOSE UR STRIP.AUTO-MCNC: NEGATIVE MG/DL
HCT VFR BLD AUTO: 31.5 % (ref 35–47)
HGB BLD-MCNC: 9.2 G/DL (ref 11.5–16)
HGB UR QL STRIP: ABNORMAL
HYALINE CASTS URNS QL MICRO: ABNORMAL /LPF (ref 0–5)
IMM GRANULOCYTES # BLD AUTO: 0.1 K/UL (ref 0–0.04)
IMM GRANULOCYTES NFR BLD AUTO: 1 % (ref 0–0.5)
KETONES UR QL STRIP.AUTO: NEGATIVE MG/DL
LEUKOCYTE ESTERASE UR QL STRIP.AUTO: ABNORMAL
LYMPHOCYTES # BLD: 0.8 K/UL (ref 0.8–3.5)
LYMPHOCYTES NFR BLD: 12 % (ref 12–49)
MAGNESIUM SERPL-MCNC: 1.7 MG/DL (ref 1.6–2.4)
MCH RBC QN AUTO: 26.5 PG (ref 26–34)
MCHC RBC AUTO-ENTMCNC: 29.2 G/DL (ref 30–36.5)
MCV RBC AUTO: 90.8 FL (ref 80–99)
MONOCYTES # BLD: 0.7 K/UL (ref 0–1)
MONOCYTES NFR BLD: 10 % (ref 5–13)
NEUTS SEG # BLD: 5.1 K/UL (ref 1.8–8)
NEUTS SEG NFR BLD: 73 % (ref 32–75)
NITRITE UR QL STRIP.AUTO: NEGATIVE
NRBC # BLD: 0 K/UL (ref 0–0.01)
NRBC BLD-RTO: 0 PER 100 WBC
PH UR STRIP: 5 (ref 5–8)
PHOSPHATE SERPL-MCNC: 3.5 MG/DL (ref 2.6–4.7)
PLATELET # BLD AUTO: 276 K/UL (ref 150–400)
PMV BLD AUTO: 11.5 FL (ref 8.9–12.9)
POTASSIUM SERPL-SCNC: 3.7 MMOL/L (ref 3.5–5.1)
PROT UR STRIP-MCNC: NEGATIVE MG/DL
RBC # BLD AUTO: 3.47 M/UL (ref 3.8–5.2)
RBC #/AREA URNS HPF: ABNORMAL /HPF (ref 0–5)
SERVICE CMNT-IMP: ABNORMAL
SERVICE CMNT-IMP: NORMAL
SODIUM SERPL-SCNC: 140 MMOL/L (ref 136–145)
SP GR UR REFRACTOMETRY: 1.01
URINE CULTURE IF INDICATED: ABNORMAL
UROBILINOGEN UR QL STRIP.AUTO: 0.2 EU/DL (ref 0.2–1)
WBC # BLD AUTO: 6.9 K/UL (ref 3.6–11)
WBC URNS QL MICRO: ABNORMAL /HPF (ref 0–4)

## 2024-11-21 PROCEDURE — 6370000000 HC RX 637 (ALT 250 FOR IP): Performed by: INTERNAL MEDICINE

## 2024-11-21 PROCEDURE — 93005 ELECTROCARDIOGRAM TRACING: CPT | Performed by: INTERNAL MEDICINE

## 2024-11-21 PROCEDURE — 82962 GLUCOSE BLOOD TEST: CPT

## 2024-11-21 PROCEDURE — 36415 COLL VENOUS BLD VENIPUNCTURE: CPT

## 2024-11-21 PROCEDURE — 83735 ASSAY OF MAGNESIUM: CPT

## 2024-11-21 PROCEDURE — 6360000002 HC RX W HCPCS: Performed by: INTERNAL MEDICINE

## 2024-11-21 PROCEDURE — 2060000000 HC ICU INTERMEDIATE R&B

## 2024-11-21 PROCEDURE — 6360000002 HC RX W HCPCS: Performed by: STUDENT IN AN ORGANIZED HEALTH CARE EDUCATION/TRAINING PROGRAM

## 2024-11-21 PROCEDURE — 85025 COMPLETE CBC W/AUTO DIFF WBC: CPT

## 2024-11-21 PROCEDURE — 81001 URINALYSIS AUTO W/SCOPE: CPT

## 2024-11-21 PROCEDURE — 6370000000 HC RX 637 (ALT 250 FOR IP): Performed by: STUDENT IN AN ORGANIZED HEALTH CARE EDUCATION/TRAINING PROGRAM

## 2024-11-21 PROCEDURE — 2700000000 HC OXYGEN THERAPY PER DAY

## 2024-11-21 PROCEDURE — P9047 ALBUMIN (HUMAN), 25%, 50ML: HCPCS | Performed by: INTERNAL MEDICINE

## 2024-11-21 PROCEDURE — 84100 ASSAY OF PHOSPHORUS: CPT

## 2024-11-21 PROCEDURE — 80048 BASIC METABOLIC PNL TOTAL CA: CPT

## 2024-11-21 PROCEDURE — 2580000003 HC RX 258: Performed by: STUDENT IN AN ORGANIZED HEALTH CARE EDUCATION/TRAINING PROGRAM

## 2024-11-21 RX ORDER — MAGNESIUM SULFATE IN WATER 40 MG/ML
2000 INJECTION, SOLUTION INTRAVENOUS ONCE
Status: COMPLETED | OUTPATIENT
Start: 2024-11-21 | End: 2024-11-21

## 2024-11-21 RX ORDER — SOTALOL HYDROCHLORIDE 80 MG/1
80 TABLET ORAL 2 TIMES DAILY
Status: DISCONTINUED | OUTPATIENT
Start: 2024-11-21 | End: 2024-11-22

## 2024-11-21 RX ORDER — ALBUMIN (HUMAN) 12.5 G/50ML
25 SOLUTION INTRAVENOUS ONCE
Status: COMPLETED | OUTPATIENT
Start: 2024-11-21 | End: 2024-11-21

## 2024-11-21 RX ADMIN — MAGNESIUM SULFATE HEPTAHYDRATE 2000 MG: 40 INJECTION, SOLUTION INTRAVENOUS at 09:24

## 2024-11-21 RX ADMIN — APIXABAN 5 MG: 5 TABLET, FILM COATED ORAL at 09:26

## 2024-11-21 RX ADMIN — PREGABALIN 75 MG: 75 CAPSULE ORAL at 21:14

## 2024-11-21 RX ADMIN — SOTALOL HYDROCHLORIDE 80 MG: 80 TABLET ORAL at 13:55

## 2024-11-21 RX ADMIN — POTASSIUM BICARBONATE 40 MEQ: 782 TABLET, EFFERVESCENT ORAL at 14:53

## 2024-11-21 RX ADMIN — FUROSEMIDE 60 MG: 10 INJECTION, SOLUTION INTRAMUSCULAR; INTRAVENOUS at 09:25

## 2024-11-21 RX ADMIN — ALBUMIN (HUMAN) 25 G: 0.25 INJECTION, SOLUTION INTRAVENOUS at 21:13

## 2024-11-21 RX ADMIN — PANTOPRAZOLE SODIUM 40 MG: 40 TABLET, DELAYED RELEASE ORAL at 06:15

## 2024-11-21 RX ADMIN — WATER 1000 MG: 1 INJECTION INTRAMUSCULAR; INTRAVENOUS; SUBCUTANEOUS at 11:39

## 2024-11-21 RX ADMIN — SODIUM CHLORIDE, PRESERVATIVE FREE 10 ML: 5 INJECTION INTRAVENOUS at 09:26

## 2024-11-21 RX ADMIN — APIXABAN 5 MG: 5 TABLET, FILM COATED ORAL at 21:14

## 2024-11-21 RX ADMIN — SOTALOL HYDROCHLORIDE 40 MG: 80 TABLET ORAL at 21:14

## 2024-11-21 RX ADMIN — PREGABALIN 75 MG: 75 CAPSULE ORAL at 09:26

## 2024-11-21 ASSESSMENT — PAIN SCALES - GENERAL: PAINLEVEL_OUTOF10: 0

## 2024-11-21 NOTE — PLAN OF CARE
Problem: Chronic Conditions and Co-morbidities  Goal: Patient's chronic conditions and co-morbidity symptoms are monitored and maintained or improved  11/21/2024 1402 by Julee Pace RN  Outcome: Progressing  11/21/2024 0959 by Shalini Jorge RN  Outcome: Progressing  11/21/2024 0959 by Shalini Jorge RN  Outcome: Progressing     Problem: Safety - Adult  Goal: Free from fall injury  11/21/2024 1402 by Julee Pace RN  Outcome: Progressing  11/21/2024 0959 by Shalini Jorge RN  Outcome: Progressing  11/21/2024 0959 by Shalini Jorge RN  Outcome: Progressing     Problem: Discharge Planning  Goal: Discharge to home or other facility with appropriate resources  Outcome: Progressing

## 2024-11-21 NOTE — Clinical Note
End of Shift Note    Bedside shift change report given to *** (oncoming nurse) by Arthur Ann RN (offgoing nurse).  Report included the following information {SBAR REPORTS LIST:23056}    Shift worked:  ***     Shift summary and any significant changes:     ***     Concerns for physician to address:  ***     Zone phone for oncoming shift:   ***       Activity:  Level of Assistance: Moderate assist, patient does 50-74%  Number times ambulated in hallways past shift: {Numbers; 0-5:334933}  Number of times OOB to chair past shift: {Numbers; 0-5:550121}    Cardiac:   Cardiac Monitoring: {YES/NO:23877}      Cardiac Rhythm: Atrial fib    Access:  Current line(s): {Line choices:85101}    Genitourinary:        Respiratory:   O2 Device: Nasal cannula  Chronic home O2 use?: {YES/NO/NA:42827}  Incentive spirometer at bedside: {YES/NO/NA:73008}    GI:  Last BM (including prior to admit): 11/19/24  Current diet:  ADULT DIET; Regular; Low Fat/Low Chol/High Fiber/2 gm Na; 2000 ml  Passing flatus: {YES/NO:14321}    Pain Management:   Patient states pain is manageable on current regimen: {YES/NO/NA:65560}    Skin:  Salas Scale Score: 19  Interventions: Wound Offloading (Prevention Methods): Turning, Repositioning    Patient Safety:  Fall Risk: Nursing Judgement-Fall Risk High(Add Comments): Yes  Fall Risk Interventions  Nursing Judgement-Fall Risk High(Add Comments): Yes  Toilet Every 2 Hours-In Advance of Need: Yes  Hourly Visual Checks: In bed, Awake  Fall Visual Posted: Armband, Socks  Room Door Open: Deferred to decrease stimulation  Alarm On: Bed  Patient Moved Closer to Nursing Station: No    Active Consults:   IP CONSULT TO HOSPITALIST  IP CONSULT TO CARDIOLOGY    Length of Stay:  Expected LOS: 3  Actual LOS: 1    Arthur Ann RN

## 2024-11-21 NOTE — CONSULTS
5-40 mL IntraVENous 2 times per day Mendel, David L, MD   10 mL at 11/20/24 2031    sodium chloride flush 0.9 % injection 5-40 mL  5-40 mL IntraVENous PRN Mendel, David L, MD        0.9 % sodium chloride infusion   IntraVENous PRN Mendel, David L, MD        polyethylene glycol (GLYCOLAX) packet 17 g  17 g Oral Daily PRN Mendel, David L, MD        acetaminophen (TYLENOL) tablet 650 mg  650 mg Oral Q6H PRN Mendel, David L, MD        Or    acetaminophen (TYLENOL) suppository 650 mg  650 mg Rectal Q6H PRN Mendel, David L, MD        cefTRIAXone (ROCEPHIN) 1,000 mg in sterile water 10 mL IV syringe  1,000 mg IntraVENous Q24H Mendel, David L, MD        furosemide (LASIX) injection 60 mg  60 mg IntraVENous Daily Mendel, David L, MD Alex Baher, MD  Clinical Cardiac Electrophysiology  [facility]  11/21/2024

## 2024-11-22 ENCOUNTER — APPOINTMENT (OUTPATIENT)
Facility: HOSPITAL | Age: 79
DRG: 291 | End: 2024-11-22
Attending: STUDENT IN AN ORGANIZED HEALTH CARE EDUCATION/TRAINING PROGRAM
Payer: MEDICARE

## 2024-11-22 LAB
ANION GAP SERPL CALC-SCNC: 5 MMOL/L (ref 2–12)
BASOPHILS # BLD: 0 K/UL (ref 0–0.1)
BASOPHILS NFR BLD: 1 % (ref 0–1)
BUN SERPL-MCNC: 19 MG/DL (ref 6–20)
BUN/CREAT SERPL: 15 (ref 12–20)
CALCIUM SERPL-MCNC: 9.2 MG/DL (ref 8.5–10.1)
CHLORIDE SERPL-SCNC: 105 MMOL/L (ref 97–108)
CO2 SERPL-SCNC: 30 MMOL/L (ref 21–32)
CREAT SERPL-MCNC: 1.28 MG/DL (ref 0.55–1.02)
DIFFERENTIAL METHOD BLD: ABNORMAL
ECHO AR MAX VEL PISA: 3.6 M/S
ECHO AV MEAN GRADIENT: 5 MMHG
ECHO AV MEAN VELOCITY: 1.1 M/S
ECHO AV PEAK GRADIENT: 10 MMHG
ECHO AV PEAK GRADIENT: 10 MMHG
ECHO AV PEAK VELOCITY: 1.6 M/S
ECHO AV PEAK VELOCITY: 1.6 M/S
ECHO AV REGURGITANT PHT: 347.1 MILLISECOND
ECHO AV VTI: 30.8 CM
ECHO BSA: 1.92 M2
ECHO EST RA PRESSURE: 10 MMHG
ECHO LV EF PHYSICIAN: 55 %
ECHO LVOT AV VTI INDEX: 0.67
ECHO LVOT MEAN GRADIENT: 3 MMHG
ECHO LVOT PEAK GRADIENT: 5 MMHG
ECHO LVOT PEAK GRADIENT: 5 MMHG
ECHO LVOT PEAK VELOCITY: 1.1 M/S
ECHO LVOT PEAK VELOCITY: 1.1 M/S
ECHO LVOT VTI: 20.7 CM
ECHO MV LVOT VTI INDEX: 2.53
ECHO MV MAX VELOCITY: 2 M/S
ECHO MV MEAN GRADIENT: 7 MMHG
ECHO MV MEAN VELOCITY: 1.2 M/S
ECHO MV PEAK GRADIENT: 17 MMHG
ECHO MV VTI: 52.3 CM
ECHO PULMONARY ARTERY END DIASTOLIC PRESSURE: 10 MMHG
ECHO PV MAX VELOCITY: 2 M/S
ECHO PV PEAK GRADIENT: 16 MMHG
ECHO PV REGURGITANT MAX VELOCITY: 1.6 M/S
ECHO RIGHT VENTRICULAR SYSTOLIC PRESSURE (RVSP): 37 MMHG
ECHO TV REGURGITANT MAX VELOCITY: 2.61 M/S
ECHO TV REGURGITANT PEAK GRADIENT: 28 MMHG
EKG ATRIAL RATE: 249 BPM
EKG DIAGNOSIS: NORMAL
EKG Q-T INTERVAL: 376 MS
EKG QRS DURATION: 80 MS
EKG QTC CALCULATION (BAZETT): 475 MS
EKG R AXIS: 56 DEGREES
EKG T AXIS: 12 DEGREES
EKG VENTRICULAR RATE: 96 BPM
EOSINOPHIL # BLD: 0.2 K/UL (ref 0–0.4)
EOSINOPHIL NFR BLD: 3 % (ref 0–7)
ERYTHROCYTE [DISTWIDTH] IN BLOOD BY AUTOMATED COUNT: 15.9 % (ref 11.5–14.5)
GLUCOSE SERPL-MCNC: 108 MG/DL (ref 65–100)
HCT VFR BLD AUTO: 33.1 % (ref 35–47)
HGB BLD-MCNC: 9.7 G/DL (ref 11.5–16)
IMM GRANULOCYTES # BLD AUTO: 0 K/UL (ref 0–0.04)
IMM GRANULOCYTES NFR BLD AUTO: 1 % (ref 0–0.5)
LYMPHOCYTES # BLD: 1 K/UL (ref 0.8–3.5)
LYMPHOCYTES NFR BLD: 13 % (ref 12–49)
MAGNESIUM SERPL-MCNC: 2.2 MG/DL (ref 1.6–2.4)
MCH RBC QN AUTO: 26.8 PG (ref 26–34)
MCHC RBC AUTO-ENTMCNC: 29.3 G/DL (ref 30–36.5)
MCV RBC AUTO: 91.4 FL (ref 80–99)
MONOCYTES # BLD: 0.8 K/UL (ref 0–1)
MONOCYTES NFR BLD: 10 % (ref 5–13)
NEUTS SEG # BLD: 5.5 K/UL (ref 1.8–8)
NEUTS SEG NFR BLD: 72 % (ref 32–75)
NRBC # BLD: 0 K/UL (ref 0–0.01)
NRBC BLD-RTO: 0 PER 100 WBC
PHOSPHATE SERPL-MCNC: 2.8 MG/DL (ref 2.6–4.7)
PLATELET # BLD AUTO: 318 K/UL (ref 150–400)
PMV BLD AUTO: 11.6 FL (ref 8.9–12.9)
POTASSIUM SERPL-SCNC: 4.3 MMOL/L (ref 3.5–5.1)
RBC # BLD AUTO: 3.62 M/UL (ref 3.8–5.2)
SODIUM SERPL-SCNC: 140 MMOL/L (ref 136–145)
WBC # BLD AUTO: 7.5 K/UL (ref 3.6–11)

## 2024-11-22 PROCEDURE — 83735 ASSAY OF MAGNESIUM: CPT

## 2024-11-22 PROCEDURE — 80048 BASIC METABOLIC PNL TOTAL CA: CPT

## 2024-11-22 PROCEDURE — 6360000002 HC RX W HCPCS: Performed by: STUDENT IN AN ORGANIZED HEALTH CARE EDUCATION/TRAINING PROGRAM

## 2024-11-22 PROCEDURE — 6370000000 HC RX 637 (ALT 250 FOR IP): Performed by: INTERNAL MEDICINE

## 2024-11-22 PROCEDURE — 85025 COMPLETE CBC W/AUTO DIFF WBC: CPT

## 2024-11-22 PROCEDURE — 2580000003 HC RX 258: Performed by: STUDENT IN AN ORGANIZED HEALTH CARE EDUCATION/TRAINING PROGRAM

## 2024-11-22 PROCEDURE — 84100 ASSAY OF PHOSPHORUS: CPT

## 2024-11-22 PROCEDURE — 2700000000 HC OXYGEN THERAPY PER DAY

## 2024-11-22 PROCEDURE — 36415 COLL VENOUS BLD VENIPUNCTURE: CPT

## 2024-11-22 PROCEDURE — 93321 DOPPLER ECHO F-UP/LMTD STD: CPT

## 2024-11-22 PROCEDURE — 6370000000 HC RX 637 (ALT 250 FOR IP): Performed by: STUDENT IN AN ORGANIZED HEALTH CARE EDUCATION/TRAINING PROGRAM

## 2024-11-22 PROCEDURE — 2060000000 HC ICU INTERMEDIATE R&B

## 2024-11-22 RX ORDER — SOTALOL HYDROCHLORIDE 80 MG/1
40 TABLET ORAL 2 TIMES DAILY
Status: DISCONTINUED | OUTPATIENT
Start: 2024-11-22 | End: 2024-11-23

## 2024-11-22 RX ADMIN — PREGABALIN 75 MG: 75 CAPSULE ORAL at 10:05

## 2024-11-22 RX ADMIN — WATER 1000 MG: 1 INJECTION INTRAMUSCULAR; INTRAVENOUS; SUBCUTANEOUS at 13:26

## 2024-11-22 RX ADMIN — APIXABAN 5 MG: 5 TABLET, FILM COATED ORAL at 10:05

## 2024-11-22 RX ADMIN — PANTOPRAZOLE SODIUM 40 MG: 40 TABLET, DELAYED RELEASE ORAL at 09:50

## 2024-11-22 RX ADMIN — SOTALOL HYDROCHLORIDE 40 MG: 80 TABLET ORAL at 10:07

## 2024-11-22 RX ADMIN — FUROSEMIDE 60 MG: 10 INJECTION, SOLUTION INTRAMUSCULAR; INTRAVENOUS at 10:08

## 2024-11-22 RX ADMIN — APIXABAN 5 MG: 5 TABLET, FILM COATED ORAL at 21:09

## 2024-11-22 RX ADMIN — PREGABALIN 75 MG: 75 CAPSULE ORAL at 21:09

## 2024-11-22 RX ADMIN — SOTALOL HYDROCHLORIDE 40 MG: 80 TABLET ORAL at 21:09

## 2024-11-22 RX ADMIN — Medication 10.5 MG: at 21:10

## 2024-11-22 RX ADMIN — SODIUM CHLORIDE, PRESERVATIVE FREE 10 ML: 5 INJECTION INTRAVENOUS at 21:09

## 2024-11-22 RX ADMIN — SODIUM CHLORIDE, PRESERVATIVE FREE 10 ML: 5 INJECTION INTRAVENOUS at 10:08

## 2024-11-23 LAB
ANION GAP SERPL CALC-SCNC: 4 MMOL/L (ref 2–12)
BASOPHILS # BLD: 0.1 K/UL (ref 0–0.1)
BASOPHILS NFR BLD: 1 % (ref 0–1)
BUN SERPL-MCNC: 21 MG/DL (ref 6–20)
BUN/CREAT SERPL: 15 (ref 12–20)
CALCIUM SERPL-MCNC: 9 MG/DL (ref 8.5–10.1)
CHLORIDE SERPL-SCNC: 103 MMOL/L (ref 97–108)
CO2 SERPL-SCNC: 30 MMOL/L (ref 21–32)
CREAT SERPL-MCNC: 1.37 MG/DL (ref 0.55–1.02)
DIFFERENTIAL METHOD BLD: ABNORMAL
EOSINOPHIL # BLD: 0.3 K/UL (ref 0–0.4)
EOSINOPHIL NFR BLD: 4 % (ref 0–7)
ERYTHROCYTE [DISTWIDTH] IN BLOOD BY AUTOMATED COUNT: 15.8 % (ref 11.5–14.5)
GLUCOSE SERPL-MCNC: 114 MG/DL (ref 65–100)
HCT VFR BLD AUTO: 34 % (ref 35–47)
HGB BLD-MCNC: 10 G/DL (ref 11.5–16)
IMM GRANULOCYTES # BLD AUTO: 0.1 K/UL (ref 0–0.04)
IMM GRANULOCYTES NFR BLD AUTO: 1 % (ref 0–0.5)
LYMPHOCYTES # BLD: 1 K/UL (ref 0.8–3.5)
LYMPHOCYTES NFR BLD: 14 % (ref 12–49)
MAGNESIUM SERPL-MCNC: 2.1 MG/DL (ref 1.6–2.4)
MCH RBC QN AUTO: 26.7 PG (ref 26–34)
MCHC RBC AUTO-ENTMCNC: 29.4 G/DL (ref 30–36.5)
MCV RBC AUTO: 90.9 FL (ref 80–99)
MONOCYTES # BLD: 0.6 K/UL (ref 0–1)
MONOCYTES NFR BLD: 9 % (ref 5–13)
NEUTS SEG # BLD: 5.4 K/UL (ref 1.8–8)
NEUTS SEG NFR BLD: 71 % (ref 32–75)
NRBC # BLD: 0 K/UL (ref 0–0.01)
NRBC BLD-RTO: 0 PER 100 WBC
PHOSPHATE SERPL-MCNC: 3.4 MG/DL (ref 2.6–4.7)
PLATELET # BLD AUTO: 355 K/UL (ref 150–400)
PMV BLD AUTO: 11.6 FL (ref 8.9–12.9)
POTASSIUM SERPL-SCNC: 4.4 MMOL/L (ref 3.5–5.1)
RBC # BLD AUTO: 3.74 M/UL (ref 3.8–5.2)
SODIUM SERPL-SCNC: 137 MMOL/L (ref 136–145)
WBC # BLD AUTO: 7.4 K/UL (ref 3.6–11)

## 2024-11-23 PROCEDURE — 85025 COMPLETE CBC W/AUTO DIFF WBC: CPT

## 2024-11-23 PROCEDURE — 2060000000 HC ICU INTERMEDIATE R&B

## 2024-11-23 PROCEDURE — 80048 BASIC METABOLIC PNL TOTAL CA: CPT

## 2024-11-23 PROCEDURE — 2700000000 HC OXYGEN THERAPY PER DAY

## 2024-11-23 PROCEDURE — 6360000002 HC RX W HCPCS: Performed by: INTERNAL MEDICINE

## 2024-11-23 PROCEDURE — 6370000000 HC RX 637 (ALT 250 FOR IP): Performed by: INTERNAL MEDICINE

## 2024-11-23 PROCEDURE — 36415 COLL VENOUS BLD VENIPUNCTURE: CPT

## 2024-11-23 PROCEDURE — 6370000000 HC RX 637 (ALT 250 FOR IP): Performed by: STUDENT IN AN ORGANIZED HEALTH CARE EDUCATION/TRAINING PROGRAM

## 2024-11-23 PROCEDURE — 84100 ASSAY OF PHOSPHORUS: CPT

## 2024-11-23 PROCEDURE — 83735 ASSAY OF MAGNESIUM: CPT

## 2024-11-23 PROCEDURE — 2580000003 HC RX 258: Performed by: STUDENT IN AN ORGANIZED HEALTH CARE EDUCATION/TRAINING PROGRAM

## 2024-11-23 RX ORDER — SOTALOL HYDROCHLORIDE 80 MG/1
80 TABLET ORAL 2 TIMES DAILY
Status: DISCONTINUED | OUTPATIENT
Start: 2024-11-23 | End: 2024-11-26 | Stop reason: HOSPADM

## 2024-11-23 RX ORDER — FUROSEMIDE 10 MG/ML
20 INJECTION INTRAMUSCULAR; INTRAVENOUS DAILY
Status: DISCONTINUED | OUTPATIENT
Start: 2024-11-23 | End: 2024-11-26 | Stop reason: HOSPADM

## 2024-11-23 RX ORDER — SOTALOL HYDROCHLORIDE 80 MG/1
40 TABLET ORAL ONCE
Status: COMPLETED | OUTPATIENT
Start: 2024-11-23 | End: 2024-11-23

## 2024-11-23 RX ADMIN — PREGABALIN 75 MG: 75 CAPSULE ORAL at 08:36

## 2024-11-23 RX ADMIN — SODIUM CHLORIDE, PRESERVATIVE FREE 10 ML: 5 INJECTION INTRAVENOUS at 08:37

## 2024-11-23 RX ADMIN — EMPAGLIFLOZIN 10 MG: 10 TABLET, FILM COATED ORAL at 08:36

## 2024-11-23 RX ADMIN — APIXABAN 5 MG: 5 TABLET, FILM COATED ORAL at 21:02

## 2024-11-23 RX ADMIN — SOTALOL HYDROCHLORIDE 80 MG: 80 TABLET ORAL at 21:06

## 2024-11-23 RX ADMIN — PREGABALIN 75 MG: 75 CAPSULE ORAL at 21:02

## 2024-11-23 RX ADMIN — PANTOPRAZOLE SODIUM 40 MG: 40 TABLET, DELAYED RELEASE ORAL at 06:22

## 2024-11-23 RX ADMIN — SOTALOL HYDROCHLORIDE 40 MG: 80 TABLET ORAL at 10:34

## 2024-11-23 RX ADMIN — FUROSEMIDE 20 MG: 10 INJECTION, SOLUTION INTRAMUSCULAR; INTRAVENOUS at 08:30

## 2024-11-23 RX ADMIN — APIXABAN 5 MG: 5 TABLET, FILM COATED ORAL at 08:36

## 2024-11-23 RX ADMIN — SODIUM CHLORIDE, PRESERVATIVE FREE 10 ML: 5 INJECTION INTRAVENOUS at 21:05

## 2024-11-23 RX ADMIN — Medication 10.5 MG: at 21:02

## 2024-11-23 RX ADMIN — SOTALOL HYDROCHLORIDE 40 MG: 80 TABLET ORAL at 08:37

## 2024-11-23 ASSESSMENT — PAIN SCALES - GENERAL
PAINLEVEL_OUTOF10: 0
PAINLEVEL_OUTOF10: 0

## 2024-11-23 NOTE — PLAN OF CARE
Problem: Chronic Conditions and Co-morbidities  Goal: Patient's chronic conditions and co-morbidity symptoms are monitored and maintained or improved  11/22/2024 2159 by Gely Neal RN  Outcome: Progressing     Problem: Safety - Adult  Goal: Free from fall injury  11/23/2024 0902 by Magnolia Pelaez RN  Outcome: Progressing  11/22/2024 2159 by Gely Neal, RN  Outcome: Progressing     Problem: Discharge Planning  Goal: Discharge to home or other facility with appropriate resources  11/22/2024 2159 by Gely Neal, RN  Outcome: Progressing

## 2024-11-23 NOTE — PLAN OF CARE
Problem: Chronic Conditions and Co-morbidities  Goal: Patient's chronic conditions and co-morbidity symptoms are monitored and maintained or improved  11/22/2024 2159 by Gely Neal RN  Outcome: Progressing  11/22/2024 1352 by Julee Pace RN  Outcome: Progressing     Problem: Safety - Adult  Goal: Free from fall injury  11/22/2024 2159 by Gely Neal RN  Outcome: Progressing  11/22/2024 1352 by Julee Pace RN  Outcome: Progressing     Problem: Discharge Planning  Goal: Discharge to home or other facility with appropriate resources  11/22/2024 2159 by Gely Neal RN  Outcome: Progressing  11/22/2024 1352 by Julee Pace RN  Outcome: Progressing

## 2024-11-24 LAB
ANION GAP SERPL CALC-SCNC: 2 MMOL/L (ref 2–12)
BASOPHILS # BLD: 0.1 K/UL (ref 0–0.1)
BASOPHILS NFR BLD: 1 % (ref 0–1)
BUN SERPL-MCNC: 20 MG/DL (ref 6–20)
BUN/CREAT SERPL: 19 (ref 12–20)
CALCIUM SERPL-MCNC: 8.9 MG/DL (ref 8.5–10.1)
CHLORIDE SERPL-SCNC: 104 MMOL/L (ref 97–108)
CO2 SERPL-SCNC: 31 MMOL/L (ref 21–32)
CREAT SERPL-MCNC: 1.08 MG/DL (ref 0.55–1.02)
DIFFERENTIAL METHOD BLD: ABNORMAL
EKG ATRIAL RATE: 256 BPM
EKG ATRIAL RATE: 267 BPM
EKG DIAGNOSIS: NORMAL
EKG DIAGNOSIS: NORMAL
EKG P AXIS: 62 DEGREES
EKG P AXIS: 63 DEGREES
EKG Q-T INTERVAL: 352 MS
EKG Q-T INTERVAL: 386 MS
EKG QRS DURATION: 80 MS
EKG QRS DURATION: 82 MS
EKG QTC CALCULATION (BAZETT): 428 MS
EKG QTC CALCULATION (BAZETT): 482 MS
EKG R AXIS: 14 DEGREES
EKG R AXIS: 5 DEGREES
EKG T AXIS: 15 DEGREES
EKG T AXIS: 16 DEGREES
EKG VENTRICULAR RATE: 89 BPM
EKG VENTRICULAR RATE: 94 BPM
EOSINOPHIL # BLD: 0.3 K/UL (ref 0–0.4)
EOSINOPHIL NFR BLD: 4 % (ref 0–7)
ERYTHROCYTE [DISTWIDTH] IN BLOOD BY AUTOMATED COUNT: 15.7 % (ref 11.5–14.5)
GLUCOSE SERPL-MCNC: 121 MG/DL (ref 65–100)
HCT VFR BLD AUTO: 32.8 % (ref 35–47)
HGB BLD-MCNC: 9.3 G/DL (ref 11.5–16)
IMM GRANULOCYTES # BLD AUTO: 0.1 K/UL (ref 0–0.04)
IMM GRANULOCYTES NFR BLD AUTO: 1 % (ref 0–0.5)
LYMPHOCYTES # BLD: 0.9 K/UL (ref 0.8–3.5)
LYMPHOCYTES NFR BLD: 12 % (ref 12–49)
MAGNESIUM SERPL-MCNC: 2.1 MG/DL (ref 1.6–2.4)
MCH RBC QN AUTO: 26.6 PG (ref 26–34)
MCHC RBC AUTO-ENTMCNC: 28.4 G/DL (ref 30–36.5)
MCV RBC AUTO: 93.7 FL (ref 80–99)
MONOCYTES # BLD: 0.7 K/UL (ref 0–1)
MONOCYTES NFR BLD: 9 % (ref 5–13)
NEUTS SEG # BLD: 5.5 K/UL (ref 1.8–8)
NEUTS SEG NFR BLD: 73 % (ref 32–75)
NRBC # BLD: 0.03 K/UL (ref 0–0.01)
NRBC BLD-RTO: 0.4 PER 100 WBC
PHOSPHATE SERPL-MCNC: 2.9 MG/DL (ref 2.6–4.7)
PLATELET # BLD AUTO: 295 K/UL (ref 150–400)
PMV BLD AUTO: 11.6 FL (ref 8.9–12.9)
POTASSIUM SERPL-SCNC: 4.2 MMOL/L (ref 3.5–5.1)
RBC # BLD AUTO: 3.5 M/UL (ref 3.8–5.2)
RBC MORPH BLD: ABNORMAL
SODIUM SERPL-SCNC: 137 MMOL/L (ref 136–145)
WBC # BLD AUTO: 7.6 K/UL (ref 3.6–11)

## 2024-11-24 PROCEDURE — 85025 COMPLETE CBC W/AUTO DIFF WBC: CPT

## 2024-11-24 PROCEDURE — 36415 COLL VENOUS BLD VENIPUNCTURE: CPT

## 2024-11-24 PROCEDURE — 6360000002 HC RX W HCPCS: Performed by: INTERNAL MEDICINE

## 2024-11-24 PROCEDURE — 2700000000 HC OXYGEN THERAPY PER DAY

## 2024-11-24 PROCEDURE — 2060000000 HC ICU INTERMEDIATE R&B

## 2024-11-24 PROCEDURE — 80048 BASIC METABOLIC PNL TOTAL CA: CPT

## 2024-11-24 PROCEDURE — 6370000000 HC RX 637 (ALT 250 FOR IP): Performed by: INTERNAL MEDICINE

## 2024-11-24 PROCEDURE — 2580000003 HC RX 258: Performed by: STUDENT IN AN ORGANIZED HEALTH CARE EDUCATION/TRAINING PROGRAM

## 2024-11-24 PROCEDURE — 83735 ASSAY OF MAGNESIUM: CPT

## 2024-11-24 PROCEDURE — 84100 ASSAY OF PHOSPHORUS: CPT

## 2024-11-24 PROCEDURE — 6370000000 HC RX 637 (ALT 250 FOR IP): Performed by: STUDENT IN AN ORGANIZED HEALTH CARE EDUCATION/TRAINING PROGRAM

## 2024-11-24 RX ADMIN — ACETAMINOPHEN 325MG 650 MG: 325 TABLET ORAL at 21:40

## 2024-11-24 RX ADMIN — FUROSEMIDE 20 MG: 10 INJECTION, SOLUTION INTRAMUSCULAR; INTRAVENOUS at 08:55

## 2024-11-24 RX ADMIN — PREGABALIN 75 MG: 75 CAPSULE ORAL at 21:31

## 2024-11-24 RX ADMIN — SOTALOL HYDROCHLORIDE 80 MG: 80 TABLET ORAL at 08:55

## 2024-11-24 RX ADMIN — APIXABAN 5 MG: 5 TABLET, FILM COATED ORAL at 21:39

## 2024-11-24 RX ADMIN — SOTALOL HYDROCHLORIDE 80 MG: 80 TABLET ORAL at 21:31

## 2024-11-24 RX ADMIN — SODIUM CHLORIDE, PRESERVATIVE FREE 10 ML: 5 INJECTION INTRAVENOUS at 08:56

## 2024-11-24 RX ADMIN — PANTOPRAZOLE SODIUM 40 MG: 40 TABLET, DELAYED RELEASE ORAL at 06:24

## 2024-11-24 RX ADMIN — Medication 10.5 MG: at 21:31

## 2024-11-24 RX ADMIN — APIXABAN 5 MG: 5 TABLET, FILM COATED ORAL at 08:55

## 2024-11-24 RX ADMIN — PREGABALIN 75 MG: 75 CAPSULE ORAL at 08:55

## 2024-11-24 RX ADMIN — SODIUM CHLORIDE, PRESERVATIVE FREE 10 ML: 5 INJECTION INTRAVENOUS at 21:30

## 2024-11-24 RX ADMIN — EMPAGLIFLOZIN 10 MG: 10 TABLET, FILM COATED ORAL at 08:55

## 2024-11-24 ASSESSMENT — PAIN SCALES - GENERAL
PAINLEVEL_OUTOF10: 0

## 2024-11-24 NOTE — PLAN OF CARE
Problem: Chronic Conditions and Co-morbidities  Goal: Patient's chronic conditions and co-morbidity symptoms are monitored and maintained or improved  11/24/2024 1002 by Taylor Pacheco RN  Outcome: Progressing  11/23/2024 2214 by Gely Neal RN  Outcome: Progressing     Problem: Safety - Adult  Goal: Free from fall injury  11/24/2024 1002 by Taylor Pacheco RN  Outcome: Progressing  11/23/2024 2214 by Gely Neal RN  Outcome: Progressing     Problem: Discharge Planning  Goal: Discharge to home or other facility with appropriate resources  11/24/2024 1002 by Taylor Pacheco RN  Outcome: Progressing  11/23/2024 2214 by Gely Neal RN  Outcome: Progressing

## 2024-11-24 NOTE — PLAN OF CARE
Problem: Chronic Conditions and Co-morbidities  Goal: Patient's chronic conditions and co-morbidity symptoms are monitored and maintained or improved  Outcome: Progressing     Problem: Safety - Adult  Goal: Free from fall injury  11/23/2024 2214 by Gely Neal RN  Outcome: Progressing  11/23/2024 0902 by Magnolia Pelaez RN  Outcome: Progressing     Problem: Discharge Planning  Goal: Discharge to home or other facility with appropriate resources  Outcome: Progressing      DISPLAY PLAN FREE TEXT

## 2024-11-25 ENCOUNTER — APPOINTMENT (OUTPATIENT)
Facility: HOSPITAL | Age: 79
DRG: 291 | End: 2024-11-25
Attending: INTERNAL MEDICINE
Payer: MEDICARE

## 2024-11-25 LAB
ANION GAP SERPL CALC-SCNC: 3 MMOL/L (ref 2–12)
BACTERIA SPEC CULT: NORMAL
BACTERIA SPEC CULT: NORMAL
BASOPHILS # BLD: 0.1 K/UL (ref 0–0.1)
BASOPHILS NFR BLD: 1 % (ref 0–1)
BUN SERPL-MCNC: 21 MG/DL (ref 6–20)
BUN/CREAT SERPL: 20 (ref 12–20)
CALCIUM SERPL-MCNC: 9.2 MG/DL (ref 8.5–10.1)
CHLORIDE SERPL-SCNC: 104 MMOL/L (ref 97–108)
CO2 SERPL-SCNC: 32 MMOL/L (ref 21–32)
CREAT SERPL-MCNC: 1.03 MG/DL (ref 0.55–1.02)
DIFFERENTIAL METHOD BLD: ABNORMAL
ECHO BSA: 1.92 M2
EKG ATRIAL RATE: 256 BPM
EKG DIAGNOSIS: NORMAL
EKG P AXIS: 48 DEGREES
EKG Q-T INTERVAL: 404 MS
EKG QRS DURATION: 88 MS
EKG QTC CALCULATION (BAZETT): 426 MS
EKG R AXIS: -1 DEGREES
EKG T AXIS: 39 DEGREES
EKG VENTRICULAR RATE: 67 BPM
EOSINOPHIL # BLD: 0.4 K/UL (ref 0–0.4)
EOSINOPHIL NFR BLD: 7 % (ref 0–7)
ERYTHROCYTE [DISTWIDTH] IN BLOOD BY AUTOMATED COUNT: 15.4 % (ref 11.5–14.5)
GLUCOSE SERPL-MCNC: 112 MG/DL (ref 65–100)
HCT VFR BLD AUTO: 33.2 % (ref 35–47)
HGB BLD-MCNC: 9.6 G/DL (ref 11.5–16)
IMM GRANULOCYTES # BLD AUTO: 0.1 K/UL (ref 0–0.04)
IMM GRANULOCYTES NFR BLD AUTO: 1 % (ref 0–0.5)
LYMPHOCYTES # BLD: 0.9 K/UL (ref 0.8–3.5)
LYMPHOCYTES NFR BLD: 16 % (ref 12–49)
MAGNESIUM SERPL-MCNC: 2 MG/DL (ref 1.6–2.4)
MCH RBC QN AUTO: 26.5 PG (ref 26–34)
MCHC RBC AUTO-ENTMCNC: 28.9 G/DL (ref 30–36.5)
MCV RBC AUTO: 91.7 FL (ref 80–99)
MONOCYTES # BLD: 0.5 K/UL (ref 0–1)
MONOCYTES NFR BLD: 9 % (ref 5–13)
NEUTS SEG # BLD: 3.8 K/UL (ref 1.8–8)
NEUTS SEG NFR BLD: 66 % (ref 32–75)
NRBC # BLD: 0 K/UL (ref 0–0.01)
NRBC BLD-RTO: 0 PER 100 WBC
PHOSPHATE SERPL-MCNC: 2.9 MG/DL (ref 2.6–4.7)
PLATELET # BLD AUTO: 284 K/UL (ref 150–400)
PMV BLD AUTO: 11.7 FL (ref 8.9–12.9)
POTASSIUM SERPL-SCNC: 3.9 MMOL/L (ref 3.5–5.1)
RBC # BLD AUTO: 3.62 M/UL (ref 3.8–5.2)
RBC MORPH BLD: ABNORMAL
SERVICE CMNT-IMP: NORMAL
SERVICE CMNT-IMP: NORMAL
SODIUM SERPL-SCNC: 139 MMOL/L (ref 136–145)
WBC # BLD AUTO: 5.8 K/UL (ref 3.6–11)

## 2024-11-25 PROCEDURE — 6370000000 HC RX 637 (ALT 250 FOR IP): Performed by: STUDENT IN AN ORGANIZED HEALTH CARE EDUCATION/TRAINING PROGRAM

## 2024-11-25 PROCEDURE — 36415 COLL VENOUS BLD VENIPUNCTURE: CPT

## 2024-11-25 PROCEDURE — 2060000000 HC ICU INTERMEDIATE R&B

## 2024-11-25 PROCEDURE — 2580000003 HC RX 258: Performed by: STUDENT IN AN ORGANIZED HEALTH CARE EDUCATION/TRAINING PROGRAM

## 2024-11-25 PROCEDURE — 6360000002 HC RX W HCPCS: Performed by: INTERNAL MEDICINE

## 2024-11-25 PROCEDURE — 85025 COMPLETE CBC W/AUTO DIFF WBC: CPT

## 2024-11-25 PROCEDURE — 5A2204Z RESTORATION OF CARDIAC RHYTHM, SINGLE: ICD-10-PCS | Performed by: INTERNAL MEDICINE

## 2024-11-25 PROCEDURE — 99152 MOD SED SAME PHYS/QHP 5/>YRS: CPT

## 2024-11-25 PROCEDURE — 93005 ELECTROCARDIOGRAM TRACING: CPT | Performed by: INTERNAL MEDICINE

## 2024-11-25 PROCEDURE — 80048 BASIC METABOLIC PNL TOTAL CA: CPT

## 2024-11-25 PROCEDURE — 6370000000 HC RX 637 (ALT 250 FOR IP): Performed by: INTERNAL MEDICINE

## 2024-11-25 PROCEDURE — 83735 ASSAY OF MAGNESIUM: CPT

## 2024-11-25 PROCEDURE — 92960 CARDIOVERSION ELECTRIC EXT: CPT

## 2024-11-25 PROCEDURE — 2700000000 HC OXYGEN THERAPY PER DAY

## 2024-11-25 PROCEDURE — 84100 ASSAY OF PHOSPHORUS: CPT

## 2024-11-25 RX ORDER — MIDAZOLAM HYDROCHLORIDE 1 MG/ML
INJECTION, SOLUTION INTRAMUSCULAR; INTRAVENOUS PRN
Status: DISCONTINUED | OUTPATIENT
Start: 2024-11-25 | End: 2024-11-25

## 2024-11-25 RX ORDER — FENTANYL CITRATE 50 UG/ML
INJECTION, SOLUTION INTRAMUSCULAR; INTRAVENOUS PRN
Status: DISCONTINUED | OUTPATIENT
Start: 2024-11-25 | End: 2024-11-25

## 2024-11-25 RX ADMIN — SODIUM CHLORIDE, PRESERVATIVE FREE 10 ML: 5 INJECTION INTRAVENOUS at 20:58

## 2024-11-25 RX ADMIN — SOTALOL HYDROCHLORIDE 80 MG: 80 TABLET ORAL at 20:50

## 2024-11-25 RX ADMIN — EMPAGLIFLOZIN 10 MG: 10 TABLET, FILM COATED ORAL at 08:55

## 2024-11-25 RX ADMIN — APIXABAN 5 MG: 5 TABLET, FILM COATED ORAL at 08:55

## 2024-11-25 RX ADMIN — PREGABALIN 75 MG: 75 CAPSULE ORAL at 08:55

## 2024-11-25 RX ADMIN — Medication 10.5 MG: at 20:51

## 2024-11-25 RX ADMIN — POTASSIUM BICARBONATE 40 MEQ: 782 TABLET, EFFERVESCENT ORAL at 08:54

## 2024-11-25 RX ADMIN — MIDAZOLAM HYDROCHLORIDE 1 MG: 1 INJECTION, SOLUTION INTRAMUSCULAR; INTRAVENOUS at 14:31

## 2024-11-25 RX ADMIN — APIXABAN 5 MG: 5 TABLET, FILM COATED ORAL at 20:51

## 2024-11-25 RX ADMIN — MIDAZOLAM HYDROCHLORIDE 2 MG: 1 INJECTION, SOLUTION INTRAMUSCULAR; INTRAVENOUS at 14:28

## 2024-11-25 RX ADMIN — SOTALOL HYDROCHLORIDE 80 MG: 80 TABLET ORAL at 08:55

## 2024-11-25 RX ADMIN — FENTANYL CITRATE 25 MCG: 50 INJECTION, SOLUTION INTRAMUSCULAR; INTRAVENOUS at 14:28

## 2024-11-25 RX ADMIN — PREGABALIN 75 MG: 75 CAPSULE ORAL at 20:51

## 2024-11-25 RX ADMIN — SODIUM CHLORIDE, PRESERVATIVE FREE 10 ML: 5 INJECTION INTRAVENOUS at 08:56

## 2024-11-25 ASSESSMENT — PAIN SCALES - GENERAL
PAINLEVEL_OUTOF10: 0

## 2024-11-25 NOTE — PLAN OF CARE
Problem: Safety - Adult  Goal: Free from fall injury  11/25/2024 0957 by Lucia Rush RN  Outcome: Progressing  11/25/2024 0554 by Gely Neal RN  Outcome: Progressing     Problem: Discharge Planning  Goal: Discharge to home or other facility with appropriate resources  11/25/2024 0957 by Lucia Rush RN  Outcome: Progressing  11/25/2024 0554 by Gely Neal, RN  Outcome: Progressing

## 2024-11-26 VITALS
WEIGHT: 187.83 LBS | HEIGHT: 62 IN | BODY MASS INDEX: 34.57 KG/M2 | TEMPERATURE: 98.1 F | OXYGEN SATURATION: 99 % | RESPIRATION RATE: 24 BRPM | DIASTOLIC BLOOD PRESSURE: 52 MMHG | SYSTOLIC BLOOD PRESSURE: 117 MMHG | HEART RATE: 58 BPM

## 2024-11-26 LAB
ANION GAP SERPL CALC-SCNC: 4 MMOL/L (ref 2–12)
BASOPHILS # BLD: 0.1 K/UL (ref 0–0.1)
BASOPHILS NFR BLD: 1 % (ref 0–1)
BUN SERPL-MCNC: 23 MG/DL (ref 6–20)
BUN/CREAT SERPL: 19 (ref 12–20)
CALCIUM SERPL-MCNC: 9.8 MG/DL (ref 8.5–10.1)
CHLORIDE SERPL-SCNC: 101 MMOL/L (ref 97–108)
CO2 SERPL-SCNC: 34 MMOL/L (ref 21–32)
CREAT SERPL-MCNC: 1.22 MG/DL (ref 0.55–1.02)
DIFFERENTIAL METHOD BLD: ABNORMAL
EKG ATRIAL RATE: 58 BPM
EKG DIAGNOSIS: NORMAL
EKG DIAGNOSIS: NORMAL
EKG P AXIS: 7 DEGREES
EKG P-R INTERVAL: 220 MS
EKG Q-T INTERVAL: 326 MS
EKG Q-T INTERVAL: 426 MS
EKG QRS DURATION: 100 MS
EKG QRS DURATION: 92 MS
EKG QTC CALCULATION (BAZETT): 409 MS
EKG QTC CALCULATION (BAZETT): 418 MS
EKG R AXIS: -6 DEGREES
EKG R AXIS: 26 DEGREES
EKG T AXIS: 27 DEGREES
EKG T AXIS: 40 DEGREES
EKG VENTRICULAR RATE: 58 BPM
EKG VENTRICULAR RATE: 95 BPM
EOSINOPHIL # BLD: 0.5 K/UL (ref 0–0.4)
EOSINOPHIL NFR BLD: 6 % (ref 0–7)
ERYTHROCYTE [DISTWIDTH] IN BLOOD BY AUTOMATED COUNT: 15.6 % (ref 11.5–14.5)
GLUCOSE SERPL-MCNC: 111 MG/DL (ref 65–100)
HCT VFR BLD AUTO: 35.5 % (ref 35–47)
HGB BLD-MCNC: 10.1 G/DL (ref 11.5–16)
IMM GRANULOCYTES # BLD AUTO: 0.1 K/UL (ref 0–0.04)
IMM GRANULOCYTES NFR BLD AUTO: 1 % (ref 0–0.5)
LYMPHOCYTES # BLD: 1 K/UL (ref 0.8–3.5)
LYMPHOCYTES NFR BLD: 13 % (ref 12–49)
MAGNESIUM SERPL-MCNC: 2.2 MG/DL (ref 1.6–2.4)
MCH RBC QN AUTO: 26.6 PG (ref 26–34)
MCHC RBC AUTO-ENTMCNC: 28.5 G/DL (ref 30–36.5)
MCV RBC AUTO: 93.7 FL (ref 80–99)
MONOCYTES # BLD: 0.7 K/UL (ref 0–1)
MONOCYTES NFR BLD: 9 % (ref 5–13)
NEUTS SEG # BLD: 5.1 K/UL (ref 1.8–8)
NEUTS SEG NFR BLD: 70 % (ref 32–75)
NRBC # BLD: 0 K/UL (ref 0–0.01)
NRBC BLD-RTO: 0 PER 100 WBC
PHOSPHATE SERPL-MCNC: 3.7 MG/DL (ref 2.6–4.7)
PLATELET # BLD AUTO: 313 K/UL (ref 150–400)
PMV BLD AUTO: 11.8 FL (ref 8.9–12.9)
POTASSIUM SERPL-SCNC: 4.3 MMOL/L (ref 3.5–5.1)
RBC # BLD AUTO: 3.79 M/UL (ref 3.8–5.2)
RBC MORPH BLD: ABNORMAL
RBC MORPH BLD: ABNORMAL
SODIUM SERPL-SCNC: 139 MMOL/L (ref 136–145)
WBC # BLD AUTO: 7.5 K/UL (ref 3.6–11)

## 2024-11-26 PROCEDURE — 84100 ASSAY OF PHOSPHORUS: CPT

## 2024-11-26 PROCEDURE — 2700000000 HC OXYGEN THERAPY PER DAY

## 2024-11-26 PROCEDURE — 80048 BASIC METABOLIC PNL TOTAL CA: CPT

## 2024-11-26 PROCEDURE — 85025 COMPLETE CBC W/AUTO DIFF WBC: CPT

## 2024-11-26 PROCEDURE — 2580000003 HC RX 258: Performed by: STUDENT IN AN ORGANIZED HEALTH CARE EDUCATION/TRAINING PROGRAM

## 2024-11-26 PROCEDURE — 6370000000 HC RX 637 (ALT 250 FOR IP): Performed by: STUDENT IN AN ORGANIZED HEALTH CARE EDUCATION/TRAINING PROGRAM

## 2024-11-26 PROCEDURE — 6360000002 HC RX W HCPCS: Performed by: INTERNAL MEDICINE

## 2024-11-26 PROCEDURE — 83735 ASSAY OF MAGNESIUM: CPT

## 2024-11-26 PROCEDURE — 36415 COLL VENOUS BLD VENIPUNCTURE: CPT

## 2024-11-26 PROCEDURE — 6370000000 HC RX 637 (ALT 250 FOR IP): Performed by: INTERNAL MEDICINE

## 2024-11-26 RX ORDER — POTASSIUM CHLORIDE 1500 MG/1
10 TABLET, EXTENDED RELEASE ORAL DAILY
Qty: 30 TABLET | Refills: 0 | Status: SHIPPED | OUTPATIENT
Start: 2024-11-26 | End: 2024-12-26

## 2024-11-26 RX ORDER — SOTALOL HYDROCHLORIDE 80 MG/1
80 TABLET ORAL 2 TIMES DAILY
Qty: 60 TABLET | Refills: 3 | Status: SHIPPED | OUTPATIENT
Start: 2024-11-26

## 2024-11-26 RX ADMIN — FUROSEMIDE 20 MG: 10 INJECTION, SOLUTION INTRAMUSCULAR; INTRAVENOUS at 08:38

## 2024-11-26 RX ADMIN — PANTOPRAZOLE SODIUM 40 MG: 40 TABLET, DELAYED RELEASE ORAL at 06:33

## 2024-11-26 RX ADMIN — SODIUM CHLORIDE, PRESERVATIVE FREE 10 ML: 5 INJECTION INTRAVENOUS at 08:39

## 2024-11-26 RX ADMIN — EMPAGLIFLOZIN 10 MG: 10 TABLET, FILM COATED ORAL at 08:38

## 2024-11-26 RX ADMIN — APIXABAN 5 MG: 5 TABLET, FILM COATED ORAL at 08:38

## 2024-11-26 RX ADMIN — SOTALOL HYDROCHLORIDE 80 MG: 80 TABLET ORAL at 08:38

## 2024-11-26 RX ADMIN — PREGABALIN 75 MG: 75 CAPSULE ORAL at 08:38

## 2024-11-26 ASSESSMENT — PAIN SCALES - GENERAL: PAINLEVEL_OUTOF10: 0

## 2024-11-26 NOTE — CARE COORDINATION
Care Management Initial Assessment       RUR: 16%  Readmission? No  1st IM letter given? Yes - 2nd letter given 11/22 1st  letter given: No    Chart reviewed. Patient not ready for discharge today.     CM met with patient to discuss discharge planning.  has been very active, but had some health issues prior to admission. She lives alone, but states she has been managing well with the assist of her 3 children. The oldest child is listed    Haylee Lynn  [phone]     She does not anticipate any problems when discharged. 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 She has been mobile and driving.     CM will follow for any dc needs.      11/22/24 2413   Service Assessment   Patient Orientation Alert and Oriented   Cognition Alert   History Provided By Patient   Primary Caregiver Self   Support Systems Children  (states she has 3 children)   Patient's Healthcare Decision Maker is: Patient Declined (Legal Next of Kin Remains as Decision Maker)   PCP Verified by CM Yes   Prior Functional Level Independent in ADLs/IADLs   Current Functional Level Independent in ADLs/IADLs   Can patient return to prior living arrangement Yes   Ability to make needs known: Good   Family able to assist with home care needs: Yes   Would you like for me to discuss the discharge plan with any other family members/significant others, and if so, who? Yes  (children)   Financial Resources Medicare   Community Resources None   Social/Functional History   Lives With Alone   Type of Home House   Home Equipment Cane;Rollator   Active  Yes   Discharge Planning   Type of Residence House   Living Arrangements Alone   Current Services Prior To Admission None   DME Ordered? No   Type of Home Care Services None   Patient expects to be discharged to: Won Garcia, RN  Care Manager  [phone]  
individualized plan of care/goals, treatment preferences, and shares the quality data associated with the providers?  Yes

## 2024-11-26 NOTE — PLAN OF CARE
Problem: Safety - Adult  Goal: Free from fall injury  11/26/2024 0842 by Lucia Rush RN  Outcome: Progressing  11/25/2024 2343 by Sajan Valentino RN  Outcome: Progressing     Problem: Discharge Planning  Goal: Discharge to home or other facility with appropriate resources  11/26/2024 0842 by Lucia Rush RN  Outcome: Progressing  11/25/2024 2343 by Sajan Valentino RN  Outcome: Progressing     Problem: Skin/Tissue Integrity  Goal: Absence of new skin breakdown  Description: 1.  Monitor for areas of redness and/or skin breakdown  2.  Assess vascular access sites hourly  3.  Every 4-6 hours minimum:  Change oxygen saturation probe site  4.  Every 4-6 hours:  If on nasal continuous positive airway pressure, respiratory therapy assess nares and determine need for appliance change or resting period.  11/26/2024 0842 by Lucia Rush RN  Outcome: Progressing  11/25/2024 2343 by Sajan Valentino RN  Outcome: Progressing

## 2024-11-26 NOTE — PLAN OF CARE
Problem: Chronic Conditions and Co-morbidities  Goal: Patient's chronic conditions and co-morbidity symptoms are monitored and maintained or improved  Outcome: Progressing     Problem: Safety - Adult  Goal: Free from fall injury  11/25/2024 2343 by Sajan Valentino RN  Outcome: Progressing  11/25/2024 0957 by Lucia Rush RN  Outcome: Progressing     Problem: Discharge Planning  Goal: Discharge to home or other facility with appropriate resources  11/25/2024 2343 by Sajan Valentino RN  Outcome: Progressing  11/25/2024 0957 by Lucia Rush RN  Outcome: Progressing     Problem: Skin/Tissue Integrity  Goal: Absence of new skin breakdown  Description: 1.  Monitor for areas of redness and/or skin breakdown  2.  Assess vascular access sites hourly  3.  Every 4-6 hours minimum:  Change oxygen saturation probe site  4.  Every 4-6 hours:  If on nasal continuous positive airway pressure, respiratory therapy assess nares and determine need for appliance change or resting period.  Outcome: Progressing

## 2024-11-26 NOTE — DISCHARGE SUMMARY
Physician Discharge Summary     Patient ID:    Vani Fontaine  [id number]  78 y.o.  1945    Admit date: 11/20/2024    Discharge date : 11/26/2024      Final Diagnoses:   Acute pulmonary edema [J81.0]  Pleural effusion [J90]  Acute respiratory failure with hypoxia [J96.01]  Atrial flutter, paroxysmal (HCC) [I48.92]  Atrial fibrillation, unspecified type (HCC) [I48.91]  Acute hypoxic respiratory failure.    Reason for Hospitalization:    Worsening shortness of breath for few days      Hospital Course:   78 y.o. female with PMHx significant for diastolic heart failure, afib, presents with 2 weeks of SOB with increased GUEVARA.  She was admitted to the hospital with diagnosis of acute hypoxic respiratory failure with fluid overload.  Treated with IV diuretics with improvement in breathing.  Noted to be in atrial flutter/A-fib.  Seen in consultation by cardiologist.  Sotalol increased from 40 mg to 80 mg twice daily.  Underwent electrical cardioversion and converted to normal sinus rhythm but later converted to A-fib.  Rate has been fairly controlled.  Oxygen saturation on room air noted to be 85% with improvement to 94% on 2 L.  She will require home O2 on discharge.  [place] stable for discharge to home with outpatient follow-up.      Discharge Medications:      Medication List        START taking these medications      empagliflozin 10 MG tablet  Commonly known as: JARDIANCE  Take 1 tablet by mouth daily  Start taking on: November 27, 2024     potassium chloride 20 MEQ extended release tablet  Commonly known as: KLOR-CON M  Take 0.5 tablets by mouth daily            CHANGE how you take these medications      sotalol 80 MG tablet  Commonly known as: Sorine  Take 1 tablet by mouth 2 times daily  What changed: how much to take            CONTINUE taking these medications      acetaminophen 500 MG tablet  Commonly known as: TYLENOL     albuterol sulfate  (90 Base) MCG/ACT inhaler  Commonly known

## 2024-11-27 NOTE — PROGRESS NOTES
[facility] EP/ ARRHYTHMIA/ CARDIOLOGY Progress Note      EP Cardiology consult requested by Javed Hamlin MD for HF exacerbation.   PCP:  Pj Acevedo MD    Primary cardiologist: Eamon Ryder MD      Electrophysiology Service  11/22/2024     Assessment:   Acute on chronic exacerbation of HFpEF  Feeling better with lasix  Normal EF  Atypical atrial flutter  Status post VOM alcohol ablation  On low dose sotalol  Atrial fibrillation  Status post PVI  UTI  On antibiotics now  Mitral valve disease  Chronic oral anticoagulation.     Plan:   Continue diuresis  Needs to be started on Jardiance once UTI is resolved and prior to discharge   Increase sotalol dose to 80 mg twice daily   DC Zofran and avoid QT prolonging meds.     11/22/24: In atypical atrial flutter today. Continue sotalol 80 mg twice daily. NPO after midnight Sunday night for DC cardioversion on Monday.     Dr. Yates is on call over the weekend.     HPI:  Vani Fontaine is a 78 y.o. female with history of:    Mitral valve disease, mitral regurgitation Status post mitral replacement with 27 mm Major Magna mitral valve on 4/19/2022  Ascending aortic aneurysm status post surgery replacement of ascending aorta with 34 mm Dacron graft and resuspension of aortic valve on 04/19/2022  Paroxysmal atrial fibrillation, left atrial appendage occlusion with a 40 mm clip during cardiac surgery on 04/19/2022  PFO status post closure during cardiac surgery on 04/19/2022  Heart failure with preserved ejection fraction, in the setting of valvular heart disease  Left heart catheterization on 11/18/2021 shows no significant CAD  Hypertension    Status post PVI+CTI+PWI and VOM ablation 12/13/23 by Dr. Ryder    Retired dentist.       EF in 2024 was 55%      Patient was admitted to the emergency department with 2 weeks of shortness of breath and dyspnea on exertion. She has a history of mitral valve replacement with atrial fibrillation and underwent 
    Hospitalist Progress Note               Daily Progress Note: 11/22/2024      Hospital Day: 3     Chief complaint:   Chief Complaint   Patient presents with    Shortness of Breath     Pt is wheeled into triage with c/o SOB on exertion.  Pt has a cardiac hx and sees Dr. Vazquez.  Pt had her right knee replaced on September 13th.        Subjective:   Hospital course to date:   78 y.o.  female with PMHx significant for diastolic heart failure, afib, presents with 2 weeks of SOB with increased GUEVARA.     --------  Patient is seen today for follow-up.  Notes improvement in breathing overnight.  Overnight events noted.  Patient reportedly felt sleepy when woken up.  A faulse stroke alert called.  She has no focal deficit, facial droop or speech deficit.      Medications reviewed  Current Facility-Administered Medications   Medication Dose Route Frequency    sotalol (BETAPACE) tablet 40 mg  40 mg Oral BID    [Held by provider] empagliflozin (JARDIANCE) tablet 10 mg  10 mg Oral Daily    Sotalol QTc monitoring placeholder   Other BID    albuterol (PROVENTIL) (2.5 MG/3ML) 0.083% nebulizer solution 2.5 mg  2.5 mg Nebulization Q6H PRN    apixaban (ELIQUIS) tablet 5 mg  5 mg Oral BID    melatonin tablet 10.5 mg  10.5 mg Oral Nightly    pantoprazole (PROTONIX) tablet 40 mg  40 mg Oral QAM AC    pregabalin (LYRICA) capsule 75 mg  75 mg Oral BID    traMADol (ULTRAM) tablet 100 mg  100 mg Oral BID PRN    sodium chloride flush 0.9 % injection 5-40 mL  5-40 mL IntraVENous 2 times per day    sodium chloride flush 0.9 % injection 5-40 mL  5-40 mL IntraVENous PRN    0.9 % sodium chloride infusion   IntraVENous PRN    polyethylene glycol (GLYCOLAX) packet 17 g  17 g Oral Daily PRN    acetaminophen (TYLENOL) tablet 650 mg  650 mg Oral Q6H PRN    Or    acetaminophen (TYLENOL) suppository 650 mg  650 mg Rectal Q6H PRN    cefTRIAXone (ROCEPHIN) 1,000 mg in sterile water 10 mL IV syringe  1,000 mg IntraVENous Q24H    furosemide (LASIX) 
    Hospitalist Progress Note               Daily Progress Note: 11/23/2024      Hospital Day: 4     Chief complaint:   Chief Complaint   Patient presents with    Shortness of Breath     Pt is wheeled into triage with c/o SOB on exertion.  Pt has a cardiac hx and sees Dr. Vazquez.  Pt had her right knee replaced on September 13th.        Subjective:   Hospital course to date:   78 y.o.  female with PMHx significant for diastolic heart failure, afib, presents with 2 weeks of SOB with increased GUEVARA.     --------  Patient is seen today for follow-up.  Notes improvement in breathing overnight.  No reported fevers or chills overnight.  No cough or productive sputum.  Remains on 2 L nasal O2    Medications reviewed  Current Facility-Administered Medications   Medication Dose Route Frequency    furosemide (LASIX) injection 20 mg  20 mg IntraVENous Daily    sotalol (BETAPACE) tablet 80 mg  80 mg Oral BID    Sotalol QTc monitoring placeholder   Other BID    empagliflozin (JARDIANCE) tablet 10 mg  10 mg Oral Daily    albuterol (PROVENTIL) (2.5 MG/3ML) 0.083% nebulizer solution 2.5 mg  2.5 mg Nebulization Q6H PRN    apixaban (ELIQUIS) tablet 5 mg  5 mg Oral BID    melatonin tablet 10.5 mg  10.5 mg Oral Nightly    pantoprazole (PROTONIX) tablet 40 mg  40 mg Oral QAM AC    pregabalin (LYRICA) capsule 75 mg  75 mg Oral BID    traMADol (ULTRAM) tablet 100 mg  100 mg Oral BID PRN    sodium chloride flush 0.9 % injection 5-40 mL  5-40 mL IntraVENous 2 times per day    sodium chloride flush 0.9 % injection 5-40 mL  5-40 mL IntraVENous PRN    0.9 % sodium chloride infusion   IntraVENous PRN    polyethylene glycol (GLYCOLAX) packet 17 g  17 g Oral Daily PRN    acetaminophen (TYLENOL) tablet 650 mg  650 mg Oral Q6H PRN    Or    acetaminophen (TYLENOL) suppository 650 mg  650 mg Rectal Q6H PRN       Review of Systems:   A comprehensive review of systems was negative.    Objective:   Physical Exam:     BP (!) 94/49   Pulse 100   Temp 
  Physician Progress Note      PATIENT:               DARIO GARCIA  CSN #:                  [id number]  :                       1945  ADMIT DATE:       2024 10:39 AM  DISCH DATE:        2024 7:03 PM  RESPONDING  PROVIDER #:        Javed Hamlin MD          QUERY TEXT:    Pt admitted. Pt noted to also have HFpEF . If possible, please document in   progress notes and discharge summary the etiology of CHF, if able to be   determined.  The medical record reflects the following:  Risk Factors: 77 y/o female, PMHx significant for diastolic heart failure,   afib, presents with 2 weeks of SOB with increased GUEVARA.    Clinical Indicators:  Acute on chronic diastolic heart failure  Atrial flutter  Hx of afib - on sotalol, apixaban    24 11:01 NT Pro-BNP: 16,920 (H)    24 Echo Interpretation Summary  -  Left Ventricle: Normal left ventricular systolic function with a visually   estimated EF of 55 - 60%. Left ventricle size is normal. Normal wall   thickness. Normal wall motion. Normal diastolic function.  -  Aortic Valve: Mild regurgitation.  -  Mitral Valve: Bioprosthetic valve. MV mean gradient is 7 mmHg.  -  Tricuspid Valve: Mild regurgitation. The estimated RVSP is 37 mmHg.    Treatment: Admit, hospitalist consult, CV consult, Meds, Labs, Echo,   cardioversion, supplemental 02, vitals per unit routine  Options provided:  -- CHF due to Hypertensive Heart Disease  -- CHF due to Hypertensive Heart Disease and Valvular Heart Disease  -- CHF not due to Hypertension but due to valvular heart disease  -- Other - I will add my own diagnosis  -- Disagree - Not applicable / Not valid  -- Disagree - Clinically unable to determine / Unknown  -- Refer to Clinical Documentation Reviewer    PROVIDER RESPONSE TEXT:    This patient has CHF due to hypertensive heart disease and valvular heart   disease.    Query created by: Rilee, Cheryle on 2024 8:28 AM      Electronically signed by:  Javed MIRELES 
  Pt sedated with 3mg Versed and 25mcg Fentanyl, given 1 synchronized shock(s) at 200 Joules, AFlutter converted to NSR.(monitored sedation from 1427 to 1445)      
  TRANSFER - IN REPORT:    Verbal report received from Lucia(name) on Vani Fontaine  being received from [facility](unit) for ordered procedure      Report consisted of patient’s Situation, Background, Assessment and   Recommendations(SBAR).     Information from the following report(s) Nurse Handoff Report was reviewed with the receiving nurse.    Opportunity for questions and clarification was provided.      Assessment completed upon patient’s arrival to unit and care assume        
.End of Shift Note    Bedside shift change report given to Brinda (oncoming nurse) by Josephine Fernando RN (offgoing nurse).  Report included the following information SBAR, Kardex, Intake/Output, MAR, Recent Results, and Med Rec Status    Shift worked:  9713-6693     Shift summary and any significant changes:     Pt made comfortable to the floor. Assessment completed and vitals done. Pt x1 with a walker to the bathroom. Oxygen on 2 L not her baseline.      Concerns for physician to address:  none     Zone phone for oncoming shift:   [phone]             Josephine Fernando, RN                            
0700: Bedside and Verbal shift change report given to Julee Pace RN (oncoming nurse) by ZAID Lira (offgoing nurse). Report included the following information Nurse Handoff Report, Adult Overview, Intake/Output, MAR, and Recent Results.     0955: RN HawkSerramos Hamlin MD regarding dosage adjustment for Sotalol. MD mentioned to RN this morning he is decreasing strength from 80mg to 40mg d/t Hypotensive episode last night. Order will need to be adjusted in MAR to reflect this change.    0958: Sotalol modified to 40mg BID starting at 2100    0959: RN called pharm to have time adjusted to start new Sotalol dose now instead of 2100 d/t pt not receiving morning dose yet d/t clarification needed regarding strength.    1900: Bedside and Verbal shift change report given to ZAID Hong (oncoming nurse) by Julee Pace RN (offgoing nurse). Report included the following information Nurse Handoff Report, Adult Overview, Intake/Output, MAR, and Recent Results.     
0700: Bedside and Verbal shift change report given to Taylor Pacheco RN (oncoming nurse) by ZAID Hong (offgoing nurse). Report included the following information Nurse Handoff Report, Index, Intake/Output, MAR, Recent Results, and Cardiac Rhythm A-flutter .     
0700: Bedside shift change report given to ZAID Pacheco (oncoming nurse) by Gely MAR (offgoing nurse). Report included the following information Nurse Handoff Report and Index.     0830: Patient on 1L NC, placed on room air sitting up in the bed and O2 saturation 95%. Ambulated on room air to bathroom and back to recliner and O2 saturation dropped to 80%. Placed on 6L in order to recover back to 92%. Patient brought back down to 2L once recovered and O2 saturation 97%.    /60, per Dr. Hamlin only give 20mg IV lasix.    
0708: Bedside and Verbal shift change report given to Lucia Rush RN (oncoming nurse) by ZAID Moeller (offgoing nurse). Report included the following information Nurse Handoff Report, Index, Intake/Output, and MAR.      1825: DISCHARGE SUMMARY FROM [facility] NURSE    The patient is stable for discharge. I have reviewed the discharge instructions with the patient. The patient verbalized understanding. All questions were fully answered. The patient verbalized no complaints.    Hard scripts and medication handouts were given and reviewed with the patient. Appropriate educational materials and medication side effects teaching were also provided.    Cardiac monitor and IV line(s) were removed.     There were no personal belongings, valuables or home medications left at patient's bedside,  or safe.     Lucia Rush RN, 11/26/2024 6:58 PM     
0715: Bedside and Verbal shift change report given to Lucia Rush RN (oncoming nurse) by ZAID Hong (offgoing nurse). Report included the following information Nurse Handoff Report, Index, ED Encounter Summary, Intake/Output, MAR, Recent Results, and Cardiac Rhythm A flutter .      0850: Stress lab contacted, pts cardioversion scheduled around 1400. Pt notified.    0859: Lasix held per MD for BP of 120/59    1415: Pt off unit to stress lab     1455: Pt returned to unit.     1807: Afib noted on monitor. EKG complete, Afib confirmed. MD Boubacar notified. Rate controlled in 60s.     1930: End of Shift Note    Bedside shift change report given to ZAID Moeller (oncoming nurse) by Lucia Rush RN (offgoing nurse).  Report included the following information SBAR, Kardex, ED Summary, Intake/Output, MAR, Recent Results, and Cardiac Rhythm NSR/Afib    Shift worked:  7228-2407     Shift summary and any significant changes:     See note above    Cardioversion completed today, pt back into Afib following cardioversion     Concerns for physician to address:       Zone phone for oncoming shift:          Activity:  Level of Assistance: Contact guard assist, steading assist  Number times ambulated in hallways past shift: 0  Number of times OOB to chair past shift: 1    Cardiac:   Cardiac Monitoring: Yes      Cardiac Rhythm: Sinus rhythm, Sinus anirudh    Access:  Current line(s): PIV     Genitourinary:   Urinary Status: Voiding    Respiratory:   O2 Device: Nasal cannula  Chronic home O2 use?: NO  Incentive spirometer at bedside: NO    GI:  Last BM (including prior to admit): 11/24/24  Current diet:  ADULT DIET; Regular  Passing flatus: YES    Pain Management:   Patient states pain is manageable on current regimen: YES    Skin:  Salas Scale Score: 17  Interventions: Wound Offloading (Prevention Methods): Pillows, Repositioning    Patient Safety:  Fall Risk: Nursing Judgement-Fall Risk High(Add Comments): Yes  Fall Risk 
0726: Home Oxygen Test    Date of test: 11/26/2024  Time of test: 0727    Sa02 86-88 % on room air AT REST.  Sa02 83-85 % on room air DURING AMBULATION.  Sa02 94 % on 2 Liters DURING AMBULATION.  Sa02 97 % on 2 Liters AT REST/AFTER AMBULATION.   
1236: TRANSFER - IN REPORT:    Verbal report received from ZAID Loya on Vani Fontaine  being received from Onc for routine progression of patient care      Report consisted of patient's Situation, Background, Assessment and   Recommendations(SBAR).     Information from the following report(s) Nurse Handoff Report, Adult Overview, Intake/Output, MAR, and Recent Results was reviewed with the receiving nurse.    Opportunity for questions and clarification was provided.      Assessment completed upon patient's arrival to unit and care assumed.     1520: UA collected and tubed to lab.    1530: D/T ZAID De La Vega ordered EKG for more accurate measurement of QTC    1623: EKG completed and added into chart. QTC: 428 ms    1900: Bedside and Verbal shift change report given to ZAID Lira (oncoming nurse) by Julee Pace RN (offgoing nurse). Report included the following information Nurse Handoff Report, Adult Overview, Intake/Output, MAR, and Recent Results.     
1900 Bedside and Verbal shift change report given to Sajan MAR (oncoming nurse) by RN (offgoing nurse). Report included the following information Nurse Handoff Report, MAR, Recent Results, and Cardiac Rhythm NSR/A Fib.     0500 Blood samples collected and sent to lab.    0700 Bedside shift change report given to Lucia MAR (oncoming nurse) by Sajan Valentino RN (offgoing nurse).  Report included the following information SBAR, MAR, Recent Results, and Cardiac Rhythm NSR/A Fib  
1950: Responded to overhead code stroke level 1. Per primary RN, upon entry into room for shift report patient was very difficult to arouse, requiring sternal rub, and was confused with slurred speech upon waking, prompting a stroke alert. BP was noted to be 76/57 (60) at this time.    Upon arrival to bedside, patient is awake and alert, oriented x 4, able to move all extremities equally, no facial droop, no difficulties with speech.     1955: DO Troy at bedside, code stroke cancelled at this time. BP 97/52 (66). All other vital signs stable.  
Attempted to schedule [facility] hospital follow up. Sent  office a message, awaiting return call from office with appt information. [facility] placed [facility] information AVS for patient resource.  Pending patient discharge. Julee Woody, Care Management Assistant   
Attempted to schedule hospital follow up PCP appointment. Office  will like for pt to contact the office per office protocol. [facility] placed [facility] information AVS for patient resource. Pending patient discharge. Julee Woody, Care Management Assistant   
Bedside and Verbal shift change report given to Gely (oncoming nurse) by Julee (offgoing nurse). Report included the following information Nurse Handoff Report, Index, Intake/Output, MAR, Recent Results, and Cardiac Rhythm Afib .         End of Shift Note    Bedside shift change report given to  (oncoming nurse) by GELY YOUNG RN (offgoing nurse).  Report included the following information SBAR, Kardex, Intake/Output, MAR, Recent Results, and Cardiac Rhythm Afib / flutter    Shift worked:  7p-7a     Shift summary and any significant changes:          Concerns for physician to address:       Zone phone for oncoming shift:          Activity:  Level of Assistance: Moderate assist, patient does 50-74%  Number times ambulated in hallways past shift: 0  Number of times OOB to chair past shift: 0    Cardiac:   Cardiac Monitoring: Yes      Cardiac Rhythm: Atrial fib    Access:  Current line(s): PIV     Genitourinary:   Urinary Status: Voiding, Bathroom privileges    Respiratory:   O2 Device: Nasal cannula  Chronic home O2 use?: NO  Incentive spirometer at bedside:     GI:  Last BM (including prior to admit): 11/22/24  Current diet:  ADULT DIET; Regular; Low Fat/Low Chol/High Fiber/2 gm Na; 2000 ml  Diet NPO  Passing flatus: YES    Pain Management:   Patient states pain is manageable on current regimen: YES    Skin:  Salas Scale Score: 19  Interventions: Wound Offloading (Prevention Methods): Bed, pressure reduction mattress, Pillows, Repositioning, Turning    Patient Safety:  Fall Risk: Nursing Judgement-Fall Risk High(Add Comments): Yes  Fall Risk Interventions  Nursing Judgement-Fall Risk High(Add Comments): Yes  Toilet Every 2 Hours-In Advance of Need: Yes  Hourly Visual Checks: In bed  Fall Visual Posted: Armband, Socks  Room Door Open: Deferred to decrease stimulation  Alarm On: Bed  Patient Moved Closer to Nursing Station: No    Active Consults:   IP CONSULT TO HOSPITALIST  IP CONSULT TO 
Bedside and Verbal shift change report given to Gely (oncoming nurse) by Tara (offgoing nurse). Report included the following information Nurse Handoff Report, Index, Intake/Output, MAR, Recent Results, and Cardiac Rhythm Aflutter .         End of Shift Note    Bedside shift change report given to  (oncoming nurse) by GELY YOUNG RN (offgoing nurse).  Report included the following information SBAR, Kardex, Intake/Output, MAR, Recent Results, and Cardiac Rhythm A flutter    Shift worked:  7p-7a     Shift summary and any significant changes:     AM labs completed     Concerns for physician to address:       Zone phone for oncoming shift:          Activity:  Level of Assistance: Moderate assist, patient does 50-74%  Number times ambulated in hallways past shift: 0  Number of times OOB to chair past shift: 0    Cardiac:   Cardiac Monitoring: Yes      Cardiac Rhythm: A flutter    Access:  Current line(s): PIV     Genitourinary:   Urinary Status: Voiding    Respiratory:   O2 Device: Nasal cannula  Chronic home O2 use?: NO  Incentive spirometer at bedside:     GI:  Last BM (including prior to admit): 11/22/24  Current diet:  ADULT DIET; Regular; Low Fat/Low Chol/High Fiber/2 gm Na; 2000 ml  Diet NPO  Passing flatus: YES    Pain Management:   Patient states pain is manageable on current regimen: YES    Skin:  Salas Scale Score: 19  Interventions: Wound Offloading (Prevention Methods): Bed, pressure reduction mattress    Patient Safety:  Fall Risk: Nursing Judgement-Fall Risk High(Add Comments): Yes  Fall Risk Interventions  Nursing Judgement-Fall Risk High(Add Comments): Yes  Toilet Every 2 Hours-In Advance of Need: Yes  Hourly Visual Checks: In bed  Fall Visual Posted: Armband, Socks  Room Door Open: Deferred to decrease stimulation  Alarm On: Bed  Patient Moved Closer to Nursing Station: No    Active Consults:   IP CONSULT TO HOSPITALIST  IP CONSULT TO CARDIOLOGY    Length of Stay:  Expected LOS: 3  Actual LOS: 
Bedside and Verbal shift change report given to Gely (oncoming nurse) by Taylor (offgoing nurse). Report included the following information Nurse Handoff Report, Index, Intake/Output, MAR, Recent Results, and Cardiac Rhythm A flutter .       End of Shift Note    Bedside shift change report given to  (oncoming nurse) by GELY YOUNG RN (offgoing nurse).  Report included the following information SBAR, Kardex, Intake/Output, MAR, Recent Results, and Cardiac Rhythm A flutter    Shift worked:  7p-7a     Shift summary and any significant changes:          Concerns for physician to address:       Zone phone for oncoming shift:          Activity:  Level of Assistance: Minimal assist, patient does 75% or more  Number times ambulated in hallways past shift: 0  Number of times OOB to chair past shift: 0    Cardiac:   Cardiac Monitoring: Yes      Cardiac Rhythm: A flutter    Access:  Current line(s): PIV     Genitourinary:   Urinary Status: Bathroom privileges    Respiratory:   O2 Device: Nasal cannula  Chronic home O2 use?: NO  Incentive spirometer at bedside:     GI:  Last BM (including prior to admit): 11/22/24  Current diet:  Diet NPO Exceptions are: Sips of Water with Meds  Passing flatus: YES    Pain Management:   Patient states pain is manageable on current regimen: YES    Skin:  Salas Scale Score: 18  Interventions: Wound Offloading (Prevention Methods): Bed, pressure reduction mattress, Repositioning    Patient Safety:  Fall Risk: Nursing Judgement-Fall Risk High(Add Comments): Yes  Fall Risk Interventions  Nursing Judgement-Fall Risk High(Add Comments): Yes  Toilet Every 2 Hours-In Advance of Need: Yes  Hourly Visual Checks: In bed, Awake  Fall Visual Posted: Socks, Fall sign posted  Room Door Open: Deferred to promote rest  Alarm On: Bed  Patient Moved Closer to Nursing Station: No    Active Consults:   IP CONSULT TO HOSPITALIST  IP CONSULT TO CARDIOLOGY    Length of Stay:  Expected LOS: 5  Actual LOS: 
Cardiology Progress Note      11/23/2024 1:00 PM    Admit Date: 11/20/2024          Subjective:  More SOB today. Remains in aflutter . Echo yest showed nl EF, stable valve with mean gradient 7          BP (!) 94/49   Pulse 100   Temp 98.3 °F (36.8 °C) (Oral)   Resp 18   Ht 1.575 m (5' 2.01\")   Wt 84.5 kg (186 lb 4.6 oz)   SpO2 94%   BMI 34.06 kg/m²   11/21 1901 - 11/23 0700  In: 815 [P.O.:815]  Out: 1620 [Urine:1620]        Objective:      Physical Exam:  VS as above   Chest scattered crackles  Card Irreg 2/6 KAYLA   Neuro awake and alert     Data Review:   Labs:      BUN 21  Creat 1.4   Hgb 10.0      Telemetry: aflutter with controlled rate       Assessment:     Acute on chronic exacerbation of HFpEF  Feeling better with lasix  Normal EF  Atypical atrial flutter  Status post VOM alcohol ablation  On low dose sotalol  Atrial fibrillation  Status post PVI  UTI  On antibiotics now    Mitral valve disease, mitral regurgitation Status post mitral replacement with 27 mm Major Magna mitral valve on 4/19/2022. Nl EF by echo, mean grad 7 yesterday   Ascending aortic aneurysm status post surgery replacement of ascending aorta with 34 mm Dacron graft and resuspension of aortic valve on 04/19/2022  Paroxysmal atrial fibrillation, left atrial appendage occlusion with a 40 mm clip during cardiac surgery on 04/19/2022  PFO status post closure during cardiac surgery on 04/19/2022  Heart failure with preserved ejection fraction, in the setting of valvular heart disease  Left heart catheterization on 11/18/2021 shows no significant CAD  Hypertension  Chronic oral anticoagulation.     Plan:  Cont current Rx. On increaed dose of sotalol.DCC Monday               
Cardiology Progress Note      11/24/2024 12:08 PM    Admit Date: 11/20/2024          Subjective: No new c/o Remains in a flutter          BP (!) 93/57   Pulse 69   Temp 98.3 °F (36.8 °C) (Oral)   Resp 17   Ht 1.575 m (5' 2.01\")   Wt 85.5 kg (188 lb 7.9 oz)   SpO2 94%   BMI 34.47 kg/m²   11/22 1901 - 11/24 0700  In: 1500 [P.O.:1500]  Out: 2350 [Urine:2350]        Objective:      Physical Exam:  VS as above  Chest CTA ant  Card Irreg irreg no gallop     Data Review:   Labs:    BUN 20  Creat 1.1  K 4.2  Hgb 9.3     Telemetry: a flutter variable block R 69       Assessment:     -Acute on chronic exacerbation of HFpEF    -Atypical atrial flutter  Status post VOM alcohol ablation  On low dose sotalol  -Atrial fibrillation  Status post PVI  -LL occlusion with a 40 mm clip during cardiac surgery on 04/19/2022  -UTI  On antibiotics now    -Mitral valve disease, mitral regurgitation Status post mitral replacement with 27 mm Major Magna mitral valve on 4/19/2022. Nl EF by echo, mean grad 7 yesterday   -Ascending aortic aneurysm status post surgery replacement of ascending aorta with 34 mm Dacron graft and resuspension of aortic valve on 04/19/2022   =PFO status post closure during cardiac surgery on      04/19/2022  Left heart catheterization on 11/18/2021 shows no significant CAD  Hypertension  Chronic oral anticoagulation.     Plan: Cont current Rx. Check 12 lead for Qtc PETAR king              
Cardiology Progress Note      11/25/2024 1:48 PM    Admit Date: 11/20/2024          Subjective: No new c/o Remains in a flutter          /63   Pulse 68   Temp 98.4 °F (36.9 °C) (Oral)   Resp 18   Ht 1.575 m (5' 2.01\")   Wt 84.7 kg (186 lb 11.7 oz)   SpO2 97%   BMI 34.14 kg/m²   11/23 1901 - 11/25 0700  In: 780 [P.O.:780]  Out: 1350 [Urine:1350]        Objective:      Physical Exam:  VS as above  Chest CTA ant  Card Irreg irreg no gallop     Data Review:   Labs:    BUN 20  Creat 1.1  K 4.2  Hgb 9.3     Telemetry: a flutter variable block R 69       Assessment:     -Acute on chronic exacerbation of HFpEF    -Atypical atrial flutter  Status post VOM alcohol ablation  On low dose sotalol  -Atrial fibrillation  Status post PVI  -LL occlusion with a 40 mm clip during cardiac surgery on 04/19/2022  -UTI  On antibiotics now    -Mitral valve disease, mitral regurgitation Status post mitral replacement with 27 mm Major Magna mitral valve on 4/19/2022. Nl EF by echo, mean grad 7 yesterday   -Ascending aortic aneurysm status post surgery replacement of ascending aorta with 34 mm Dacron graft and resuspension of aortic valve on 04/19/2022   =PFO status post closure during cardiac surgery on      04/19/2022  Left heart catheterization on 11/18/2021 shows no significant CAD  Hypertension  Chronic oral anticoagulation.     Plan: DCCV today             
EP Progress Note      11/26/2024 3:29 PM    Admit Date: 11/20/2024          Subjective: No new c/o Remains in a flutter          BP (!) 109/47   Pulse 62   Temp 98.1 °F (36.7 °C) (Oral)   Resp 24   Ht 1.575 m (5' 2.01\")   Wt 85.2 kg (187 lb 13.3 oz)   SpO2 97%   BMI 34.35 kg/m²   11/24 1901 - 11/26 0700  In: 775 [P.O.:775]  Out: 2550 [Urine:2550]        Objective:      Physical Exam:  VS as above  Chest CTA ant  Card Irreg irreg no gallop     Data Review:   Labs:    BUN 20  Creat 1.1  K 4.2  Hgb 9.3     Telemetry: a flutter variable block R 69       Assessment:     -Acute on chronic exacerbation of HFpEF    -Atypical atrial flutter  Status post VOM alcohol ablation  On low dose sotalol  -Atrial fibrillation  Status post PVI  -LL occlusion with a 40 mm clip during cardiac surgery on 04/19/2022  -UTI  On antibiotics now    -Mitral valve disease, mitral regurgitation Status post mitral replacement with 27 mm Major Magna mitral valve on 4/19/2022. Nl EF by echo, mean grad 7 yesterday   -Ascending aortic aneurysm status post surgery replacement of ascending aorta with 34 mm Dacron graft and resuspension of aortic valve on 04/19/2022   =PFO status post closure during cardiac surgery on      04/19/2022  Left heart catheterization on 11/18/2021 shows no significant CAD  Hypertension  Chronic oral anticoagulation.     Plan: Status post cardioversion. In sinus rhythm. OK to go home.          Eamon Ryder MD  Clinical Cardiac Electrophysiology  [facility]      
End of Shift Note    Bedside shift change report given to Shalini MAR (oncoming nurse) by Brinda Kirkland RN (offgoing nurse).  Report included the following information SBAR, Kardex, MAR, and Recent Results    Shift worked: [id number]   Shift summary and any significant changes:    Pt remained stable throughout shift. Scheduled meds given- no PRNs given. Ordered labs retrieved and sent by phlebotomy team. Caring rounds completed.      Brinda Kirkland RN    
Hospital follow-up CARDIO transitional care appointment has been scheduled with MARISSA Villarreal on 1/2/25 at 0900. This is a previously scheduled appt. [facility] placed [facility] information AVS for patient resource. Pending patient discharge.  Julee Woody, Care Management Assistant     
Pharmacy Medication History Review    Medication history obtained by Sary Linares while patient was in room CD37/37 and was completed based on information available during current patient encounter. Medication history was completed after home meds were reconciled by provider.      PTA medication list was corrected to the following:   Prior to Admission Medications   Prescriptions Last Dose Informant   Biotin 10 MG CAPS 11/19/2024 Self   Sig: Take 1 tablet by mouth in the morning and at bedtime   Melatonin 10 MG TABS 11/18/2024 Self   Sig: Take 1 tablet by mouth nightly   SORINE 80 MG tablet 11/20/2024 Self   Sig: Take 0.5 tablets by mouth 2 times daily   acetaminophen (TYLENOL) 500 MG tablet 11/20/2024 Self   Sig: Take 1 tablet by mouth every 4 hours as needed   albuterol sulfate HFA (PROVENTIL;VENTOLIN;PROAIR) 108 (90 Base) MCG/ACT inhaler Past Week Self   Sig: Inhale 2 puffs into the lungs every 4 hours as needed   apixaban (ELIQUIS) 5 MG TABS tablet 11/19/2024 Self   Sig: Take 1 tablet by mouth 2 times daily   bumetanide (BUMEX) 2 MG tablet Past Week Self   Sig: Take 1 tablet by mouth daily as needed   celecoxib (CELEBREX) 200 MG capsule     Sig: Take 1 capsule by mouth daily   dofetilide (TIKOSYN) 250 MCG capsule     Sig: Take 1 capsule by mouth 2 times daily   metoprolol succinate (TOPROL XL) 50 MG extended release tablet     Sig: Take 1 tablet by mouth daily   omeprazole (PRILOSEC) 20 MG delayed release capsule 11/19/2024 Self   Sig: Take 1 capsule by mouth daily   pantoprazole (PROTONIX) 40 MG tablet Past Week Self   Sig: Take 1 tablet by mouth in the morning and at bedtime   pregabalin (LYRICA) 75 MG capsule 11/18/2024 Self   Sig: Take 1 capsule by mouth 2 times daily.   terbinafine (LAMISIL) 250 MG tablet 11/19/2024 Self   Sig: Take 1 tablet by mouth at bedtime   traMADol (ULTRAM) 50 MG tablet 11/20/2024 Self   Sig: Take 2 tablets by mouth 2 times daily.      Facility-Administered Medications: None 
Pharmacy Monitoring of Sotalol for Arrhythmias    Indication: Afib    Sotalol dose ordered: 40 mg every 12 hours (PTA) dose initially  Baseline QTc interval (on admission prior to dose) for new starts only: NA    Labs:  Recent Labs     11/20/24  1101 11/21/24  0538   K 4.3 3.7   MG 2.0 1.7     estimated creatinine clearance is 47 mL/min (based on SCr of 1.02 mg/dL).  Pulse Readings from Last 3 Encounters:   11/21/24 80   05/24/22 96   03/21/22 70       Significant drug interactions: ondansetron - monitor    Electrolyte replacement:   Potassium supplementation ordered: NO  Magnesium supplementation ordered: NO       Impression/Plan:  - Sotalol  increase to 80mg bid ordered by Cardiologist. Patient will need transferred to a step-down unit for monitoring due to increased dose.  - Mag level 1.7, replete with 2gm iv x1  - Daily BMP per protocol + daily magnesium     Thank you,  SNOW CHEN, [facility]    Sotalol Policies    Documents located on pharmacy Teams site: Clinical Practice -> Anticoagulation & Cardiology        
Pharmacy Monitoring of Sotalol for Arrhythmias    Indication: Afib    Sotalol dose ordered: 80 mg PO BID  Baseline QTc interval (on admission prior to dose) for new starts only: NA    Labs:  Recent Labs     11/20/24  1101 11/21/24  0538   K 4.3 3.7   MG 2.0 1.7     estimated creatinine clearance is 47 mL/min (based on SCr of 1.02 mg/dL).  Pulse Readings from Last 3 Encounters:   11/21/24 (!) 107   05/24/22 96   03/21/22 70       Significant drug interactions: ondansetron - monitor    Date and Time Dose (mg) QTc* (2 hours after each dose)   11/21 AM 80    11/21 PM 80    11/22 AM 80    11/22 PM 80    11/23 AM 80    11/23 PM 80        Electrolyte replacement:   Potassium supplementation ordered: YES  Magnesium supplementation ordered: YES       Impression/Plan:  Sotalol dose increased to 80 mg BID  Will need to complete Qtc monitoring for 6 doses (ending 11/23 PM)  Patient cannot be transferred off stepdown unit or discharged until Qtc monitoring period has been completed  Daily BMP, magnesium     Thank you,  Jose Alejandro Casas [facility]    Sotalol Policies    Documents located on pharmacy Teams site: Clinical Practice -> Anticoagulation & Cardiology          
Pharmacy Monitoring of Sotalol for Arrhythmias    Indication: Afib    Sotalol dose ordered: 80 mg PO BID  Baseline QTc interval (on admission prior to dose) for new starts only: NA    Labs:  Recent Labs     11/21/24  0538 11/22/24  0618 11/23/24  0354   K 3.7 4.3 4.4   MG 1.7 2.2 2.1     estimated creatinine clearance is 35 mL/min (A) (based on SCr of 1.37 mg/dL (H)).  Pulse Readings from Last 3 Encounters:   11/23/24 96   05/24/22 96   03/21/22 70       Significant drug interactions: ondansetron - monitor    Date and Time Dose (mg) QTc* (2 hours after each dose)   11/23 AM 80    11/23 PM 80    11/24 AM 80    11/24 PM 80    11/25 AM 80    11/25 PM 80        Electrolyte replacement:   Potassium supplementation ordered: YES  Magnesium supplementation ordered: YES       Impression/Plan:  Sotalol dose was decreased back to home dose of 40 mg BID 11/22  Cardiology still wants to increase dose to 80 mg  Will need to restart and then complete Qtc monitoring for 6 doses (ending 11/25 PM)  Patient cannot be transferred off stepdown unit or discharged until Qtc monitoring period has been completed  Daily BMP, magnesium     Thank you,  Jose Alejandro Casas RPH    Sotalol Policies    Documents located on pharmacy Teams site: Clinical Practice -> Anticoagulation & Cardiology            
Pharmacy Monitoring of Sotalol for Arrhythmias    Indication: Afib    Sotalol dose ordered: 80 mg PO BID  Baseline QTc interval (on admission prior to dose) for new starts only: NA    Labs:  Recent Labs     11/23/24  0354 11/24/24  0411 11/25/24  0400   K 4.4 4.2 3.9   MG 2.1 2.1 2.0     estimated creatinine clearance is 46 mL/min (A) (based on SCr of 1.03 mg/dL (H)).  Pulse Readings from Last 3 Encounters:   11/25/24 68   05/24/22 96   03/21/22 70       Significant drug interactions: ondansetron - monitor    Date and Time Dose (mg) QTc* (2 hours after each dose)   11/23 AM 80 530   11/23 PM 80 414   11/24 AM 80 426   11/24 PM 80 434   11/25 AM 80 429   11/25 PM 80        Electrolyte replacement:   Potassium supplementation ordered: YES  Magnesium supplementation ordered: NO       Impression/Plan:  Continue sotalol 80 mg BID  Sotalol Qtc monitoring period ends tonight 11/25. Can discharge 11/26  Daily BMP, magnesium     Thank you,  Jose Alejandro Casas [facility]    Sotalol Policies    Documents located on pharmacy Teams site: Clinical Practice -> Anticoagulation & Cardiology                
RAPID RESPONSE NOTE:    BACKGROUND/ SITUATION:  78-year-old  female admitted for diastolic heart failure exacerbation.  Recently transferred to stepdown unit to initiate sotalol for atypical atrial flutter.  Nursing went to administer medications and on waking patient felt that she was altered with slurred speech noting blood pressure reading at that time was in the 80s.  They became concerned for CVA and called a stroke alert.    FINDINGS:  Elderly  female in no apparent distress sitting upright in bed  Alert and oriented x 4  Speaking fluently  No aphasia or dysarthria appreciated  Symmetric muscle strength BUE/BLE  Symmetric sensation to light touch BUE/BLE  Patient denies any concerns stating \"I do not think I am having a stroke either I was just sleeping\"    Hemodynamically stable -BP at time of my encounter WNL    POC glucose 189    ASSESSMENT    Transient confusion on waking  Diastolic heart failure exacerbation  Atrial flutter on sotalol initiation  Anticoagulated patient-Eliquis    INTERVENTION/ RESPONSE  Canceled stroke alert  Would not be candidate for TNK regardless given no apparent symptomatology and anticoagulated status  Patient condition appears unchanged-suspect she was transiently confused on waking.  Confusion may have been exacerbated by transient hypotension if BP reading was accurate given this has corrected without any intervention.  Alternatively, patient may have experienced vagal response from being startled awake.  I have requested that in future nursing attempt to contact a provider for evaluation or call a rapid response prior to calling stroke alert as there are providers in house for rapid assessment.  I directed nursing if they are unable to reach me they may proceed with stroke alert without provider evaluation      Arthur Simmons DO  [facility] [place]/ Nocturnist  11/21/2024 8:00 PM    Please do not hesitate to reach out to myself or 
TRANSFER - OUT REPORT:    Verbal report given to Lucia(name) on Vani Fontaine being transferred to CMSU(unit) for routine post-op       Report consisted of patient's Situation, Background, Assessment and   Recommendations(SBAR).     Information from the following report(s) Nurse Handoff Report was reviewed with the receiving nurse.    Opportunity for questions and clarification was provided.      Patient transported with:   Registered Nurse    
TRANSFER - OUT REPORT:    Verbal report given to ZAID Ladd on Vani Fontaine  being transferred to 2112 for routine progression of patient care       Report consisted of patient's Situation, Background, Assessment and   Recommendations(SBAR).     Information from the following report(s) Intake/Output, MAR, and Recent Results was reviewed with the receiving nurse.           Lines:   Peripheral IV 11/20/24 Left Wrist (Active)   Site Assessment Clean, dry & intact 11/21/24 0740   Line Status Flushed;Capped 11/21/24 0740   Line Care Connections checked and tightened 11/21/24 0740   Phlebitis Assessment No symptoms 11/21/24 0740   Infiltration Assessment 0 11/21/24 0740   Alcohol Cap Used Yes 11/21/24 0740   Dressing Status Clean, dry & intact 11/21/24 0740   Dressing Type Transparent 11/21/24 0740       Peripheral IV 11/20/24 Right Antecubital (Active)   Site Assessment Clean, dry & intact 11/21/24 0740   Line Status Flushed 11/21/24 0740   Line Care Cap changed;Connections checked and tightened 11/21/24 0740   Phlebitis Assessment No symptoms 11/21/24 0740   Infiltration Assessment 0 11/21/24 0740   Alcohol Cap Used Yes 11/21/24 0740   Dressing Status Clean, dry & intact 11/21/24 0740   Dressing Type Transparent 11/21/24 0740        Opportunity for questions and clarification was provided.      Patient transported with:  Monitor, O2 @ 2lpm, Registered Nurse, and Tech       
CO2 27 21 - 32 mmol/L    Anion Gap 6 2 - 12 mmol/L    Glucose 71 65 - 100 mg/dL    BUN 11 6 - 20 MG/DL    Creatinine 1.02 0.55 - 1.02 MG/DL    BUN/Creatinine Ratio 11 (L) 12 - 20      Est, Glom Filt Rate 57 (L) >60 ml/min/1.73m2    Calcium 8.6 8.5 - 10.1 MG/DL   CBC with Auto Differential    Collection Time: 11/21/24  5:38 AM   Result Value Ref Range    WBC 6.9 3.6 - 11.0 K/uL    RBC 3.47 (L) 3.80 - 5.20 M/uL    Hemoglobin 9.2 (L) 11.5 - 16.0 g/dL    Hematocrit 31.5 (L) 35.0 - 47.0 %    MCV 90.8 80.0 - 99.0 FL    MCH 26.5 26.0 - 34.0 PG    MCHC 29.2 (L) 30.0 - 36.5 g/dL    RDW 15.6 (H) 11.5 - 14.5 %    Platelets 276 150 - 400 K/uL    MPV 11.5 8.9 - 12.9 FL    Nucleated RBCs 0.0 0  WBC    nRBC 0.00 0.00 - 0.01 K/uL    Neutrophils % 73 32 - 75 %    Lymphocytes % 12 12 - 49 %    Monocytes % 10 5 - 13 %    Eosinophils % 3 0 - 7 %    Basophils % 1 0 - 1 %    Immature Granulocytes % 1 (H) 0.0 - 0.5 %    Neutrophils Absolute 5.1 1.8 - 8.0 K/UL    Lymphocytes Absolute 0.8 0.8 - 3.5 K/UL    Monocytes Absolute 0.7 0.0 - 1.0 K/UL    Eosinophils Absolute 0.2 0.0 - 0.4 K/UL    Basophils Absolute 0.1 0.0 - 0.1 K/UL    Immature Granulocytes Absolute 0.1 (H) 0.00 - 0.04 K/UL    Differential Type AUTOMATED     Magnesium    Collection Time: 11/21/24  5:38 AM   Result Value Ref Range    Magnesium 1.7 1.6 - 2.4 mg/dL   Phosphorus    Collection Time: 11/21/24  5:38 AM   Result Value Ref Range    Phosphorus 3.5 2.6 - 4.7 MG/DL       CT ABDOMEN PELVIS WO CONTRAST Additional Contrast? None   Final Result   1.  1.3 cm stone in the right kidney. No hydronephrosis.   2.  Small bilateral pleural effusions.   3.  Multiple fat-containing ventral wall hernias. No evidence of intestinal   herniation or obstruction.   4.  Colonic diverticulosis without evidence of acute diverticulitis.      Electronically signed by URIEL Packer      CTA CHEST W WO CONTRAST PE Eval   Final Result   No pulmonary embolism. No thoracic aortic dissection or 
Collection Time: 11/25/24  4:00 AM   Result Value Ref Range    Phosphorus 2.9 2.6 - 4.7 MG/DL       CT ABDOMEN PELVIS WO CONTRAST Additional Contrast? None   Final Result   1.  1.3 cm stone in the right kidney. No hydronephrosis.   2.  Small bilateral pleural effusions.   3.  Multiple fat-containing ventral wall hernias. No evidence of intestinal   herniation or obstruction.   4.  Colonic diverticulosis without evidence of acute diverticulitis.      Electronically signed by URIEL Packer      CTA CHEST W WO CONTRAST PE Eval   Final Result   No pulmonary embolism. No thoracic aortic dissection or aneurysm.      Trace bilateral pleural effusions with mild pulmonary edema. Associated   cardiomegaly and dilated pulmonary artery may suggest congestive heart failure.   Dilated pulmonary artery may also be seen with chronic pulmonary hypertension.   Postsurgical changes as detailed.   Nonspecific mediastinal lymphadenopathy.      Left thyroid lobe nodule measuring up to 2.3 cm. Clinical correlation advised   and correlation available.            Electronically signed by GWYN GODWIN      XR CHEST PORTABLE   Final Result      No acute process on portable chest.         Electronically signed by KASHIF RANDOLPH             Discussion/MDM:     [] High (any 2)    A. Problems (any 1)  [x] Acute/Chronic Illness/injury posing threat to life or bodily function:    [] Severe exacerbation of chronic illness:    ---------------------------------------------------------------------  B. Risk of Treatment (any 1)   [] Drugs/treatments that require intensive monitoring for toxicity include:    [] IV ABX requiring serial renal monitoring for nephrotoxicity:     [] IV Narcotic analgesia for adverse drug reaction  [x] Aggressive IV diuresis requiring serial monitoring for renal impairment and electrolyte derangements  [x] Critical electrolyte abnormalities requiring IV replacement and close serial monitoring  [] SQ Insulin SS- monitoring 
dissection or aneurysm.      Trace bilateral pleural effusions with mild pulmonary edema. Associated   cardiomegaly and dilated pulmonary artery may suggest congestive heart failure.   Dilated pulmonary artery may also be seen with chronic pulmonary hypertension.   Postsurgical changes as detailed.   Nonspecific mediastinal lymphadenopathy.      Left thyroid lobe nodule measuring up to 2.3 cm. Clinical correlation advised   and correlation available.            Electronically signed by GWYN GODWIN      XR CHEST PORTABLE   Final Result      No acute process on portable chest.         Electronically signed by KASHIF RANDOLPH             Discussion/MDM:     [] High (any 2)    A. Problems (any 1)  [x] Acute/Chronic Illness/injury posing threat to life or bodily function:    [] Severe exacerbation of chronic illness:    ---------------------------------------------------------------------  B. Risk of Treatment (any 1)   [] Drugs/treatments that require intensive monitoring for toxicity include:    [] IV ABX requiring serial renal monitoring for nephrotoxicity:     [] IV Narcotic analgesia for adverse drug reaction  [x] Aggressive IV diuresis requiring serial monitoring for renal impairment and electrolyte derangements  [x] Critical electrolyte abnormalities requiring IV replacement and close serial monitoring  [] SQ Insulin SS- monitoring serial FSBS for Hypoglycemic adverse drug reaction  [] Other -   [] Change in code status:    [] Decision to escalate care:    [] Major surgery/procedure with associated risk factors:    ----------------------------------------------------------------------  C. Data (any 2)  [x] Discussed current management and discharge planning options with Case Management.  [x] Discussed management of the case with:    [] Telemetry personally reviewed and interpreted as documented above    [] Imaging personally reviewed and interpreted, includes:    [] Data Review (any 3)  [x] All available

## (undated) DEVICE — Device

## (undated) DEVICE — FELT SURG W6XL6IN THK1.65MM PTFE FOR THE REP OF SEPT DEFCT

## (undated) DEVICE — EZ GLIDE AORTIC CANNULA: Brand: EDWARDS LIFESCIENCES EZ GLIDE AORTIC CANNULA

## (undated) DEVICE — GLOVE SURG SZ 75 CRM LTX FREE POLYISOPRENE POLYMER BEAD ANTI

## (undated) DEVICE — CLIP LIG M BLU TI HRT SHP WIRE HORZ 600 PER BX

## (undated) DEVICE — GLOVE SURG SZ 8 CRM LTX FREE POLYISOPRENE POLYMER BEAD ANTI

## (undated) DEVICE — BAG RED 3PLY 2MIL 30X40 IN

## (undated) DEVICE — YANKAUER,BULB TIP,W/O VENT,RIGID,STERILE: Brand: MEDLINE

## (undated) DEVICE — HI-TORQUE SPARTACORE 14 PERIPHERAL GUIDE WIRE .014 5.0 CM X 190 CM: Brand: SPARTACORE

## (undated) DEVICE — DRESSING SIL W4XL5IN ANTIBACT GELLING FBR CYTOFORM

## (undated) DEVICE — PRESSURE MONITORING SET: Brand: TRUWAVE

## (undated) DEVICE — 72" ARTERIAL PRESSURE TUBING: Brand: ICU MEDICAL

## (undated) DEVICE — TUBING PRSS MON L6IN PVC M FEM CONN

## (undated) DEVICE — 3M™ TEGADERM™ TRANSPARENT FILM DRESSING FRAME STYLE, 1626W, 4 IN X 4-3/4 IN (10 CM X 12 CM), 50/CT 4CT/CASE: Brand: 3M™ TEGADERM™

## (undated) DEVICE — CANNULA AORT ROOT INTRO STD TIP 5FR OVERALL LEN 55IN DLP

## (undated) DEVICE — CATHETER,URETHRAL,REDRUBBER,STRL,18FR: Brand: MEDLINE

## (undated) DEVICE — COR-KNOT MINI® COMBO KITBASE PACKAGE TYPE - KITEACH STERILE PACKAGE KIT CONTAINS (2) SINGLE PATIENT USE COR-KNOT MINI® DEVICES AND (12) COR-KNOT® QUICK LOADS®.: Brand: COR-KNOT MINI®

## (undated) DEVICE — SOLUTION IV 1000ML 140MEQ/L SOD 5MEQ/L K 3MEQ/L MG 27MEQ/L

## (undated) DEVICE — VALVE MI 27MM PERICARD HRT THERMAFIX PROC PERIMT MAGNA MI
Type: IMPLANTABLE DEVICE | Site: MITRAL VALVE | Status: NON-FUNCTIONAL
Removed: 2022-04-19

## (undated) DEVICE — BLANKET WRM W25XL64IN NONWOVEN SFT LTWT PLIABLE HYPR

## (undated) DEVICE — SOL INJ SOD CL 0.9% 500ML BG --

## (undated) DEVICE — CABLE EXTN L6FT BLU FAX LOC W/ SAFE CONN OR SCR DN DISP

## (undated) DEVICE — Device: Brand: VASCULAR DILATOR KIT

## (undated) DEVICE — GUIDEWIRE VASC L260CM DIA0.035IN L7CM DIA3MM J TIP PTFE S

## (undated) DEVICE — CATH GUID COR JR4.0 6FR 100CM -- LAUNCHER

## (undated) DEVICE — OPEN HEART A- RICHMOND: Brand: MEDLINE INDUSTRIES, INC.

## (undated) DEVICE — CANNULA PERF 25FR L30IN MULTISTAGE FEM VEN W/ INSRT KT

## (undated) DEVICE — GLOVE SURG SZ 65 CRM LTX FREE POLYISOPRENE POLYMER BEAD ANTI

## (undated) DEVICE — SUT SLK 2 60IN TIE MP BLK --

## (undated) DEVICE — TOURNIQUET 12FR L7IN 3 CLR 1 SNR VENA CAVA DLP

## (undated) DEVICE — CLIP INT SM WIDE RED TI TRNSVRS GRV CHEVRON SHP W PRECIS

## (undated) DEVICE — SYR 10ML LUER LOK 1/5ML GRAD --

## (undated) DEVICE — RADIFOCUS OPTITORQUE ANGIOGRAPHIC CATHETER: Brand: OPTITORQUE

## (undated) DEVICE — SWAN-GANZ TRUE SIZE THERMODULTION CATHETER, 5F: Brand: SWAN-GANZ TRUE SIZE

## (undated) DEVICE — 3M™ MEDIPORE™ H SOFT CLOTH SURGICAL TAPE 2864, 4 INCH X 10 YARD (10CM X 9,14M), 12 ROLLS/CASE: Brand: 3M™ MEDIPORE™

## (undated) DEVICE — SUTURE PERMA-HAND 0 L18IN NONABSORBABLE BLK CT-1 L36MM 1/2 C021D

## (undated) DEVICE — OPEN HEART B-RICHMOND: Brand: MEDLINE INDUSTRIES, INC.

## (undated) DEVICE — TUBING, SUCTION, 1/4" X 10', STRAIGHT: Brand: MEDLINE

## (undated) DEVICE — GLIDESHEATH SLENDER ACCESS KIT: Brand: GLIDESHEATH SLENDER

## (undated) DEVICE — SUMP PERICARD AD 20FR WT TIP VERSATILE DSGN MULT PRT VENT

## (undated) DEVICE — SYR 3ML LL TIP 1/10ML GRAD --

## (undated) DEVICE — TTL1LYR 16FR10ML 100%SIL TMPST TR: Brand: MEDLINE

## (undated) DEVICE — SYRINGE ANGIO 12ML COR CTRL FIX M LUER CONN RNG GRP SLD RNG

## (undated) DEVICE — HI-TORQUE VERSACORE FLOPPY GUIDE WIRE SYSTEM 145 CM: Brand: HI-TORQUE VERSACORE

## (undated) DEVICE — CANISTER, RIGID, 3000CC: Brand: MEDLINE INDUSTRIES, INC.

## (undated) DEVICE — GUIDEWIRE VASC L260CM 0.035IN J TIP L3MM PTFE FIX COR NAMIC

## (undated) DEVICE — SYR 20ML LL STRL LF --

## (undated) DEVICE — SYSTEM SKIN CLOSURE 42CM DERMABOND PRINEO

## (undated) DEVICE — UNDERGLOVE SURG SZ 8 FNGR THK0.21MIL GRN LTX BEAD CUF

## (undated) DEVICE — SIZER GRAFT DIA28 38MM SURGICAL MEASURING

## (undated) DEVICE — DRAPE SLUSH DISC W44XL66IN ST FOR RND BSIN HUSH SLUSH SYS

## (undated) DEVICE — SYR 50ML LR LCK 1ML GRAD NSAF --

## (undated) DEVICE — DUAL LUMEN STOMACH TUBE MULTI-FUNCTIONAL PORT: Brand: SALEM SUMP

## (undated) DEVICE — SUTURE MCRYL SZ 3-0 L27IN ABSRB UD L24MM PS-1 3/8 CIR PRIM Y936H

## (undated) DEVICE — AVID DUAL STAGE VENOUS DRAINAGE CANNULA: Brand: AVID DUAL STAGE VENOUS DRAINAGE CANNULA

## (undated) DEVICE — SUTURE SZ 7 L18IN NONABSORBABLE SIL CCS L48MM 1/2 CIR STRNM M655G

## (undated) DEVICE — SOLUTION IRRIG 1000ML 0.9% SOD CHL USP POUR PLAS BTL

## (undated) DEVICE — PUTTY BONE HEMSTAT ABSORB MTRX 2.0GRAM

## (undated) DEVICE — COR-KNOT® QUICK LOAD® SINGLES: Brand: COR-KNOT® QUICK LOAD®

## (undated) DEVICE — BAND COMPR L24CM REG CLR PLAS HEMSTAT EXT HK AND LOOP RETEN

## (undated) DEVICE — SPLINT WR POS F/ARTERIAL ACC -- BX/10

## (undated) DEVICE — SYR LR LCK 1ML GRAD NSAF 30ML --

## (undated) DEVICE — TRANSFER PK BLD PROD 300ML --

## (undated) DEVICE — GLOVE SURG SZ 85 CRM LTX FREE POLYISOPRENE POLYMER BEAD ANTI

## (undated) DEVICE — CATHETER IV 14GA L2IN POLYUR STR ORNG HUB SFTY RADPQ DISP

## (undated) DEVICE — CAUTERY HIGH TEMPERATURE 2" EXTENDED SHAFT LOOP TIP (10/BX): Brand: BOVIE

## (undated) DEVICE — AEGIS 1" DISK 4MM HOLE, PEEL OPEN: Brand: MEDLINE

## (undated) DEVICE — FOGARTY - HYDRAGRIP SURGICAL - CLAMP INSERTS: Brand: FOGARTY SOFTJAW

## (undated) DEVICE — GLUE TISS 10ML PLAS STD SPRD TIP SYR PLUNG PREFIL SKIN CLSR 5PK

## (undated) DEVICE — GLOVE SURG SZ 7 CRM LTX FREE POLYISOPRENE POLYMER BEAD ANTI

## (undated) DEVICE — DRSG BORDR MPLX HEEL 8.7X9.1IN --

## (undated) DEVICE — DRAPE PRB US TRNSDCR 6X96IN --

## (undated) DEVICE — SOLUTION IRRIG 1000ML STRL H2O USP PLAS POUR BTL

## (undated) DEVICE — Device: Brand: JELCO

## (undated) DEVICE — SUT ETHBND 3-0 36IN SH1 DA GRN --

## (undated) DEVICE — CANNULA PERF 15FR L12.5IN RG STPCOCK WIREWOUND BODY

## (undated) DEVICE — BLADE ES L4IN INSUL EDGE

## (undated) DEVICE — 6 FOOT DISPOSABLE EXTENSION CABLE WITH SAFE CONNECT / SCREW-DOWN

## (undated) DEVICE — CANNULA PERF AD 19FR L18CM FEM ART CONN VENT 1 PC ELONG KINK

## (undated) DEVICE — MEDI-TRACE CADENCE ADULT, DEFIBRILLATION ELECTRODE -RTS  (10 PR/PK) - PHILIPS: Brand: MEDI-TRACE CADENCE

## (undated) DEVICE — CANNULA PERF 19FR L30IN MULTISTAGE FEM VEN W/ INSRT KT

## (undated) DEVICE — SYRINGE ANGIO 10 CC BRL STD PRNT POLYCARB LT BLU MEDALLION

## (undated) DEVICE — DISPOSABLE OR TOWEL: Brand: CARDINAL HEALTH